# Patient Record
Sex: FEMALE | Race: WHITE | HISPANIC OR LATINO | Employment: FULL TIME | URBAN - METROPOLITAN AREA
[De-identification: names, ages, dates, MRNs, and addresses within clinical notes are randomized per-mention and may not be internally consistent; named-entity substitution may affect disease eponyms.]

---

## 2017-03-03 DIAGNOSIS — N60.09 SOLITARY CYST OF BREAST: ICD-10-CM

## 2017-03-03 DIAGNOSIS — Z12.31 ENCOUNTER FOR SCREENING MAMMOGRAM FOR MALIGNANT NEOPLASM OF BREAST: ICD-10-CM

## 2017-03-06 ENCOUNTER — HOSPITAL ENCOUNTER (OUTPATIENT)
Dept: MAMMOGRAPHY | Facility: CLINIC | Age: 52
Discharge: HOME | End: 2017-03-06
Payer: COMMERCIAL

## 2017-03-06 DIAGNOSIS — Z12.31 ENCOUNTER FOR SCREENING MAMMOGRAM FOR MALIGNANT NEOPLASM OF BREAST: ICD-10-CM

## 2017-03-06 PROCEDURE — G0202 SCR MAMMO BI INCL CAD: HCPCS

## 2017-03-22 ENCOUNTER — HOSPITAL ENCOUNTER (OUTPATIENT)
Dept: MAMMOGRAPHY | Facility: CLINIC | Age: 52
Discharge: HOME | End: 2017-03-22
Payer: COMMERCIAL

## 2017-03-22 ENCOUNTER — HOSPITAL ENCOUNTER (OUTPATIENT)
Dept: ULTRASOUND IMAGING | Facility: CLINIC | Age: 52
Discharge: HOME | End: 2017-03-22
Payer: COMMERCIAL

## 2017-03-22 DIAGNOSIS — N60.09 SOLITARY CYST OF BREAST: ICD-10-CM

## 2017-03-22 PROCEDURE — 76642 ULTRASOUND BREAST LIMITED: CPT

## 2017-03-22 PROCEDURE — G0206 DX MAMMO INCL CAD UNI: HCPCS

## 2017-04-17 ENCOUNTER — ALLSCRIPTS OFFICE VISIT (OUTPATIENT)
Dept: OTHER | Facility: OTHER | Age: 52
End: 2017-04-17

## 2017-04-17 ENCOUNTER — TRANSCRIBE ORDERS (OUTPATIENT)
Dept: LAB | Facility: CLINIC | Age: 52
End: 2017-04-17

## 2017-04-17 ENCOUNTER — LAB (OUTPATIENT)
Dept: LAB | Facility: CLINIC | Age: 52
End: 2017-04-17
Payer: COMMERCIAL

## 2017-04-17 DIAGNOSIS — R10.9 ABDOMINAL PAIN: ICD-10-CM

## 2017-04-17 DIAGNOSIS — E07.9 DISORDER OF THYROID: ICD-10-CM

## 2017-04-17 DIAGNOSIS — Z86.39 PERSONAL HISTORY OF OTHER ENDOCRINE, NUTRITIONAL AND METABOLIC DISEASE: ICD-10-CM

## 2017-04-17 LAB
ALBUMIN SERPL BCP-MCNC: 3.6 G/DL (ref 3.5–5)
ALP SERPL-CCNC: 110 U/L (ref 46–116)
ALT SERPL W P-5'-P-CCNC: 20 U/L (ref 12–78)
AMYLASE SERPL-CCNC: 56 IU/L (ref 25–115)
ANION GAP SERPL CALCULATED.3IONS-SCNC: 7 MMOL/L (ref 4–13)
AST SERPL W P-5'-P-CCNC: 20 U/L (ref 5–45)
BASOPHILS # BLD AUTO: 0.01 THOUSANDS/ΜL (ref 0–0.1)
BASOPHILS NFR BLD AUTO: 0 % (ref 0–1)
BILIRUB SERPL-MCNC: 0.42 MG/DL (ref 0.2–1)
BUN SERPL-MCNC: 14 MG/DL (ref 5–25)
CALCIUM SERPL-MCNC: 8.8 MG/DL (ref 8.3–10.1)
CHLORIDE SERPL-SCNC: 107 MMOL/L (ref 100–108)
CHOLEST SERPL-MCNC: 158 MG/DL (ref 50–200)
CO2 SERPL-SCNC: 27 MMOL/L (ref 21–32)
CREAT SERPL-MCNC: 0.64 MG/DL (ref 0.6–1.3)
EOSINOPHIL # BLD AUTO: 0.19 THOUSAND/ΜL (ref 0–0.61)
EOSINOPHIL NFR BLD AUTO: 3 % (ref 0–6)
ERYTHROCYTE [DISTWIDTH] IN BLOOD BY AUTOMATED COUNT: 13.8 % (ref 11.6–15.1)
GFR SERPL CREATININE-BSD FRML MDRD: >60 ML/MIN/1.73SQ M
GLUCOSE P FAST SERPL-MCNC: 88 MG/DL (ref 65–99)
HCT VFR BLD AUTO: 37.7 % (ref 34.8–46.1)
HDLC SERPL-MCNC: 76 MG/DL (ref 40–60)
HGB BLD-MCNC: 12.2 G/DL (ref 11.5–15.4)
LDLC SERPL CALC-MCNC: 53 MG/DL (ref 0–100)
LIPASE SERPL-CCNC: 253 U/L (ref 73–393)
LYMPHOCYTES # BLD AUTO: 2.56 THOUSANDS/ΜL (ref 0.6–4.47)
LYMPHOCYTES NFR BLD AUTO: 46 % (ref 14–44)
MCH RBC QN AUTO: 30 PG (ref 26.8–34.3)
MCHC RBC AUTO-ENTMCNC: 32.4 G/DL (ref 31.4–37.4)
MCV RBC AUTO: 93 FL (ref 82–98)
MONOCYTES # BLD AUTO: 0.53 THOUSAND/ΜL (ref 0.17–1.22)
MONOCYTES NFR BLD AUTO: 9 % (ref 4–12)
NEUTROPHILS # BLD AUTO: 2.37 THOUSANDS/ΜL (ref 1.85–7.62)
NEUTS SEG NFR BLD AUTO: 42 % (ref 43–75)
NRBC BLD AUTO-RTO: 0 /100 WBCS
PLATELET # BLD AUTO: 257 THOUSANDS/UL (ref 149–390)
PMV BLD AUTO: 10.6 FL (ref 8.9–12.7)
POTASSIUM SERPL-SCNC: 3.8 MMOL/L (ref 3.5–5.3)
PROT SERPL-MCNC: 7 G/DL (ref 6.4–8.2)
RBC # BLD AUTO: 4.07 MILLION/UL (ref 3.81–5.12)
SODIUM SERPL-SCNC: 141 MMOL/L (ref 136–145)
T4 FREE SERPL-MCNC: 1.11 NG/DL (ref 0.76–1.46)
TRIGL SERPL-MCNC: 146 MG/DL
TSH SERPL DL<=0.05 MIU/L-ACNC: 1.17 UIU/ML (ref 0.36–3.74)
WBC # BLD AUTO: 5.66 THOUSAND/UL (ref 4.31–10.16)

## 2017-04-17 PROCEDURE — 36415 COLL VENOUS BLD VENIPUNCTURE: CPT

## 2017-04-17 PROCEDURE — 84439 ASSAY OF FREE THYROXINE: CPT

## 2017-04-17 PROCEDURE — 86803 HEPATITIS C AB TEST: CPT

## 2017-04-17 PROCEDURE — 80053 COMPREHEN METABOLIC PANEL: CPT

## 2017-04-17 PROCEDURE — 84443 ASSAY THYROID STIM HORMONE: CPT

## 2017-04-17 PROCEDURE — 80061 LIPID PANEL: CPT

## 2017-04-17 PROCEDURE — 85025 COMPLETE CBC W/AUTO DIFF WBC: CPT

## 2017-04-17 PROCEDURE — 82150 ASSAY OF AMYLASE: CPT

## 2017-04-17 PROCEDURE — 83690 ASSAY OF LIPASE: CPT

## 2017-04-18 LAB — HCV AB SER QL: NORMAL

## 2017-04-22 ENCOUNTER — HOSPITAL ENCOUNTER (OUTPATIENT)
Dept: ULTRASOUND IMAGING | Facility: HOSPITAL | Age: 52
Discharge: HOME | End: 2017-04-22
Payer: COMMERCIAL

## 2017-04-22 DIAGNOSIS — R10.9 ABDOMINAL PAIN: ICD-10-CM

## 2017-04-22 PROCEDURE — 76705 ECHO EXAM OF ABDOMEN: CPT

## 2017-06-14 ENCOUNTER — HOSPITAL ENCOUNTER (EMERGENCY)
Facility: HOSPITAL | Age: 52
Discharge: HOME | End: 2017-06-14
Attending: EMERGENCY MEDICINE
Payer: COMMERCIAL

## 2017-06-14 ENCOUNTER — APPOINTMENT (EMERGENCY)
Dept: RADIOLOGY | Facility: HOSPITAL | Age: 52
End: 2017-06-14
Payer: COMMERCIAL

## 2017-06-14 VITALS
SYSTOLIC BLOOD PRESSURE: 186 MMHG | BODY MASS INDEX: 21.35 KG/M2 | TEMPERATURE: 98.1 F | RESPIRATION RATE: 16 BRPM | DIASTOLIC BLOOD PRESSURE: 86 MMHG | WEIGHT: 136 LBS | OXYGEN SATURATION: 100 % | HEIGHT: 67 IN | HEART RATE: 74 BPM

## 2017-06-14 DIAGNOSIS — M25.519 SHOULDER PAIN: Primary | ICD-10-CM

## 2017-06-14 PROCEDURE — 96372 THER/PROPH/DIAG INJ SC/IM: CPT

## 2017-06-14 PROCEDURE — 73030 X-RAY EXAM OF SHOULDER: CPT

## 2017-06-14 PROCEDURE — 99283 EMERGENCY DEPT VISIT LOW MDM: CPT

## 2017-06-14 RX ORDER — NAPROXEN 500 MG/1
500 TABLET ORAL 2 TIMES DAILY WITH MEALS
Qty: 30 TABLET | Refills: 0 | Status: SHIPPED | OUTPATIENT
Start: 2017-06-14 | End: 2018-04-27 | Stop reason: ALTCHOICE

## 2017-06-14 RX ORDER — KETOROLAC TROMETHAMINE 30 MG/ML
30 INJECTION, SOLUTION INTRAMUSCULAR; INTRAVENOUS ONCE
Status: COMPLETED | OUTPATIENT
Start: 2017-06-14 | End: 2017-06-14

## 2017-06-14 RX ADMIN — KETOROLAC TROMETHAMINE 30 MG: 30 INJECTION, SOLUTION INTRAMUSCULAR at 17:01

## 2017-06-21 ENCOUNTER — APPOINTMENT (OUTPATIENT)
Dept: LAB | Facility: CLINIC | Age: 52
End: 2017-06-21
Payer: COMMERCIAL

## 2017-06-21 ENCOUNTER — TRANSCRIBE ORDERS (OUTPATIENT)
Dept: LAB | Facility: CLINIC | Age: 52
End: 2017-06-21

## 2017-06-21 DIAGNOSIS — Z00.8 HEALTH EXAMINATION IN POPULATION SURVEY: ICD-10-CM

## 2017-06-21 DIAGNOSIS — Z00.8 HEALTH EXAMINATION IN POPULATION SURVEY: Primary | ICD-10-CM

## 2017-06-21 LAB
CHOLEST SERPL-MCNC: 215 MG/DL (ref 50–200)
EST. AVERAGE GLUCOSE BLD GHB EST-MCNC: 123 MG/DL
HBA1C MFR BLD: 5.9 % (ref 4.2–6.3)
HDLC SERPL-MCNC: 98 MG/DL (ref 40–60)
LDLC SERPL CALC-MCNC: 97 MG/DL (ref 0–100)
TRIGL SERPL-MCNC: 102 MG/DL

## 2017-06-21 PROCEDURE — 36415 COLL VENOUS BLD VENIPUNCTURE: CPT

## 2017-06-21 PROCEDURE — 80061 LIPID PANEL: CPT

## 2017-06-21 PROCEDURE — 83036 HEMOGLOBIN GLYCOSYLATED A1C: CPT

## 2017-06-23 ENCOUNTER — HOSPITAL ENCOUNTER (OUTPATIENT)
Dept: NON INVASIVE DIAGNOSTICS | Facility: CLINIC | Age: 52
Discharge: HOME | End: 2017-06-23
Payer: COMMERCIAL

## 2017-06-23 DIAGNOSIS — M79.609 PAIN IN EXTREMITY: ICD-10-CM

## 2017-06-23 PROCEDURE — 93971 EXTREMITY STUDY: CPT

## 2017-08-14 DIAGNOSIS — M25.552 PAIN IN LEFT HIP: ICD-10-CM

## 2017-08-14 DIAGNOSIS — M85.00 FIBROUS DYSPLASIA, MONOSTOTIC: ICD-10-CM

## 2017-08-29 ENCOUNTER — APPOINTMENT (OUTPATIENT)
Dept: RADIOLOGY | Facility: CLINIC | Age: 52
End: 2017-08-29
Payer: COMMERCIAL

## 2017-08-29 DIAGNOSIS — M25.552 PAIN IN LEFT HIP: ICD-10-CM

## 2017-08-29 PROCEDURE — 73502 X-RAY EXAM HIP UNI 2-3 VIEWS: CPT

## 2017-08-31 ENCOUNTER — TRANSCRIBE ORDERS (OUTPATIENT)
Dept: ADMINISTRATIVE | Facility: HOSPITAL | Age: 52
End: 2017-08-31

## 2017-08-31 DIAGNOSIS — M25.552 LEFT HIP PAIN: ICD-10-CM

## 2017-08-31 DIAGNOSIS — M85.00 FIBROUS DYSPLASIA OF BONE: Primary | ICD-10-CM

## 2017-08-31 DIAGNOSIS — M25.522 LEFT ELBOW PAIN: ICD-10-CM

## 2017-09-07 ENCOUNTER — HOSPITAL ENCOUNTER (OUTPATIENT)
Dept: MRI IMAGING | Facility: CLINIC | Age: 52
Discharge: HOME | End: 2017-09-07
Payer: COMMERCIAL

## 2017-09-07 DIAGNOSIS — M85.00 FIBROUS DYSPLASIA, MONOSTOTIC: ICD-10-CM

## 2017-09-07 DIAGNOSIS — M25.552 PAIN IN LEFT HIP: ICD-10-CM

## 2017-09-07 PROCEDURE — 73721 MRI JNT OF LWR EXTRE W/O DYE: CPT

## 2017-09-25 DIAGNOSIS — N60.09 SOLITARY CYST OF BREAST: ICD-10-CM

## 2017-10-28 ENCOUNTER — ALLSCRIPTS OFFICE VISIT (OUTPATIENT)
Dept: OTHER | Facility: OTHER | Age: 52
End: 2017-10-28

## 2017-10-29 NOTE — PROGRESS NOTES
Assessment  1  Acute suppurative otitis media (382 00) (H66 009)    Plan  Acute suppurative otitis media    · Amoxicillin-Pot Clavulanate 875-125 MG Oral Tablet; TAKE 1 TABLET EVERY 12 HOURS DAILY    Discussion/Summary  Discussion Summary:   She has bilateral otitis media  She was given a prescription for Augmentin  Side effects explained  will also take an antihistamine decongestant  Recheck next week if not improved  Chief Complaint  Chief Complaint Free Text Note Form: Acute sore throat and bilateral ear pain      History of Present Illness  HPI: She's been ill for 48 hours  She has bilateral ear pain and some pharyngitis  No rhinitis  Slight cough  No chest pain  No shortness of breath  Review of Systems  Complete-Female:   Constitutional: feeling poorly,-- chills-- and-- feeling tired, but-- as noted in HPI  Eyes: No complaints of eye pain, no red eyes, no eyesight problems, no discharge, no dry eyes, no itching of eyes  ENT: earache,-- sore throat-- and-- hoarseness, but-- no nasal discharge  Cardiovascular: No complaints of slow heart rate, no fast heart rate, no chest pain, no palpitations, no leg claudication, no lower extremity edema  Respiratory: as noted in HPI  Gastrointestinal: No complaints of abdominal pain, no constipation, no nausea or vomiting, no diarrhea, no bloody stools  Genitourinary: No complaints of dysuria, no incontinence, no pelvic pain, no dysmenorrhea, no vaginal discharge or bleeding  Active Problems  1  Abdominal pain (789 00) (R10 9)   2  Acute maxillary sinusitis (461 0) (J01 00)   3  Breast cyst (610 0) (N60 09)   4  Cervicalgia (723 1) (M54 2)   5  Cough (786 2) (R05)   6  Eustachian tube disorder (381 9) (H69 90)   7  Headache (784 0) (R51)   8  Hiatal hernia (553 3) (K44 9)   9  Left hip pain (719 45) (M25 552)   10  Limb pain (729 5) (M79 609)   11  Migraines (346 90) (G43 909)   12  Need for hepatitis C screening test (V73 89) (Z11 59)   13   Other screening mammogram (V76 12) (Z12 31)   14  Positional vertigo, right (386 19) (H81 11)   15  Rotator cuff injury (959 2) (S46 009A)   16  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)   17  Thyroid disease (246 9) (E07 9)    Past Medical History  1  History of Graves' disease (V12 29) (Z86 39)   2  History of Left hip pain (719 45) (M25 552)    Surgical History  1  History of Appendectomy   2  History of Excision Of Neuroma   3  History of Surgery Excision Lipoma    Family History  Mother    1  Family history of borderline diabetes mellitus (V18 0) (Z84 89)   2  Family history of hypercholesterolemia (V18 19) (Z83 42)  Father    3  Family history of epilepsy (V17 2) (Z82 0)  Sister    4  Family history of malignant neoplasm of brain (V16 8) (Z80 8)  Brother    5  Family history of    6  Family history of epilepsy (V17 2) (Z82 0)   7  Family history of Headache    Social History   · Current every day smoker (305 1) (F17 200)   · Employed   · No alcohol use   · Single  Social History Reviewed: The social history was reviewed and updated today  The social history was reviewed and is unchanged  Current Meds   1  Excedrin Extra Strength CAPS; TAKE 2 CAPSULE 4 times daily PRN; Therapy: (Recorded:73Vlt1328) to Recorded   2  Meclizine HCl - 25 MG Oral Tablet; TAKE 1 TABLET 3 TIMES DAILY AS NEEDED; Therapy: 97HBP1674 to (Evaluate:73Fim6591)  Requested for: 30PHF6195; Last Rx:03Vsp1304   Ordered   3  Multivitamins TABS; TAKE 1 TABLET DAILY; Therapy: (Recorded:14Zxp1481) to Recorded   4  PredniSONE 10 MG Oral Tablet; TAKE 4 TABLETS DAILY FOR 2 DAYS,3 TABLETS DAILY FOR 2   DAYS, 2 TABLETS DAILY FOR 2 DAYS AND 1 TABLET DAILY FOR 2 DAYS, THEN STOP; Therapy: 04XHX1946 to (Last Rx:75Hcd3078)  Requested for: 95LPV8773 Ordered   5  SUMAtriptan Succinate 100 MG Oral Tablet; take 1 tablet by mouth at onset of migraine headache,   may repeat onceafter 2 hours  maximum 2 tablets per 24 hours;    Therapy: 00AEX5496 to (Evaluate:09Apr2017)  Requested for: 61WIM9087; Last Rx:10Mar2017   Ordered   6  TraMADol HCl - 50 MG Oral Tablet; take one tablet by mouth every six hours as needed; Therapy: 01Sep2017 to (Last Rx:01Sep2017) Ordered  Medication List Reviewed: The medication list was reviewed and updated today  Allergies  1  Compazine TABS    Vitals  Vital Signs    Recorded: 79QBR5560 11:16AM   Temperature 97 8 F   Heart Rate 75   Systolic 482   Diastolic 88   O2 Saturation 100     Physical Exam    Constitutional   General appearance: Abnormal  -- She is in moderate distress  Eyes   Conjunctiva and lids: No swelling, erythema or discharge  Pupils and irises: Equal, round and reactive to light  Ears, Nose, Mouth, and Throat   Otoscopic examination: Abnormal  -- Tympanic membranes are dull and bulging bilaterally  No erythema  No perforation  Oropharynx: Abnormal  -- Posterior pharynx is markedly hyperemic  Pulmonary   Respiratory effort: No increased work of breathing or signs of respiratory distress  Auscultation of lungs: Clear to auscultation  Cardiovascular   Auscultation of heart: Normal rate and rhythm, normal S1 and S2, without murmurs  Abdomen   Abdomen: Non-tender, no masses  Future Appointments    Date/Time Provider Specialty Site   04/25/2018 09:00 AM SUSU Mckenzie   Orthopedic Surgery [address]     Signatures   Electronically signed by : Gustavo Levy DO; Oct 28 2017 12:48PM EST                       (Author)

## 2017-11-14 ENCOUNTER — GENERIC CONVERSION - ENCOUNTER (OUTPATIENT)
Dept: OTHER | Facility: OTHER | Age: 52
End: 2017-11-14

## 2017-11-15 NOTE — PROGRESS NOTES
Chief Complaint  Chief Complaint Free Text Note Form: Vaginal itching      History of Present Illness  HPI: The patient came to me in the office because I had given her an antibiotic for otitis media  The otitis media has resolved however she has developed some vaginal itching consistent with a yeast infection  She was given 2 doses of Diflucan to take  She is advised to see her gynecologist in follow-up  This is a especially since she has not had a recent pelvic examination  The importance of this was stressed to the patient  Active Problems  1  Abdominal pain (789 00) (R10 9)   2  Acute maxillary sinusitis (461 0) (J01 00)   3  Acute suppurative otitis media (382 00) (H66 009)   4  Breast cyst (610 0) (N60 09)   5  Cervicalgia (723 1) (M54 2)   6  Cough (786 2) (R05)   7  Eustachian tube disorder (381 9) (H69 90)   8  Headache (784 0) (R51)   9  Hiatal hernia (553 3) (K44 9)   10  Left hip pain (719 45) (M25 552)   11  Limb pain (729 5) (M79 609)   12  Migraines (346 90) (G43 909)   13  Need for hepatitis C screening test (V73 89) (Z11 59)   14  Other screening mammogram (V76 12) (Z12 31)   15  Positional vertigo, right (386 19) (H81 11)   16  Rotator cuff injury (959 2) (S46 009A)   17  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)   18  Thyroid disease (246 9) (E07 9)   19  Yeast infection (112 9) (B37 9)    Past Medical History  1  History of Graves' disease (V12 29) (Z86 39)   2  History of Left hip pain (719 45) (M25 552)    Surgical History  1  History of Appendectomy   2  History of Excision Of Neuroma   3  History of Surgery Excision Lipoma    Family History  Mother    1  Family history of borderline diabetes mellitus (V18 0) (Z84 89)   2  Family history of hypercholesterolemia (V18 19) (Z83 42)  Father    3  Family history of epilepsy (V17 2) (Z82 0)  Sister    4  Family history of malignant neoplasm of brain (V16 8) (Z80 8)  Brother    5  Family history of    6   Family history of epilepsy (V17 2) (Z82 0)   7  Family history of Headache    Social History     · Current every day smoker (305 1) (F17 200)   · Employed   · No alcohol use   · Single    Current Meds   1  Amoxicillin-Pot Clavulanate 875-125 MG Oral Tablet; TAKE 1 TABLET EVERY 12 HOURS DAILY; Therapy: 41SMK1863 to (Evaluate:11Nov2017)  Requested for: 28Oct2017; Last Rx:28Oct2017 Ordered   2  Excedrin Extra Strength CAPS; TAKE 2 CAPSULE 4 times daily PRN; Therapy: (Recorded:22Rat6820) to Recorded   3  Fluconazole 100 MG Oral Tablet (Diflucan); TAKE 1 TABLET ONCE  MAY REPEAT IN 2 DAYS ; Therapy: 85QMZ2072 to (Last Rx:14Nov2017); Status: ACTIVE - Retrospective By Protocol Authorization Ordered   4  Meclizine HCl - 25 MG Oral Tablet; TAKE 1 TABLET 3 TIMES DAILY AS NEEDED; Therapy: 01RFO9066 to (Evaluate:13Sep2016)  Requested for: 12KIC3705; Last Rx:85Glo2917 Ordered   5  Multivitamins TABS; TAKE 1 TABLET DAILY; Therapy: (Recorded:26Fzy7328) to Recorded   6  PredniSONE 10 MG Oral Tablet; TAKE 4 TABLETS DAILY FOR 2 DAYS,3 TABLETS DAILY FOR 2 DAYS, 2 TABLETS DAILY FOR 2 DAYS AND 1 TABLET DAILY FOR 2 DAYS, THEN STOP; Therapy: 16YJL4219 to (Last Rx:15Jun2017)  Requested for: 29EOY5133 Ordered   7  SUMAtriptan Succinate 100 MG Oral Tablet (Imitrex); take 1 tablet by mouth at onset of migraine headache, may repeat onceafter 2 hours  maximum 2 tablets per 24 hours; Therapy: 62ZAJ7514 to (Evaluate:09Apr2017)  Requested for: 48KOT3080; Last Rx:10Mar2017 Ordered   8  TraMADol HCl - 50 MG Oral Tablet; take one tablet by mouth every six hours as needed; Therapy: 08Ggv4102 to (Last Rx:63Lzx9441) Ordered    Allergies  1  Compazine TABS    Future Appointments    Date/Time Provider Specialty Site   04/25/2018 09:00 AM SUSU Mckenzie   Orthopedic Surgery [address]       Signatures   Electronically signed by : uGstavo Levy DO; Nov 14 2017  2:07PM EST                       (Author)

## 2018-01-12 VITALS
TEMPERATURE: 97.8 F | SYSTOLIC BLOOD PRESSURE: 130 MMHG | HEART RATE: 75 BPM | DIASTOLIC BLOOD PRESSURE: 88 MMHG | OXYGEN SATURATION: 100 %

## 2018-01-14 VITALS
RESPIRATION RATE: 12 BRPM | HEIGHT: 66 IN | WEIGHT: 135.25 LBS | HEART RATE: 60 BPM | SYSTOLIC BLOOD PRESSURE: 122 MMHG | DIASTOLIC BLOOD PRESSURE: 84 MMHG | BODY MASS INDEX: 21.74 KG/M2

## 2018-02-09 DIAGNOSIS — Z12.39 SCREENING FOR BREAST CANCER: Primary | ICD-10-CM

## 2018-03-06 DIAGNOSIS — F41.9 ANXIETY: Primary | ICD-10-CM

## 2018-03-06 RX ORDER — ALPRAZOLAM 0.25 MG/1
0.25 TABLET ORAL 2 TIMES DAILY PRN
Qty: 30 TABLET | Refills: 0 | Status: SHIPPED | OUTPATIENT
Start: 2018-03-06 | End: 2018-04-25

## 2018-04-16 ENCOUNTER — HOSPITAL ENCOUNTER (OUTPATIENT)
Dept: MAMMOGRAPHY | Facility: CLINIC | Age: 53
Discharge: HOME | End: 2018-04-16
Payer: COMMERCIAL

## 2018-04-16 DIAGNOSIS — Z12.39 SCREENING FOR BREAST CANCER: ICD-10-CM

## 2018-04-16 PROCEDURE — 77067 SCR MAMMO BI INCL CAD: CPT

## 2018-04-16 PROCEDURE — 77063 BREAST TOMOSYNTHESIS BI: CPT

## 2018-04-25 ENCOUNTER — TELEPHONE (OUTPATIENT)
Dept: OBGYN CLINIC | Facility: HOSPITAL | Age: 53
End: 2018-04-25

## 2018-04-25 ENCOUNTER — APPOINTMENT (OUTPATIENT)
Dept: RADIOLOGY | Facility: MEDICAL CENTER | Age: 53
End: 2018-04-25
Payer: COMMERCIAL

## 2018-04-25 ENCOUNTER — OFFICE VISIT (OUTPATIENT)
Dept: OBGYN CLINIC | Facility: MEDICAL CENTER | Age: 53
End: 2018-04-25
Payer: COMMERCIAL

## 2018-04-25 VITALS
HEART RATE: 73 BPM | RESPIRATION RATE: 18 BRPM | DIASTOLIC BLOOD PRESSURE: 76 MMHG | WEIGHT: 133.4 LBS | BODY MASS INDEX: 20.89 KG/M2 | SYSTOLIC BLOOD PRESSURE: 113 MMHG

## 2018-04-25 DIAGNOSIS — M25.561 ACUTE PAIN OF RIGHT KNEE: ICD-10-CM

## 2018-04-25 DIAGNOSIS — M16.7 OTHER SECONDARY OSTEOARTHRITIS OF LEFT HIP: Primary | ICD-10-CM

## 2018-04-25 DIAGNOSIS — M76.31 ILIOTIBIAL BAND SYNDROME OF RIGHT SIDE: ICD-10-CM

## 2018-04-25 DIAGNOSIS — M70.62 TROCHANTERIC BURSITIS OF LEFT HIP: ICD-10-CM

## 2018-04-25 PROCEDURE — 99204 OFFICE O/P NEW MOD 45 MIN: CPT | Performed by: ORTHOPAEDIC SURGERY

## 2018-04-25 PROCEDURE — 20610 DRAIN/INJ JOINT/BURSA W/O US: CPT | Performed by: ORTHOPAEDIC SURGERY

## 2018-04-25 PROCEDURE — 73562 X-RAY EXAM OF KNEE 3: CPT

## 2018-04-25 RX ORDER — DIPHENOXYLATE HYDROCHLORIDE AND ATROPINE SULFATE 2.5; .025 MG/1; MG/1
1 TABLET ORAL DAILY
COMMUNITY
End: 2022-06-29 | Stop reason: ALTCHOICE

## 2018-04-25 RX ORDER — LIDOCAINE HYDROCHLORIDE 10 MG/ML
2 INJECTION, SOLUTION INFILTRATION; PERINEURAL
Status: COMPLETED | OUTPATIENT
Start: 2018-04-25 | End: 2018-04-25

## 2018-04-25 RX ORDER — SUMATRIPTAN 100 MG/1
TABLET, FILM COATED ORAL ONCE AS NEEDED
COMMUNITY
Start: 2016-07-07 | End: 2022-06-29

## 2018-04-25 RX ORDER — LORAZEPAM 1 MG/1
1 TABLET ORAL
Qty: 1 TABLET | Refills: 0 | Status: SHIPPED | OUTPATIENT
Start: 2018-04-25 | End: 2018-06-19 | Stop reason: CLARIF

## 2018-04-25 RX ORDER — TITANIUM DIOXIDE, OCTINOXATE, ZINC OXIDE 4.61; 1.6; .78 G/40ML; G/40ML; G/40ML
400 CREAM TOPICAL AS NEEDED
COMMUNITY
End: 2020-07-22 | Stop reason: ALTCHOICE

## 2018-04-25 RX ORDER — IBUPROFEN 200 MG
1 CAPSULE ORAL DAILY
COMMUNITY
End: 2018-07-22 | Stop reason: ALTCHOICE

## 2018-04-25 RX ORDER — PNV NO.121/IRON/FOLIC ACID 28MG-0.8MG
1 TABLET ORAL DAILY
COMMUNITY
End: 2018-04-25

## 2018-04-25 RX ORDER — MECLIZINE HYDROCHLORIDE 25 MG/1
1 TABLET ORAL 3 TIMES DAILY PRN
COMMUNITY
Start: 2016-07-07 | End: 2018-10-19

## 2018-04-25 RX ORDER — LANOLIN ALCOHOL/MO/W.PET/CERES
1 CREAM (GRAM) TOPICAL AS NEEDED
COMMUNITY
End: 2022-06-29 | Stop reason: ALTCHOICE

## 2018-04-25 RX ORDER — TRAMADOL HYDROCHLORIDE 50 MG/1
1 TABLET ORAL EVERY 6 HOURS PRN
COMMUNITY
Start: 2017-09-01 | End: 2018-04-25

## 2018-04-25 RX ORDER — ASCORBIC ACID 500 MG
500 TABLET ORAL DAILY
COMMUNITY
End: 2018-10-19

## 2018-04-25 RX ORDER — METHYLPREDNISOLONE ACETATE 40 MG/ML
2 INJECTION, SUSPENSION INTRA-ARTICULAR; INTRALESIONAL; INTRAMUSCULAR; SOFT TISSUE
Status: COMPLETED | OUTPATIENT
Start: 2018-04-25 | End: 2018-04-25

## 2018-04-25 RX ORDER — BUPIVACAINE HYDROCHLORIDE 2.5 MG/ML
2 INJECTION, SOLUTION INFILTRATION; PERINEURAL
Status: DISCONTINUED | OUTPATIENT
Start: 2018-04-25 | End: 2018-05-08

## 2018-04-25 RX ORDER — PREDNISONE 10 MG/1
TABLET ORAL
COMMUNITY
Start: 2017-06-15 | End: 2018-04-25

## 2018-04-25 RX ORDER — METHYLPREDNISOLONE ACETATE 40 MG/ML
1 INJECTION, SUSPENSION INTRA-ARTICULAR; INTRALESIONAL; INTRAMUSCULAR; SOFT TISSUE
Status: COMPLETED | OUTPATIENT
Start: 2018-04-25 | End: 2018-04-25

## 2018-04-25 RX ORDER — AMOXICILLIN AND CLAVULANATE POTASSIUM 875; 125 MG/1; MG/1
1 TABLET, FILM COATED ORAL EVERY 12 HOURS
COMMUNITY
Start: 2017-10-28 | End: 2018-04-25

## 2018-04-25 RX ORDER — MELOXICAM 15 MG/1
15 TABLET ORAL DAILY
Qty: 30 TABLET | Refills: 0 | Status: SHIPPED | OUTPATIENT
Start: 2018-04-25 | End: 2018-06-03 | Stop reason: SDUPTHER

## 2018-04-25 RX ORDER — FLUCONAZOLE 100 MG/1
1 TABLET ORAL
COMMUNITY
Start: 2017-11-14 | End: 2018-04-25

## 2018-04-25 RX ADMIN — METHYLPREDNISOLONE ACETATE 1 ML: 40 INJECTION, SUSPENSION INTRA-ARTICULAR; INTRALESIONAL; INTRAMUSCULAR; SOFT TISSUE at 10:43

## 2018-04-25 RX ADMIN — LIDOCAINE HYDROCHLORIDE 2 ML: 10 INJECTION, SOLUTION INFILTRATION; PERINEURAL at 10:43

## 2018-04-25 RX ADMIN — METHYLPREDNISOLONE ACETATE 2 ML: 40 INJECTION, SUSPENSION INTRA-ARTICULAR; INTRALESIONAL; INTRAMUSCULAR; SOFT TISSUE at 10:44

## 2018-04-25 RX ADMIN — LIDOCAINE HYDROCHLORIDE 2 ML: 10 INJECTION, SOLUTION INFILTRATION; PERINEURAL at 10:44

## 2018-04-25 NOTE — PROGRESS NOTES
47 y o female Who presents for evaluation of both her left hip as well as her right knee  Patient describes pain as laterally based as well as groin pain which is been present now for the past 5 years  Patient denies history of trauma but was evaluated at that time and was instructed that she had arthritis  Patient states that at the time she was given very limited options and has since then been dealing with the pain  She denies any numbness or tingling  She is not had any formal intervention  The patient is also states since this time she's had increasing right knee pain described as anteriorly based worse as she kneels on it and feels that at time her knee Does pop  She denies any locking or catching  The patient states that she does like to be active and continues to workout at the gym     Review of Systems  Review of systems negative unless otherwise specified in HPI    Past Medical History  History reviewed  No pertinent past medical history  Past Surgical History  History reviewed  No pertinent surgical history  Current Medications  Current Outpatient Prescriptions on File Prior to Visit   Medication Sig Dispense Refill    naproxen (NAPROSYN) 500 mg tablet Take 1 tablet by mouth 2 (two) times a day with meals 30 tablet 0    [DISCONTINUED] ALPRAZolam (XANAX) 0 25 mg tablet Take 1 tablet (0 25 mg total) by mouth 2 (two) times a day as needed for anxiety 30 tablet 0     No current facility-administered medications on file prior to visit  Recent Labs (HCT,HGB,PT,INR,ESR,CRP,GLU,HgA1C)    0  Lab Value Date/Time   HCT 37 7 04/17/2017 1648   HGB 12 2 04/17/2017 1648   WBC 5 66 04/17/2017 1648   HGBA1C 5 9 06/21/2017 0847         Physical exam  · General: Awake, Alert, Oriented  · Eyes: Pupils equal, round and reactive to light  · Heart: regular rate and rhythm  · Lungs: No audible wheezing  · Abdomen: soft    Right knee has no obvious effusion or increase in loss    Patient does have tenderness to palpation over the tibial tubercle  Patient does have pain with patellar grind  No apprehension  Patient does have full extension with flexion up to 130  No varus and valgus instability  Patient has no medial or lateral joint line tenderness  With excursion patient does have snapping with the knee anteriorly  Patient has normal stress the quadriceps is Devoid of atrophy  Patient has no pain with logroll of the hip  Pain over Briana's    Left hip has tenderness over the IT band as well as the trochanteric flare  Patient does have pain with logroll  Patient has a positive Stinchfield pain with flexion adduction and internal rotation  Negative GALO   sTRENGTH IS APPROPRIATE HAVE A LIMITED SECONDARY TO PAIN   pATIENT HAS NO PAIN AT THE KNEE NO PAIN AT HER ANKLE   pALPABLE PULSES NORMAL SENSATION negative straight leg  Imaging  Imaging August 2017 left hip demonstrates patient to have joint space narrowing with evidence of calcinosis with the Hip joint in for aspect of the femoral acetabular joint  Symmetric wear pattern with mild protrusio    MRI left hip and traced patient to have evidence of degenerative changes within the femoral acetabular joint     Subchondral cystic changes in the left femoral head    Large joint arthrocentesis  Date/Time: 4/25/2018 10:43 AM  Consent given by: patient  Supporting Documentation  Indications: pain   Procedure Details  Location: hip - L greater trochanteric bursa  Approach: lateral  Medications administered: 2 mL lidocaine 1 %; 1 mL methylPREDNISolone acetate 40 mg/mL    Patient tolerance: patient tolerated the procedure well with no immediate complications      Large joint arthrocentesis  Date/Time: 4/25/2018 10:44 AM  Consent given by: patient  Supporting Documentation  Indications: pain   Procedure Details  Location: knee - R knee  Approach: anterolateral  Medications administered: 2 mL lidocaine 1 %; 2 mL methylPREDNISolone acetate 40 mg/mL    Patient tolerance: patient tolerated the procedure well with no immediate complications            Assessment/Plan:   47 y  o female With left hip degenerative arthritis with symmetrical wear pattern and evidence of calcinosis  Greater chandni bursitis left side     IT band syndrome With pain over gerdys  tubercle right knee with patellofemoral syndrome    Today we provided the patient with 2 injections  Left greater trochanter bursitis and right knee Gerdy's tubercle       Recommended oral anti-inflammatories meloxicam    Fluoroscopic left hip injection and will follow-up with results    Evaluation by rheumatology as patient does report a history concerning for right knots and with x-ray demonstrating a more symmetric pattern this may be a more inflammatory pathology    We also provide the patient with a list of exercises to work on VMO strengthening

## 2018-04-25 NOTE — TELEPHONE ENCOUNTER
Kiara- dr Marcia Velazco patient  - [phone]  Patient was seen today during an appointment and had an injection in her hip and knee  She is requesting to speak to you

## 2018-04-25 NOTE — LETTER
April 25, 2018     Patient: Thang Ferrell   YOB: 1965   Date of Visit: 4/25/2018       To Whom it May Concern:    Kyra Xavier is under my professional care  She was seen in my office on 4/25/2018  She may return to work on 4/25/2018  If you have any questions or concerns, please don't hesitate to call           Sincerely,          Brendan Erickson MD        CC: No Recipients

## 2018-04-27 ENCOUNTER — OFFICE VISIT (OUTPATIENT)
Dept: OBGYN CLINIC | Age: 53
End: 2018-04-27
Payer: COMMERCIAL

## 2018-04-27 VITALS
WEIGHT: 132 LBS | SYSTOLIC BLOOD PRESSURE: 106 MMHG | DIASTOLIC BLOOD PRESSURE: 70 MMHG | HEIGHT: 67 IN | BODY MASS INDEX: 20.72 KG/M2

## 2018-04-27 DIAGNOSIS — Z12.31 ENCOUNTER FOR SCREENING MAMMOGRAM FOR MALIGNANT NEOPLASM OF BREAST: ICD-10-CM

## 2018-04-27 DIAGNOSIS — Z78.0 ASYMPTOMATIC POSTMENOPAUSAL STATUS: ICD-10-CM

## 2018-04-27 DIAGNOSIS — Z01.419 ENCOUNTER FOR ANNUAL ROUTINE GYNECOLOGICAL EXAMINATION: Primary | ICD-10-CM

## 2018-04-27 PROBLEM — B37.9 YEAST INFECTION: Status: ACTIVE | Noted: 2017-11-14

## 2018-04-27 PROBLEM — N60.09 BREAST CYST: Status: ACTIVE | Noted: 2017-03-17

## 2018-04-27 PROCEDURE — 87624 HPV HI-RISK TYP POOLED RSLT: CPT | Performed by: NURSE PRACTITIONER

## 2018-04-27 PROCEDURE — 99386 PREV VISIT NEW AGE 40-64: CPT | Performed by: NURSE PRACTITIONER

## 2018-04-27 PROCEDURE — G0145 SCR C/V CYTO,THINLAYER,RESCR: HCPCS | Performed by: NURSE PRACTITIONER

## 2018-04-27 NOTE — PROGRESS NOTES
Assessment/Plan:    No problem-specific Assessment & Plan notes found for this encounter  Diagnoses and all orders for this visit:    Encounter for annual routine gynecological examination  -     Liquid-based pap, screening    Asymptomatic postmenopausal status    Encounter for screening mammogram for malignant neoplasm of breast  -     Mammo screening bilateral w 3d & cad; Future    Other orders  -     Aspirin-Acetaminophen-Caffeine (EXCEDRIN PO); Take by mouth        Call as needed, encouraged calcium/vit D in her diet, all questions answered    Subjective:      Patient ID: Harjinder Barber is a 47 y o  female  Pleasant 47 y o  postmenopausal female here for annual exam  She denies postmenopausal bleeding  Denies history of abnormal pap smears  Denies vaginal issues  Denies pelvic pain  Denies menopausal/postmenopausal issues  The following portions of the patient's history were reviewed and updated as appropriate:   She  has a past medical history of DVT (deep venous thrombosis) ([facility] [address] )  She   Patient Active Problem List    Diagnosis Date Noted    Encounter for annual routine gynecological examination 04/27/2018    Asymptomatic postmenopausal status 04/27/2018    Yeast infection 11/14/2017    Breast cyst 03/17/2017    Thyroid disease 07/07/2016    Nephrolithiasis 01/20/2016     She  has a past surgical history that includes Appendectomy  Her family history includes Asthma in her mother; Hypertension in her mother; Migraines in her brother; Seizures in her father  She  reports that she has quit smoking  She has never used smokeless tobacco  She reports that she drinks alcohol  She reports that she does not use drugs    Current Outpatient Prescriptions   Medication Sig Dispense Refill    ascorbic acid (VITAMIN C) 500 mg tablet Take 500 mg by mouth daily      Aspirin-Acetaminophen-Caffeine (EXCEDRIN PO) Take by mouth      calcium carbonate (OS-AL) 1250 (500 Ca) MG tablet Take 1 tablet by mouth daily      Cranberry 400 MG CAPS Take by mouth      glucosamine-chondroitin 500-400 MG tablet Take 1 tablet by mouth 3 (three) times a day      LORazepam (ATIVAN) 1 mg tablet Take 1 tablet (1 mg total) by mouth 30 min pre-procedure 1 tablet 0    meclizine (ANTIVERT) 25 mg tablet Take 1 tablet by mouth 3 (three) times a day as needed      multivitamin (THERAGRAN) TABS Take 1 tablet by mouth      SUMAtriptan (IMITREX) 100 mg tablet Take by mouth      meloxicam (MOBIC) 15 mg tablet Take 1 tablet (15 mg total) by mouth daily 30 tablet 0     No current facility-administered medications for this visit  She is allergic to pollen extract and prochlorperazine  OB History      Para Term  AB Living    2 2 2     2    SAB TAB Ectopic Multiple Live Births            2        Has a 26 yo daughter and 16yo son, no grands yet  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 No   BMI 20 67 kg/m²          Physical Exam   Constitutional: She appears well-developed and well-nourished  No distress  HENT:   Head: Normocephalic  Neck: Normal range of motion  Neck supple  Pulmonary/Chest: Effort normal  Right breast exhibits no inverted nipple, no mass, no nipple discharge, no skin change and no tenderness  Left breast exhibits no inverted nipple, no mass, no nipple discharge, no skin change and no tenderness  Breasts are symmetrical    Abdominal: Soft  Genitourinary: No breast swelling, tenderness, discharge or bleeding   Pelvic exam was performed with patient supine  No labial fusion  There is no rash, tenderness, lesion or injury on the right labia  There is no rash, tenderness, lesion or injury on the left labia  Uterus is not deviated, not enlarged, not fixed and not tender  Cervix exhibits no motion tenderness, no discharge and no friability  Right adnexum displays no mass, no tenderness and no fullness  Left adnexum displays no mass, no tenderness and no fullness  No erythema or tenderness in the vagina  No foreign body in the vagina  No signs of injury around the vagina  No vaginal discharge found  Genitourinary Comments: STENOTIC OS   Lymphadenopathy:        Right: No inguinal adenopathy present  Left: No inguinal adenopathy present

## 2018-04-27 NOTE — PATIENT INSTRUCTIONS
Menopause   WHAT YOU NEED TO KNOW:   What is menopause? Menopause is a normal stage in a woman's life when her monthly periods stop  Menopause starts when the ovaries slowly stop making the female hormones estrogen and progesterone  After menopause, a woman is no longer able to become pregnant  A woman who has not had a period for a full year after the age of 39 is considered to be in menopause  Perimenopause is a stage before menopause that may cause signs and symptoms similar to menopause  Perimenopause can last an average of 4 to 5 years  What are the signs and symptoms of menopause? The signs and symptoms of menopause can be different from woman to woman:  · Menstrual period changes such as skipped periods or periods that are closer together, or lighter or heavier than usual    · Hot flashes (feeling warm, flushed, and sweaty)     · Mood changes such as irritability or decreased desire to have sex    · Breast changes such as tenderness or pain    · Hair changes such as thinning hair or increased hair on your face    · Vaginal changes such as increased dryness     · Urinary changes such as increased urinary tract infections (UTIs) or urgency (feeling that you need to urinate right away)    · Other symptoms such as headaches, trouble sleeping, fatigue, or heart palpitations (strong, fast heartbeats)  What do I need to know about menopause? · You can still get pregnant while you have periods  Continue to use birth control if you do not want to get pregnant  You may need to use birth control until it has been 1 year since your periods stopped  Ask your healthcare provider when you can stop using birth control to prevent pregnancy  · Hormone replacement therapy can be used to treat symptoms of menopause  Hormone replacement therapy (HRT) is medicine that replaces your low hormone levels  HRT contains estrogen and sometimes progestin  HRT has benefits and risks   HRT decreases your risk for bone fractures by helping to prevent osteoporosis  HRT also protects you from colon cancer  HRT may increase your risk for breast cancer, blood clots, heart disease, and stroke  Ask your healthcare provider if HRT is right for you  How can I live a healthy lifestyle during and after menopause? After menopause, your risk for heart disease and bone loss increases  Ask about these and other ways to stay healthy:  · Exercise regularly  Exercise helps you maintain a healthy weight  Exercise can also help to control your blood pressure and cholesterol levels  Include weight-bearing exercise for strong bones  Ask your healthcare provider about the best exercise plan for you  · Eat a variety of healthy foods  Include fruits, vegetables, whole grains (whole-wheat bread, pasta, and cereals), low-fat dairy, and lean protein foods (beans, poultry, and fish)  Limit foods high in sodium (salt)  Ask your healthcare provider for more information about a meal plan that is right for you  · Maintain a healthy weight  Check with your healthcare provider before you start any weight loss program      · Take supplements as directed  You may need extra calcium and vitamin D to help prevent osteoporosis  · Limit alcohol and caffeine  Alcohol and caffeine may worsen your symptoms  · Do not smoke  If you smoke, it is never too late to quit  You are more likely to have a heart attack, lung disease, blood clots, and cancer if you smoke  Ask your healthcare provider for information if you need help quitting  When should I contact my healthcare provider? · You have vaginal bleeding after menopause  · You have questions or concerns about your condition or care  CARE AGREEMENT:   You have the right to help plan your care  Learn about your health condition and how it may be treated  Discuss treatment options with your caregivers to decide what care you want to receive  You always have the right to refuse treatment   The above information is an  only  It is not intended as medical advice for individual conditions or treatments  Talk to your doctor, nurse or pharmacist before following any medical regimen to see if it is safe and effective for you  © 2017 [employer] Information is for End User's use only and may not be sold, redistributed or otherwise used for commercial purposes  All illustrations and images included in CareNotes® are the copyrighted property of [employer]  or [employer]

## 2018-04-30 DIAGNOSIS — R92.2 DENSE BREAST: Primary | ICD-10-CM

## 2018-05-01 LAB — HPV RRNA GENITAL QL NAA+PROBE: NORMAL

## 2018-05-02 LAB
LAB AP GYN PRIMARY INTERPRETATION: NORMAL
Lab: NORMAL

## 2018-05-07 ENCOUNTER — HOSPITAL ENCOUNTER (OUTPATIENT)
Dept: RADIOLOGY | Facility: HOSPITAL | Age: 53
Discharge: HOME | End: 2018-05-07
Payer: COMMERCIAL

## 2018-05-07 DIAGNOSIS — M16.7 OTHER SECONDARY OSTEOARTHRITIS OF LEFT HIP: ICD-10-CM

## 2018-05-07 PROCEDURE — 20610 DRAIN/INJ JOINT/BURSA W/O US: CPT

## 2018-05-07 PROCEDURE — 77002 NEEDLE LOCALIZATION BY XRAY: CPT

## 2018-05-07 RX ORDER — LIDOCAINE HYDROCHLORIDE 10 MG/ML
15 INJECTION, SOLUTION INFILTRATION; PERINEURAL
Status: COMPLETED | OUTPATIENT
Start: 2018-05-07 | End: 2018-05-07

## 2018-05-07 RX ORDER — METHYLPREDNISOLONE ACETATE 80 MG/ML
80 INJECTION, SUSPENSION INTRA-ARTICULAR; INTRALESIONAL; INTRAMUSCULAR; SOFT TISSUE
Status: COMPLETED | OUTPATIENT
Start: 2018-05-07 | End: 2018-05-07

## 2018-05-07 RX ORDER — BUPIVACAINE HYDROCHLORIDE 2.5 MG/ML
10 INJECTION, SOLUTION EPIDURAL; INFILTRATION; INTRACAUDAL
Status: COMPLETED | OUTPATIENT
Start: 2018-05-07 | End: 2018-05-07

## 2018-05-07 RX ADMIN — BUPIVACAINE HYDROCHLORIDE 2 ML: 2.5 INJECTION, SOLUTION EPIDURAL; INFILTRATION; INTRACAUDAL; PERINEURAL at 12:25

## 2018-05-07 RX ADMIN — METHYLPREDNISOLONE ACETATE 80 MG: 80 INJECTION, SUSPENSION INTRA-ARTICULAR; INTRALESIONAL; INTRAMUSCULAR; SOFT TISSUE at 12:25

## 2018-05-07 RX ADMIN — LIDOCAINE HYDROCHLORIDE 10 ML: 10 INJECTION, SOLUTION INFILTRATION; PERINEURAL at 12:25

## 2018-05-09 NOTE — PROGRESS NOTES
Ms Luciano Andrews is a pleasant 46 y/o female who presented to [facility] on 5/7/18 to undergo a left hip steroid injection  Her procedure was completed without complications or complaints  Due to her documented iodine contrast allergy, confirmation of the location was achieved by injecting 2 ml air  The patient called today stated she worked yesterday and, " may have done too much" because she had a significant amount of left hip pain last evening  She states she felt her hip was, "swollen " She denies any skin erythema, drainage, fevers, or chills  She states she has been applying, "icing and using heat off and on  which has help alleviate some of the left hip discomfort  She does not feel that her prescribed meloxicam has help alleviate her pain  She does note that her left hip pain has slightly improved today  I offered for the patient to be evaluated today by Radiology  The patient stated she once to see how she feels today, before scheduling an evaluation  I asked the patient to call us in the interim with any questions or concerns  I provided a contact number for her   Brianda [facility]

## 2018-05-10 ENCOUNTER — TELEPHONE (OUTPATIENT)
Dept: OBGYN CLINIC | Facility: HOSPITAL | Age: 53
End: 2018-05-10

## 2018-05-10 NOTE — TELEPHONE ENCOUNTER
Dr Dariusz Wilde patient - L Hip Fluoro injection 5/7    Patient is calling that she is having increased pain post L Hip Fluoro injection  Feels pain when bearing weight  I advised this is normal post injection as we irritate the tissues should be better in a week  Advised tylenol 1000mg TID, and Ice 20 min on 20 min off   , She is taking Ibuprofen every  6 hrs but it is not helping and it is making her stomach irritated  Please advise

## 2018-05-10 NOTE — PROGRESS NOTES
I called and left a message with the patient this morning to see how she has been feeling since yesterday  I spoke to her a moment ago  She states she is continuing to have "severe" left hip pain to the point, " she can barely lift up her leg " She continues to deny any fevers, chills, or drainage from her injection site  She notes continued mild swelling at the site of the injection  She denies any skin erythema  Due to the persistent and severe left hip pain she has been experiencing since her hip injection, I recommended that the patient be evaluated by her PCP, orthopedics, or the emergency room  The patient stated, she was thinking about being evaluated by the ED tonight anyway, since her pain has persisted  I have asked the patient to call us with any questions or concerns  She verbalized her understanding of the above and concurred  Will discuss with attending   Misbah Guzman PA-C

## 2018-05-17 DIAGNOSIS — R51.9 NONINTRACTABLE HEADACHE, UNSPECIFIED CHRONICITY PATTERN, UNSPECIFIED HEADACHE TYPE: ICD-10-CM

## 2018-05-17 DIAGNOSIS — R53.83 OTHER FATIGUE: ICD-10-CM

## 2018-05-17 DIAGNOSIS — R30.0 DYSURIA: Primary | ICD-10-CM

## 2018-05-18 ENCOUNTER — APPOINTMENT (OUTPATIENT)
Dept: LAB | Facility: CLINIC | Age: 53
End: 2018-05-18
Payer: COMMERCIAL

## 2018-05-18 DIAGNOSIS — R30.0 DYSURIA: ICD-10-CM

## 2018-05-18 LAB
BACTERIA UR QL AUTO: ABNORMAL /HPF
BILIRUB UR QL STRIP: NEGATIVE
CAOX CRY URNS QL MICRO: ABNORMAL /HPF
CLARITY UR: ABNORMAL
COLOR UR: ABNORMAL
GLUCOSE UR STRIP-MCNC: NEGATIVE MG/DL
HGB UR QL STRIP.AUTO: ABNORMAL
KETONES UR STRIP-MCNC: NEGATIVE MG/DL
LEUKOCYTE ESTERASE UR QL STRIP: NEGATIVE
NITRITE UR QL STRIP: NEGATIVE
NON-SQ EPI CELLS URNS QL MICRO: ABNORMAL /HPF
PH UR STRIP.AUTO: 5.5 [PH] (ref 4.5–8)
PROT UR STRIP-MCNC: NEGATIVE MG/DL
RBC #/AREA URNS AUTO: ABNORMAL /HPF
SP GR UR STRIP.AUTO: 1.02 (ref 1–1.03)
UROBILINOGEN UR QL STRIP.AUTO: 0.2 E.U./DL
WBC #/AREA URNS AUTO: ABNORMAL /HPF

## 2018-05-18 PROCEDURE — 81001 URINALYSIS AUTO W/SCOPE: CPT | Performed by: NURSE PRACTITIONER

## 2018-05-18 PROCEDURE — 87086 URINE CULTURE/COLONY COUNT: CPT

## 2018-05-19 LAB — BACTERIA UR CULT: NORMAL

## 2018-05-21 ENCOUNTER — APPOINTMENT (OUTPATIENT)
Dept: LAB | Facility: CLINIC | Age: 53
End: 2018-05-21
Payer: COMMERCIAL

## 2018-05-21 ENCOUNTER — TELEPHONE (OUTPATIENT)
Dept: NEUROLOGY | Facility: CLINIC | Age: 53
End: 2018-05-21

## 2018-05-21 DIAGNOSIS — R51.9 NONINTRACTABLE HEADACHE, UNSPECIFIED CHRONICITY PATTERN, UNSPECIFIED HEADACHE TYPE: ICD-10-CM

## 2018-05-21 DIAGNOSIS — R53.83 OTHER FATIGUE: ICD-10-CM

## 2018-05-21 LAB
ALBUMIN SERPL BCP-MCNC: 4 G/DL (ref 3.5–5)
ALP SERPL-CCNC: 137 U/L (ref 46–116)
ALT SERPL W P-5'-P-CCNC: 30 U/L (ref 12–78)
ANION GAP SERPL CALCULATED.3IONS-SCNC: 6 MMOL/L (ref 4–13)
AST SERPL W P-5'-P-CCNC: 21 U/L (ref 5–45)
BASOPHILS # BLD AUTO: 0.02 THOUSANDS/ΜL (ref 0–0.1)
BASOPHILS NFR BLD AUTO: 0 % (ref 0–1)
BILIRUB DIRECT SERPL-MCNC: 0.14 MG/DL (ref 0–0.2)
BILIRUB SERPL-MCNC: 0.49 MG/DL (ref 0.2–1)
BUN SERPL-MCNC: 12 MG/DL (ref 5–25)
CALCIUM SERPL-MCNC: 9.5 MG/DL (ref 8.3–10.1)
CHLORIDE SERPL-SCNC: 105 MMOL/L (ref 100–108)
CO2 SERPL-SCNC: 29 MMOL/L (ref 21–32)
CREAT SERPL-MCNC: 0.78 MG/DL (ref 0.6–1.3)
EOSINOPHIL # BLD AUTO: 0.14 THOUSAND/ΜL (ref 0–0.61)
EOSINOPHIL NFR BLD AUTO: 2 % (ref 0–6)
ERYTHROCYTE [DISTWIDTH] IN BLOOD BY AUTOMATED COUNT: 13.7 % (ref 11.6–15.1)
ERYTHROCYTE [SEDIMENTATION RATE] IN BLOOD: 23 MM/HOUR (ref 0–20)
GFR SERPL CREATININE-BSD FRML MDRD: 87 ML/MIN/1.73SQ M
GLUCOSE SERPL-MCNC: 111 MG/DL (ref 65–140)
HCT VFR BLD AUTO: 43 % (ref 34.8–46.1)
HGB BLD-MCNC: 14.1 G/DL (ref 11.5–15.4)
LYMPHOCYTES # BLD AUTO: 1.96 THOUSANDS/ΜL (ref 0.6–4.47)
LYMPHOCYTES NFR BLD AUTO: 25 % (ref 14–44)
MAGNESIUM SERPL-MCNC: 2.2 MG/DL (ref 1.6–2.6)
MCH RBC QN AUTO: 30.7 PG (ref 26.8–34.3)
MCHC RBC AUTO-ENTMCNC: 32.8 G/DL (ref 31.4–37.4)
MCV RBC AUTO: 94 FL (ref 82–98)
MONOCYTES # BLD AUTO: 0.58 THOUSAND/ΜL (ref 0.17–1.22)
MONOCYTES NFR BLD AUTO: 7 % (ref 4–12)
NEUTROPHILS # BLD AUTO: 5.17 THOUSANDS/ΜL (ref 1.85–7.62)
NEUTS SEG NFR BLD AUTO: 65 % (ref 43–75)
NRBC BLD AUTO-RTO: 0 /100 WBCS
PHOSPHATE SERPL-MCNC: 2.9 MG/DL (ref 2.7–4.5)
PLATELET # BLD AUTO: 264 THOUSANDS/UL (ref 149–390)
PMV BLD AUTO: 9.8 FL (ref 8.9–12.7)
POTASSIUM SERPL-SCNC: 3.6 MMOL/L (ref 3.5–5.3)
PROT SERPL-MCNC: 7.7 G/DL (ref 6.4–8.2)
RBC # BLD AUTO: 4.6 MILLION/UL (ref 3.81–5.12)
SODIUM SERPL-SCNC: 140 MMOL/L (ref 136–145)
TSH SERPL DL<=0.05 MIU/L-ACNC: 1.09 UIU/ML (ref 0.36–3.74)
WBC # BLD AUTO: 7.9 THOUSAND/UL (ref 4.31–10.16)

## 2018-05-21 PROCEDURE — 84443 ASSAY THYROID STIM HORMONE: CPT

## 2018-05-21 PROCEDURE — 85652 RBC SED RATE AUTOMATED: CPT

## 2018-05-21 PROCEDURE — 83735 ASSAY OF MAGNESIUM: CPT

## 2018-05-21 PROCEDURE — 85025 COMPLETE CBC W/AUTO DIFF WBC: CPT

## 2018-05-21 PROCEDURE — 86618 LYME DISEASE ANTIBODY: CPT

## 2018-05-21 PROCEDURE — 86038 ANTINUCLEAR ANTIBODIES: CPT

## 2018-05-21 PROCEDURE — 36415 COLL VENOUS BLD VENIPUNCTURE: CPT

## 2018-05-21 PROCEDURE — 84100 ASSAY OF PHOSPHORUS: CPT

## 2018-05-21 PROCEDURE — 80048 BASIC METABOLIC PNL TOTAL CA: CPT

## 2018-05-21 PROCEDURE — 80076 HEPATIC FUNCTION PANEL: CPT

## 2018-05-21 NOTE — TELEPHONE ENCOUNTER
Pt last saw you 2016, she has an appt with you 7/18/18- she doesn't think she can wait that long, states that she has had a migraine since last Thursday, with nausea and vomiting  States that she works for [employer] and one of the [employer]  [address] at her practice assessed her and noticed the right side of her face and the back of her neck was swollen  Her right eye is twitching  Pt states that the right side of her face is numb, and whenever she puts her head down she becomes dizzy/nauseaed to the point where she has to run to the bathroom to vomit  Pt not sure why her face and back of her neck would be swollen  States that she has tried imatrex and Excedrin and it does not help  MARIANN ran some labs that are pending    Pt # [phone]

## 2018-05-21 NOTE — TELEPHONE ENCOUNTER
Patient can follow up with the primary care physician asap  and if he feels necessary for patient to be seen by us this week then please bring the patient in and if not let Dr Luisa Arshad know next week

## 2018-05-22 LAB
B BURGDOR IGG SER IA-ACNC: 0.06
B BURGDOR IGM SER IA-ACNC: 0.26

## 2018-05-23 LAB — RYE IGE QN: NEGATIVE

## 2018-05-24 ENCOUNTER — HOSPITAL ENCOUNTER (OUTPATIENT)
Dept: ULTRASOUND IMAGING | Facility: CLINIC | Age: 53
Discharge: HOME | End: 2018-05-24
Payer: COMMERCIAL

## 2018-05-24 DIAGNOSIS — R30.0 DYSURIA: ICD-10-CM

## 2018-05-24 PROCEDURE — 76770 US EXAM ABDO BACK WALL COMP: CPT

## 2018-05-25 ENCOUNTER — APPOINTMENT (OUTPATIENT)
Dept: LAB | Facility: CLINIC | Age: 53
End: 2018-05-25
Payer: COMMERCIAL

## 2018-05-25 DIAGNOSIS — R74.8 ELEVATED ALKALINE PHOSPHATASE LEVEL: ICD-10-CM

## 2018-05-25 DIAGNOSIS — R74.8 ELEVATED ALKALINE PHOSPHATASE LEVEL: Primary | ICD-10-CM

## 2018-05-25 PROCEDURE — 84080 ASSAY ALKALINE PHOSPHATASES: CPT

## 2018-05-25 PROCEDURE — 84075 ASSAY ALKALINE PHOSPHATASE: CPT

## 2018-05-25 PROCEDURE — 36415 COLL VENOUS BLD VENIPUNCTURE: CPT

## 2018-05-28 LAB
ALP BONE CFR SERPL: 47 % (ref 14–68)
ALP INTEST CFR SERPL: 7 % (ref 0–18)
ALP LIVER CFR SERPL: 46 % (ref 18–85)
ALP SERPL-CCNC: 137 IU/L (ref 39–117)

## 2018-06-03 DIAGNOSIS — M16.7 OTHER SECONDARY OSTEOARTHRITIS OF LEFT HIP: ICD-10-CM

## 2018-06-04 RX ORDER — MELOXICAM 15 MG/1
15 TABLET ORAL DAILY
Qty: 30 TABLET | Refills: 0 | Status: SHIPPED | OUTPATIENT
Start: 2018-06-04 | End: 2018-06-19 | Stop reason: CLARIF

## 2018-06-07 ENCOUNTER — TRANSCRIBE ORDERS (OUTPATIENT)
Dept: LAB | Facility: CLINIC | Age: 53
End: 2018-06-07

## 2018-06-07 ENCOUNTER — TELEPHONE (OUTPATIENT)
Dept: NEUROLOGY | Facility: CLINIC | Age: 53
End: 2018-06-07

## 2018-06-07 ENCOUNTER — APPOINTMENT (OUTPATIENT)
Dept: LAB | Facility: CLINIC | Age: 53
End: 2018-06-07
Payer: COMMERCIAL

## 2018-06-07 DIAGNOSIS — Z00.8 HEALTH EXAMINATION IN POPULATION SURVEY: ICD-10-CM

## 2018-06-07 DIAGNOSIS — Z00.8 HEALTH EXAMINATION IN POPULATION SURVEY: Primary | ICD-10-CM

## 2018-06-07 LAB
CHOLEST SERPL-MCNC: 189 MG/DL (ref 50–200)
EST. AVERAGE GLUCOSE BLD GHB EST-MCNC: 137 MG/DL
HBA1C MFR BLD: 6.4 % (ref 4.2–6.3)
HDLC SERPL-MCNC: 68 MG/DL (ref 40–60)
LDLC SERPL CALC-MCNC: 92 MG/DL (ref 0–100)
NONHDLC SERPL-MCNC: 121 MG/DL
TRIGL SERPL-MCNC: 146 MG/DL

## 2018-06-07 PROCEDURE — 80061 LIPID PANEL: CPT

## 2018-06-07 PROCEDURE — 83036 HEMOGLOBIN GLYCOSYLATED A1C: CPT

## 2018-06-07 PROCEDURE — 36415 COLL VENOUS BLD VENIPUNCTURE: CPT

## 2018-06-07 NOTE — TELEPHONE ENCOUNTER
LMOM for patient to call back to schedule an appointment of Dr Nida Holland wait list that we had an opening tomorrow

## 2018-06-12 ENCOUNTER — TELEPHONE (OUTPATIENT)
Dept: NEUROLOGY | Facility: CLINIC | Age: 53
End: 2018-06-12

## 2018-06-19 ENCOUNTER — OFFICE VISIT (OUTPATIENT)
Dept: NEUROLOGY | Facility: CLINIC | Age: 53
End: 2018-06-19
Payer: COMMERCIAL

## 2018-06-19 VITALS
HEIGHT: 66 IN | DIASTOLIC BLOOD PRESSURE: 72 MMHG | HEART RATE: 64 BPM | WEIGHT: 134.4 LBS | BODY MASS INDEX: 21.6 KG/M2 | SYSTOLIC BLOOD PRESSURE: 118 MMHG

## 2018-06-19 DIAGNOSIS — G43.009 MIGRAINE WITHOUT AURA AND WITHOUT STATUS MIGRAINOSUS, NOT INTRACTABLE: Primary | ICD-10-CM

## 2018-06-19 DIAGNOSIS — M54.2 CERVICALGIA: ICD-10-CM

## 2018-06-19 PROCEDURE — 99215 OFFICE O/P EST HI 40 MIN: CPT | Performed by: PSYCHIATRY & NEUROLOGY

## 2018-06-19 RX ORDER — TOPIRAMATE 25 MG/1
TABLET ORAL
Qty: 42 TABLET | Refills: 0 | Status: SHIPPED | OUTPATIENT
Start: 2018-06-19 | End: 2018-07-22 | Stop reason: SDUPTHER

## 2018-06-19 RX ORDER — PROMETHAZINE HYDROCHLORIDE 12.5 MG/1
12.5 SUPPOSITORY RECTAL EVERY 6 HOURS PRN
Qty: 2 SUPPOSITORY | Refills: 3 | Status: SHIPPED | OUTPATIENT
Start: 2018-06-19 | End: 2018-10-08 | Stop reason: CLARIF

## 2018-06-19 RX ORDER — TOPIRAMATE 100 MG/1
TABLET, FILM COATED ORAL
Qty: 30 TABLET | Refills: 5 | Status: SHIPPED | OUTPATIENT
Start: 2018-06-19 | End: 2018-10-08 | Stop reason: SDUPTHER

## 2018-06-19 RX ORDER — CEPHALEXIN 250 MG/1
1 CAPSULE ORAL 4 TIMES DAILY
COMMUNITY
Start: 2018-06-18 | End: 2018-07-22 | Stop reason: ALTCHOICE

## 2018-06-19 RX ORDER — RIZATRIPTAN BENZOATE 10 MG/1
10 TABLET ORAL ONCE AS NEEDED
Qty: 9 TABLET | Refills: 5 | Status: SHIPPED | OUTPATIENT
Start: 2018-06-19 | End: 2018-10-19

## 2018-06-19 NOTE — PROGRESS NOTES
Blanco Renteria is a 47 y o  female who returns in follow-up today with headaches and neck pain    Assessment:  1  Migraine without aura and without status migrainosus, not intractable    2  Cervicalgia        Plan:  Initiate Topamax 25 mg at bedtime gradually increasing up to 100 mg  Trial of Maxalt as needed for headache  Phenergan as needed for rescue  Keep headache calendar  Follow-up 6 weeks    Discussion:  Ifnargis has migraine headaches with recent increased frequency, possibly related to medication overuse  Have recommended avoiding Excedrin and other caffeinated products to treat her headaches, we will initiate Topamax for migraine prophylaxis and a trial of Maxalt for breakthrough headaches  She will try physical therapy for her neck pain and I will see her back in follow-up in 6 weeks      Subjective:    HPI  Ifnargis returns back to see me for headaches  She was last seen in July of 2016 with headaches that were occurring daily for 3-4 weeks  She states that she was doing reasonably well over the last couple of years until about a month ago when she reports her headache frequency again increased from 2 or 3 times a week to every day  She states she tends to get a headache toward the middle of the day  It begins as a pressure type pain sometimes in the neck other times in the head associated with photophobia, sonophobia and nausea  She finds that bright lights aggravate her headaches and the heat will sometimes precipitate headaches  She states that she avoids taking Imitrex during the day as it makes her feel little bit tired so she uses Excedrin  She states she has been taking a lot of Excedrin  She states that sometimes she uses energy drinks to control her headaches  She notes no warning prior to the headache  In addition she reports pain in her neck  She states that sometimes she gets a severe pain in her neck like she cannot move it and this typically lateralizes to the left side    Other times she notes more muscle spasm on the right  She denies any radicular pain symptoms  She had blood work done a month ago that demonstrated no significant abnormalities including sedimentation rate and Lyme titer      Past Medical History:   Diagnosis Date    Benign positional vertigo, right     Cervicalgia     [facility] venous thrombosis) ([facility] [address] )     Headache     Thyroid disease        Family History:  Family History   Problem Relation Age of Onset    Hypertension Mother     Asthma Mother     Seizures Father     Migraines Brother     Breast cancer Neg Hx     Colon cancer Neg Hx     Ovarian cancer Neg Hx     Uterine cancer Neg Hx     Cervical cancer Neg Hx        Past Surgical History:  Past Surgical History:   Procedure Laterality Date    APPENDECTOMY      LIPOMA RESECTION      NEUROMA EXCISION      STEROID INJECTION HIP Left 05/2018       Social History:   reports that she has quit smoking  She has never used smokeless tobacco  She reports that she drinks alcohol  She reports that she does not use drugs  Allergies:  Contrast [iodinated diagnostic agents];  Pollen extract; and Prochlorperazine      Current Outpatient Prescriptions:     ascorbic acid (VITAMIN C) 500 mg tablet, Take 500 mg by mouth daily, Disp: , Rfl:     Aspirin-Acetaminophen-Caffeine (EXCEDRIN PO), Take by mouth, Disp: , Rfl:     calcium carbonate (OS-AL) 1250 (500 Ca) MG tablet, Take 1 tablet by mouth daily, Disp: , Rfl:     cephalexin (KEFLEX) 250 mg capsule, Take 1 capsule by mouth 4 (four) times a day, Disp: , Rfl:     Cranberry 400 MG CAPS, Take by mouth, Disp: , Rfl:     glucosamine-chondroitin 500-400 MG tablet, Take 1 tablet by mouth 3 (three) times a day, Disp: , Rfl:     meclizine (ANTIVERT) 25 mg tablet, Take 1 tablet by mouth 3 (three) times a day as needed, Disp: , Rfl:     multivitamin (THERAGRAN) TABS, Take 1 tablet by mouth, Disp: , Rfl:     SUMAtriptan (IMITREX) 100 mg tablet, Take by mouth, Disp: , Rfl:     topiramate (TOPAMAX) 100 mg tablet, One p o  q h s , Disp: 30 tablet, Rfl: 5    topiramate (TOPAMAX) 25 mg tablet, One p o  q h s  for 1 week then 2 p o  q h s  for 1 week then 3 p o  q h s  for 1 week then increase to the 100 mg dose, Disp: 42 tablet, Rfl: 0    I have reviewed the past medical, social and family history, current medications, allergies, vitals, review of systems and updated this information as appropriate today     Objective:    Vitals:  Blood pressure 118/72, pulse 64, height 5' 6" (1 676 m), weight 61 kg (134 lb 6 4 oz), not currently breastfeeding  Physical Exam    Neurological Exam    GENERAL:  Cooperative in no acute distress  Well-developed and well-nourished    HEAD and NECK   Head is atraumatic normocephalic with no lesions or masses  Neck is supple with full range of motion    CARDIOVASCULAR  Carotid Arteries-no carotid bruits  NEUROLOGIC:  Mental Status-the patient is awake alert and oriented without aphasia or apraxia  Cranial Nerves: Visual fields are full to confrontation  Discs are flat  Extraocular movements are full without nystagmus  Pupils are 2-1/2 mm and reactive  Face is symmetrical to light touch  Movements of facial expression move symmetrically  Hearing is normal to finger rub bilaterally  Soft palate lifts symmetrically  Shoulder shrug is symmetrical  Tongue is midline without atrophy  Motor: No drift is noted on arm extension  Strength is full in the upper and lower extremities with normal bulk and tone  Sensory:  Cortical function is intact  Coordination: Finger to nose testing is performed accurately  Romberg is negative  Gait reveals a normal base with symmetrical arm swing  Tandem walk is normal   Reflexes:  2/4 and symmetrical in the biceps, triceps, brachioradialis, knee jerk and ankle jerk regions    Toes are downgoing  Hallpike maneuver failed to elicit symptoms of vertigo were signs of nystagmus          ROS:    Review of Systems Constitutional: Negative for chills, fatigue and fever  HENT: Negative for congestion, sinus pain, sinus pressure, sore throat, tinnitus and trouble swallowing  Eyes: Negative for pain, discharge and visual disturbance  Respiratory: Negative for cough, shortness of breath and wheezing  Cardiovascular: Negative  Gastrointestinal: Positive for nausea and vomiting  Negative for abdominal pain  Endocrine: Positive for heat intolerance  Negative for cold intolerance  Genitourinary: Negative for difficulty urinating, frequency, hematuria and urgency  Musculoskeletal: Positive for arthralgias, gait problem, neck pain and neck stiffness  Negative for back pain  Skin: Positive for rash  Negative for wound  Allergic/Immunologic: Negative for environmental allergies and food allergies  Neurological: Positive for facial asymmetry (right sided numbness), light-headedness and headaches  Negative for dizziness, tremors, seizures, syncope, speech difficulty, weakness and numbness  Hematological: Negative  Psychiatric/Behavioral: Negative for confusion, decreased concentration and sleep disturbance

## 2018-07-13 DIAGNOSIS — M54.2 CERVICALGIA: ICD-10-CM

## 2018-07-13 RX ORDER — TOPIRAMATE 25 MG/1
TABLET ORAL
Qty: 42 TABLET | Refills: 0 | OUTPATIENT
Start: 2018-07-13

## 2018-07-18 ENCOUNTER — APPOINTMENT (OUTPATIENT)
Dept: LAB | Facility: CLINIC | Age: 53
End: 2018-07-18
Payer: COMMERCIAL

## 2018-07-18 DIAGNOSIS — R07.81 PLEURITIC CHEST PAIN: Primary | ICD-10-CM

## 2018-07-18 DIAGNOSIS — R07.81 PLEURITIC CHEST PAIN: ICD-10-CM

## 2018-07-18 LAB
BASOPHILS # BLD AUTO: 0.02 THOUSANDS/ΜL (ref 0–0.1)
BASOPHILS NFR BLD AUTO: 0 % (ref 0–1)
DEPRECATED D DIMER PPP: 349 NG/ML (FEU) (ref 0–424)
EOSINOPHIL # BLD AUTO: 0.19 THOUSAND/ΜL (ref 0–0.61)
EOSINOPHIL NFR BLD AUTO: 3 % (ref 0–6)
ERYTHROCYTE [DISTWIDTH] IN BLOOD BY AUTOMATED COUNT: 12.7 % (ref 11.6–15.1)
ERYTHROCYTE [SEDIMENTATION RATE] IN BLOOD: 16 MM/HOUR (ref 0–20)
HCT VFR BLD AUTO: 43.6 % (ref 34.8–46.1)
HGB BLD-MCNC: 13.6 G/DL (ref 11.5–15.4)
IMM GRANULOCYTES # BLD AUTO: 0.01 THOUSAND/UL (ref 0–0.2)
IMM GRANULOCYTES NFR BLD AUTO: 0 % (ref 0–2)
LYMPHOCYTES # BLD AUTO: 1.95 THOUSANDS/ΜL (ref 0.6–4.47)
LYMPHOCYTES NFR BLD AUTO: 32 % (ref 14–44)
MCH RBC QN AUTO: 29.7 PG (ref 26.8–34.3)
MCHC RBC AUTO-ENTMCNC: 31.2 G/DL (ref 31.4–37.4)
MCV RBC AUTO: 95 FL (ref 82–98)
MONOCYTES # BLD AUTO: 0.58 THOUSAND/ΜL (ref 0.17–1.22)
MONOCYTES NFR BLD AUTO: 10 % (ref 4–12)
NEUTROPHILS # BLD AUTO: 3.38 THOUSANDS/ΜL (ref 1.85–7.62)
NEUTS SEG NFR BLD AUTO: 55 % (ref 43–75)
NRBC BLD AUTO-RTO: 0 /100 WBCS
PLATELET # BLD AUTO: 281 THOUSANDS/UL (ref 149–390)
PMV BLD AUTO: 9.9 FL (ref 8.9–12.7)
RBC # BLD AUTO: 4.58 MILLION/UL (ref 3.81–5.12)
WBC # BLD AUTO: 6.13 THOUSAND/UL (ref 4.31–10.16)

## 2018-07-18 PROCEDURE — 36415 COLL VENOUS BLD VENIPUNCTURE: CPT

## 2018-07-18 PROCEDURE — 85652 RBC SED RATE AUTOMATED: CPT

## 2018-07-18 PROCEDURE — 85025 COMPLETE CBC W/AUTO DIFF WBC: CPT

## 2018-07-18 PROCEDURE — 85379 FIBRIN DEGRADATION QUANT: CPT

## 2018-07-22 ENCOUNTER — APPOINTMENT (EMERGENCY)
Dept: RADIOLOGY | Facility: HOSPITAL | Age: 53
End: 2018-07-22
Payer: COMMERCIAL

## 2018-07-22 ENCOUNTER — HOSPITAL ENCOUNTER (EMERGENCY)
Facility: HOSPITAL | Age: 53
Discharge: HOME | End: 2018-07-22
Payer: COMMERCIAL

## 2018-07-22 VITALS
DIASTOLIC BLOOD PRESSURE: 75 MMHG | WEIGHT: 134.48 LBS | HEIGHT: 66 IN | TEMPERATURE: 97.9 F | HEART RATE: 58 BPM | BODY MASS INDEX: 21.61 KG/M2 | SYSTOLIC BLOOD PRESSURE: 142 MMHG | RESPIRATION RATE: 18 BRPM | OXYGEN SATURATION: 100 %

## 2018-07-22 DIAGNOSIS — M70.22 OLECRANON BURSITIS OF LEFT ELBOW: Primary | ICD-10-CM

## 2018-07-22 PROCEDURE — 99283 EMERGENCY DEPT VISIT LOW MDM: CPT

## 2018-07-22 PROCEDURE — 73080 X-RAY EXAM OF ELBOW: CPT

## 2018-07-22 RX ORDER — NAPROXEN 500 MG/1
500 TABLET ORAL 2 TIMES DAILY WITH MEALS
Qty: 10 TABLET | Refills: 0 | Status: SHIPPED | OUTPATIENT
Start: 2018-07-22 | End: 2018-10-08 | Stop reason: CLARIF

## 2018-07-22 NOTE — DISCHARGE INSTRUCTIONS
Elbow Bursitis   WHAT YOU NEED TO KNOW:   Elbow bursitis is inflammation of the bursa in your elbow  The bursa is a fluid-filled sac that acts as a cushion between a bone and a tendon  A tendon is a cord of strong tissue that connects muscles to bones  The bursa is located right under the point of your elbow  DISCHARGE INSTRUCTIONS:   Medicines:   · NSAIDs:  These medicines decrease swelling, pain, and fever  NSAIDs are available without a doctor's order  Ask your healthcare provider which medicine is right for you  Ask how much to take and when to take it  Take as directed  NSAIDs can cause stomach bleeding and kidney problems if not taken correctly  · Antibiotics: These help fight an infection caused by bacteria  You may need antibiotics if your bursitis is caused by infection  · Take your medicine as directed  Contact your healthcare provider if you think your medicine is not helping or if you have side effects  Tell him of her if you are allergic to any medicine  Keep a list of the medicines, vitamins, and herbs you take  Include the amounts, and when and why you take them  Bring the list or the pill bottles to follow-up visits  Carry your medicine list with you in case of an emergency  Manage your symptoms:   · Rest:  Rest your elbow as much as possible to decrease pain and swelling  Slowly start to do more each day  Return to your daily activities as directed  · Ice:  Ice helps decrease swelling and pain  Ice may also help prevent tissue damage  Use an ice pack, or put crushed ice in a plastic bag  Cover it with a towel and place it on your elbow for 15 to 20 minutes, 3 to 4 times each day, as directed  · Compress:  Healthcare providers may wrap your arm with tape or an elastic bandage to decrease swelling  Loosen the elastic bandage if you start to lose feeling in your fingers  · Elevate:  Raise your elbow above the level of your heart as often as you can   This will help decrease swelling and pain  Prop your elbow on pillows or blankets to keep it elevated comfortably  Physical therapy:  A physical therapist teaches you exercises to help improve movement and strength, and to decrease pain  Prevent another elbow injury:   · Avoid injury and pressure to your elbows: Wear elbow pads or protectors when you play sports  Do not lean on your elbows or clench your fists  Do not tightly  small items, such as tools or pens  · Stretch, warm up, and cool down:  Always stretch and do warmup and cool-down exercises before and after you exercise  This will help loosen your muscles and decrease stress on your elbow  Rest between workouts  Follow up with your healthcare provider as directed:  Write down your questions so you remember to ask them during your visits  Contact your healthcare provider if:   · Your pain and swelling increase  · Your symptoms do not improve after 10 days of treatment  · You have a fever  · You have questions or concerns about your condition or care  © 2017 [facility] Information is for End User's use only and may not be sold, redistributed or otherwise used for commercial purposes  All illustrations and images included in CareNotes® are the copyrighted property of [employer]  or Carlos Maxwell  The above information is an  only  It is not intended as medical advice for individual conditions or treatments  Talk to your doctor, nurse or pharmacist before following any medical regimen to see if it is safe and effective for you

## 2018-07-22 NOTE — ED PROVIDER NOTES
History  Chief Complaint   Patient presents with    Elbow Injury     Pt states she fell yesterday and landed on her left elbow, today c/o swelling and burning  Took Aleve without relief  Pleasant 50-year-old female presenting here with left elbow injury  Reportedly she had fallen yesterday landing on her left elbow  Today she has point tenderness over the left olecranon  She does have full range of motion full extension and flexion full pronation supination  She is point tender over that area  Likely the beginning of an olecranon bursitis  Prior to Admission Medications   Prescriptions Last Dose Informant Patient Reported? Taking? Aspirin-Acetaminophen-Caffeine (EXCEDRIN PO)   Yes Yes   Sig: Take by mouth as needed     Cranberry 400 MG CAPS   Yes Yes   Sig: Take by mouth   SUMAtriptan (IMITREX) 100 mg tablet   Yes Yes   Sig: Take by mouth once as needed     ascorbic acid (VITAMIN C) 500 mg tablet   Yes Yes   Sig: Take 500 mg by mouth daily   glucosamine-chondroitin 500-400 MG tablet   Yes Yes   Sig: Take 1 tablet by mouth 3 (three) times a day   meclizine (ANTIVERT) 25 mg tablet   Yes Yes   Sig: Take 1 tablet by mouth 3 (three) times a day as needed   multivitamin (THERAGRAN) TABS   Yes Yes   Sig: Take 1 tablet by mouth   promethazine (PHENERGAN) 12 5 mg suppository   No Yes   Sig: Insert 1 suppository (12 5 mg total) into the rectum every 6 (six) hours as needed for nausea or vomiting   rizatriptan (MAXALT) 10 MG tablet   No Yes   Sig: Take 1 tablet (10 mg total) by mouth once as needed for migraine for up to 1 dose May repeat in 2 hours if needed   topiramate (TOPAMAX) 100 mg tablet   No Yes   Sig: One p o  q h s  Patient taking differently: 10 mg as needed One p o  q h s         Facility-Administered Medications: None       Past Medical History:   Diagnosis Date    Benign positional vertigo, right     Cervicalgia     DVT (deep venous thrombosis) ([facility])     Headache     Thyroid disease        Past Surgical History:   Procedure Laterality Date    APPENDECTOMY      LIPOMA RESECTION      NEUROMA EXCISION      STEROID INJECTION HIP Left 05/2018       Family History   Problem Relation Age of Onset    Hypertension Mother     Asthma Mother     Seizures Father     Migraines Brother     Breast cancer Neg Hx     Colon cancer Neg Hx     Ovarian cancer Neg Hx     Uterine cancer Neg Hx     Cervical cancer Neg Hx      I have reviewed and agree with the history as documented  Social History   Substance Use Topics    Smoking status: Former Smoker    Smokeless tobacco: Never Used    Alcohol use Yes      Comment: occasional        Review of Systems   Constitutional: Negative for activity change, fatigue and fever  HENT: Negative for congestion, ear pain, rhinorrhea and sore throat  Eyes: Negative  Respiratory: Negative for cough, shortness of breath and wheezing  Gastrointestinal: Negative for abdominal pain, diarrhea, nausea and vomiting  Endocrine: Negative  Genitourinary: Negative for difficulty urinating, dyspareunia, dysuria, flank pain, frequency, menstrual problem, pelvic pain, urgency, vaginal bleeding, vaginal discharge and vaginal pain  Musculoskeletal: Negative for arthralgias and myalgias  Skin: Negative for color change and pallor  Neurological: Negative for dizziness, speech difficulty, weakness and headaches  Hematological: Negative for adenopathy  Psychiatric/Behavioral: Negative for confusion  Physical Exam  Physical Exam   Constitutional: She is oriented to person, place, and time  She appears well-developed and well-nourished  She is cooperative  Non-toxic appearance  She does not have a sickly appearance  She does not appear ill  No distress  HENT:   Head: Normocephalic and atraumatic     Right Ear: Tympanic membrane and external ear normal    Left Ear: Tympanic membrane and external ear normal    Nose: No rhinorrhea, sinus tenderness or nasal deformity  No epistaxis  Right sinus exhibits no maxillary sinus tenderness and no frontal sinus tenderness  Left sinus exhibits no maxillary sinus tenderness and no frontal sinus tenderness  Mouth/Throat: Oropharynx is clear and moist and mucous membranes are normal  Normal dentition  Eyes: EOM are normal  Pupils are equal, round, and reactive to light  Neck: Normal range of motion  Neck supple  Cardiovascular: Normal rate, regular rhythm and normal heart sounds  No murmur heard  Pulmonary/Chest: Effort normal and breath sounds normal  No accessory muscle usage  No respiratory distress  She has no wheezes  She has no rales  She exhibits no tenderness  Abdominal: Soft  She exhibits no distension  There is no guarding  Musculoskeletal: Normal range of motion  She exhibits no edema  Left elbow: Tenderness found  Olecranon process tenderness noted  Lymphadenopathy:     She has no cervical adenopathy  Neurological: She is alert and oriented to person, place, and time  She exhibits normal muscle tone  Skin: Skin is warm and dry  No rash noted  No erythema  Psychiatric: She has a normal mood and affect  Nursing note and vitals reviewed  Vital Signs  ED Triage Vitals [07/22/18 1423]   Temperature Pulse Respirations Blood Pressure SpO2   97 9 °F (36 6 °C) 58 18 142/75 100 %      Temp Source Heart Rate Source Patient Position - Orthostatic VS BP Location FiO2 (%)   Oral Monitor Sitting Left arm --      Pain Score       4           Vitals:    07/22/18 1423   BP: 142/75   Pulse: 58   Patient Position - Orthostatic VS: Sitting       Visual Acuity      ED Medications  Medications - No data to display    Diagnostic Studies  Results Reviewed     None                 XR elbow 3+ vw LEFT   Final Result by Yaya Rush MD (07/22 1602)      No acute osseous abnormality              Workstation performed: [id number]                    Procedures  Procedures       Phone Contacts  ED Phone Contact    ED Course                               MDM  Number of Diagnoses or Management Options  Olecranon bursitis of left elbow: new and requires workup     Amount and/or Complexity of Data Reviewed  Tests in the radiology section of CPT®: reviewed and ordered    Patient Progress  Patient progress: stable    CritCare Time    Disposition  Final diagnoses:   Olecranon bursitis of left elbow     Time reflects when diagnosis was documented in both MDM as applicable and the Disposition within this note     Time User Action Codes Description Comment    7/22/2018  3:36 PM Thomas Guzman Add [M70 22] Olecranon bursitis of left elbow       ED Disposition     ED Disposition Condition Comment    Discharge  Tomy Daley discharge to home/self care      Condition at discharge: Good        Follow-up Information     Follow up With Specialties Details Why Contact Info    [facility], DO Sports Medicine Schedule an appointment as soon as possible for a visit For Continued Evaluation [address]  [place] [address]  [phone]            Discharge Medication List as of 7/22/2018  3:37 PM      START taking these medications    Details   naproxen (NAPROSYN) 500 mg tablet Take 1 tablet (500 mg total) by mouth 2 (two) times a day with meals for 5 days, Starting Sun 7/22/2018, Until Fri 7/27/2018, Print         CONTINUE these medications which have NOT CHANGED    Details   ascorbic acid (VITAMIN C) 500 mg tablet Take 500 mg by mouth daily, Historical Med      Aspirin-Acetaminophen-Caffeine (EXCEDRIN PO) Take by mouth as needed  , Historical Med      Cranberry 400 MG CAPS Take by mouth, Historical Med      glucosamine-chondroitin 500-400 MG tablet Take 1 tablet by mouth 3 (three) times a day, Historical Med      meclizine (ANTIVERT) 25 mg tablet Take 1 tablet by mouth 3 (three) times a day as needed, Starting Thu 7/7/2016, Historical Med      multivitamin (THERAGRAN) TABS Take 1 tablet by mouth, Historical Med      promethazine (PHENERGAN) 12 5 mg suppository Insert 1 suppository (12 5 mg total) into the rectum every 6 (six) hours as needed for nausea or vomiting, Starting Tue 6/19/2018, Normal      rizatriptan (MAXALT) 10 MG tablet Take 1 tablet (10 mg total) by mouth once as needed for migraine for up to 1 dose May repeat in 2 hours if needed, Starting Tue 6/19/2018, Normal      SUMAtriptan (IMITREX) 100 mg tablet Take by mouth once as needed  , Starting Thu 7/7/2016, Historical Med      topiramate (TOPAMAX) 100 mg tablet One p o  q h s , Normal           No discharge procedures on file      ED Provider  Electronically Signed by           MARIANN Willett  07/27/18 6076

## 2018-08-01 ENCOUNTER — OFFICE VISIT (OUTPATIENT)
Dept: INTERNAL MEDICINE CLINIC | Facility: CLINIC | Age: 53
End: 2018-08-01
Payer: COMMERCIAL

## 2018-08-01 ENCOUNTER — HOSPITAL ENCOUNTER (OUTPATIENT)
Dept: NON INVASIVE DIAGNOSTICS | Facility: CLINIC | Age: 53
Discharge: HOME | End: 2018-08-01
Payer: COMMERCIAL

## 2018-08-01 VITALS
DIASTOLIC BLOOD PRESSURE: 76 MMHG | BODY MASS INDEX: 21.53 KG/M2 | WEIGHT: 134 LBS | RESPIRATION RATE: 12 BRPM | SYSTOLIC BLOOD PRESSURE: 118 MMHG | HEIGHT: 66 IN | HEART RATE: 68 BPM

## 2018-08-01 DIAGNOSIS — R10.13 EPIGASTRIC PAIN: Primary | ICD-10-CM

## 2018-08-01 DIAGNOSIS — M79.605 LEG PAIN, POSTERIOR, LEFT: ICD-10-CM

## 2018-08-01 PROCEDURE — 3008F BODY MASS INDEX DOCD: CPT | Performed by: PHYSICIAN ASSISTANT

## 2018-08-01 PROCEDURE — 93971 EXTREMITY STUDY: CPT

## 2018-08-01 PROCEDURE — 93971 EXTREMITY STUDY: CPT | Performed by: SURGERY

## 2018-08-01 PROCEDURE — 99214 OFFICE O/P EST MOD 30 MIN: CPT | Performed by: PHYSICIAN ASSISTANT

## 2018-08-01 RX ORDER — PANTOPRAZOLE SODIUM 40 MG/1
40 TABLET, DELAYED RELEASE ORAL DAILY
Qty: 30 TABLET | Refills: 5 | Status: SHIPPED | OUTPATIENT
Start: 2018-08-01 | End: 2018-10-19

## 2018-08-01 NOTE — PROGRESS NOTES
Assessment/Plan:    Left leg pain/HX of DVT-  STAT Doppler of left leg    Epigastric pain-  CBC, CMP, amylase and lipase  Complete US abd  Start Protonix for acid reflux    No problem-specific Assessment & Plan notes found for this encounter  Diagnoses and all orders for this visit:    Epigastric pain  -     CBC and differential; Future  -     Comprehensive metabolic panel; Future  -     Amylase; Future  -     Lipase; Future  -     US abdomen complete; Future  -     pantoprazole (PROTONIX) 40 mg tablet; Take 1 tablet (40 mg total) by mouth daily    Leg pain, posterior, left  -     VAS lower limb venous duplex study, unilateral/limited; Future          Subjective:      Patient ID: Nomran Maldonado is a 47 y o  female  Patient comes in with 2 complaints  She is having pain in her left calf posteriorly just below the popliteal fossa  She says it has been going on for 3 days  She is concerned because she has a history of a DVT  She has not had any long travel in a car or airplane recently  She has started lifting weights recently and wonders if it is just muscular  There has not been any significant or noticeable swelling  No chest pain, shortness of breath or palpitations  Her other symptom is epigastric abdominal pain  For the last 5 days she has been experiencing discomfort in "the pit of her stomach"  She says it is related to both eating, as well as having an empty stomach  She says even when she drinks water sometimes she feels it  She did have 3 days with loose bowels but today she had a formed stool  No nausea or vomiting, no fever, chills or sweats  No blood in the stool or black stools  The patient does use Excedrin on a fairly regular basis for headaches  She follows with Neurology for these and does take Topamax as well          The following portions of the patient's history were reviewed and updated as appropriate: allergies, current medications, past family history, past medical history, past social history, past surgical history and problem list     Review of Systems   Constitutional: Negative for chills, fatigue, fever and unexpected weight change  Respiratory: Negative for cough, chest tightness and shortness of breath  Cardiovascular: Negative for chest pain, palpitations and leg swelling  Gastrointestinal: Positive for abdominal pain and diarrhea  Negative for blood in stool, constipation, nausea and vomiting  Musculoskeletal:        Left calf pain   Neurological: Positive for headaches  Objective:      /76 (BP Location: Left arm, Patient Position: Sitting)   Pulse 68   Resp 12   Ht 5' 6" (1 676 m)   Wt 60 8 kg (134 lb)   BMI 21 63 kg/m²          Physical Exam   Constitutional: She appears well-developed and well-nourished  HENT:   Head: Normocephalic and atraumatic  Mouth/Throat: Oropharynx is clear and moist  No oropharyngeal exudate  Neck: Normal range of motion  Cardiovascular: Normal rate, regular rhythm and normal heart sounds  Pulmonary/Chest: Effort normal and breath sounds normal  No respiratory distress  She has no wheezes  Abdominal: Soft  Bowel sounds are normal  There is tenderness in the epigastric area  There is no rebound and no guarding

## 2018-09-07 ENCOUNTER — APPOINTMENT (OUTPATIENT)
Dept: LAB | Facility: CLINIC | Age: 53
End: 2018-09-07
Payer: COMMERCIAL

## 2018-09-07 DIAGNOSIS — R30.0 DYSURIA: ICD-10-CM

## 2018-09-07 DIAGNOSIS — R30.0 DYSURIA: Primary | ICD-10-CM

## 2018-09-07 LAB
BACTERIA UR QL AUTO: ABNORMAL /HPF
BILIRUB UR QL STRIP: NEGATIVE
CAOX CRY URNS QL MICRO: ABNORMAL /HPF
CLARITY UR: ABNORMAL
COLOR UR: ABNORMAL
GLUCOSE UR STRIP-MCNC: NEGATIVE MG/DL
HGB UR QL STRIP.AUTO: ABNORMAL
KETONES UR STRIP-MCNC: NEGATIVE MG/DL
LEUKOCYTE ESTERASE UR QL STRIP: NEGATIVE
NITRITE UR QL STRIP: NEGATIVE
NON-SQ EPI CELLS URNS QL MICRO: ABNORMAL /HPF
PH UR STRIP.AUTO: 6 [PH] (ref 4.5–8)
PROT UR STRIP-MCNC: ABNORMAL MG/DL
RBC #/AREA URNS AUTO: ABNORMAL /HPF
SP GR UR STRIP.AUTO: 1.02 (ref 1–1.03)
UROBILINOGEN UR QL STRIP.AUTO: 0.2 E.U./DL
WBC #/AREA URNS AUTO: ABNORMAL /HPF

## 2018-09-07 PROCEDURE — 87086 URINE CULTURE/COLONY COUNT: CPT

## 2018-09-07 PROCEDURE — 81001 URINALYSIS AUTO W/SCOPE: CPT | Performed by: INTERNAL MEDICINE

## 2018-09-08 DIAGNOSIS — R30.0 DYSURIA: Primary | ICD-10-CM

## 2018-09-08 DIAGNOSIS — R10.9 ABDOMINAL PAIN, UNSPECIFIED ABDOMINAL LOCATION: ICD-10-CM

## 2018-09-08 LAB — BACTERIA UR CULT: NORMAL

## 2018-09-08 RX ORDER — CIPROFLOXACIN 500 MG/1
500 TABLET, FILM COATED ORAL EVERY 12 HOURS SCHEDULED
Qty: 6 TABLET | Refills: 0 | Status: SHIPPED | OUTPATIENT
Start: 2018-09-08 | End: 2018-09-11

## 2018-09-21 ENCOUNTER — OFFICE VISIT (OUTPATIENT)
Dept: DERMATOLOGY | Facility: CLINIC | Age: 53
End: 2018-09-21
Payer: COMMERCIAL

## 2018-09-21 DIAGNOSIS — M79.5 FOREIGN BODY (FB) IN SOFT TISSUE: Primary | ICD-10-CM

## 2018-09-21 PROCEDURE — 10120 INC&RMVL FB SUBQ TISS SMPL: CPT | Performed by: DERMATOLOGY

## 2018-09-21 NOTE — PROGRESS NOTES
[facility]ERMATOLOG[facility]  [address]  [facility] [address]  [phone]  [phone]     MRN: [id number] : 1965  Encounter: [id number]  Patient Information: Greg Aj    Subjective:     47  female presents for concerns regarding painful area on her left foot patient was concerned regarding this was a wart     Objective: There were no vitals taken for this visit  Physical Exam:    General Appearance:    Alert, cooperative, no distress   Skin:    Keratotic area noted on the heel of the left foot pared down with a magnifying light we were able to see that there was a small foreign body  which was pared out with a 15 blade     Assessment:     1  Foreign body (FB) in soft tissue           Plan:    no further treatment needed this should hopefully resolve without  sequela    Prior to Admission medications    Medication Sig Start Date End Date Taking?  Authorizing Provider   ascorbic acid (VITAMIN C) 500 mg tablet Take 500 mg by mouth daily   Yes Historical Provider, MD   Aspirin-Acetaminophen-Caffeine (EXCEDRIN PO) Take by mouth as needed     Yes Historical Provider, MD   Cranberry 400 MG CAPS Take by mouth   Yes Historical Provider, MD   glucosamine-chondroitin 500-400 MG tablet Take 1 tablet by mouth 3 (three) times a day   Yes Historical Provider, MD   meclizine (ANTIVERT) 25 mg tablet Take 1 tablet by mouth 3 (three) times a day as needed 16  Yes Historical Provider, MD   multivitamin (THERAGRAN) TABS Take 1 tablet by mouth   Yes Historical Provider, MD   pantoprazole (PROTONIX) 40 mg tablet Take 1 tablet (40 mg total) by mouth daily 18  Yes Mi Montemayor PA-C   rizatriptan (MAXALT) 10 MG tablet Take 1 tablet (10 mg total) by mouth once as needed for migraine for up to 1 dose May repeat in 2 hours if needed 18  Yes Trina Alcaraz MD   SUMAtriptan (IMITREX) 100 mg tablet Take by mouth once as needed   16  Yes Historical Provider, MD   topiramate (TOPAMAX) 100 mg tablet One p o  q h s  Patient taking differently: 10 mg as needed One p o  q h s   6/19/18  Yes Juliane Tan MD   naproxen (NAPROSYN) 500 mg tablet Take 1 tablet (500 mg total) by mouth 2 (two) times a day with meals for 5 days 7/22/18 7/27/18  MARIANN Wild   promethazine (PHENERGAN) 12 5 mg suppository Insert 1 suppository (12 5 mg total) into the rectum every 6 (six) hours as needed for nausea or vomiting  Patient not taking: Reported on 9/21/2018 6/19/18   Juliane Tan MD     Allergies   Allergen Reactions    Contrast [Iodinated Diagnostic Agents]      Pt had continuous coughing after last ct scan  Pt was given benadryl and the coughing stopped  No other symptoms noted   Pollen Extract Other (See Comments)     Throat clearing, difficulty swallowing, ears itchy    Prochlorperazine Other (See Comments)      Aka (compazine)  Delirious        Xin Crum MD  9/21/2018,11:33 AM    Portions of the record may have been created with voice recognition software   Occasional wrong word or "sound a like" substitutions may have occurred due to the inherent limitations of voice recognition software   Read the chart carefully and recognize, using context, where substitutions have occurred

## 2018-10-08 ENCOUNTER — APPOINTMENT (OUTPATIENT)
Dept: LAB | Facility: CLINIC | Age: 53
End: 2018-10-08
Payer: COMMERCIAL

## 2018-10-08 DIAGNOSIS — E07.9 THYROID DISEASE: ICD-10-CM

## 2018-10-08 DIAGNOSIS — R53.81 MALAISE AND FATIGUE: ICD-10-CM

## 2018-10-08 DIAGNOSIS — R73.03 PREDIABETES: ICD-10-CM

## 2018-10-08 DIAGNOSIS — E53.1 VITAMIN B6 DEFICIENCY: ICD-10-CM

## 2018-10-08 DIAGNOSIS — E55.9 VITAMIN D DEFICIENCY: ICD-10-CM

## 2018-10-08 DIAGNOSIS — R77.0 ABNORMAL ALBUMIN: ICD-10-CM

## 2018-10-08 DIAGNOSIS — R74.8 ELEVATED ALKALINE PHOSPHATASE LEVEL: Primary | ICD-10-CM

## 2018-10-08 DIAGNOSIS — G43.809 OTHER MIGRAINE WITHOUT STATUS MIGRAINOSUS, NOT INTRACTABLE: Primary | ICD-10-CM

## 2018-10-08 DIAGNOSIS — R53.83 MALAISE AND FATIGUE: ICD-10-CM

## 2018-10-08 DIAGNOSIS — R74.8 ELEVATED ALKALINE PHOSPHATASE LEVEL: ICD-10-CM

## 2018-10-08 DIAGNOSIS — E53.8 VITAMIN B12 DEFICIENCY: ICD-10-CM

## 2018-10-08 DIAGNOSIS — E07.9 THYROID DISEASE: Primary | ICD-10-CM

## 2018-10-08 LAB
25(OH)D3 SERPL-MCNC: 20.3 NG/ML (ref 30–100)
ALBUMIN SERPL BCP-MCNC: 4 G/DL (ref 3.5–5)
BASOPHILS # BLD AUTO: 0.02 THOUSANDS/ΜL (ref 0–0.1)
BASOPHILS NFR BLD AUTO: 0 % (ref 0–1)
BILIRUB UR QL STRIP: NEGATIVE
CLARITY UR: ABNORMAL
COLOR UR: YELLOW
EOSINOPHIL # BLD AUTO: 0.17 THOUSAND/ΜL (ref 0–0.61)
EOSINOPHIL NFR BLD AUTO: 3 % (ref 0–6)
ERYTHROCYTE [DISTWIDTH] IN BLOOD BY AUTOMATED COUNT: 12.7 % (ref 11.6–15.1)
EST. AVERAGE GLUCOSE BLD GHB EST-MCNC: 123 MG/DL
GGT SERPL-CCNC: 29 U/L (ref 5–85)
GLUCOSE UR STRIP-MCNC: NEGATIVE MG/DL
HBA1C MFR BLD: 5.9 % (ref 4.2–6.3)
HCT VFR BLD AUTO: 42.6 % (ref 34.8–46.1)
HGB BLD-MCNC: 13.6 G/DL (ref 11.5–15.4)
HGB UR QL STRIP.AUTO: ABNORMAL
IMM GRANULOCYTES # BLD AUTO: 0.01 THOUSAND/UL (ref 0–0.2)
IMM GRANULOCYTES NFR BLD AUTO: 0 % (ref 0–2)
KETONES UR STRIP-MCNC: NEGATIVE MG/DL
LEUKOCYTE ESTERASE UR QL STRIP: NEGATIVE
LYMPHOCYTES # BLD AUTO: 1.92 THOUSANDS/ΜL (ref 0.6–4.47)
LYMPHOCYTES NFR BLD AUTO: 33 % (ref 14–44)
MCH RBC QN AUTO: 30.4 PG (ref 26.8–34.3)
MCHC RBC AUTO-ENTMCNC: 31.9 G/DL (ref 31.4–37.4)
MCV RBC AUTO: 95 FL (ref 82–98)
MONOCYTES # BLD AUTO: 0.54 THOUSAND/ΜL (ref 0.17–1.22)
MONOCYTES NFR BLD AUTO: 9 % (ref 4–12)
NEUTROPHILS # BLD AUTO: 3.2 THOUSANDS/ΜL (ref 1.85–7.62)
NEUTS SEG NFR BLD AUTO: 55 % (ref 43–75)
NITRITE UR QL STRIP: NEGATIVE
NRBC BLD AUTO-RTO: 0 /100 WBCS
PH UR STRIP.AUTO: 5.5 [PH] (ref 4.5–8)
PLATELET # BLD AUTO: 268 THOUSANDS/UL (ref 149–390)
PMV BLD AUTO: 10.4 FL (ref 8.9–12.7)
PROT UR STRIP-MCNC: NEGATIVE MG/DL
RBC # BLD AUTO: 4.48 MILLION/UL (ref 3.81–5.12)
SP GR UR STRIP.AUTO: 1.02 (ref 1–1.03)
TSH SERPL DL<=0.05 MIU/L-ACNC: 1.36 UIU/ML (ref 0.36–3.74)
UROBILINOGEN UR QL STRIP.AUTO: 0.2 E.U./DL
VIT B12 SERPL-MCNC: 313 PG/ML (ref 100–900)
WBC # BLD AUTO: 5.86 THOUSAND/UL (ref 4.31–10.16)

## 2018-10-08 PROCEDURE — 82306 VITAMIN D 25 HYDROXY: CPT

## 2018-10-08 PROCEDURE — 82040 ASSAY OF SERUM ALBUMIN: CPT

## 2018-10-08 PROCEDURE — 80053 COMPREHEN METABOLIC PANEL: CPT

## 2018-10-08 PROCEDURE — 82977 ASSAY OF GGT: CPT

## 2018-10-08 PROCEDURE — 84443 ASSAY THYROID STIM HORMONE: CPT

## 2018-10-08 PROCEDURE — 82607 VITAMIN B-12: CPT

## 2018-10-08 PROCEDURE — 81001 URINALYSIS AUTO W/SCOPE: CPT

## 2018-10-08 PROCEDURE — 84207 ASSAY OF VITAMIN B-6: CPT

## 2018-10-08 PROCEDURE — 85025 COMPLETE CBC W/AUTO DIFF WBC: CPT

## 2018-10-08 PROCEDURE — 36415 COLL VENOUS BLD VENIPUNCTURE: CPT

## 2018-10-08 PROCEDURE — 83036 HEMOGLOBIN GLYCOSYLATED A1C: CPT

## 2018-10-08 RX ORDER — TOPIRAMATE 25 MG/1
25 TABLET ORAL
Qty: 30 TABLET | Refills: 5 | Status: SHIPPED | OUTPATIENT
Start: 2018-10-08 | End: 2018-10-19

## 2018-10-09 ENCOUNTER — APPOINTMENT (OUTPATIENT)
Dept: LAB | Facility: HOSPITAL | Age: 53
End: 2018-10-09
Payer: COMMERCIAL

## 2018-10-09 ENCOUNTER — TELEPHONE (OUTPATIENT)
Dept: INTERNAL MEDICINE CLINIC | Facility: CLINIC | Age: 53
End: 2018-10-09

## 2018-10-09 DIAGNOSIS — R31.29 MICROSCOPIC HEMATURIA: Primary | ICD-10-CM

## 2018-10-09 DIAGNOSIS — R82.998 CALCIUM OXALATE CRYSTALS IN URINE: ICD-10-CM

## 2018-10-09 DIAGNOSIS — N20.0 NEPHROLITHIASIS: ICD-10-CM

## 2018-10-09 LAB
ALBUMIN SERPL BCP-MCNC: 4 G/DL (ref 3.5–5)
ALP SERPL-CCNC: 129 U/L (ref 46–116)
ALT SERPL W P-5'-P-CCNC: 18 U/L (ref 12–78)
ANION GAP SERPL CALCULATED.3IONS-SCNC: 5 MMOL/L (ref 4–13)
AST SERPL W P-5'-P-CCNC: 19 U/L (ref 5–45)
BACTERIA UR QL AUTO: ABNORMAL /HPF
BILIRUB SERPL-MCNC: 0.47 MG/DL (ref 0.2–1)
BUN SERPL-MCNC: 13 MG/DL (ref 5–25)
CALCIUM SERPL-MCNC: 9.1 MG/DL (ref 8.3–10.1)
CAOX CRY URNS QL MICRO: ABNORMAL /HPF
CHLORIDE SERPL-SCNC: 105 MMOL/L (ref 100–108)
CO2 SERPL-SCNC: 29 MMOL/L (ref 21–32)
CREAT SERPL-MCNC: 0.84 MG/DL (ref 0.6–1.3)
ERYTHROCYTE [SEDIMENTATION RATE] IN BLOOD: 16 MM/HOUR (ref 0–20)
GFR SERPL CREATININE-BSD FRML MDRD: 80 ML/MIN/1.73SQ M
GLUCOSE SERPL-MCNC: 78 MG/DL (ref 65–140)
NON-SQ EPI CELLS URNS QL MICRO: ABNORMAL /HPF
POTASSIUM SERPL-SCNC: 3.7 MMOL/L (ref 3.5–5.3)
PROT SERPL-MCNC: 7.7 G/DL (ref 6.4–8.2)
RBC #/AREA URNS AUTO: ABNORMAL /HPF
SODIUM SERPL-SCNC: 139 MMOL/L (ref 136–145)
WBC #/AREA URNS AUTO: ABNORMAL /HPF

## 2018-10-09 PROCEDURE — 85652 RBC SED RATE AUTOMATED: CPT

## 2018-10-09 PROCEDURE — 36415 COLL VENOUS BLD VENIPUNCTURE: CPT

## 2018-10-09 NOTE — TELEPHONE ENCOUNTER
Ceasar Nance called about patient's albumin results  She said that there was a sampling issue and she said she will correct it and then send the result   Call her if there are any questions at [phone]

## 2018-10-09 NOTE — PROGRESS NOTES
Reviewed lab results with patient  Discussed need for routine follow-up with PCP  She wishes to stay with Alyx Vu as her PCP  Albumin level as expected came back normal on repeat  She continues to have microscopic hematuria and calcium oxalate crystals in urine  Discussed her use of OTC vitamin C intake and energy drink consumption  She should get further testing for her stone production  Referral to nephrology will be provided

## 2018-10-10 ENCOUNTER — OFFICE VISIT (OUTPATIENT)
Dept: NEPHROLOGY | Facility: CLINIC | Age: 53
End: 2018-10-10
Payer: COMMERCIAL

## 2018-10-10 VITALS
BODY MASS INDEX: 20.96 KG/M2 | DIASTOLIC BLOOD PRESSURE: 80 MMHG | WEIGHT: 130.4 LBS | HEIGHT: 66 IN | SYSTOLIC BLOOD PRESSURE: 122 MMHG | TEMPERATURE: 98 F | HEART RATE: 64 BPM

## 2018-10-10 DIAGNOSIS — N20.0 NEPHROLITHIASIS: ICD-10-CM

## 2018-10-10 DIAGNOSIS — R82.998 CALCIUM OXALATE CRYSTALS IN URINE: ICD-10-CM

## 2018-10-10 DIAGNOSIS — R31.29 MICROSCOPIC HEMATURIA: ICD-10-CM

## 2018-10-10 PROCEDURE — 99244 OFF/OP CNSLTJ NEW/EST MOD 40: CPT | Performed by: INTERNAL MEDICINE

## 2018-10-10 NOTE — LETTER
October 10, 2018     Nayan Murillo MD  [address], [address] [place] [address]    Patient: Roselia Sheppard   YOB: 1965   Date of Visit: 10/10/2018       Dear Dr Leta Jones: Thank you for referring Lionel Alvarado to me for evaluation  Below are my notes for this consultation  If you have questions, please do not hesitate to call me  I look forward to following your patient along with you  Sincerely,        Mikki Youssef MD        CC: DO Mikki Bonilla MD  10/10/2018 11:24 AM  Incomplete  NEPHROLOGY OFFICE CONSULT  Tomy Owen 47 y o  female MRN: [id number]    Encounter: [id number] DATE: 10/10/2018    REASON FOR VISIT: Roselia Sheppard is a 47 y  o female who was referred by Nayan Murillo MD for evaluation  Bhargav Manifold HPI:    This is a 47 y o  F with PMH of Nephrolithiasis first diagnosed at the age of 4 s/p lithotripsy, UTI, migraine headaches, UTI, smoker who is referred to Renal clinic by PCP due to having h/o forming renal calculi  Patient's first episode of kidney stone with flank pain was at the age of 4  She had undergone lithotripsy once in her life time  More recently, patient had c/o left flank pain associated with polyuria and nausea  A Renal US in May, 2018 had revealed a 6 mm renal calculi in the mid-pole of left kidney without any hydronephrosis  Patient, however, denies any burning in urine, foul odor or discharge  Reports to me that very recently while at an outdoor pool in [facility] became dizzy, nauseous and defecated  States that she has lost 3 lbs as well in the past 2 weeks  Denies any vomiting, diarrhea, chest pain, shortness of breath, leg swelling, gross blood in urine or foamy urine  States that she has a long h/o microscopic hematuria with cause likely being kidney stones  States that she takes Vitamin C daily and has been on Topamax for the past few months  Denies any use of NSAIDs   States that her diet consists of mostly vegetables but admits to consuming spinach, nuts and chocolates  PAST MEDICAL HISTORY:  Past Medical History:   Diagnosis Date    Benign positional vertigo, right     Cervicalgia     DVT (deep venous thrombosis) (HCC)     Headache     Thyroid disease        PAST SURGICAL HISTORY:  Past Surgical History:   Procedure Laterality Date    APPENDECTOMY      LIPOMA RESECTION      NEUROMA EXCISION      STEROID INJECTION HIP Left 05/2018       SOCIAL HISTORY:  History   Alcohol Use    Yes     Comment: occasional     History   Drug Use No     History   Smoking Status    Light Tobacco Smoker   Smokeless Tobacco    Never Used     Comment: 3 a week        FAMILY HISTORY:  Family History   Problem Relation Age of Onset    Hypertension Mother     Asthma Mother     Seizures Father     Migraines Brother     Breast cancer Neg Hx     Colon cancer Neg Hx     Ovarian cancer Neg Hx     Uterine cancer Neg Hx     Cervical cancer Neg Hx        ALLERGY:  Allergies   Allergen Reactions    Contrast [Iodinated Diagnostic Agents]      Pt had continuous coughing after last ct scan  Pt was given benadryl and the coughing stopped  No other symptoms noted       Pollen Extract Other (See Comments)     Throat clearing, difficulty swallowing, ears itchy    Prochlorperazine Other (See Comments)      Aka (compazine)  Delirious        MEDICATIONS:    Current Outpatient Prescriptions:     ascorbic acid (VITAMIN C) 500 mg tablet, Take 500 mg by mouth daily, Disp: , Rfl:     Aspirin-Acetaminophen-Caffeine (EXCEDRIN PO), Take by mouth as needed  , Disp: , Rfl:     Cranberry 400 MG CAPS, Take by mouth, Disp: , Rfl:     glucosamine-chondroitin 500-400 MG tablet, Take 1 tablet by mouth 3 (three) times a day, Disp: , Rfl:     meclizine (ANTIVERT) 25 mg tablet, Take 1 tablet by mouth 3 (three) times a day as needed, Disp: , Rfl:     multivitamin (THERAGRAN) TABS, Take 1 tablet by mouth, Disp: , Rfl:    pantoprazole (PROTONIX) 40 mg tablet, Take 1 tablet (40 mg total) by mouth daily, Disp: 30 tablet, Rfl: 5    rizatriptan (MAXALT) 10 MG tablet, Take 1 tablet (10 mg total) by mouth once as needed for migraine for up to 1 dose May repeat in 2 hours if needed, Disp: 9 tablet, Rfl: 5    SUMAtriptan (IMITREX) 100 mg tablet, Take by mouth once as needed  , Disp: , Rfl:     topiramate (TOPAMAX) 25 mg tablet, Take 1 tablet (25 mg total) by mouth daily at bedtime One p o  q h s , Disp: 30 tablet, Rfl: 5    REVIEW OF SYSTEMS:    Review of Systems   Constitutional: Positive for unexpected weight change  Gastrointestinal:        Flank pain   Genitourinary: Positive for frequency  All other systems reviewed and are negative  PHYSICAL EXAM:  Vitals:    10/10/18 0902   BP: 122/80   BP Location: Left arm   Patient Position: Sitting   Cuff Size: Adult   Pulse: 64   Temp: 98 °F (36 7 °C)   TempSrc: Oral   Weight: 59 1 kg (130 lb 6 4 oz)   Height: 5' 6" (1 676 m)     Body mass index is 21 05 kg/m²  Physical Exam   Constitutional: She is oriented to person, place, and time  She appears well-developed and well-nourished  HENT:   Head: Normocephalic and atraumatic  Eyes: Pupils are equal, round, and reactive to light  Neck: Neck supple  Cardiovascular: Normal rate, regular rhythm and normal heart sounds  Pulmonary/Chest: Effort normal    Abdominal: Soft  Bowel sounds are normal    Left CVA tenderness upon palpation   Musculoskeletal: Normal range of motion  Neurological: She is alert and oriented to person, place, and time  Skin: Skin is warm  Psychiatric: She has a normal mood and affect         LAB RESULTS:  Results for orders placed or performed in visit on 10/08/18   CBC and differential   Result Value Ref Range    WBC 5 86 4 31 - 10 16 Thousand/uL    RBC 4 48 3 81 - 5 12 Million/uL    Hemoglobin 13 6 11 5 - 15 4 g/dL    Hematocrit 42 6 34 8 - 46 1 %    MCV 95 82 - 98 fL    MCH 30 4 26 8 - 34 3 pg MCHC 31 9 31 4 - 37 4 g/dL    RDW 12 7 11 6 - 15 1 %    MPV 10 4 8 9 - 12 7 fL    Platelets 131 748 - 440 Thousands/uL    nRBC 0 /100 WBCs    Neutrophils Relative 55 43 - 75 %    Immat GRANS % 0 0 - 2 %    Lymphocytes Relative 33 14 - 44 %    Monocytes Relative 9 4 - 12 %    Eosinophils Relative 3 0 - 6 %    Basophils Relative 0 0 - 1 %    Neutrophils Absolute 3 20 1 85 - 7 62 Thousands/µL    Immature Grans Absolute 0 01 0 00 - 0 20 Thousand/uL    Lymphocytes Absolute 1 92 0 60 - 4 47 Thousands/µL    Monocytes Absolute 0 54 0 17 - 1 22 Thousand/µL    Eosinophils Absolute 0 17 0 00 - 0 61 Thousand/µL    Basophils Absolute 0 02 0 00 - 0 10 Thousands/µL   Comprehensive metabolic panel   Result Value Ref Range    Sodium 139 136 - 145 mmol/L    Potassium 3 7 3 5 - 5 3 mmol/L    Chloride 105 100 - 108 mmol/L    CO2 29 21 - 32 mmol/L    ANION GAP 5 4 - 13 mmol/L    BUN 13 5 - 25 mg/dL    Creatinine 0 84 0 60 - 1 30 mg/dL    Glucose 78 65 - 140 mg/dL    Calcium 9 1 8 3 - 10 1 mg/dL    AST 19 5 - 45 U/L    ALT 18 12 - 78 U/L    Alkaline Phosphatase 129 (H) 46 - 116 U/L    Total Protein 7 7 6 4 - 8 2 g/dL    Albumin 4 0 3 5 - 5 0 g/dL    Total Bilirubin 0 47 0 20 - 1 00 mg/dL    eGFR 80 ml/min/1 73sq m   TSH, 3rd generation with Free T4 reflex   Result Value Ref Range    TSH 3RD GENERATON 1 360 0 358 - 3 740 uIU/mL   Vitamin D 25 hydroxy   Result Value Ref Range    Vit D, 25-Hydroxy 20 3 (L) 30 0 - 100 0 ng/mL   Vitamin B12   Result Value Ref Range    Vitamin B-12 313 100 - 900 pg/mL   Albumin   Result Value Ref Range    Albumin 4 0 3 5 - 5 0 g/dL   Gamma GT   Result Value Ref Range    GGT 29 5 - 85 U/L   Urinalysis with reflex to microscopic   Result Value Ref Range    Color, UA Yellow     Clarity, UA Turbid     Specific [place], UA 1 024 1 003 - 1 030    pH, UA 5 5 4 5 - 8 0    Leukocytes, UA Negative Negative    Nitrite, UA Negative Negative    Protein, UA Negative Negative mg/dl    Glucose, UA Negative Negative mg/dl Ketones, UA Negative Negative mg/dl    Urobilinogen, UA 0 2 0 2, 1 0 E U /dl E U /dl    Bilirubin, UA Negative Negative    Blood, UA Large (A) Negative   Sedimentation rate, automated   Result Value Ref Range    Sed Rate 16 0 - 20 mm/hour   HEMOGLOBIN A1C W/ EAG ESTIMATION   Result Value Ref Range    Hemoglobin A1C 5 9 4 2 - 6 3 %     mg/dl   Urine Microscopic   Result Value Ref Range    RBC, UA 20-30 (A) None Seen, 0-5 /hpf    WBC, UA None Seen None Seen, 0-5, 5-55, 5-65 /hpf    Epithelial Cells Occasional None Seen, Occasional /hpf    Bacteria, UA Occasional None Seen, Occasional /hpf    Ca Oxalate Rama, UA Occasional (A) None Seen /hpf       ASSESSMENT and PLAN:  Tomy was seen today for consult  Diagnoses and all orders for this visit:    Microscopic hematuria  -     Ambulatory referral to Nephrology  -     Urinalysis with microscopic  -     Urine culture; Future  -     Urinalysis with microscopic  -     Protein / creatinine ratio, urine    Calcium oxalate crystals in urine  -     Ambulatory referral to Nephrology  -     PTH, intact; Future  -     Urine culture; Future    Nephrolithiasis  -     Ambulatory referral to Nephrology  -     PTH, intact; Future  -     Phosphorus; Future      47 F with PMH of microscopic hematuria, Nephrolithiasis, Migraine headaches, smoker, UTI who is referred to Renal clinic for management of microscopic hematuria and long standing h/o Nephrolithiasis  1) Nephrolithiasis: Long standing h/o producing renal stones with urine sediment examination recently showing presence of calcium oxalate crystals   - Recent imaging in May, 2018 had shown 6 mm stone in the left kidney   Given the slightly worsening symptoms at present time of obstructed renal calculi such as flank pain, polyuria, recommended patient to undergo a repeat Renal US for evaluation of any hydronephrosis/hydroureter    - Patient was educated on the etiology of stone formation which may include lack of adequate water intake (up to ~ 2 5 L/day), increased salt intake (which can mimic calcium excretion), low citrate intake (natural binder of calcium in the urine), excess oxalate production (both from eating nuts, rhubarb, spinach and helene doses of Vit C daily), idiopathic calcium excretion  Patient has been consuming excessive nuts, spinach and so was told to discontinue the same  Instructed to d/c vitamin C at this time as metabolism of it can result in production of oxalate and may contribute toward calcium oxalate stone formation if she is producing them  Specific gravity of urine as been ~ 1 024 which is suggestive of  A Litholink study with two 24 hour urine collection will be performed to find out the etiology of stone formation  2) Microscopic hematuria: Noted isolated microscopic hematuria without proteinuria  With lack of elevated BP, edema or renal insufficiency unlikely to suspect underlying glomerulonephritis and rather suspect Nephrolithiasis as the etiology of hematuria  Repeat Renal US to assess for the 6 mm stone will be undertaken  Even though patient is a smoker, highly unlikely to suspect underlying bladder or kidney mass as the cause of hematuria especially in the setting of benign Renal US  Du[place]

## 2018-10-10 NOTE — PATIENT INSTRUCTIONS
Discontinue Vitamin C tabs  Drink up to 2 5 L of fluids  Discontinue nuts, ice tea, spinach, rhubarb, chocolates

## 2018-10-10 NOTE — PROGRESS NOTES
NEPHROLOGY OFFICE CONSULT  Tomy Rodrigues 47 y o  female MRN: [id number]    Encounter: [id number] DATE: 10/10/2018    REASON FOR VISIT: Bunny De La Rosa is a 47 y  o female who was referred by Rae Ford MD for evaluation  Negar Matt HPI:    This is a 47 y o  F with PMH of Nephrolithiasis first diagnosed at the age of 4 s/p lithotripsy, UTI, migraine headaches, UTI, smoker who is referred to Renal clinic by PCP due to having h/o forming renal calculi  Patient's first episode of kidney stone with flank pain was at the age of 4  She had undergone lithotripsy once in her life time  More recently, patient had c/o left flank pain associated with polyuria and nausea  A Renal US in May, 2018 had revealed a 6 mm renal calculi in the mid-pole of left kidney without any hydronephrosis  Patient, however, denies any burning in urine, foul odor or discharge  Reports to me that very recently while at an outdoor pool in [facility] became dizzy, nauseous and defecated  States that she has lost 3 lbs as well in the past 2 weeks  Denies any vomiting, diarrhea, chest pain, shortness of breath, leg swelling, gross blood in urine or foamy urine  States that she has a long h/o microscopic hematuria with cause likely being kidney stones  States that she takes Vitamin C daily and has been on Topamax for the past few months  Denies any use of NSAIDs  States that her diet consists of mostly vegetables but admits to consuming spinach, nuts and chocolates               PAST MEDICAL HISTORY:  Past Medical History:   Diagnosis Date    Benign positional vertigo, right     Cervicalgia     DVT (deep venous thrombosis) (HCC)     Headache     Thyroid disease        PAST SURGICAL HISTORY:  Past Surgical History:   Procedure Laterality Date    APPENDECTOMY      LIPOMA RESECTION      NEUROMA EXCISION      STEROID INJECTION HIP Left 05/2018       SOCIAL HISTORY:  History   Alcohol Use    Yes     Comment: occasional     History   Drug Use No     History   Smoking Status    Light Tobacco Smoker   Smokeless Tobacco    Never Used     Comment: 3 a week        FAMILY HISTORY:  Family History   Problem Relation Age of Onset    Hypertension Mother     Asthma Mother     Seizures Father     Migraines Brother     Breast cancer Neg Hx     Colon cancer Neg Hx     Ovarian cancer Neg Hx     Uterine cancer Neg Hx     Cervical cancer Neg Hx        ALLERGY:  Allergies   Allergen Reactions    Contrast [Iodinated Diagnostic Agents]      Pt had continuous coughing after last ct scan  Pt was given benadryl and the coughing stopped  No other symptoms noted       Pollen Extract Other (See Comments)     Throat clearing, difficulty swallowing, ears itchy    Prochlorperazine Other (See Comments)      Aka (compazine)  Delirious        MEDICATIONS:    Current Outpatient Prescriptions:     ascorbic acid (VITAMIN C) 500 mg tablet, Take 500 mg by mouth daily, Disp: , Rfl:     Aspirin-Acetaminophen-Caffeine (EXCEDRIN PO), Take by mouth as needed  , Disp: , Rfl:     Cranberry 400 MG CAPS, Take by mouth, Disp: , Rfl:     glucosamine-chondroitin 500-400 MG tablet, Take 1 tablet by mouth 3 (three) times a day, Disp: , Rfl:     meclizine (ANTIVERT) 25 mg tablet, Take 1 tablet by mouth 3 (three) times a day as needed, Disp: , Rfl:     multivitamin (THERAGRAN) TABS, Take 1 tablet by mouth, Disp: , Rfl:     pantoprazole (PROTONIX) 40 mg tablet, Take 1 tablet (40 mg total) by mouth daily, Disp: 30 tablet, Rfl: 5    rizatriptan (MAXALT) 10 MG tablet, Take 1 tablet (10 mg total) by mouth once as needed for migraine for up to 1 dose May repeat in 2 hours if needed, Disp: 9 tablet, Rfl: 5    SUMAtriptan (IMITREX) 100 mg tablet, Take by mouth once as needed  , Disp: , Rfl:     topiramate (TOPAMAX) 25 mg tablet, Take 1 tablet (25 mg total) by mouth daily at bedtime One p o  q h s , Disp: 30 tablet, Rfl: 5    REVIEW OF SYSTEMS:    Review of Systems   Constitutional: Positive for unexpected weight change  Gastrointestinal:        Flank pain   Genitourinary: Positive for frequency  All other systems reviewed and are negative  PHYSICAL EXAM:  Vitals:    10/10/18 0902   BP: 122/80   BP Location: Left arm   Patient Position: Sitting   Cuff Size: Adult   Pulse: 64   Temp: 98 °F (36 7 °C)   TempSrc: Oral   Weight: 59 1 kg (130 lb 6 4 oz)   Height: 5' 6" (1 676 m)     Body mass index is 21 05 kg/m²  Physical Exam   Constitutional: She is oriented to person, place, and time  She appears well-developed and well-nourished  HENT:   Head: Normocephalic and atraumatic  Eyes: Pupils are equal, round, and reactive to light  Neck: Neck supple  Cardiovascular: Normal rate, regular rhythm and normal heart sounds  Pulmonary/Chest: Effort normal    Abdominal: Soft  Bowel sounds are normal    Left CVA tenderness upon palpation   Musculoskeletal: Normal range of motion  Neurological: She is alert and oriented to person, place, and time  Skin: Skin is warm  Psychiatric: She has a normal mood and affect         LAB RESULTS:  Results for orders placed or performed in visit on 10/08/18   CBC and differential   Result Value Ref Range    WBC 5 86 4 31 - 10 16 Thousand/uL    RBC 4 48 3 81 - 5 12 Million/uL    Hemoglobin 13 6 11 5 - 15 4 g/dL    Hematocrit 42 6 34 8 - 46 1 %    MCV 95 82 - 98 fL    MCH 30 4 26 8 - 34 3 pg    MCHC 31 9 31 4 - 37 4 g/dL    RDW 12 7 11 6 - 15 1 %    MPV 10 4 8 9 - 12 7 fL    Platelets 918 450 - 616 Thousands/uL    nRBC 0 /100 WBCs    Neutrophils Relative 55 43 - 75 %    Immat GRANS % 0 0 - 2 %    Lymphocytes Relative 33 14 - 44 %    Monocytes Relative 9 4 - 12 %    Eosinophils Relative 3 0 - 6 %    Basophils Relative 0 0 - 1 %    Neutrophils Absolute 3 20 1 85 - 7 62 Thousands/µL    Immature Grans Absolute 0 01 0 00 - 0 20 Thousand/uL    Lymphocytes Absolute 1 92 0 60 - 4 47 Thousands/µL    Monocytes Absolute 0 54 0 17 - 1 22 Thousand/µL    Eosinophils Absolute 0 17 0 00 - 0 61 Thousand/µL    Basophils Absolute 0 02 0 00 - 0 10 Thousands/µL   Comprehensive metabolic panel   Result Value Ref Range    Sodium 139 136 - 145 mmol/L    Potassium 3 7 3 5 - 5 3 mmol/L    Chloride 105 100 - 108 mmol/L    CO2 29 21 - 32 mmol/L    ANION GAP 5 4 - 13 mmol/L    BUN 13 5 - 25 mg/dL    Creatinine 0 84 0 60 - 1 30 mg/dL    Glucose 78 65 - 140 mg/dL    Calcium 9 1 8 3 - 10 1 mg/dL    AST 19 5 - 45 U/L    ALT 18 12 - 78 U/L    Alkaline Phosphatase 129 (H) 46 - 116 U/L    Total Protein 7 7 6 4 - 8 2 g/dL    Albumin 4 0 3 5 - 5 0 g/dL    Total Bilirubin 0 47 0 20 - 1 00 mg/dL    eGFR 80 ml/min/1 73sq m   TSH, 3rd generation with Free T4 reflex   Result Value Ref Range    TSH 3RD GENERATON 1 360 0 358 - 3 740 uIU/mL   Vitamin D 25 hydroxy   Result Value Ref Range    Vit D, 25-Hydroxy 20 3 (L) 30 0 - 100 0 ng/mL   Vitamin B12   Result Value Ref Range    Vitamin B-12 313 100 - 900 pg/mL   Albumin   Result Value Ref Range    Albumin 4 0 3 5 - 5 0 g/dL   Gamma GT   Result Value Ref Range    GGT 29 5 - 85 U/L   Urinalysis with reflex to microscopic   Result Value Ref Range    Color, UA Yellow     Clarity, UA Turbid     Specific [place], UA 1 024 1 003 - 1 030    pH, UA 5 5 4 5 - 8 0    Leukocytes, UA Negative Negative    Nitrite, UA Negative Negative    Protein, UA Negative Negative mg/dl    Glucose, UA Negative Negative mg/dl    Ketones, UA Negative Negative mg/dl    Urobilinogen, UA 0 2 0 2, 1 0 E U /dl E U /dl    Bilirubin, UA Negative Negative    Blood, UA Large (A) Negative   Sedimentation rate, automated   Result Value Ref Range    Sed Rate 16 0 - 20 mm/hour   HEMOGLOBIN A1C W/ EAG ESTIMATION   Result Value Ref Range    Hemoglobin A1C 5 9 4 2 - 6 3 %     mg/dl   Urine Microscopic   Result Value Ref Range    RBC, UA 20-30 (A) None Seen, 0-5 /hpf    WBC, UA None Seen None Seen, 0-5, 5-55, 5-65 /hpf    Epithelial Cells Occasional None Seen, Occasional /hpf    Bacteria, UA Occasional None Seen, Occasional /hpf    Ca Oxalate Rama, UA Occasional (A) None Seen /hpf       ASSESSMENT and PLAN:  Tomy was seen today for consult  Diagnoses and all orders for this visit:    Microscopic hematuria  -     Ambulatory referral to Nephrology  -     Urinalysis with microscopic  -     Urine culture; Future  -     Urinalysis with microscopic  -     Protein / creatinine ratio, urine    Calcium oxalate crystals in urine  -     Ambulatory referral to Nephrology  -     PTH, intact; Future  -     Urine culture; Future    Nephrolithiasis  -     Ambulatory referral to Nephrology  -     PTH, intact; Future  -     Phosphorus; Future      47 F with PMH of microscopic hematuria, Nephrolithiasis, Migraine headaches, smoker, UTI who is referred to Renal clinic for management of microscopic hematuria and long standing h/o Nephrolithiasis  1) Nephrolithiasis: Long standing h/o producing renal stones with urine sediment examination recently showing presence of calcium oxalate crystals   - Recent imaging in May, 2018 had shown 6 mm stone in the left kidney  Given the slightly worsening symptoms at present time of obstructed renal calculi such as flank pain, polyuria, recommended patient to undergo a repeat Renal US for evaluation of any hydronephrosis/hydroureter    - Patient was educated on the etiology of stone formation which may include lack of adequate water intake (< 2 5 L/day), increased salt intake (which can mimic calcium excretion), low citrate intake (natural binder of calcium in the urine), excess oxalate production (both from eating nuts, rhubarb, spinach and helene doses of Vit C daily), and idiopathic calcium excretion  Patient has been consuming excessive nuts, spinach and so was told to discontinue the same  Instructed to d/c vitamin C at this time as metabolism of it can result in production of oxalate and may contribute toward calcium oxalate stone formation if she is producing them   Specific gravity of urine has been ~ 1 024 which is suggestive of producing concentrated urine  Therefore, recommended patient to drink at least 2 5-3 L of fluids daily  Topamax has been known to result in Calcium phosphate stones  Since patient is taking it sparingly, instructed to hold off on the drug at this time  A Litholink study with two 24 hour urine collection will be performed to find out the etiology of stone formation  PTHi will be ordered to evaluate for any primary HPTH  2) Microscopic hematuria: Noted isolated microscopic hematuria without proteinuria  With lack of elevated BP, edema or renal insufficiency unlikely to suspect underlying glomerulonephritis and rather suspect Nephrolithiasis as the etiology of hematuria  Repeat Renal US to assess for the 6 mm stone will be undertaken  Even though patient is a smoker, highly unlikely to suspect underlying bladder or kidney mass as the cause of hematuria especially in the setting of benign Renal US  3) Polyuria: Will get repeat UA with Urine C+S and repeat imaging as suggested above to evaluate for obstructed renal stone

## 2018-10-10 NOTE — LETTER
October 10, 2018     Sergio Bettnecourt MD  [address], [facility] [place] [address]    Patient: Donald Kebede   YOB: 1965   Date of Visit: 10/10/2018       Dear Dr Giovanny Boykin: Thank you for referring Carmella Millard to me for evaluation  Below are my notes for this consultation  If you have questions, please do not hesitate to call me  I look forward to following your patient along with you  Sincerely,        Rick Marlow MD        CC: DO Rick Hussein MD  10/10/2018 11:24 AM  Incomplete  NEPHROLOGY OFFICE CONSULT  Tomy Bobo 47 y o  female MRN: [id number]    Encounter: [id number] DATE: 10/10/2018    REASON FOR VISIT: Donald Kebede is a 47 y  o female who was referred by Sergio Bettencourt MD for evaluation  Leonie Dickinson HPI:    This is a 47 y o  F with PMH of Nephrolithiasis first diagnosed at the age of 4 s/p lithotripsy, UTI, migraine headaches, UTI, smoker who is referred to Renal clinic by PCP due to having h/o forming renal calculi  Patient's first episode of kidney stone with flank pain was at the age of 4  She had undergone lithotripsy once in her life time  More recently, patient had c/o left flank pain associated with polyuria and nausea  A Renal US in May, 2018 had revealed a 6 mm renal calculi in the mid-pole of left kidney without any hydronephrosis  Patient, however, denies any burning in urine, foul odor or discharge  Reports to me that very recently while at an outdoor pool in [facility] became dizzy, nauseous and defecated  States that she has lost 3 lbs as well in the past 2 weeks  Denies any vomiting, diarrhea, chest pain, shortness of breath, leg swelling, gross blood in urine or foamy urine  States that she has a long h/o microscopic hematuria with cause likely being kidney stones  States that she takes Vitamin C daily and has been on Topamax for the past few months  Denies any use of NSAIDs   States that her diet consists of mostly vegetables but admits to consuming spinach, nuts and chocolates  PAST MEDICAL HISTORY:  Past Medical History:   Diagnosis Date    Benign positional vertigo, right     Cervicalgia     DVT (deep venous thrombosis) (HCC)     Headache     Thyroid disease        PAST SURGICAL HISTORY:  Past Surgical History:   Procedure Laterality Date    APPENDECTOMY      LIPOMA RESECTION      NEUROMA EXCISION      STEROID INJECTION HIP Left 05/2018       SOCIAL HISTORY:  History   Alcohol Use    Yes     Comment: occasional     History   Drug Use No     History   Smoking Status    Light Tobacco Smoker   Smokeless Tobacco    Never Used     Comment: 3 a week        FAMILY HISTORY:  Family History   Problem Relation Age of Onset    Hypertension Mother     Asthma Mother     Seizures Father     Migraines Brother     Breast cancer Neg Hx     Colon cancer Neg Hx     Ovarian cancer Neg Hx     Uterine cancer Neg Hx     Cervical cancer Neg Hx        ALLERGY:  Allergies   Allergen Reactions    Contrast [Iodinated Diagnostic Agents]      Pt had continuous coughing after last ct scan  Pt was given benadryl and the coughing stopped  No other symptoms noted       Pollen Extract Other (See Comments)     Throat clearing, difficulty swallowing, ears itchy    Prochlorperazine Other (See Comments)      Aka (compazine)  Delirious        MEDICATIONS:    Current Outpatient Prescriptions:     ascorbic acid (VITAMIN C) 500 mg tablet, Take 500 mg by mouth daily, Disp: , Rfl:     Aspirin-Acetaminophen-Caffeine (EXCEDRIN PO), Take by mouth as needed  , Disp: , Rfl:     Cranberry 400 MG CAPS, Take by mouth, Disp: , Rfl:     glucosamine-chondroitin 500-400 MG tablet, Take 1 tablet by mouth 3 (three) times a day, Disp: , Rfl:     meclizine (ANTIVERT) 25 mg tablet, Take 1 tablet by mouth 3 (three) times a day as needed, Disp: , Rfl:     multivitamin (THERAGRAN) TABS, Take 1 tablet by mouth, Disp: , Rfl:    pantoprazole (PROTONIX) 40 mg tablet, Take 1 tablet (40 mg total) by mouth daily, Disp: 30 tablet, Rfl: 5    rizatriptan (MAXALT) 10 MG tablet, Take 1 tablet (10 mg total) by mouth once as needed for migraine for up to 1 dose May repeat in 2 hours if needed, Disp: 9 tablet, Rfl: 5    SUMAtriptan (IMITREX) 100 mg tablet, Take by mouth once as needed  , Disp: , Rfl:     topiramate (TOPAMAX) 25 mg tablet, Take 1 tablet (25 mg total) by mouth daily at bedtime One p o  q h s , Disp: 30 tablet, Rfl: 5    REVIEW OF SYSTEMS:    Review of Systems   Constitutional: Positive for unexpected weight change  Gastrointestinal:        Flank pain   Genitourinary: Positive for frequency  All other systems reviewed and are negative  PHYSICAL EXAM:  Vitals:    10/10/18 0902   BP: 122/80   BP Location: Left arm   Patient Position: Sitting   Cuff Size: Adult   Pulse: 64   Temp: 98 °F (36 7 °C)   TempSrc: Oral   Weight: 59 1 kg (130 lb 6 4 oz)   Height: 5' 6" (1 676 m)     Body mass index is 21 05 kg/m²  Physical Exam   Constitutional: She is oriented to person, place, and time  She appears well-developed and well-nourished  HENT:   Head: Normocephalic and atraumatic  Eyes: Pupils are equal, round, and reactive to light  Neck: Neck supple  Cardiovascular: Normal rate, regular rhythm and normal heart sounds  Pulmonary/Chest: Effort normal    Abdominal: Soft  Bowel sounds are normal    Left CVA tenderness upon palpation   Musculoskeletal: Normal range of motion  Neurological: She is alert and oriented to person, place, and time  Skin: Skin is warm  Psychiatric: She has a normal mood and affect         LAB RESULTS:  Results for orders placed or performed in visit on 10/08/18   CBC and differential   Result Value Ref Range    WBC 5 86 4 31 - 10 16 Thousand/uL    RBC 4 48 3 81 - 5 12 Million/uL    Hemoglobin 13 6 11 5 - 15 4 g/dL    Hematocrit 42 6 34 8 - 46 1 %    MCV 95 82 - 98 fL    MCH 30 4 26 8 - 34 3 pg MCHC 31 9 31 4 - 37 4 g/dL    RDW 12 7 11 6 - 15 1 %    MPV 10 4 8 9 - 12 7 fL    Platelets 248 578 - 854 Thousands/uL    nRBC 0 /100 WBCs    Neutrophils Relative 55 43 - 75 %    Immat GRANS % 0 0 - 2 %    Lymphocytes Relative 33 14 - 44 %    Monocytes Relative 9 4 - 12 %    Eosinophils Relative 3 0 - 6 %    Basophils Relative 0 0 - 1 %    Neutrophils Absolute 3 20 1 85 - 7 62 Thousands/µL    Immature Grans Absolute 0 01 0 00 - 0 20 Thousand/uL    Lymphocytes Absolute 1 92 0 60 - 4 47 Thousands/µL    Monocytes Absolute 0 54 0 17 - 1 22 Thousand/µL    Eosinophils Absolute 0 17 0 00 - 0 61 Thousand/µL    Basophils Absolute 0 02 0 00 - 0 10 Thousands/µL   Comprehensive metabolic panel   Result Value Ref Range    Sodium 139 136 - 145 mmol/L    Potassium 3 7 3 5 - 5 3 mmol/L    Chloride 105 100 - 108 mmol/L    CO2 29 21 - 32 mmol/L    ANION GAP 5 4 - 13 mmol/L    BUN 13 5 - 25 mg/dL    Creatinine 0 84 0 60 - 1 30 mg/dL    Glucose 78 65 - 140 mg/dL    Calcium 9 1 8 3 - 10 1 mg/dL    AST 19 5 - 45 U/L    ALT 18 12 - 78 U/L    Alkaline Phosphatase 129 (H) 46 - 116 U/L    Total Protein 7 7 6 4 - 8 2 g/dL    Albumin 4 0 3 5 - 5 0 g/dL    Total Bilirubin 0 47 0 20 - 1 00 mg/dL    eGFR 80 ml/min/1 73sq m   TSH, 3rd generation with Free T4 reflex   Result Value Ref Range    TSH 3RD GENERATON 1 360 0 358 - 3 740 uIU/mL   Vitamin D 25 hydroxy   Result Value Ref Range    Vit D, 25-Hydroxy 20 3 (L) 30 0 - 100 0 ng/mL   Vitamin B12   Result Value Ref Range    Vitamin B-12 313 100 - 900 pg/mL   Albumin   Result Value Ref Range    Albumin 4 0 3 5 - 5 0 g/dL   Gamma GT   Result Value Ref Range    GGT 29 5 - 85 U/L   Urinalysis with reflex to microscopic   Result Value Ref Range    Color, UA Yellow     Clarity, UA Turbid     Specific [place], UA 1 024 1 003 - 1 030    pH, UA 5 5 4 5 - 8 0    Leukocytes, UA Negative Negative    Nitrite, UA Negative Negative    Protein, UA Negative Negative mg/dl    Glucose, UA Negative Negative mg/dl Ketones, UA Negative Negative mg/dl    Urobilinogen, UA 0 2 0 2, 1 0 E U /dl E U /dl    Bilirubin, UA Negative Negative    Blood, UA Large (A) Negative   Sedimentation rate, automated   Result Value Ref Range    Sed Rate 16 0 - 20 mm/hour   HEMOGLOBIN A1C W/ EAG ESTIMATION   Result Value Ref Range    Hemoglobin A1C 5 9 4 2 - 6 3 %     mg/dl   Urine Microscopic   Result Value Ref Range    RBC, UA 20-30 (A) None Seen, 0-5 /hpf    WBC, UA None Seen None Seen, 0-5, 5-55, 5-65 /hpf    Epithelial Cells Occasional None Seen, Occasional /hpf    Bacteria, UA Occasional None Seen, Occasional /hpf    Ca Oxalate Rama, UA Occasional (A) None Seen /hpf       ASSESSMENT and PLAN:  Tomy was seen today for consult  Diagnoses and all orders for this visit:    Microscopic hematuria  -     Ambulatory referral to Nephrology  -     Urinalysis with microscopic  -     Urine culture; Future  -     Urinalysis with microscopic  -     Protein / creatinine ratio, urine    Calcium oxalate crystals in urine  -     Ambulatory referral to Nephrology  -     PTH, intact; Future  -     Urine culture; Future    Nephrolithiasis  -     Ambulatory referral to Nephrology  -     PTH, intact; Future  -     Phosphorus; Future      47 F with PMH of microscopic hematuria, Nephrolithiasis, Migraine headaches, smoker, UTI who is referred to Renal clinic for management of microscopic hematuria and long standing h/o Nephrolithiasis  1) Nephrolithiasis: Long standing h/o producing renal stones with urine sediment examination recently showing presence of calcium oxalate crystals   - Recent imaging in May, 2018 had shown 6 mm stone in the left kidney   Given the slightly worsening symptoms at present time of obstructed renal calculi such as flank pain, polyuria, recommended patient to undergo a repeat Renal US for evaluation of any hydronephrosis/hydroureter    - Patient was educated on the etiology of stone formation which may include lack of adequate water intake (up to ~ 2 5 L/day), increased salt intake (which can mimic calcium excretion), low citrate intake (natural binder of calcium in the urine), excess oxalate production (both from eating nuts, rhubarb, spinach and helene doses of Vit C daily), idiopathic calcium excretion  Patient has been consuming excessive nuts, spinach and so was told to discontinue the same  Instructed to d/c vitamin C at this time as metabolism of it can result in production of oxalate and may contribute toward calcium oxalate stone formation if she is producing them  Specific gravity of urine as been ~ 1 024 which is suggestive of  A Litholink study with two 24 hour urine collection will be performed to find out the etiology of stone formation  2) Microscopic hematuria: Noted isolated microscopic hematuria without proteinuria  With lack of elevated BP, edema or renal insufficiency unlikely to suspect underlying glomerulonephritis and rather suspect Nephrolithiasis as the etiology of hematuria  Repeat Renal US to assess for the 6 mm stone will be undertaken  Even though patient is a smoker, highly unlikely to suspect underlying bladder or kidney mass as the cause of hematuria especially in the setting of benign Renal US  Henry Elias

## 2018-10-11 ENCOUNTER — APPOINTMENT (OUTPATIENT)
Dept: LAB | Facility: CLINIC | Age: 53
End: 2018-10-11
Payer: COMMERCIAL

## 2018-10-11 DIAGNOSIS — R31.29 MICROSCOPIC HEMATURIA: ICD-10-CM

## 2018-10-11 DIAGNOSIS — N20.0 NEPHROLITHIASIS: ICD-10-CM

## 2018-10-11 DIAGNOSIS — R82.998 CALCIUM OXALATE CRYSTALS IN URINE: ICD-10-CM

## 2018-10-11 LAB
BACTERIA UR QL AUTO: ABNORMAL /HPF
BILIRUB UR QL STRIP: NEGATIVE
CLARITY UR: ABNORMAL
COLOR UR: YELLOW
CREAT UR-MCNC: 166 MG/DL
GLUCOSE UR STRIP-MCNC: NEGATIVE MG/DL
HGB UR QL STRIP.AUTO: ABNORMAL
HYALINE CASTS #/AREA URNS LPF: ABNORMAL /LPF
KETONES UR STRIP-MCNC: NEGATIVE MG/DL
LEUKOCYTE ESTERASE UR QL STRIP: NEGATIVE
NITRITE UR QL STRIP: NEGATIVE
NON-SQ EPI CELLS URNS QL MICRO: ABNORMAL /HPF
PH UR STRIP.AUTO: 6 [PH] (ref 4.5–8)
PHOSPHATE SERPL-MCNC: 3.3 MG/DL (ref 2.7–4.5)
PROT UR STRIP-MCNC: NEGATIVE MG/DL
PROT UR-MCNC: 6 MG/DL
PROT/CREAT UR: 0.04 MG/G{CREAT} (ref 0–0.1)
PTH-INTACT SERPL-MCNC: 63.4 PG/ML (ref 18.4–80.1)
RBC #/AREA URNS AUTO: ABNORMAL /HPF
SP GR UR STRIP.AUTO: 1.02 (ref 1–1.03)
UROBILINOGEN UR QL STRIP.AUTO: 0.2 E.U./DL
VIT B6 SERPL-MCNC: 16.1 UG/L (ref 2–32.8)
WBC #/AREA URNS AUTO: ABNORMAL /HPF

## 2018-10-11 PROCEDURE — 81001 URINALYSIS AUTO W/SCOPE: CPT | Performed by: INTERNAL MEDICINE

## 2018-10-11 PROCEDURE — 36415 COLL VENOUS BLD VENIPUNCTURE: CPT

## 2018-10-11 PROCEDURE — 84100 ASSAY OF PHOSPHORUS: CPT

## 2018-10-11 PROCEDURE — 82570 ASSAY OF URINE CREATININE: CPT | Performed by: INTERNAL MEDICINE

## 2018-10-11 PROCEDURE — 87086 URINE CULTURE/COLONY COUNT: CPT

## 2018-10-11 PROCEDURE — 84156 ASSAY OF PROTEIN URINE: CPT | Performed by: INTERNAL MEDICINE

## 2018-10-11 PROCEDURE — 83970 ASSAY OF PARATHORMONE: CPT

## 2018-10-12 ENCOUNTER — HOSPITAL ENCOUNTER (OUTPATIENT)
Dept: ULTRASOUND IMAGING | Facility: HOSPITAL | Age: 53
Discharge: HOME | End: 2018-10-12
Attending: INTERNAL MEDICINE
Payer: COMMERCIAL

## 2018-10-12 DIAGNOSIS — R10.9 ABDOMINAL PAIN, UNSPECIFIED ABDOMINAL LOCATION: ICD-10-CM

## 2018-10-12 LAB — BACTERIA UR CULT: NORMAL

## 2018-10-12 PROCEDURE — 76700 US EXAM ABDOM COMPLETE: CPT

## 2018-10-14 ENCOUNTER — APPOINTMENT (EMERGENCY)
Dept: RADIOLOGY | Facility: HOSPITAL | Age: 53
End: 2018-10-14
Payer: COMMERCIAL

## 2018-10-14 ENCOUNTER — HOSPITAL ENCOUNTER (EMERGENCY)
Facility: HOSPITAL | Age: 53
Discharge: HOME | End: 2018-10-14
Attending: EMERGENCY MEDICINE | Admitting: EMERGENCY MEDICINE
Payer: COMMERCIAL

## 2018-10-14 VITALS
HEART RATE: 63 BPM | DIASTOLIC BLOOD PRESSURE: 68 MMHG | OXYGEN SATURATION: 100 % | SYSTOLIC BLOOD PRESSURE: 154 MMHG | WEIGHT: 135.14 LBS | TEMPERATURE: 97.6 F | BODY MASS INDEX: 21.81 KG/M2 | RESPIRATION RATE: 18 BRPM

## 2018-10-14 DIAGNOSIS — M10.9 GOUT: Primary | ICD-10-CM

## 2018-10-14 DIAGNOSIS — L03.90 CELLULITIS: ICD-10-CM

## 2018-10-14 LAB
BACTERIA UR QL AUTO: ABNORMAL /HPF
BILIRUB UR QL STRIP: NEGATIVE
CLARITY UR: CLEAR
COLOR UR: YELLOW
GLUCOSE UR STRIP-MCNC: NEGATIVE MG/DL
HGB UR QL STRIP.AUTO: ABNORMAL
KETONES UR STRIP-MCNC: NEGATIVE MG/DL
LEUKOCYTE ESTERASE UR QL STRIP: NEGATIVE
NITRITE UR QL STRIP: NEGATIVE
NON-SQ EPI CELLS URNS QL MICRO: ABNORMAL /HPF
PH UR STRIP.AUTO: 5.5 [PH] (ref 4.5–8)
PROT UR STRIP-MCNC: NEGATIVE MG/DL
RBC #/AREA URNS AUTO: ABNORMAL /HPF
SP GR UR STRIP.AUTO: <=1.005 (ref 1–1.03)
UROBILINOGEN UR QL STRIP.AUTO: 0.2 E.U./DL
WBC #/AREA URNS AUTO: ABNORMAL /HPF

## 2018-10-14 PROCEDURE — 81001 URINALYSIS AUTO W/SCOPE: CPT | Performed by: EMERGENCY MEDICINE

## 2018-10-14 PROCEDURE — 99283 EMERGENCY DEPT VISIT LOW MDM: CPT

## 2018-10-14 PROCEDURE — 73630 X-RAY EXAM OF FOOT: CPT

## 2018-10-14 RX ORDER — COLCHICINE 0.6 MG/1
1.2 TABLET ORAL ONCE
Status: COMPLETED | OUTPATIENT
Start: 2018-10-14 | End: 2018-10-14

## 2018-10-14 RX ORDER — IBUPROFEN 600 MG/1
600 TABLET ORAL ONCE
Status: COMPLETED | OUTPATIENT
Start: 2018-10-14 | End: 2018-10-14

## 2018-10-14 RX ORDER — CEPHALEXIN 500 MG/1
500 CAPSULE ORAL EVERY 6 HOURS SCHEDULED
Qty: 28 CAPSULE | Refills: 0 | Status: SHIPPED | OUTPATIENT
Start: 2018-10-14 | End: 2018-10-19

## 2018-10-14 RX ORDER — COLCHICINE 0.6 MG/1
0.6 TABLET ORAL DAILY
Qty: 30 TABLET | Refills: 0 | Status: SHIPPED | OUTPATIENT
Start: 2018-10-14 | End: 2018-10-19

## 2018-10-14 RX ORDER — INDOMETHACIN 50 MG/1
50 CAPSULE ORAL 2 TIMES DAILY WITH MEALS
Qty: 20 CAPSULE | Refills: 0 | Status: SHIPPED | OUTPATIENT
Start: 2018-10-14 | End: 2018-10-19

## 2018-10-14 RX ADMIN — IBUPROFEN 600 MG: 600 TABLET ORAL at 11:57

## 2018-10-14 RX ADMIN — COLCHICINE 1.2 MG: 0.6 TABLET, FILM COATED ORAL at 11:32

## 2018-10-14 NOTE — DISCHARGE INSTRUCTIONS
Gout   WHAT YOU NEED TO KNOW:   Gout is a form of arthritis that causes severe joint pain, redness, swelling, and stiffness  Acute gout pain starts suddenly, gets worse quickly, and stops on its own  Acute gout can become chronic and cause permanent damage to the joints  DISCHARGE INSTRUCTIONS:   Return to the emergency department if:   · You have severe pain in one or more of your joints that you cannot tolerate  · You have a fever or redness that spreads beyond the joint area  Contact your healthcare provider if:   · You have new symptoms, such as a rash, after you start gout treatment  · Your joint pain and swelling do not go away, even after treatment  · You are not urinating as much or as often as you usually do  · You have trouble taking your gout medicines  · You have questions or concerns about your condition or care  Medicines: You may need any of the following:  · Prescription pain medicine  may be given  Ask your healthcare provider how to take this medicine safely  Some prescription pain medicines contain acetaminophen  Do not take other medicines that contain acetaminophen without talking to your healthcare provider  Too much acetaminophen may cause liver damage  Prescription pain medicine may cause constipation  Ask your healthcare provider how to prevent or treat constipation  · NSAIDs , such as ibuprofen, help decrease swelling, pain, and fever  This medicine is available with or without a doctor's order  NSAIDs can cause stomach bleeding or kidney problems in certain people  If you take blood thinner medicine, always ask your healthcare provider if NSAIDs are safe for you  Always read the medicine label and follow directions  · Gout medicine  decreases joint pain and swelling  It may also be given to prevent new gout attacks  · Steroids  reduce inflammation and can help your joint stiffness and pain during gout attacks      · Uric acid medicine  may be given to reduce the amount of uric acid your body makes  Some medicines may help you pass more uric acid when you urinate  · Take your medicine as directed  Contact your healthcare provider if you think your medicine is not helping or if you have side effects  Tell him or her if you are allergic to any medicine  Keep a list of the medicines, vitamins, and herbs you take  Include the amounts, and when and why you take them  Bring the list or the pill bottles to follow-up visits  Carry your medicine list with you in case of an emergency  Follow up with your healthcare provider as directed:  Write down your questions so you remember to ask them during your visits  Manage gout:   · Rest your painful joint so it can heal   Your healthcare provider may recommend crutches or a walker if the affected joint is in a leg  · Apply ice to your joint  Ice decreases pain and swelling  Use an ice pack, or put crushed ice in a plastic bag  Cover the ice pack or bag with a towel before you apply it to your painful joint  Apply ice for 15 to 20 minutes every hour, or as directed  · Elevate your joint  Elevation helps reduce swelling and pain  Raise your joint above the level of your heart as often as you can  Prop your painful joint on pillows to keep it above your heart comfortably  · Go to physical therapy if directed  A physical therapist can teach you exercises to improve flexibility and range of motion  Prevent gout attacks:   · Do not eat high-purine foods  These foods include meats, seafood, asparagus, spinach, cauliflower, and some types of beans  Healthcare providers may tell you to eat more low-fat milk products, such as yogurt  Milk products may decrease your risk for gout attacks  Vitamin C and coffee may also help  Your healthcare provider or dietitian can help you create a meal plan  · Drink more liquids as directed  Liquids such as water help remove uric acid from your body   Ask how much liquid to drink each day and which liquids are best for you  · Manage your weight  Weight loss may decrease the amount of uric acid in your body  Exercise can help you lose weight  Talk to your healthcare provider about the best exercises for you  · Control your blood sugar level if you have diabetes  Keep your blood sugar level in a normal range  This can help prevent gout attacks  · Limit or do not drink alcohol as directed  Alcohol can trigger a gout attack  Alcohol also increases your risk for dehydration  Ask your healthcare provider if alcohol is safe for you  © 2017 [address] [facility] Information is for End User's use only and may not be sold, redistributed or otherwise used for commercial purposes  All illustrations and images included in CareNotes® are the copyrighted property of [employer] D [employer] M [employer]  or Carlos Maxwell  The above information is an  only  It is not intended as medical advice for individual conditions or treatments  Talk to your doctor, nurse or pharmacist before following any medical regimen to see if it is safe and effective for you

## 2018-10-14 NOTE — ED PROVIDER NOTES
History  Chief Complaint   Patient presents with    Toe Pain     Pt states she has pain in her right pinky toe since yesterday  [de-identified] old female patient presents emergency department for evaluation of digit of the pain  The patient states this started spontaneously  The patient has no history of gout but does describe a get a like pain  Only for x-ray, treated with colchicine, a prescription for outpatient antibiotics if culture seen does not have take care of the patient's symptoms  History provided by:  Patient   used: No    Foot Injury - Major   Location:  Foot  Injury: no    Foot location:  R foot  Pain details:     Quality:  Aching    Radiates to:  Does not radiate    Severity:  Moderate    Onset quality:  Sudden    Timing:  Constant    Progression:  Worsening  Chronicity:  New  Foreign body present:  No foreign bodies  Relieved by:  Nothing  Worsened by:  Nothing  Ineffective treatments:  None tried  Associated symptoms: no decreased ROM, no itching, no muscle weakness and no stiffness    Risk factors: no frequent fractures        Prior to Admission Medications   Prescriptions Last Dose Informant Patient Reported? Taking?    Aspirin-Acetaminophen-Caffeine (EXCEDRIN PO)  Self Yes No   Sig: Take by mouth as needed     Cranberry 400 MG CAPS  Self Yes No   Sig: Take by mouth   SUMAtriptan (IMITREX) 100 mg tablet  Self Yes No   Sig: Take by mouth once as needed     ascorbic acid (VITAMIN C) 500 mg tablet  Self Yes No   Sig: Take 500 mg by mouth daily   glucosamine-chondroitin 500-400 MG tablet  Self Yes No   Sig: Take 1 tablet by mouth 3 (three) times a day   meclizine (ANTIVERT) 25 mg tablet  Self Yes No   Sig: Take 1 tablet by mouth 3 (three) times a day as needed   multivitamin (THERAGRAN) TABS  Self Yes No   Sig: Take 1 tablet by mouth   pantoprazole (PROTONIX) 40 mg tablet   No No   Sig: Take 1 tablet (40 mg total) by mouth daily   rizatriptan (MAXALT) 10 MG tablet  Self No No   Sig: Take 1 tablet (10 mg total) by mouth once as needed for migraine for up to 1 dose May repeat in 2 hours if needed   topiramate (TOPAMAX) 25 mg tablet   No No   Sig: Take 1 tablet (25 mg total) by mouth daily at bedtime One p o  q h s  Facility-Administered Medications: None       Past Medical History:   Diagnosis Date    Benign positional vertigo, right     Cervicalgia     DVT (deep venous thrombosis) (HCC)     Headache     Thyroid disease        Past Surgical History:   Procedure Laterality Date    APPENDECTOMY      LIPOMA RESECTION      NEUROMA EXCISION      STEROID INJECTION HIP Left 05/2018       Family History   Problem Relation Age of Onset    Hypertension Mother     Asthma Mother     Seizures Father     Migraines Brother     Breast cancer Neg Hx     Colon cancer Neg Hx     Ovarian cancer Neg Hx     Uterine cancer Neg Hx     Cervical cancer Neg Hx      I have reviewed and agree with the history as documented  Social History   Substance Use Topics    Smoking status: Light Tobacco Smoker    Smokeless tobacco: Never Used      Comment: 3 a week     Alcohol use Yes      Comment: occasional        Review of Systems   Musculoskeletal: Negative for stiffness  Skin: Negative for itching  All other systems reviewed and are negative  Physical Exam  Physical Exam   Constitutional: She is oriented to person, place, and time  She appears well-developed and well-nourished  HENT:   Head: Normocephalic and atraumatic  Right Ear: External ear normal    Left Ear: External ear normal    Eyes: Conjunctivae and EOM are normal    Neck: No JVD present  No tracheal deviation present  No thyromegaly present  Cardiovascular: Normal rate  Pulmonary/Chest: Effort normal and breath sounds normal  No stridor  Abdominal: Soft  She exhibits no distension and no mass  There is no tenderness  There is no guarding  No hernia  Musculoskeletal: Normal range of motion   She exhibits no edema, tenderness or deformity  Lymphadenopathy:     She has no cervical adenopathy  Neurological: She is alert and oriented to person, place, and time  Skin: Skin is warm  No rash noted  No erythema  No pallor  Psychiatric: She has a normal mood and affect  Her behavior is normal    Nursing note and vitals reviewed        Vital Signs  ED Triage Vitals [10/14/18 1055]   Temperature Pulse Respirations Blood Pressure SpO2   97 6 °F (36 4 °C) 87 18 (!) 177/84 100 %      Temp Source Heart Rate Source Patient Position - Orthostatic VS BP Location FiO2 (%)   Oral Monitor Sitting Right arm --      Pain Score       --           Vitals:    10/14/18 1055 10/14/18 1241   BP: (!) 177/84 154/68   Pulse: 87 63   Patient Position - Orthostatic VS: Sitting Sitting       Visual Acuity      ED Medications  Medications   colchicine (COLCRYS) tablet 1 2 mg (1 2 mg Oral Given 10/14/18 1132)   ibuprofen (MOTRIN) tablet 600 mg (600 mg Oral Given 10/14/18 1157)       Diagnostic Studies  Results Reviewed     Procedure Component Value Units Date/Time    Urine Microscopic [[id number]]  (Abnormal) Collected:  10/14/18 1151    Lab Status:  Final result Specimen:  Urine from Urine, Clean Catch Updated:  10/14/18 1205     RBC, UA 4-10 (A) /hpf      WBC, UA 0-1 (A) /hpf      Epithelial Cells Occasional /hpf      Bacteria, UA None Seen /hpf     UA w Reflex to Microscopic w Reflex to Culture [[id number]]  (Abnormal) Collected:  10/14/18 1151    Lab Status:  Final result Specimen:  Urine from Urine, Clean Catch Updated:  10/14/18 1156     Color, UA Yellow     Clarity, UA Clear     Specific Gravity, UA <=1 005     pH, UA 5 5     Leukocytes, UA Negative     Nitrite, UA Negative     Protein, UA Negative mg/dl      Glucose, UA Negative mg/dl      Ketones, UA Negative mg/dl      Urobilinogen, UA 0 2 E U /dl      Bilirubin, UA Negative     Blood, UA Large (A)                 XR foot 3+ views RIGHT   ED Interpretation by April DO Minerva (10/14 1154)   No fracture                 Procedures  Procedures       Phone Contacts  ED Phone Contact    ED Course                               MDM  Number of Diagnoses or Management Options  Cellulitis: new and requires workup  Gout: new and requires workup     Amount and/or Complexity of Data Reviewed  Tests in the radiology section of CPT®: ordered and reviewed  Decide to obtain previous medical records or to obtain history from someone other than the patient: yes  Review and summarize past medical records: yes    Patient Progress  Patient progress: stable    CritCare Time    Disposition  Final diagnoses:   Gout   Cellulitis     Time reflects when diagnosis was documented in both MDM as applicable and the Disposition within this note     Time User Action Codes Description Comment    10/14/2018 12:34 PM Yancy Chapae Add [M10 9] Gout     10/14/2018 12:34 PM Yancy Chapae Add [L03 90] Cellulitis       ED Disposition     ED Disposition Condition Comment    Discharge  Tomy Rodrigues discharge to home/self care      Condition at discharge: Good        Follow-up Information     Follow up With Specialties Details Why Jacobo Salmeron MD Internal Medicine, [address] [place] [address]  [phone]            Discharge Medication List as of 10/14/2018 12:36 PM      START taking these medications    Details   cephalexin (KEFLEX) 500 mg capsule Take 1 capsule (500 mg total) by mouth every 6 (six) hours for 28 days, Starting Sun 10/14/2018, Until Sun 11/11/2018, Normal      colchicine (COLCRYS) 0 6 mg tablet Take 1 tablet (0 6 mg total) by mouth daily, Starting Sun 10/14/2018, Normal      indomethacin (INDOCIN) 50 mg capsule Take 1 capsule (50 mg total) by mouth 2 (two) times a day with meals, Starting Sun 10/14/2018, Normal         CONTINUE these medications which have NOT CHANGED    Details   ascorbic acid (VITAMIN C) 500 mg tablet Take 500 mg by mouth daily, Historical Med      Aspirin-Acetaminophen-Caffeine (EXCEDRIN PO) Take by mouth as needed  , Historical Med      Cranberry 400 MG CAPS Take by mouth, Historical Med      glucosamine-chondroitin 500-400 MG tablet Take 1 tablet by mouth 3 (three) times a day, Historical Med      meclizine (ANTIVERT) 25 mg tablet Take 1 tablet by mouth 3 (three) times a day as needed, Starting Thu 7/7/2016, Historical Med      multivitamin (THERAGRAN) TABS Take 1 tablet by mouth, Historical Med      pantoprazole (PROTONIX) 40 mg tablet Take 1 tablet (40 mg total) by mouth daily, Starting Wed 8/1/2018, Normal      rizatriptan (MAXALT) 10 MG tablet Take 1 tablet (10 mg total) by mouth once as needed for migraine for up to 1 dose May repeat in 2 hours if needed, Starting Tue 6/19/2018, Normal      SUMAtriptan (IMITREX) 100 mg tablet Take by mouth once as needed  , Starting Thu 7/7/2016, Historical Med      topiramate (TOPAMAX) 25 mg tablet Take 1 tablet (25 mg total) by mouth daily at bedtime One p o  q h s , Starting Mon 10/8/2018, Normal           No discharge procedures on file      ED Provider  Electronically Signed by           Torsten Carlos DO  10/14/18 3897

## 2018-10-19 ENCOUNTER — OFFICE VISIT (OUTPATIENT)
Dept: INTERNAL MEDICINE CLINIC | Facility: CLINIC | Age: 53
End: 2018-10-19
Payer: COMMERCIAL

## 2018-10-19 VITALS
SYSTOLIC BLOOD PRESSURE: 142 MMHG | BODY MASS INDEX: 20.93 KG/M2 | WEIGHT: 130.2 LBS | HEIGHT: 66 IN | OXYGEN SATURATION: 98 % | DIASTOLIC BLOOD PRESSURE: 88 MMHG | HEART RATE: 76 BPM

## 2018-10-19 DIAGNOSIS — M79.676 PAIN OF TOE, UNSPECIFIED LATERALITY: ICD-10-CM

## 2018-10-19 DIAGNOSIS — N20.0 NEPHROLITHIASIS: ICD-10-CM

## 2018-10-19 DIAGNOSIS — R10.2 PELVIC PAIN: ICD-10-CM

## 2018-10-19 DIAGNOSIS — I10 ESSENTIAL HYPERTENSION: ICD-10-CM

## 2018-10-19 DIAGNOSIS — N20.0 KIDNEY STONE: ICD-10-CM

## 2018-10-19 DIAGNOSIS — R31.9 HEMATURIA, UNSPECIFIED TYPE: Primary | ICD-10-CM

## 2018-10-19 PROCEDURE — 99215 OFFICE O/P EST HI 40 MIN: CPT | Performed by: NURSE PRACTITIONER

## 2018-10-19 PROCEDURE — 3008F BODY MASS INDEX DOCD: CPT | Performed by: NURSE PRACTITIONER

## 2018-10-19 NOTE — PATIENT INSTRUCTIONS
Hypertension borderline recommend she follow on a weekly basis  Decrease caffeine sodium continue exercise    History of kidney stone she has a 6 mm stone left inner pole in the setting of microscopic hematuria will refer to Urology    Acute onset of right 5th toe pain ER diagnosed as gout question traumatic since she had been at the gym the day before  Continue to monitor I discussed if it reoccurs then it was more likely gout we could check uric acid etc we will consider doing so with next set of labs    Elevated alk-phos her isoenzymes were negative she does have O-positive blood this is likely the cause    Microscopic hematuria as above refer to Urology    Left flank pain on likely related to kidney stone likely musculoskeletal ibuprofen as needed    Right groin pain differential diagnosis is ovarian pathology versus hernia we will start with pelvic and transvaginal ultrasound of this is negative would proceed with CT      History of migraines- off topamax secondary to stones

## 2018-10-19 NOTE — PROGRESS NOTES
Assessment/Plan:    Patient Instructions   Hypertension borderline recommend she follow on a weekly basis  Decrease caffeine sodium continue exercise    History of kidney stone she has a 6 mm stone left inner pole in the setting of microscopic hematuria will refer to Urology    Acute onset of right 5th toe pain ER diagnosed as gout question traumatic since she had been at the gym the day before  Continue to monitor I discussed if it reoccurs then it was more likely gout we could check uric acid etc we will consider doing so with next set of labs    Elevated alk-phos her isoenzymes were negative she does have O-positive blood this is likely the cause    Microscopic hematuria as above refer to Urology    Left flank pain on likely related to kidney stone likely musculoskeletal ibuprofen as needed    Right groin pain differential diagnosis is ovarian pathology versus hernia we will start with pelvic and transvaginal ultrasound of this is negative would proceed with CT  History of migraines- off topamax secondary to stones         Diagnoses and all orders for this visit:    Hematuria, unspecified type  -     Ambulatory referral to Urology; Future    Kidney stone  -     Ambulatory referral to Urology;  Future    Pelvic pain  -     US pelvis complete w transvaginal; Future    Nephrolithiasis    Essential hypertension    Pain of toe, unspecified laterality         Subjective:      Patient ID: Tomy Fleming Neighbor is a 47 y o  female    Pt was found to have microscopic hematuria sent to nephrology- she has known stone left side- hx of same  Left sided flank pain x 6 months worse w/ movement and deep palp  Right groin pain, hurts worse w/ touch no bulge does not hurt worse w/ exercise  Last period at 45 no vaginally bleeding  Pt admits she was drinking redbull daily she has since stopped  Was taking topamax but stopped b/c of kidney stones            Current Outpatient Prescriptions:     Cranberry 400 MG CAPS, Take by mouth, Disp: , Rfl:     glucosamine-chondroitin 500-400 MG tablet, Take 1 tablet by mouth 3 (three) times a day, Disp: , Rfl:     multivitamin (THERAGRAN) TABS, Take 1 tablet by mouth, Disp: , Rfl:     SUMAtriptan (IMITREX) 100 mg tablet, Take by mouth once as needed  , Disp: , Rfl:     Recent Results (from the past 1008 hour(s))   UA w Reflex to Microscopic w Reflex to Culture - Clinic Collect    Collection Time: 09/07/18  2:16 PM   Result Value Ref Range    Color, UA Dk Yellow     Clarity, UA Cloudy     Specific [place], UA 1 023 1 003 - 1 030    pH, UA 6 0 4 5 - 8 0    Leukocytes, UA Negative Negative    Nitrite, UA Negative Negative    Protein, UA Trace (A) Negative mg/dl    Glucose, UA Negative Negative mg/dl    Ketones, UA Negative Negative mg/dl    Urobilinogen, UA 0 2 0 2, 1 0 E U /dl E U /dl    Bilirubin, UA Negative Negative    Blood, UA Moderate (A) Negative   Urine culture    Collection Time: 09/07/18  2:16 PM   Result Value Ref Range    Urine Culture No Growth <1000 cfu/mL    Urine Microscopic    Collection Time: 09/07/18  2:16 PM   Result Value Ref Range    RBC, UA None Seen None Seen, 0-5 /hpf    WBC, UA 2-4 (A) None Seen, 0-5, 5-55, 5-65 /hpf    Epithelial Cells Occasional None Seen, Occasional /hpf    Bacteria, UA Occasional None Seen, Occasional /hpf    Ca Oxalate Rama, UA Moderate (A) None Seen /hpf   CBC and differential    Collection Time: 10/08/18 12:55 PM   Result Value Ref Range    WBC 5 86 4 31 - 10 16 Thousand/uL    RBC 4 48 3 81 - 5 12 Million/uL    Hemoglobin 13 6 11 5 - 15 4 g/dL    Hematocrit 42 6 34 8 - 46 1 %    MCV 95 82 - 98 fL    MCH 30 4 26 8 - 34 3 pg    MCHC 31 9 31 4 - 37 4 g/dL    RDW 12 7 11 6 - 15 1 %    MPV 10 4 8 9 - 12 7 fL    Platelets 978 068 - 339 Thousands/uL    nRBC 0 /100 WBCs    Neutrophils Relative 55 43 - 75 %    Immat GRANS % 0 0 - 2 %    Lymphocytes Relative 33 14 - 44 %    Monocytes Relative 9 4 - 12 %    Eosinophils Relative 3 0 - 6 %    Basophils Relative 0 0 - 1 %    Neutrophils Absolute 3 20 1 85 - 7 62 Thousands/µL    Immature Grans Absolute 0 01 0 00 - 0 20 Thousand/uL    Lymphocytes Absolute 1 92 0 60 - 4 47 Thousands/µL    Monocytes Absolute 0 54 0 17 - 1 22 Thousand/µL    Eosinophils Absolute 0 17 0 00 - 0 61 Thousand/µL    Basophils Absolute 0 02 0 00 - 0 10 Thousands/µL   Comprehensive metabolic panel    Collection Time: 10/08/18 12:55 PM   Result Value Ref Range    Sodium 139 136 - 145 mmol/L    Potassium 3 7 3 5 - 5 3 mmol/L    Chloride 105 100 - 108 mmol/L    CO2 29 21 - 32 mmol/L    ANION GAP 5 4 - 13 mmol/L    BUN 13 5 - 25 mg/dL    Creatinine 0 84 0 60 - 1 30 mg/dL    Glucose 78 65 - 140 mg/dL    Calcium 9 1 8 3 - 10 1 mg/dL    AST 19 5 - 45 U/L    ALT 18 12 - 78 U/L    Alkaline Phosphatase 129 (H) 46 - 116 U/L    Total Protein 7 7 6 4 - 8 2 g/dL    Albumin 4 0 3 5 - 5 0 g/dL    Total Bilirubin 0 47 0 20 - 1 00 mg/dL    eGFR 80 ml/min/1 73sq m   TSH, 3rd generation with Free T4 reflex    Collection Time: 10/08/18 12:55 PM   Result Value Ref Range    TSH 3RD GENERATON 1 360 0 358 - 3 740 uIU/mL   Vitamin D 25 hydroxy    Collection Time: 10/08/18 12:55 PM   Result Value Ref Range    Vit D, 25-Hydroxy 20 3 (L) 30 0 - 100 0 ng/mL   Vitamin B6    Collection Time: 10/08/18 12:55 PM   Result Value Ref Range    Vitamin B6 16 1 2 0 - 32 8 ug/L   Vitamin B12    Collection Time: 10/08/18 12:55 PM   Result Value Ref Range    Vitamin B-12 313 100 - 900 pg/mL   Albumin    Collection Time: 10/08/18  5:46 PM   Result Value Ref Range    Albumin 4 0 3 5 - 5 0 g/dL   Gamma GT    Collection Time: 10/08/18  5:46 PM   Result Value Ref Range    GGT 29 5 - 85 U/L   Urinalysis with reflex to microscopic    Collection Time: 10/08/18  5:46 PM   Result Value Ref Range    Color, UA Yellow     Clarity, UA Turbid     Specific [place], UA 1 024 1 003 - 1 030    pH, UA 5 5 4 5 - 8 0    Leukocytes, UA Negative Negative    Nitrite, UA Negative Negative    Protein, UA Negative Negative mg/dl    Glucose, UA Negative Negative mg/dl    Ketones, UA Negative Negative mg/dl    Urobilinogen, UA 0 2 0 2, 1 0 E U /dl E U /dl    Bilirubin, UA Negative Negative    Blood, UA Large (A) Negative   HEMOGLOBIN A1C W/ EAG ESTIMATION    Collection Time: 10/08/18  5:46 PM   Result Value Ref Range    Hemoglobin A1C 5 9 4 2 - 6 3 %     mg/dl   Urine Microscopic    Collection Time: 10/08/18  5:46 PM   Result Value Ref Range    RBC, UA 20-30 (A) None Seen, 0-5 /hpf    WBC, UA None Seen None Seen, 0-5, 5-55, 5-65 /hpf    Epithelial Cells Occasional None Seen, Occasional /hpf    Bacteria, UA Occasional None Seen, Occasional /hpf    Ca Oxalate Rama, UA Occasional (A) None Seen /hpf   Sedimentation rate, automated    Collection Time: 10/09/18 11:37 AM   Result Value Ref Range    Sed Rate 16 0 - 20 mm/hour   Urinalysis with microscopic    Collection Time: 10/11/18  7:51 AM   Result Value Ref Range    Clarity, UA Cloudy     Color, UA Yellow     Specific [place], UA 1 016 1 003 - 1 030    pH, UA 6 0 4 5 - 8 0    Glucose, UA Negative Negative mg/dl    Ketones, UA Negative Negative mg/dl    Blood, UA Moderate (A) Negative    Protein, UA Negative Negative mg/dl    Nitrite, UA Negative Negative    Bilirubin, UA Negative Negative    Urobilinogen, UA 0 2 0 2, 1 0 E U /dl E U /dl    Leukocytes, UA Negative Negative    WBC, UA None Seen None Seen, 0-5, 5-55, 5-65 /hpf    RBC, UA 4-10 (A) None Seen, 0-5 /hpf    Hyaline Casts, UA None Seen None Seen /lpf    Bacteria, UA None Seen None Seen, Occasional /hpf    Epithelial Cells None Seen None Seen, Occasional /hpf   Protein / creatinine ratio, urine    Collection Time: 10/11/18  7:51 AM   Result Value Ref Range    Creatinine, Ur 166 0 mg/dL    Protein Urine Random 6 mg/dL    Prot/Creat Ratio, Ur 0 04 0 00 - 0 10   Urine culture    Collection Time: 10/11/18  7:51 AM   Result Value Ref Range    Urine Culture No Growth <1000 cfu/mL    PTH, intact    Collection Time: 10/11/18  7:51 AM Result Value Ref Range    PTH 63 4 18 4 - 80 1 pg/mL   Phosphorus    Collection Time: 10/11/18  7:51 AM   Result Value Ref Range    Phosphorus 3 3 2 7 - 4 5 mg/dL   UA w Reflex to Microscopic w Reflex to Culture    Collection Time: 10/14/18 11:51 AM   Result Value Ref Range    Color, UA Yellow     Clarity, UA Clear     Specific Gravity, UA <=1 005 1 003 - 1 030    pH, UA 5 5 4 5 - 8 0    Leukocytes, UA Negative Negative    Nitrite, UA Negative Negative    Protein, UA Negative Negative mg/dl    Glucose, UA Negative Negative mg/dl    Ketones, UA Negative Negative mg/dl    Urobilinogen, UA 0 2 0 2, 1 0 E U /dl E U /dl    Bilirubin, UA Negative Negative    Blood, UA Large (A) Negative   Urine Microscopic    Collection Time: 10/14/18 11:51 AM   Result Value Ref Range    RBC, UA 4-10 (A) None Seen, 0-5 /hpf    WBC, UA 0-1 (A) None Seen, 0-5, 5-55, 5-65 /hpf    Epithelial Cells Occasional None Seen, Occasional /hpf    Bacteria, UA None Seen None Seen, Occasional /hpf       The following portions of the patient's history were reviewed and updated as appropriate: allergies, current medications, past family history, past medical history, past social history, past surgical history and problem list      Review of Systems   Constitutional: Negative for appetite change, chills, diaphoresis, fatigue, fever and unexpected weight change  HENT: Negative for postnasal drip and sneezing  Eyes: Negative for visual disturbance  Respiratory: Negative for chest tightness and shortness of breath  Cardiovascular: Negative for chest pain, palpitations and leg swelling  Gastrointestinal: Negative for abdominal pain and blood in stool  Pelvic pain   Endocrine: Negative for cold intolerance, heat intolerance, polydipsia, polyphagia and polyuria  Genitourinary: Negative for difficulty urinating, dysuria, frequency and urgency  Musculoskeletal: Positive for back pain  Negative for arthralgias and myalgias     Skin: Negative for rash and wound  Neurological: Negative for dizziness, weakness, light-headedness and headaches  Hematological: Negative for adenopathy  Psychiatric/Behavioral: Negative for confusion, dysphoric mood and sleep disturbance  The patient is not nervous/anxious  Objective:      /88 (BP Location: Left arm, Patient Position: Sitting)   Pulse 76   Ht 5' 6" (1 676 m)   Wt 59 1 kg (130 lb 3 2 oz)   SpO2 98%   BMI 21 01 kg/m²        Physical Exam   Constitutional: She is oriented to person, place, and time  She appears well-developed  No distress  HENT:   Head: Normocephalic and atraumatic  Nose: Nose normal    Mouth/Throat: Oropharynx is clear and moist    Eyes: Pupils are equal, round, and reactive to light  Conjunctivae and EOM are normal    Neck: Normal range of motion  Neck supple  No JVD present  No tracheal deviation present  No thyromegaly present  Cardiovascular: Normal rate, regular rhythm, normal heart sounds and intact distal pulses  Exam reveals no gallop and no friction rub  No murmur heard  Pulmonary/Chest: Effort normal and breath sounds normal  No respiratory distress  She has no wheezes  She has no rales  Abdominal: Soft  Bowel sounds are normal  She exhibits mass  She exhibits no distension  There is tenderness  Musculoskeletal: Normal range of motion  She exhibits no edema  Lymphadenopathy:     She has no cervical adenopathy  Neurological: She is alert and oriented to person, place, and time  No cranial nerve deficit  Skin: Skin is warm and dry  No rash noted  She is not diaphoretic  Psychiatric: She has a normal mood and affect   Her behavior is normal  Judgment and thought content normal

## 2018-11-14 DIAGNOSIS — H10.32 ACUTE CONJUNCTIVITIS OF LEFT EYE, UNSPECIFIED ACUTE CONJUNCTIVITIS TYPE: Primary | ICD-10-CM

## 2018-12-03 ENCOUNTER — TELEPHONE (OUTPATIENT)
Dept: INTERNAL MEDICINE CLINIC | Facility: CLINIC | Age: 53
End: 2018-12-03

## 2018-12-03 NOTE — TELEPHONE ENCOUNTER
----- Message from Andrea Christian DO sent at 12/3/2018 10:05 AM EST -----  Regarding: RE: Colonoscopy [facility] internal med   Contact: [phone]    We made 3 requests to provider's office  They did not provide a response to any of our requests  Drake Marie ----- Message -----  From: Meme Walsh  Sent: 8/1/2018   2:40 PM  To: Huong  Subject: Colonoscopy [facility] internal med                    I have reviewed both [employer] and Epic and need your assistance in locating Colorectal Cancer (CRC) Screening  Per Patient, testing was done by Dr Alanna Morelos on maybe 3-4 years ago  Thank you

## 2018-12-13 DIAGNOSIS — N30.01 ACUTE CYSTITIS WITH HEMATURIA: Primary | ICD-10-CM

## 2018-12-13 RX ORDER — CIPROFLOXACIN 250 MG/1
TABLET, FILM COATED ORAL
Qty: 6 TABLET | Refills: 0 | Status: SHIPPED | OUTPATIENT
Start: 2018-12-13 | End: 2018-12-16

## 2018-12-18 ENCOUNTER — TELEPHONE (OUTPATIENT)
Dept: OBGYN CLINIC | Age: 53
End: 2018-12-18

## 2018-12-18 DIAGNOSIS — N30.00 ACUTE CYSTITIS WITHOUT HEMATURIA: Primary | ICD-10-CM

## 2018-12-18 DIAGNOSIS — R30.0 DYSURIA: Primary | ICD-10-CM

## 2018-12-18 PROCEDURE — 87077 CULTURE AEROBIC IDENTIFY: CPT | Performed by: NURSE PRACTITIONER

## 2018-12-18 PROCEDURE — 87186 SC STD MICRODIL/AGAR DIL: CPT | Performed by: NURSE PRACTITIONER

## 2018-12-18 PROCEDURE — 87086 URINE CULTURE/COLONY COUNT: CPT | Performed by: NURSE PRACTITIONER

## 2018-12-18 RX ORDER — NITROFURANTOIN 25; 75 MG/1; MG/1
100 CAPSULE ORAL 2 TIMES DAILY
Qty: 10 CAPSULE | Refills: 0 | Status: SHIPPED | OUTPATIENT
Start: 2018-12-18 | End: 2018-12-23

## 2018-12-18 NOTE — PROGRESS NOTES
Patient has complaint of dysuria  In office urine dip showed positive Leuks, Nitrites and Blood  Will send culture

## 2018-12-18 NOTE — TELEPHONE ENCOUNTER
Patient states that she has symptoms of UTI   Sent culture for patient  In office urine dip showed nitrites,leuks and blood

## 2018-12-20 LAB — BACTERIA UR CULT: ABNORMAL

## 2019-01-17 ENCOUNTER — APPOINTMENT (EMERGENCY)
Dept: RADIOLOGY | Facility: HOSPITAL | Age: 54
End: 2019-01-17
Payer: COMMERCIAL

## 2019-01-17 ENCOUNTER — HOSPITAL ENCOUNTER (EMERGENCY)
Facility: HOSPITAL | Age: 54
Discharge: HOME | End: 2019-01-18
Attending: EMERGENCY MEDICINE | Admitting: EMERGENCY MEDICINE
Payer: COMMERCIAL

## 2019-01-17 ENCOUNTER — HOSPITAL ENCOUNTER (OUTPATIENT)
Dept: RADIOLOGY | Facility: HOSPITAL | Age: 54
Discharge: HOME | End: 2019-01-17
Attending: ORTHOPAEDIC SURGERY
Payer: COMMERCIAL

## 2019-01-17 ENCOUNTER — OFFICE VISIT (OUTPATIENT)
Dept: OBGYN CLINIC | Facility: HOSPITAL | Age: 54
End: 2019-01-17
Payer: COMMERCIAL

## 2019-01-17 ENCOUNTER — APPOINTMENT (OUTPATIENT)
Dept: LAB | Facility: HOSPITAL | Age: 54
End: 2019-01-17
Attending: ORTHOPAEDIC SURGERY
Payer: COMMERCIAL

## 2019-01-17 VITALS
HEART RATE: 71 BPM | OXYGEN SATURATION: 99 % | DIASTOLIC BLOOD PRESSURE: 71 MMHG | SYSTOLIC BLOOD PRESSURE: 136 MMHG | BODY MASS INDEX: 21.25 KG/M2 | TEMPERATURE: 98.3 F | RESPIRATION RATE: 20 BRPM | WEIGHT: 135.36 LBS | HEIGHT: 67 IN

## 2019-01-17 VITALS
BODY MASS INDEX: 19.93 KG/M2 | HEART RATE: 79 BPM | SYSTOLIC BLOOD PRESSURE: 171 MMHG | WEIGHT: 127 LBS | DIASTOLIC BLOOD PRESSURE: 114 MMHG | HEIGHT: 67 IN

## 2019-01-17 DIAGNOSIS — M25.552 PAIN IN LEFT HIP: Primary | ICD-10-CM

## 2019-01-17 DIAGNOSIS — G89.29 CHRONIC GROIN PAIN, LEFT: ICD-10-CM

## 2019-01-17 DIAGNOSIS — R10.32 CHRONIC GROIN PAIN, LEFT: ICD-10-CM

## 2019-01-17 DIAGNOSIS — R07.9 CHEST PAIN: Primary | ICD-10-CM

## 2019-01-17 DIAGNOSIS — M16.7 OTHER SECONDARY OSTEOARTHRITIS OF LEFT HIP: Primary | ICD-10-CM

## 2019-01-17 DIAGNOSIS — M16.7 OTHER SECONDARY OSTEOARTHRITIS OF LEFT HIP: ICD-10-CM

## 2019-01-17 LAB
ALBUMIN SERPL BCP-MCNC: 4.1 G/DL (ref 3.5–5)
ALP SERPL-CCNC: 170 U/L (ref 46–116)
ALT SERPL W P-5'-P-CCNC: 20 U/L (ref 12–78)
ANION GAP SERPL CALCULATED.3IONS-SCNC: 10 MMOL/L (ref 4–13)
APTT PPP: 28 SECONDS (ref 26–38)
AST SERPL W P-5'-P-CCNC: 27 U/L (ref 5–45)
BASOPHILS # BLD AUTO: 0.03 THOUSANDS/ΜL (ref 0–0.1)
BASOPHILS # BLD AUTO: 0.03 THOUSANDS/ΜL (ref 0–0.1)
BASOPHILS NFR BLD AUTO: 0 % (ref 0–1)
BASOPHILS NFR BLD AUTO: 1 % (ref 0–1)
BILIRUB DIRECT SERPL-MCNC: 0.08 MG/DL (ref 0–0.2)
BILIRUB SERPL-MCNC: 0.2 MG/DL (ref 0.2–1)
BUN SERPL-MCNC: 17 MG/DL (ref 5–25)
CALCIUM SERPL-MCNC: 9.3 MG/DL (ref 8.3–10.1)
CHLORIDE SERPL-SCNC: 104 MMOL/L (ref 100–108)
CO2 SERPL-SCNC: 30 MMOL/L (ref 21–32)
CREAT SERPL-MCNC: 0.85 MG/DL (ref 0.6–1.3)
CRP SERPL QL: <3 MG/L
DEPRECATED D DIMER PPP: <270 NG/ML (FEU)
EOSINOPHIL # BLD AUTO: 0.21 THOUSAND/ΜL (ref 0–0.61)
EOSINOPHIL # BLD AUTO: 0.29 THOUSAND/ΜL (ref 0–0.61)
EOSINOPHIL NFR BLD AUTO: 3 % (ref 0–6)
EOSINOPHIL NFR BLD AUTO: 4 % (ref 0–6)
ERYTHROCYTE [DISTWIDTH] IN BLOOD BY AUTOMATED COUNT: 13 % (ref 11.6–15.1)
ERYTHROCYTE [DISTWIDTH] IN BLOOD BY AUTOMATED COUNT: 13.1 % (ref 11.6–15.1)
ERYTHROCYTE [SEDIMENTATION RATE] IN BLOOD: 12 MM/HOUR (ref 0–20)
GFR SERPL CREATININE-BSD FRML MDRD: 78 ML/MIN/1.73SQ M
GLUCOSE SERPL-MCNC: 102 MG/DL (ref 65–140)
HCT VFR BLD AUTO: 40.8 % (ref 34.8–46.1)
HCT VFR BLD AUTO: 42.3 % (ref 34.8–46.1)
HGB BLD-MCNC: 13.2 G/DL (ref 11.5–15.4)
HGB BLD-MCNC: 13.3 G/DL (ref 11.5–15.4)
IMM GRANULOCYTES # BLD AUTO: 0.01 THOUSAND/UL (ref 0–0.2)
IMM GRANULOCYTES # BLD AUTO: 0.02 THOUSAND/UL (ref 0–0.2)
IMM GRANULOCYTES NFR BLD AUTO: 0 % (ref 0–2)
IMM GRANULOCYTES NFR BLD AUTO: 0 % (ref 0–2)
INR PPP: 0.93 (ref 0.86–1.17)
LIPASE SERPL-CCNC: 263 U/L (ref 73–393)
LYMPHOCYTES # BLD AUTO: 2.36 THOUSANDS/ΜL (ref 0.6–4.47)
LYMPHOCYTES # BLD AUTO: 2.98 THOUSANDS/ΜL (ref 0.6–4.47)
LYMPHOCYTES NFR BLD AUTO: 36 % (ref 14–44)
LYMPHOCYTES NFR BLD AUTO: 41 % (ref 14–44)
MCH RBC QN AUTO: 29.6 PG (ref 26.8–34.3)
MCH RBC QN AUTO: 30.1 PG (ref 26.8–34.3)
MCHC RBC AUTO-ENTMCNC: 31.4 G/DL (ref 31.4–37.4)
MCHC RBC AUTO-ENTMCNC: 32.4 G/DL (ref 31.4–37.4)
MCV RBC AUTO: 93 FL (ref 82–98)
MCV RBC AUTO: 94 FL (ref 82–98)
MONOCYTES # BLD AUTO: 0.6 THOUSAND/ΜL (ref 0.17–1.22)
MONOCYTES # BLD AUTO: 0.7 THOUSAND/ΜL (ref 0.17–1.22)
MONOCYTES NFR BLD AUTO: 10 % (ref 4–12)
MONOCYTES NFR BLD AUTO: 9 % (ref 4–12)
NEUTROPHILS # BLD AUTO: 3.25 THOUSANDS/ΜL (ref 1.85–7.62)
NEUTROPHILS # BLD AUTO: 3.29 THOUSANDS/ΜL (ref 1.85–7.62)
NEUTS SEG NFR BLD AUTO: 45 % (ref 43–75)
NEUTS SEG NFR BLD AUTO: 51 % (ref 43–75)
NRBC BLD AUTO-RTO: 0 /100 WBCS
NRBC BLD AUTO-RTO: 0 /100 WBCS
PLATELET # BLD AUTO: 265 THOUSANDS/UL (ref 149–390)
PLATELET # BLD AUTO: 272 THOUSANDS/UL (ref 149–390)
PMV BLD AUTO: 9.6 FL (ref 8.9–12.7)
PMV BLD AUTO: 9.6 FL (ref 8.9–12.7)
POTASSIUM SERPL-SCNC: 3.8 MMOL/L (ref 3.5–5.3)
PROT SERPL-MCNC: 7.8 G/DL (ref 6.4–8.2)
PROTHROMBIN TIME: 12.4 SECONDS (ref 11.8–14.2)
RBC # BLD AUTO: 4.38 MILLION/UL (ref 3.81–5.12)
RBC # BLD AUTO: 4.49 MILLION/UL (ref 3.81–5.12)
SODIUM SERPL-SCNC: 144 MMOL/L (ref 136–145)
TROPONIN I SERPL-MCNC: 0.02 NG/ML
TROPONIN I SERPL-MCNC: 0.03 NG/ML
WBC # BLD AUTO: 6.5 THOUSAND/UL (ref 4.31–10.16)
WBC # BLD AUTO: 7.27 THOUSAND/UL (ref 4.31–10.16)

## 2019-01-17 PROCEDURE — 73502 X-RAY EXAM HIP UNI 2-3 VIEWS: CPT

## 2019-01-17 PROCEDURE — 36415 COLL VENOUS BLD VENIPUNCTURE: CPT

## 2019-01-17 PROCEDURE — 96361 HYDRATE IV INFUSION ADD-ON: CPT

## 2019-01-17 PROCEDURE — 86038 ANTINUCLEAR ANTIBODIES: CPT

## 2019-01-17 PROCEDURE — 85025 COMPLETE CBC W/AUTO DIFF WBC: CPT

## 2019-01-17 PROCEDURE — 71046 X-RAY EXAM CHEST 2 VIEWS: CPT

## 2019-01-17 PROCEDURE — 84484 ASSAY OF TROPONIN QUANT: CPT | Performed by: PHYSICIAN ASSISTANT

## 2019-01-17 PROCEDURE — 86140 C-REACTIVE PROTEIN: CPT

## 2019-01-17 PROCEDURE — 83690 ASSAY OF LIPASE: CPT | Performed by: INTERNAL MEDICINE

## 2019-01-17 PROCEDURE — 96375 TX/PRO/DX INJ NEW DRUG ADDON: CPT

## 2019-01-17 PROCEDURE — 96374 THER/PROPH/DIAG INJ IV PUSH: CPT

## 2019-01-17 PROCEDURE — 99285 EMERGENCY DEPT VISIT HI MDM: CPT

## 2019-01-17 PROCEDURE — 93005 ELECTROCARDIOGRAM TRACING: CPT

## 2019-01-17 PROCEDURE — 85730 THROMBOPLASTIN TIME PARTIAL: CPT | Performed by: PHYSICIAN ASSISTANT

## 2019-01-17 PROCEDURE — 80076 HEPATIC FUNCTION PANEL: CPT | Performed by: PHYSICIAN ASSISTANT

## 2019-01-17 PROCEDURE — 85610 PROTHROMBIN TIME: CPT | Performed by: PHYSICIAN ASSISTANT

## 2019-01-17 PROCEDURE — 85379 FIBRIN DEGRADATION QUANT: CPT | Performed by: PHYSICIAN ASSISTANT

## 2019-01-17 PROCEDURE — 85025 COMPLETE CBC W/AUTO DIFF WBC: CPT | Performed by: PHYSICIAN ASSISTANT

## 2019-01-17 PROCEDURE — 99213 OFFICE O/P EST LOW 20 MIN: CPT | Performed by: ORTHOPAEDIC SURGERY

## 2019-01-17 PROCEDURE — 80048 BASIC METABOLIC PNL TOTAL CA: CPT | Performed by: PHYSICIAN ASSISTANT

## 2019-01-17 PROCEDURE — 86003 ALLG SPEC IGE CRUDE XTRC EA: CPT

## 2019-01-17 PROCEDURE — 85652 RBC SED RATE AUTOMATED: CPT

## 2019-01-17 RX ORDER — KETOROLAC TROMETHAMINE 30 MG/ML
15 INJECTION, SOLUTION INTRAMUSCULAR; INTRAVENOUS ONCE
Status: COMPLETED | OUTPATIENT
Start: 2019-01-17 | End: 2019-01-17

## 2019-01-17 RX ORDER — NITROGLYCERIN 0.4 MG/1
0.4 TABLET SUBLINGUAL ONCE
Status: COMPLETED | OUTPATIENT
Start: 2019-01-17 | End: 2019-01-17

## 2019-01-17 RX ORDER — DIPHENHYDRAMINE HYDROCHLORIDE 50 MG/ML
25 INJECTION INTRAMUSCULAR; INTRAVENOUS ONCE
Status: COMPLETED | OUTPATIENT
Start: 2019-01-17 | End: 2019-01-17

## 2019-01-17 RX ADMIN — NITROGLYCERIN 0.4 MG: 0.4 TABLET SUBLINGUAL at 17:45

## 2019-01-17 RX ADMIN — SODIUM CHLORIDE 1000 ML: 0.9 INJECTION, SOLUTION INTRAVENOUS at 17:48

## 2019-01-17 RX ADMIN — KETOROLAC TROMETHAMINE 15 MG: 30 INJECTION, SOLUTION INTRAMUSCULAR at 19:40

## 2019-01-17 RX ADMIN — DIPHENHYDRAMINE HYDROCHLORIDE 25 MG: 50 INJECTION INTRAMUSCULAR; INTRAVENOUS at 18:50

## 2019-01-17 NOTE — ED PROVIDER NOTES
History  Chief Complaint   Patient presents with    Chest Pain     Pt c/o left sided back pain radiating under left axilary, radiating to left side of chest x1 day  Pt reports dizziness ongoing starting 40 minutes prior   Back Pain     45 y/o Female patient presents to the ER for evaluation of left shoulder bleed, left chest pain  Began yesterday  She was driving home from work when it began to develop  Since then getting worse  Worse with deep inspiration  At 1 point yesterday she felt like she could not lift her arm up because of pain  She has mild shortness of breath  She does have history of DVT, not currently on any anticoagulants or antiplatelets  Denies cough, fevers, chills  No hemoptysis  She went to orthopedic today for routine follow-up and in office she had elevated blood pressure which concerned her  She has never had elevated blood pressure in the past  Denies any trauma or injuries           History provided by:  Patient   used: No    Chest Pain   Pain location:  L lateral chest  Pain quality: aching    Pain radiates to:  Upper back and L shoulder  Pain radiates to the back: yes    Pain severity:  Severe  Onset quality:  Gradual  Duration:  1 day  Timing:  Constant  Progression:  Unchanged  Chronicity:  New  Context: not breathing, no drug use, not eating, no intercourse, not lifting, no movement, not raising an arm, not at rest, no stress and no trauma    Relieved by:  Nothing  Worsened by:  Deep breathing  Ineffective treatments:  None tried  Associated symptoms: back pain    Associated symptoms: no abdominal pain, no AICD problem, no altered mental status, no anorexia, no anxiety, no claudication, no cough, no diaphoresis, no dizziness, no dysphagia, no fatigue, no fever, no headache, no heartburn, no lower extremity edema, no nausea, no near-syncope, no numbness, no orthopnea, no palpitations, no PND, no shortness of breath, no syncope, not vomiting and no weakness Risk factors: no aortic disease, no coronary artery disease, no diabetes mellitus, no Jocelyn-Danlos syndrome, no high cholesterol, no hypertension, no immobilization, not male, no Marfan's syndrome, not obese, not pregnant, no prior DVT/PE, no smoking and no surgery    Back Pain   Associated symptoms: chest pain    Associated symptoms: no abdominal pain, no dysuria, no fever, no headaches, no numbness and no weakness        Prior to Admission Medications   Prescriptions Last Dose Informant Patient Reported? Taking? Cranberry 400 MG CAPS Past Month at Unknown time Self Yes Yes   Sig: Take 400 mg by mouth daily prn    SUMAtriptan (IMITREX) 100 mg tablet More than a month at Unknown time Self Yes No   Sig: Take by mouth once as needed     glucosamine-chondroitin 500-400 MG tablet 1/16/2019 at Unknown time Self Yes Yes   Sig: Take 1 tablet by mouth 3 (three) times a day   multivitamin (THERAGRAN) TABS Past Month at Unknown time Self Yes Yes   Sig: Take 1 tablet by mouth daily        Facility-Administered Medications: None       Past Medical History:   Diagnosis Date    Benign positional vertigo, right     Cervicalgia     [facility] thrombosis) ([facility] [address] )     Graves disease     Headache     Thyroid disease        Past Surgical History:   Procedure Laterality Date    APPENDECTOMY      LIPOMA RESECTION      NEUROMA EXCISION      STEROID INJECTION HIP Left 05/2018       Family History   Problem Relation Age of Onset    Hypertension Mother     Asthma Mother     Diabetes Mother     Hyperlipidemia Mother     Seizures Father    [place] Migraines Brother     Seizures Brother     Cancer Sister         brain    Breast cancer Neg Hx     Colon cancer Neg Hx     Ovarian cancer Neg Hx     Uterine cancer Neg Hx     Cervical cancer Neg Hx      I have reviewed and agree with the history as documented      Social History   Substance Use Topics    Smoking status: Light Tobacco Smoker    Smokeless tobacco: Never Used Comment: 3 a week     Alcohol use Yes      Comment: occasional        Review of Systems   Constitutional: Negative for activity change, appetite change, chills, diaphoresis, fatigue, fever and unexpected weight change  HENT: Negative for congestion, rhinorrhea, sinus pressure, sore throat and trouble swallowing  Eyes: Negative for photophobia and visual disturbance  Respiratory: Negative for apnea, cough, choking, chest tightness, shortness of breath, wheezing and stridor  Cardiovascular: Positive for chest pain  Negative for palpitations, orthopnea, claudication, leg swelling, syncope, PND and near-syncope  Gastrointestinal: Negative for abdominal distention, abdominal pain, anorexia, blood in stool, constipation, diarrhea, heartburn, nausea and vomiting  Genitourinary: Negative for decreased urine volume, difficulty urinating, dysuria, enuresis, flank pain, frequency, hematuria and urgency  Musculoskeletal: Positive for back pain  Negative for arthralgias, myalgias, neck pain and neck stiffness  Skin: Negative for color change, pallor, rash and wound  Allergic/Immunologic: Negative  Neurological: Negative for dizziness, tremors, syncope, weakness, light-headedness, numbness and headaches  Hematological: Negative  Psychiatric/Behavioral: Negative  All other systems reviewed and are negative  Physical Exam  Physical Exam   Constitutional: She is oriented to person, place, and time  She appears well-developed and well-nourished  Non-toxic appearance  She does not have a sickly appearance  She does not appear ill  No distress  HENT:   Head: Normocephalic and atraumatic  Eyes: Pupils are equal, round, and reactive to light  EOM and lids are normal    Neck: Normal range of motion  Neck supple  Cardiovascular: Normal rate, regular rhythm, S1 normal, S2 normal, normal heart sounds, intact distal pulses and normal pulses    Exam reveals no gallop, no distant heart sounds, no friction rub and no decreased pulses  No murmur heard  Pulses:       Radial pulses are 2+ on the right side, and 2+ on the left side  Pulmonary/Chest: Effort normal and breath sounds normal  No accessory muscle usage  No apnea, no tachypnea and no bradypnea  No respiratory distress  She has no decreased breath sounds  She has no wheezes  She has no rhonchi  She has no rales  Abdominal: Soft  Normal appearance  She exhibits no distension  There is no tenderness  There is no rigidity, no rebound and no guarding  Musculoskeletal: Normal range of motion  She exhibits no edema, tenderness or deformity  Arms:  Tenderness in left thoracic paraspinal region  No crepitus  No rash or redness  Neurological: She is alert and oriented to person, place, and time  No cranial nerve deficit  GCS eye subscore is 4  GCS verbal subscore is 5  GCS motor subscore is 6  GCS 15  AAOx3  Ambulating in department without difficulty  CN II-XII grossly intact  No focal neuro deficits  Skin: Skin is warm, dry and intact  No rash noted  She is not diaphoretic  No erythema  No pallor  Psychiatric: Her speech is normal    Nursing note and vitals reviewed        Vital Signs  ED Triage Vitals   Temperature Pulse Respirations Blood Pressure SpO2   01/17/19 1737 01/17/19 1729 01/17/19 1729 01/17/19 1729 01/17/19 1729   98 3 °F (36 8 °C) 81 22 (!) 196/101 100 %      Temp Source Heart Rate Source Patient Position - Orthostatic VS BP Location FiO2 (%)   01/17/19 1737 01/17/19 1729 01/17/19 1729 01/17/19 1729 --   Oral Monitor Lying Right arm       Pain Score       01/17/19 1729       7           Vitals:    01/17/19 2030 01/17/19 2040 01/17/19 2215 01/17/19 2245   BP: 147/71 137/83  136/71   Pulse:  68 75 71   Patient Position - Orthostatic VS:  Lying         Visual Acuity      ED Medications  Medications   nitroglycerin (NITROSTAT) SL tablet 0 4 mg (0 4 mg Sublingual Given 1/17/19 1745)   sodium chloride 0 9 % bolus 1,000 mL (0 mL Intravenous Stopped 1/17/19 1848)   diphenhydrAMINE (BENADRYL) injection 25 mg (25 mg Intravenous Given 1/17/19 1850)   ketorolac (TORADOL) injection 15 mg (15 mg Intravenous Given 1/17/19 1940)       Diagnostic Studies  Results Reviewed     Procedure Component Value Units Date/Time    Troponin I [[id number]]  (Normal) Collected:  01/17/19 2358    Lab Status:  Final result Specimen:  Blood from Arm, Right Updated:  01/18/19 0022     Troponin I 0 03 ng/mL     Lipase [[id number]]  (Normal) Collected:  01/17/19 1746    Lab Status:  Final result Specimen:  Blood from Arm, Right Updated:  01/17/19 2315     Lipase 263 u/L     Troponin I [[id number]]  (Normal) Collected:  01/17/19 2050    Lab Status:  Final result Specimen:  Blood from Arm, Right Updated:  01/17/19 2113     Troponin I 0 03 ng/mL     D-Dimer [[id number]]  (Normal) Collected:  01/17/19 1746    Lab Status:  Final result Specimen:  Blood from Arm, Right Updated:  01/17/19 1817     D-Dimer, Quant <270 ng/ml (FEU)     Troponin I [[id number]]  (Normal) Collected:  01/17/19 1746    Lab Status:  Final result Specimen:  Blood from Arm, Right Updated:  01/17/19 1814     Troponin I 0 02 ng/mL     Basic metabolic panel [[id number]] Collected:  01/17/19 1746    Lab Status:  Final result Specimen:  Blood from Arm, Right Updated:  01/17/19 1810     Sodium 144 mmol/L      Potassium 3 8 mmol/L      Chloride 104 mmol/L      CO2 30 mmol/L      ANION GAP 10 mmol/L      BUN 17 mg/dL      Creatinine 0 85 mg/dL      Glucose 102 mg/dL      Calcium 9 3 mg/dL      eGFR 78 ml/min/1 73sq m     Narrative:         National Kidney Disease Education Program recommendations are as follows:  GFR calculation is accurate only with a steady state creatinine  Chronic Kidney disease less than 60 ml/min/1 73 sq  meters  Kidney failure less than 15 ml/min/1 73 sq  meters      Hepatic function panel [[id number]]  (Abnormal) Collected:  01/17/19 1746    Lab Status:  Final result Specimen:  Blood from Arm, Right Updated:  01/17/19 1810     Total Bilirubin 0 20 mg/dL      Bilirubin, Direct 0 08 mg/dL      Alkaline Phosphatase 170 (H) U/L      AST 27 U/L      ALT 20 U/L      Total Protein 7 8 g/dL      Albumin 4 1 g/dL     Protime-INR [[id number]]  (Normal) Collected:  01/17/19 1746    Lab Status:  Final result Specimen:  Blood from Arm, Right Updated:  01/17/19 1802     Protime 12 4 seconds      INR 0 93    APTT [[id number]]  (Normal) Collected:  01/17/19 1746    Lab Status:  Final result Specimen:  Blood from Arm, Right Updated:  01/17/19 1802     PTT 28 seconds     CBC and differential [[id number]] Collected:  01/17/19 1746    Lab Status:  Final result Specimen:  Blood from Arm, Right Updated:  01/17/19 1752     WBC 7 27 Thousand/uL      RBC 4 38 Million/uL      Hemoglobin 13 2 g/dL      Hematocrit 40 8 %      MCV 93 fL      MCH 30 1 pg      MCHC 32 4 g/dL      RDW 13 0 %      MPV 9 6 fL      Platelets 422 Thousands/uL      nRBC 0 /100 WBCs      Neutrophils Relative 45 %      Immat GRANS % 0 %      Lymphocytes Relative 41 %      Monocytes Relative 10 %      Eosinophils Relative 4 %      Basophils Relative 0 %      Neutrophils Absolute 3 25 Thousands/µL      Immature Grans Absolute 0 02 Thousand/uL      Lymphocytes Absolute 2 98 Thousands/µL      Monocytes Absolute 0 70 Thousand/µL      Eosinophils Absolute 0 29 Thousand/µL      Basophils Absolute 0 03 Thousands/µL                  XR chest 2 views   ED Interpretation by Yaya Barajas PA-C (01/17 1833)   No acute cardiopulmonary abnormalities      Final Result by John Fu MD (01/18 0085)      No acute cardiopulmonary disease  Workstation performed: [id number]                    Procedures  Procedures       Phone Contacts  ED Phone Contact    ED Course  ED Course as of Jan 21 1508   Thu Jan 17, 2019   2154 Paged slim to admit pt  SLIM would like to see patient prior to admission to determine need for admission       2308 Pt would prefer to repeat a third troponin and if it continues to trend up she will be admitted, otherwise pt would then like to go home if it remains negative  HEART Risk Score      Most Recent Value   History  1 Filed at: 01/17/2019 2138   ECG  1 Filed at: 01/17/2019 2138   Age  1 Filed at: 01/17/2019 2138   Risk Factors  0 Filed at: 01/17/2019 2138   Troponin  0 Filed at: 01/17/2019 2138   Heart Score Risk Calculator   History  1 Filed at: 01/17/2019 2138   ECG  1 Filed at: 01/17/2019 2138   Age  1 Filed at: 01/17/2019 2138   Risk Factors  0 Filed at: 01/17/2019 2138   Troponin  0 Filed at: 01/17/2019 2138   HEART Score  3 Filed at: 01/17/2019 2138   HEART Score  3 Filed at: 01/17/2019 2138                            MDM  Number of Diagnoses or Management Options  Chest pain: new and requires workup  Diagnosis management comments: DDX including but not limited to: ACS, MI, PE, PTX, pneumonia, dissection, pleurisy, pericarditis, myocarditis, rhabdomyolysis, GI etiology  Plan: EKG and trop  Labs  CXR  SL nitro  Dispo pending  Amount and/or Complexity of Data Reviewed  Clinical lab tests: ordered and reviewed  Tests in the radiology section of CPT®: ordered and reviewed  Independent visualization of images, tracings, or specimens: yes    Risk of Complications, Morbidity, and/or Mortality  Presenting problems: moderate  Management options: low  General comments: 45 y/o female with chest pain  Trop negative  EKG nonspecific  BP improved while resting  Delta troponin slightly increased, still negative  Discussed with patient and medicine  Offered admission  After being seen by SLIM, pt preferred to have one additional repeat troponin  Ultimately this repeat troponin was negative and pt wanted to go home   She was instructed to f/u with PCP for her pain and elevated blood pressure readings which could be situational      Patient Progress  Patient progress: stable    CritCare Time    Disposition  Final diagnoses:   Chest pain     Time reflects when diagnosis was documented in both MDM as applicable and the Disposition within this note     Time User Action Codes Description Comment    1/17/2019 11:52 PM Devyn Rodriguez Add [R07 9] Chest pain       ED Disposition     ED Disposition Condition Comment    Discharge  Tomy Bhat [facility] discharge to home/self care  Condition at discharge: Good        Follow-up Information     Follow up With Specialties Details Why Contact Info    Bennye Halsted, MD Internal Medicine, Palliative Care Call for routine follow up after ED visit [address] [place] [address]            Discharge Medication List as of 1/17/2019 11:53 PM      CONTINUE these medications which have NOT CHANGED    Details   Cranberry 400 MG CAPS Take 400 mg by mouth daily prn , Historical Med      glucosamine-chondroitin 500-400 MG tablet Take 1 tablet by mouth 3 (three) times a day, Historical Med      multivitamin (THERAGRAN) TABS Take 1 tablet by mouth daily  , Historical Med      SUMAtriptan (IMITREX) 100 mg tablet Take by mouth once as needed  , Starting Thu 7/7/2016, Historical Med           No discharge procedures on file      ED Provider  Electronically Signed by           Yanci Wei PA-C  01/21/19 0019

## 2019-01-17 NOTE — PROGRESS NOTES
Assessment:  1  Other secondary osteoarthritis of left hip  XR hip/pelv 2-3 vws left if performed   2  Chronic groin pain, left         Plan:    Reviewed options with patient conservative care vs total hip replacement  She has significant arthritis of hip with sclerosis  She feels her quality of life impaired due to hip pain and would like to have hip replacement either in summer or fall  Surgery was reviewed at this appointment as well as risks, complications, benefits of surgery  Risk infection, fracture,nerve damage, DVT, dislocation, stroke, mechanical complication of prosthesis, unequal leg length  She may need lovenox post op  She doesn't tolearte narcotics well  Continue with HEP, daily stretching to maintain hip motion  OTC NSAIDs as needed  Would recommend 6-8 weeks of lead time to hold surgical date  Set up for another intra articular hip injection  Was also having right knee pain, can inject with cortisone at next appointment  Mary, CR latex, cbc, sedrate, crp will be ordered r/o inflammatory disease     To do next visit:  No Follow-up on file  The above stated was discussed in layman's terms and the patient expressed understanding  All questions were answered to the patient's satisfaction  Scribe Attestation    I,:   aMy Trevizo am acting as a scribe while in the presence of the attending physician :        I,:   Dustin Sahu MD personally performed the services described in this documentation    as scribed in my presence :              Subjective:   Suni Lowe is a 46 y o  female who presents for evaluation of left hip pain  She has had progressive left hip pain for the past 4 years  No associated trauma  She use to dance ballet  She noted daily groin pain worse at the end of the day  She was told she has significant arthritis of left hip and will need ROSIBEL in near future  Pain with internal rotation, loss of ROM secondary to pain   Any pivoting or twisting recreate sharp acute pain in groin at times almost causing her to fall  Pain will refer to lateral hip and down anterior thigh  She has had 2 intra articular hip injections which have offered relief for around 5 months, last injection was in May  Using OTC NSAIDs for pain control  Pain will wake her from sleep  Difficult time sleeping on left hip due to pain  She has had to cut back going to the gym due to pain in hip  Her job requires a lot of standing and walking  Denies numbness or tingling in leg        Review of systems negative unless otherwise specified in HPI    Past Medical History:   Diagnosis Date    Benign positional vertigo, right     Cervicalgia     DVT (deep venous thrombosis) ([facility])     Graves disease     Headache     Thyroid disease        Past Surgical History:   Procedure Laterality Date    APPENDECTOMY      LIPOMA RESECTION      NEUROMA EXCISION      STEROID INJECTION HIP Left 05/2018       Family History   Problem Relation Age of Onset    Hypertension Mother     Asthma Mother     Diabetes Mother     Hyperlipidemia Mother     Seizures Father    [place] Migraines Brother     Seizures Brother    [place] Cancer Sister         brain    Breast cancer Neg Hx     Colon cancer Neg Hx     Ovarian cancer Neg Hx     Uterine cancer Neg Hx     Cervical cancer Neg Hx        Social History     Occupational History          Social History Main Topics    Smoking status: Light Tobacco Smoker    Smokeless tobacco: Never Used      Comment: 3 a week     Alcohol use Yes      Comment: occasional    Drug use: No    Sexual activity: Not Currently     Birth control/ protection: Post-menopausal         Current Outpatient Prescriptions:     Cranberry 400 MG CAPS, Take by mouth, Disp: , Rfl:     glucosamine-chondroitin 500-400 MG tablet, Take 1 tablet by mouth 3 (three) times a day, Disp: , Rfl:     multivitamin (THERAGRAN) TABS, Take 1 tablet by mouth, Disp: , Rfl:     SUMAtriptan (IMITREX) 100 mg tablet, Take by mouth once as needed  , Disp: , Rfl:     tobramycin in sterile water-balanced salts ophthalmic solution, Apply 1-2 drops to eye every 4 (four) hours (Patient not taking: Reported on 1/17/2019 ), Disp: 7 mL, Rfl: 0    Allergies   Allergen Reactions    Contrast [Iodinated Diagnostic Agents]      Pt had continuous coughing after last ct scan  Pt was given benadryl and the coughing stopped  No other symptoms noted   Pollen Extract Other (See Comments)     Throat clearing, difficulty swallowing, ears itchy    Prochlorperazine Other (See Comments)      Aka (compazine)  Delirious             Vitals:    01/17/19 0923   BP: (!) 171/114   Pulse: 79       Objective:                    Left Hip Exam     Tenderness   The patient is experiencing tenderness in the anterior and greater trochanter (groin pain)  Range of Motion   Flexion: 100   Internal Rotation: 10   External Rotation: 20     Muscle Strength   Flexion: 4/5     Other   Erythema: absent  Scars: absent  Sensation: normal  Pulse: present    Comments:  Hip flexion 4/5 against resistance  Internal rotation of hip recreates sharp groin pain  Calf is soft to palpation            Diagnostics, reviewed and taken today if performed as documented: The attending physician has personally reviewed the pertinent films in PACS and interpretation is as follows: significant arthritic changes left hip, inferior medial wall with inferior osteophyte with sclerosis  Procedures, if performed today:    Procedures    None performed      Portions of the record may have been created with voice recognition software   Occasional wrong word or "sound a like" substitutions may have occurred due to the inherent limitations of voice recognition software   Read the chart carefully and recognize, using context, where substitutions have occurred

## 2019-01-17 NOTE — ED NOTES
Pt c/o throat feeling itchy  VSS  ALDO Hunt updated, no orders received at this time  Updated pt on plan of care, continued support provided        Mariano Peterson RN  01/17/19 6547

## 2019-01-18 LAB
ATRIAL RATE: 69 BPM
ATRIAL RATE: 79 BPM
P AXIS: 59 DEGREES
P AXIS: 61 DEGREES
PR INTERVAL: 120 MS
PR INTERVAL: 130 MS
QRS AXIS: 87 DEGREES
QRS AXIS: 89 DEGREES
QRSD INTERVAL: 90 MS
QRSD INTERVAL: 94 MS
QT INTERVAL: 380 MS
QT INTERVAL: 414 MS
QTC INTERVAL: 435 MS
QTC INTERVAL: 443 MS
RYE IGE QN: NEGATIVE
T WAVE AXIS: 66 DEGREES
T WAVE AXIS: 68 DEGREES
TROPONIN I SERPL-MCNC: 0.03 NG/ML
VENTRICULAR RATE: 69 BPM
VENTRICULAR RATE: 79 BPM

## 2019-01-18 PROCEDURE — 93010 ELECTROCARDIOGRAM REPORT: CPT | Performed by: INTERNAL MEDICINE

## 2019-01-18 NOTE — DISCHARGE INSTRUCTIONS
Return to the Emergency Department sooner if increased pain, fever, vomiting, difficulty breathing, rash  Chest Pain   WHAT YOU NEED TO KNOW:   What causes chest pain? Chest pain can be caused by a range of conditions, from not serious to life-threatening  Chest pain can be a symptom of a digestive problem, such as acid reflux or a stomach ulcer  An anxiety attack or a strong emotion, such as anger, can also cause chest pain  Infection, inflammation, or a fracture in the bones or cartilage in your chest can cause pain or discomfort  Sometimes chest pain or pressure is caused by poor blood flow to your heart (angina)  Chest pain may also be caused by life-threatening conditions such as a heart attack or blood clot in your lungs  What other symptoms might I have with chest pain? · A burning feeling behind your breastbone    · A racing or slow heartbeat     · Fever or sweating     · Nausea or vomiting     · Shortness of breath     · Discomfort or pressure that spreads from your chest to your back, jaw, or arm     · Feeling weak, tired, or faint  How is the cause of chest pain diagnosed? Your healthcare provider will examine you  Describe your chest pain in as much detail as possible  Tell him or her where your pain is and when it began  Tell the provider if you notice anything that makes the pain worse or better  Tell him or her if it is constant or comes and goes  Your healthcare provider will ask about any medicines you use and medical conditions you have  He or she will also examine you  You may also need any of the following tests:  · An EKG  is a test that records your heart's electrical activity  · Blood tests  check for heart damage and signs of a heart attack  · An echocardiogram  uses sound waves to see if blood is flowing normally through your heart  · An ultrasound, x-ray, CT, or MRI scan  may show the cause of your chest pain   You may be given contrast liquid to help your heart show up better in the pictures  Tell the healthcare provider if you have ever had an allergic reaction to contrast liquid  Do not enter the MRI room with anything metal  Metal can cause serious injury  Tell the healthcare provider if you have any metal in or on your body  · An endoscopy  may be done to check for ulcers or problem with your esophagus  How is chest pain treated? Medicines may be given to treat the cause of your chest pain  Examples include pain medicine, anxiety medicine, or medicines to increase blood flow to your heart  Do not  take certain medicines without asking your healthcare provider first  These include NSAIDs, herbal or vitamin supplements, or hormones (estrogen or progestin)  What are some healthy living tips? The following are general healthy guidelines  If your chest pain is caused by a heart problem, your healthcare provider will give you specific guidelines to follow  · Do not smoke  Nicotine and other chemicals in cigarettes and cigars can cause lung and heart damage  Ask your healthcare provider for information if you currently smoke and need help to quit  E-cigarettes or smokeless tobacco still contain nicotine  Talk to your healthcare provider before you use these products  · Eat a variety of healthy, low-fat foods  Healthy foods include fruits, vegetables, whole-grain breads, low-fat dairy products, beans, lean meats, and fish  Ask for more information about a heart healthy diet  · Ask about activity  Your healthcare provider will tell you which activities to limit or avoid  Ask when you can drive, return to work, and have sex  Ask about the best exercise plan for you  · Maintain a healthy weight  Ask your healthcare provider how much you should weigh  Ask him or her to help you create a weight loss plan if you are overweight    Call 911 if:   · You have any of the following signs of a heart attack:      ¨ Squeezing, pressure, or pain in your chest that lasts longer than 5 minutes or returns    ¨ Discomfort or pain in your back, neck, jaw, stomach, or arm     ¨ Trouble breathing    ¨ Nausea or vomiting    ¨ Lightheadedness or a sudden cold sweat, especially with chest pain or trouble breathing    When should I seek immediate care? · You have chest discomfort that gets worse, even with medicine  · You cough or vomit blood  · Your bowel movements are black or bloody  · You cannot stop vomiting, or it hurts to swallow  When should I contact my healthcare provider? · You have questions or concerns about your condition or care  CARE AGREEMENT:   You have the right to help plan your care  Learn about your health condition and how it may be treated  Discuss treatment options with your caregivers to decide what care you want to receive  You always have the right to refuse treatment  The above information is an  only  It is not intended as medical advice for individual conditions or treatments  Talk to your doctor, nurse or pharmacist before following any medical regimen to see if it is safe and effective for you  © 2017 [employer]  Information is for End User's use only and may not be sold, redistributed or otherwise used for commercial purposes  All illustrations and images included in CareNotes® are the copyrighted property of [employer]  or [employer]

## 2019-01-18 NOTE — CONSULTS
Reason for consult - scapular pain radiating to chest    Assesment-  50-year-old female with scapular pain radiating to the chest for the last 24 hours prior to admission  EKG- LVH  Serial troponins negative x3  D-dimer is negative  Pain is exacerbated by inspiration  Chest x-ray showing no acute abnormalities  Mediastinum is not widened  Heart score of 2  Pain does not sound cardiac  Suspect this is musculoskeletal versus pleuritic in nature  Recommendations-  I recommended to patient that she takes over-the-counter NSAIDs for pain control  She knows to return to the emergency department if pain is worse or she has any other concerning symptoms such SOB, worsening chest pain, lightheadedness, syncope  LVH seen on EKG  Patient's blood pressure is normotensive in the emergency department she states that her blood pressure is normal as an outpatient  I have recommended to keep daily log of her blood pressure at home and discuss with her PCP if her blood pressure is elevated to consider antihypertensive medications  Assessment and plan was discussed with emergency physician on call and patient who are all in agreement with the plan  We would like to thank primary team for allowing us to participate in the care of this patient  HPI    Patient is a 50-year-old female with remote history of DVT previously when she was smoking and on birth control  She denies any other cardiopulmonary disease  She takes no medications  She presents with pain which started on the evening of Tuesday  Pain has been persistent and stabbing in nature and radiating to the front of her chest   It is worse with inspiration  It is not positional   She denies trauma or heavy lifting  There is no diaphoresis  No nausea or vomiting  No abdominal pain  No fevers, no chills  No coughs  No leg swelling  No calf tenderness  No recent surgeries or immobilization  Denies any early family history of cardiac disease  Denies any personal history of hypertension or any cardiac issues  Denies any chest pain with exertion or shortness of breath, she is active and goes the gym to do cardiac workout with no chest pain with exertion    In the ED, EKG showed left ventricular hypertrophy with repolarization abnormalities  Serial troponins were negative  Medicine was consulted for chest pain  PMH AND PSH  Past Medical History:   Diagnosis Date    Benign positional vertigo, right     Cervicalgia     DVT (deep venous thrombosis) (HCC)     Graves disease     Headache     Thyroid disease     Past Surgical History:   Procedure Laterality Date    APPENDECTOMY      LIPOMA RESECTION      NEUROMA EXCISION      STEROID INJECTION HIP Left 05/2018       [address]  None    ROS  14 point ROS is otherwise non contributory except for mentioned in HPI    MEDS  Scheduled Meds:  Continuous Infusions:  No current facility-administered medications for this encounter  PRN Meds:  ALLERGIES  Iodine contrast causing throat swelling      PHYSICAL EXAMINATION  VITALS  Vitals:    01/17/19 2245   BP: 136/71   Pulse: 71   Resp: 20   Temp:    SpO2: 99%       GENERAL: AAO x 3, NAD  HEENT: atraumatic, normocephalic  NECK- supple, no JVD, no lymphadenopathy, no thryomegaly  CHEST:  clear to auscultation bilaterally  CVS: S1, S2   Regular rate and rhythm, no murmurs, rubs or gallops  ABDOMEN:  soft, nontender, nondistended, normoactive bowel sounds  NEUROLOGICAL: CN II -XI grossly intact  No focal motor or sensory deficits    EXTREMITIES: wwp, no edema    LABS  Results Reviewed     Procedure Component Value Units Date/Time    Troponin I [[id number]]  (Normal) Collected:  01/17/19 2358    Lab Status:  Final result Specimen:  Blood from Arm, Right Updated:  01/18/19 0022     Troponin I 0 03 ng/mL     Lipase [[id number]]  (Normal) Collected:  01/17/19 1746    Lab Status:  Final result Specimen:  Blood from Arm, Right Updated:  01/17/19 2315     Lipase 263 u/L     Troponin I [[id number]]  (Normal) Collected:  01/17/19 2050    Lab Status:  Final result Specimen:  Blood from Arm, Right Updated:  01/17/19 2113     Troponin I 0 03 ng/mL     D-Dimer [[id number]]  (Normal) Collected:  01/17/19 1746    Lab Status:  Final result Specimen:  Blood from Arm, Right Updated:  01/17/19 1817     D-Dimer, Quant <270 ng/ml (FEU)     Troponin I [[id number]]  (Normal) Collected:  01/17/19 1746    Lab Status:  Final result Specimen:  Blood from Arm, Right Updated:  01/17/19 1814     Troponin I 0 02 ng/mL     Basic metabolic panel [[id number]] Collected:  01/17/19 1746    Lab Status:  Final result Specimen:  Blood from Arm, Right Updated:  01/17/19 1810     Sodium 144 mmol/L      Potassium 3 8 mmol/L      Chloride 104 mmol/L      CO2 30 mmol/L      ANION GAP 10 mmol/L      BUN 17 mg/dL      Creatinine 0 85 mg/dL      Glucose 102 mg/dL      Calcium 9 3 mg/dL      eGFR 78 ml/min/1 73sq m     Narrative:         National Kidney Disease Education Program recommendations are as follows:  GFR calculation is accurate only with a steady state creatinine  Chronic Kidney disease less than 60 ml/min/1 73 sq  meters  Kidney failure less than 15 ml/min/1 73 sq  meters      Hepatic function panel [[id number]]  (Abnormal) Collected:  01/17/19 1746    Lab Status:  Final result Specimen:  Blood from Arm, Right Updated:  01/17/19 1810     Total Bilirubin 0 20 mg/dL      Bilirubin, Direct 0 08 mg/dL      Alkaline Phosphatase 170 (H) U/L      AST 27 U/L      ALT 20 U/L      Total Protein 7 8 g/dL      Albumin 4 1 g/dL     Protime-INR [[id number]]  (Normal) Collected:  01/17/19 1746    Lab Status:  Final result Specimen:  Blood from Arm, Right Updated:  01/17/19 1802     Protime 12 4 seconds      INR 0 93    APTT [[id number]]  (Normal) Collected:  01/17/19 1746    Lab Status:  Final result Specimen:  Blood from Arm, Right Updated:  01/17/19 1802     PTT 28 seconds     CBC and differential [[id number]] Collected:  01/17/19 1746    Lab Status:  Final result Specimen:  Blood from Arm, Right Updated:  01/17/19 1752     WBC 7 27 Thousand/uL      RBC 4 38 Million/uL      Hemoglobin 13 2 g/dL      Hematocrit 40 8 %      MCV 93 fL      MCH 30 1 pg      MCHC 32 4 g/dL      RDW 13 0 %      MPV 9 6 fL      Platelets 251 Thousands/uL      nRBC 0 /100 WBCs      Neutrophils Relative 45 %      Immat GRANS % 0 %      Lymphocytes Relative 41 %      Monocytes Relative 10 %      Eosinophils Relative 4 %      Basophils Relative 0 %      Neutrophils Absolute 3 25 Thousands/µL      Immature Grans Absolute 0 02 Thousand/uL      Lymphocytes Absolute 2 98 Thousands/µL      Monocytes Absolute 0 70 Thousand/µL      Eosinophils Absolute 0 29 Thousand/µL      Basophils Absolute 0 03 Thousands/µL           EKG-normal sinus rhythm, left ventricular hypertrophy  Time Spent for Care:  minutes   More than 50% of total time spent on counseling and coordination of care as described above  Janeane Phoenix, MD  HOSPITALIST SERVICES  1/18/2019    PLEASE NOTE:  This encounter was completed utilizing the [employer] Direct Speech Voice Recognition Software  Grammatical errors, random word insertions, pronoun errors and incomplete sentences are occasional consequences of the system due to software limitations, ambient noise and hardware issues  These may be missed by proof reading prior to affixing electronic signature  Any questions or concerns about the content, text or information contained within the body of this dictation should be directly addressed to the physician for clarification  Please do not hesitate to call me directly if you have any any questions or concerns

## 2019-01-20 LAB — LTX IGE QN: <0.1 KU/L

## 2019-01-24 ENCOUNTER — TELEPHONE (OUTPATIENT)
Dept: INTERNAL MEDICINE CLINIC | Facility: CLINIC | Age: 54
End: 2019-01-24

## 2019-01-24 ENCOUNTER — HOSPITAL ENCOUNTER (OUTPATIENT)
Dept: MRI IMAGING | Facility: HOSPITAL | Age: 54
Discharge: HOME | End: 2019-01-24
Payer: COMMERCIAL

## 2019-01-24 ENCOUNTER — OFFICE VISIT (OUTPATIENT)
Dept: INTERNAL MEDICINE CLINIC | Facility: CLINIC | Age: 54
End: 2019-01-24
Payer: COMMERCIAL

## 2019-01-24 VITALS
RESPIRATION RATE: 16 BRPM | SYSTOLIC BLOOD PRESSURE: 150 MMHG | HEART RATE: 80 BPM | HEIGHT: 67 IN | DIASTOLIC BLOOD PRESSURE: 90 MMHG | BODY MASS INDEX: 20.53 KG/M2 | WEIGHT: 130.8 LBS

## 2019-01-24 DIAGNOSIS — M54.6 THORACIC BACK PAIN, UNSPECIFIED BACK PAIN LATERALITY, UNSPECIFIED CHRONICITY: ICD-10-CM

## 2019-01-24 DIAGNOSIS — R51.9 ACUTE NONINTRACTABLE HEADACHE, UNSPECIFIED HEADACHE TYPE: ICD-10-CM

## 2019-01-24 DIAGNOSIS — H53.9 VISUAL CHANGES: ICD-10-CM

## 2019-01-24 DIAGNOSIS — R51.9 ACUTE NONINTRACTABLE HEADACHE, UNSPECIFIED HEADACHE TYPE: Primary | ICD-10-CM

## 2019-01-24 DIAGNOSIS — I51.7 LVH (LEFT VENTRICULAR HYPERTROPHY): ICD-10-CM

## 2019-01-24 DIAGNOSIS — F40.240 CLAUSTROPHOBIA: ICD-10-CM

## 2019-01-24 PROBLEM — I15.9 SECONDARY HYPERTENSION: Status: ACTIVE | Noted: 2019-01-24

## 2019-01-24 PROCEDURE — 99214 OFFICE O/P EST MOD 30 MIN: CPT | Performed by: NURSE PRACTITIONER

## 2019-01-24 PROCEDURE — 3008F BODY MASS INDEX DOCD: CPT | Performed by: NURSE PRACTITIONER

## 2019-01-24 PROCEDURE — 70551 MRI BRAIN STEM W/O DYE: CPT

## 2019-01-24 RX ORDER — NAPROXEN 500 MG/1
500 TABLET ORAL 2 TIMES DAILY WITH MEALS
Qty: 60 TABLET | Refills: 1 | Status: SHIPPED | OUTPATIENT
Start: 2019-01-24 | End: 2019-08-06

## 2019-01-24 RX ORDER — LORAZEPAM 0.5 MG/1
TABLET ORAL
Qty: 2 TABLET | Refills: 0 | Status: SHIPPED | OUTPATIENT
Start: 2019-01-24 | End: 2019-01-30 | Stop reason: SDUPTHER

## 2019-01-24 NOTE — TELEPHONE ENCOUNTER
CALLED AND SPOKE TO JAIDA AND WAS NOTIFIED SHE CAN LEAVE, STATED SHE TOOK SON TO ED AND WILL CALL US WHEN RESULTS ARE IN

## 2019-01-24 NOTE — PATIENT INSTRUCTIONS
Borderline hypertension in the office 140/90 continue to monitor for the next 10 days communicate results to me remain elevated discuss starting hydrochlorothiazide  Discussed she would increase potassium in diet    Headache with numbness to the left side of her face after a hypertensive episode she does have history of migraine her current headaches or atypical of those migraines  She is also complaining of visual changes  Therefore we will check stat MRI of the brain  Premedicate as documented  Musculoskeletal strain, left scapular region this may be also causing cervicogenic type headaches  Discussed this with the patient  Will treat with naproxen twice a day with food discussed this could temporarily increased blood pressures so we will have to take that in mind    Warm compresses if not improving consider physical therapy    Abnormal EKG showing left ventricular hypertrophy follow up with echocardiogram

## 2019-01-24 NOTE — TELEPHONE ENCOUNTER
Nicolas Melendez from MRI Dept with [facility] called to see if patient can leave; since her MRI was done- it was a STAT    OR    Does she have to wait until it's read  I tried to get [facility]; no answer  Called Ar next; Silvia answered- went to ask [facility] said she can leave; the results of the MRI after it's read has to be relayed to our office  Renetta Buchanan from [facility] hung up by the time I got an answer

## 2019-01-24 NOTE — PROGRESS NOTES
Assessment/Plan:    Patient Instructions   Borderline hypertension in the office 140/90 continue to monitor for the next 10 days communicate results to me remain elevated discuss starting hydrochlorothiazide  Discussed she would increase potassium in diet    Headache with numbness to the left side of her face after a hypertensive episode she does have history of migraine her current headaches or atypical of those migraines  She is also complaining of visual changes  Therefore we will check stat MRI of the brain  Premedicate as documented  Musculoskeletal strain, left scapular region this may be also causing cervicogenic type headaches  Discussed this with the patient  Will treat with naproxen twice a day with food discussed this could temporarily increased blood pressures so we will have to take that in mind  Warm compresses if not improving consider physical therapy    Abnormal EKG showing left ventricular hypertrophy follow up with echocardiogram         Diagnoses and all orders for this visit:    Acute nonintractable headache, unspecified headache type  -     MRI brain wo contrast; Future    Visual changes  -     MRI brain wo contrast; Future    Claustrophobia  -     LORazepam (ATIVAN) 0 5 mg tablet; Take 1 tab 1 hour prior to MRI and can repeat 30 mins prior    LVH (left ventricular hypertrophy)  -     Echo complete with contrast if indicated; Future    Thoracic back pain, unspecified back pain laterality, unspecified chronicity  -     naproxen (NAPROSYN) 500 mg tablet; Take 1 tablet (500 mg total) by mouth 2 (two) times a day with meals         Subjective:      Patient ID: Suni Lowe is a 46 y o  female    Patient noticed that last Wednesday she started to notice some pain to the left upper scapular region she noted that the pain was in the back and radiated along the side and under the breast   She thought nothing of it    Then on Thursday she was evaluated by Orthopedic for her hip pain and she noted in her my chart that her blood pressure was documented as 171/114 but was not mentioned at the visit  She got nervous based on her symptoms so went to the emergency room  She mentioned chest pain while in the emergency room she was given nitroglycerin Toradol IV fluids she had negative troponins she had an abnormal EKG showing left ventricular hypertrophy  After they gave her nitroglycerin she had headache  She has had headache ever since she rates it a 5 out of 10 pain but at worst it is an 8/10  It is not her typical headaches  She feels that behind her eyes and in the back of her head  She has also noticed blurred vision  And she has had several days of numbness to the left side of her face  Her blood pressure was not treated  She takes anti inflammatory only as needed  Not taking decongestants  No increased salt in her diet            Current Outpatient Prescriptions:     Cranberry 400 MG CAPS, Take 400 mg by mouth as needed prn , Disp: , Rfl:     glucosamine-chondroitin 500-400 MG tablet, Take 1 tablet by mouth 3 (three) times a day, Disp: , Rfl:     multivitamin (THERAGRAN) TABS, Take 1 tablet by mouth daily  , Disp: , Rfl:     SUMAtriptan (IMITREX) 100 mg tablet, Take by mouth once as needed  , Disp: , Rfl:     LORazepam (ATIVAN) 0 5 mg tablet, Take 1 tab 1 hour prior to MRI and can repeat 30 mins prior, Disp: 2 tablet, Rfl: 0    naproxen (NAPROSYN) 500 mg tablet, Take 1 tablet (500 mg total) by mouth 2 (two) times a day with meals, Disp: 60 tablet, Rfl: 1    Recent Results (from the past 1008 hour(s))   Urine culture    Collection Time: 12/18/18 12:54 PM   Result Value Ref Range    Urine Culture >100,000 cfu/ml Escherichia coli (A)        Susceptibility    Escherichia coli - OPAL     ZID Performed Yes       Amoxicillin + Clavulanate <4/2 Susceptible ug/ml     Ampicillin ($$) >16 00 Resistant ug/ml     Ampicillin + Sulbactam ($) 16/8 Intermediate ug/ml     Aztreonam ($$$)  <4 Susceptible ug/ml     Cefazolin ($) <2 00 Susceptible ug/ml     Ciprofloxacin ($)  <1 00 Susceptible ug/ml     Ertapenem ($$$) <0 5 Susceptible ug/ml     Gentamicin ($$) <1 Susceptible ug/ml     Levofloxacin ($) 0 50 Susceptible ug/ml     Nitrofurantoin <32 Susceptible ug/ml     Tetracycline >8 Resistant ug/ml     Tobramycin ($) 2 Susceptible ug/ml     Trimethoprim + Sulfamethoxazole ($$$) >2/38 Resistant ug/ml   CBC and differential    Collection Time: 01/17/19 10:52 AM   Result Value Ref Range    WBC 6 50 4 31 - 10 16 Thousand/uL    RBC 4 49 3 81 - 5 12 Million/uL    Hemoglobin 13 3 11 5 - 15 4 g/dL    Hematocrit 42 3 34 8 - 46 1 %    MCV 94 82 - 98 fL    MCH 29 6 26 8 - 34 3 pg    MCHC 31 4 31 4 - 37 4 g/dL    RDW 13 1 11 6 - 15 1 %    MPV 9 6 8 9 - 12 7 fL    Platelets 648 221 - 228 Thousands/uL    nRBC 0 /100 WBCs    Neutrophils Relative 51 43 - 75 %    Immat GRANS % 0 0 - 2 %    Lymphocytes Relative 36 14 - 44 %    Monocytes Relative 9 4 - 12 %    Eosinophils Relative 3 0 - 6 %    Basophils Relative 1 0 - 1 %    Neutrophils Absolute 3 29 1 85 - 7 62 Thousands/µL    Immature Grans Absolute 0 01 0 00 - 0 20 Thousand/uL    Lymphocytes Absolute 2 36 0 60 - 4 47 Thousands/µL    Monocytes Absolute 0 60 0 17 - 1 22 Thousand/µL    Eosinophils Absolute 0 21 0 00 - 0 61 Thousand/µL    Basophils Absolute 0 03 0 00 - 0 10 Thousands/µL   Sedimentation rate, automated    Collection Time: 01/17/19 10:52 AM   Result Value Ref Range    Sed Rate 12 0 - 20 mm/hour   C-reactive protein    Collection Time: 01/17/19 10:52 AM   Result Value Ref Range    CRP <3 0 <3 0 mg/L   Latex, IgE    Collection Time: 01/17/19 10:52 AM   Result Value Ref Range    Latex Specific IgE <0 10 Class 0 kU/L   ALLAN Screen w/ Reflex to Titer/Pattern    Collection Time: 01/17/19 10:52 AM   Result Value Ref Range    ALLAN Negative Negative   ECG 12 lead    Collection Time: 01/17/19  5:34 PM   Result Value Ref Range    Ventricular Rate 79 BPM    Atrial Rate 79 BPM    MI Interval 120 ms    QRSD Interval 90 ms    QT Interval 380 ms    QTC Interval 435 ms    P Axis 61 degrees    QRS Axis 89 degrees    T Wave Axis 66 degrees   CBC and differential    Collection Time: 01/17/19  5:46 PM   Result Value Ref Range    WBC 7 27 4 31 - 10 16 Thousand/uL    RBC 4 38 3 81 - 5 12 Million/uL    Hemoglobin 13 2 11 5 - 15 4 g/dL    Hematocrit 40 8 34 8 - 46 1 %    MCV 93 82 - 98 fL    MCH 30 1 26 8 - 34 3 pg    MCHC 32 4 31 4 - 37 4 g/dL    RDW 13 0 11 6 - 15 1 %    MPV 9 6 8 9 - 12 7 fL    Platelets 780 319 - 216 Thousands/uL    nRBC 0 /100 WBCs    Neutrophils Relative 45 43 - 75 %    Immat GRANS % 0 0 - 2 %    Lymphocytes Relative 41 14 - 44 %    Monocytes Relative 10 4 - 12 %    Eosinophils Relative 4 0 - 6 %    Basophils Relative 0 0 - 1 %    Neutrophils Absolute 3 25 1 85 - 7 62 Thousands/µL    Immature Grans Absolute 0 02 0 00 - 0 20 Thousand/uL    Lymphocytes Absolute 2 98 0 60 - 4 47 Thousands/µL    Monocytes Absolute 0 70 0 17 - 1 22 Thousand/µL    Eosinophils Absolute 0 29 0 00 - 0 61 Thousand/µL    Basophils Absolute 0 03 0 00 - 0 10 Thousands/µL   Basic metabolic panel    Collection Time: 01/17/19  5:46 PM   Result Value Ref Range    Sodium 144 136 - 145 mmol/L    Potassium 3 8 3 5 - 5 3 mmol/L    Chloride 104 100 - 108 mmol/L    CO2 30 21 - 32 mmol/L    ANION GAP 10 4 - 13 mmol/L    BUN 17 5 - 25 mg/dL    Creatinine 0 85 0 60 - 1 30 mg/dL    Glucose 102 65 - 140 mg/dL    Calcium 9 3 8 3 - 10 1 mg/dL    eGFR 78 ml/min/1 73sq m   Hepatic function panel    Collection Time: 01/17/19  5:46 PM   Result Value Ref Range    Total Bilirubin 0 20 0 20 - 1 00 mg/dL    Bilirubin, Direct 0 08 0 00 - 0 20 mg/dL    Alkaline Phosphatase 170 (H) 46 - 116 U/L    AST 27 5 - 45 U/L    ALT 20 12 - 78 U/L    Total Protein 7 8 6 4 - 8 2 g/dL    Albumin 4 1 3 5 - 5 0 g/dL   D-Dimer    Collection Time: 01/17/19  5:46 PM   Result Value Ref Range    D-Dimer, Quant <270 <500 ng/ml (FEU)   Troponin I Collection Time: 01/17/19  5:46 PM   Result Value Ref Range    Troponin I 0 02 <=0 04 ng/mL   Protime-INR    Collection Time: 01/17/19  5:46 PM   Result Value Ref Range    Protime 12 4 11 8 - 14 2 seconds    INR 0 93 0 86 - 1 17   APTT    Collection Time: 01/17/19  5:46 PM   Result Value Ref Range    PTT 28 26 - 38 seconds   Lipase    Collection Time: 01/17/19  5:46 PM   Result Value Ref Range    Lipase 263 73 - 393 u/L   ECG 12 lead    Collection Time: 01/17/19  8:38 PM   Result Value Ref Range    Ventricular Rate 69 BPM    Atrial Rate 69 BPM    OK Interval 130 ms    QRSD Interval 94 ms    QT Interval 414 ms    QTC Interval 443 ms    P [place] 59 degrees    QRS Axis 87 degrees    T Wave Axis 68 degrees   Troponin I    Collection Time: 01/17/19  8:50 PM   Result Value Ref Range    Troponin I 0 03 <=0 04 ng/mL   Troponin I    Collection Time: 01/17/19 11:58 PM   Result Value Ref Range    Troponin I 0 03 <=0 04 ng/mL       The following portions of the patient's history were reviewed and updated as appropriate: allergies, current medications, past family history, past medical history, past social history, past surgical history and problem list      Review of Systems   Constitutional: Negative for appetite change, chills, diaphoresis, fatigue, fever and unexpected weight change  HENT: Negative for postnasal drip and sneezing  Eyes: Negative for visual disturbance  Respiratory: Negative for chest tightness and shortness of breath  Cardiovascular: Negative for chest pain, palpitations and leg swelling  Gastrointestinal: Negative for abdominal pain and blood in stool  Endocrine: Negative for cold intolerance, heat intolerance, polydipsia, polyphagia and polyuria  Genitourinary: Negative for difficulty urinating, dysuria, frequency and urgency  Musculoskeletal: Positive for back pain  Negative for arthralgias and myalgias  Skin: Negative for rash and wound     Neurological: Positive for numbness and headaches  Negative for dizziness, weakness and light-headedness  Hematological: Negative for adenopathy  Psychiatric/Behavioral: Negative for confusion, dysphoric mood and sleep disturbance  The patient is not nervous/anxious  Objective:      /90 (BP Location: Left arm, Patient Position: Sitting)   Pulse 80   Resp 16   Ht 5' 7" (1 702 m)   Wt 59 3 kg (130 lb 12 8 oz)   BMI 20 49 kg/m²        Physical Exam   Constitutional: She is oriented to person, place, and time  She appears well-developed  No distress  HENT:   Head: Normocephalic and atraumatic  Nose: Nose normal    Mouth/Throat: Oropharynx is clear and moist    Eyes: Pupils are equal, round, and reactive to light  Conjunctivae and EOM are normal    Neck: Normal range of motion  Neck supple  No JVD present  No tracheal deviation present  No thyromegaly present  Cardiovascular: Normal rate, regular rhythm and intact distal pulses  Exam reveals no gallop and no friction rub  Murmur heard  Pulmonary/Chest: Effort normal and breath sounds normal  No respiratory distress  She has no wheezes  She has no rales  Abdominal: Soft  Bowel sounds are normal  She exhibits no distension  There is no tenderness  Musculoskeletal: Normal range of motion  She exhibits no edema  Lymphadenopathy:     She has no cervical adenopathy  Neurological: She is alert and oriented to person, place, and time  No cranial nerve deficit  Skin: Skin is warm and dry  No rash noted  She is not diaphoretic  Psychiatric: She has a normal mood and affect   Her behavior is normal  Judgment and thought content normal

## 2019-01-28 ENCOUNTER — HOSPITAL ENCOUNTER (OUTPATIENT)
Dept: NON INVASIVE DIAGNOSTICS | Facility: CLINIC | Age: 54
Discharge: HOME | End: 2019-01-28
Payer: COMMERCIAL

## 2019-01-28 DIAGNOSIS — I51.7 LVH (LEFT VENTRICULAR HYPERTROPHY): ICD-10-CM

## 2019-01-28 PROCEDURE — 93306 TTE W/DOPPLER COMPLETE: CPT | Performed by: INTERNAL MEDICINE

## 2019-01-28 PROCEDURE — 93306 TTE W/DOPPLER COMPLETE: CPT

## 2019-01-29 DIAGNOSIS — I10 ESSENTIAL HYPERTENSION: Primary | ICD-10-CM

## 2019-01-29 RX ORDER — CHLORTHALIDONE 25 MG/1
TABLET ORAL
Qty: 30 TABLET | Refills: 5 | Status: SHIPPED | OUTPATIENT
Start: 2019-01-29 | End: 2019-02-28 | Stop reason: SDUPTHER

## 2019-01-30 DIAGNOSIS — F40.240 CLAUSTROPHOBIA: ICD-10-CM

## 2019-01-30 RX ORDER — LORAZEPAM 0.5 MG/1
TABLET ORAL
Qty: 2 TABLET | Refills: 0 | OUTPATIENT
Start: 2019-01-30

## 2019-01-30 RX ORDER — LORAZEPAM 0.5 MG/1
TABLET ORAL
Qty: 2 TABLET | Refills: 0 | Status: SHIPPED | OUTPATIENT
Start: 2019-01-30 | End: 2019-08-06

## 2019-01-30 RX ORDER — LORAZEPAM 0.5 MG/1
TABLET ORAL
Qty: 2 TABLET | Refills: 0 | Status: CANCELLED | OUTPATIENT
Start: 2019-01-30

## 2019-01-30 NOTE — TELEPHONE ENCOUNTER
From: Leidy Serrato  Sent: 6/88/2695 3:25 PM EST  Subject: Medication Renewal Request    Tomy Newton would like a refill of the following medications:     LORazepam (ATIVAN) 0 5 mg tablet MARIANN Magaña]    Preferred pharmacy: [id number] : [id number]    Comment:

## 2019-01-31 ENCOUNTER — TRANSCRIBE ORDERS (OUTPATIENT)
Dept: RADIOLOGY | Facility: HOSPITAL | Age: 54
End: 2019-01-31

## 2019-01-31 ENCOUNTER — HOSPITAL ENCOUNTER (OUTPATIENT)
Dept: RADIOLOGY | Facility: HOSPITAL | Age: 54
Discharge: HOME | End: 2019-01-31
Attending: ORTHOPAEDIC SURGERY
Payer: COMMERCIAL

## 2019-01-31 DIAGNOSIS — M25.552 PAIN IN LEFT HIP: ICD-10-CM

## 2019-01-31 PROCEDURE — 20610 DRAIN/INJ JOINT/BURSA W/O US: CPT

## 2019-01-31 PROCEDURE — 77002 NEEDLE LOCALIZATION BY XRAY: CPT

## 2019-01-31 RX ORDER — BUPIVACAINE HYDROCHLORIDE 2.5 MG/ML
10 INJECTION, SOLUTION EPIDURAL; INFILTRATION; INTRACAUDAL
Status: DISCONTINUED | OUTPATIENT
Start: 2019-01-31 | End: 2019-02-01 | Stop reason: HOSPADM

## 2019-01-31 RX ORDER — METHYLPREDNISOLONE ACETATE 80 MG/ML
80 INJECTION, SUSPENSION INTRA-ARTICULAR; INTRALESIONAL; INTRAMUSCULAR; SOFT TISSUE
Status: DISCONTINUED | OUTPATIENT
Start: 2019-01-31 | End: 2019-02-01 | Stop reason: HOSPADM

## 2019-01-31 RX ORDER — LIDOCAINE HYDROCHLORIDE 10 MG/ML
10 INJECTION, SOLUTION INFILTRATION; PERINEURAL
Status: DISCONTINUED | OUTPATIENT
Start: 2019-01-31 | End: 2019-02-01 | Stop reason: HOSPADM

## 2019-02-21 ENCOUNTER — HOSPITAL ENCOUNTER (OUTPATIENT)
Dept: ULTRASOUND IMAGING | Facility: HOSPITAL | Age: 54
Discharge: HOME | End: 2019-02-21
Payer: COMMERCIAL

## 2019-02-21 ENCOUNTER — TRANSCRIBE ORDERS (OUTPATIENT)
Dept: ADMINISTRATIVE | Facility: HOSPITAL | Age: 54
End: 2019-02-21

## 2019-02-21 DIAGNOSIS — D17.23 LIPOMA OF RIGHT LOWER EXTREMITY: ICD-10-CM

## 2019-02-21 DIAGNOSIS — D17.23 LIPOMA OF RIGHT LOWER EXTREMITY: Primary | ICD-10-CM

## 2019-02-21 PROCEDURE — 76705 ECHO EXAM OF ABDOMEN: CPT

## 2019-02-28 DIAGNOSIS — I10 ESSENTIAL HYPERTENSION: ICD-10-CM

## 2019-02-28 RX ORDER — CHLORTHALIDONE 25 MG/1
TABLET ORAL
Qty: 30 TABLET | Refills: 5 | Status: SHIPPED | OUTPATIENT
Start: 2019-02-28 | End: 2019-03-04 | Stop reason: SDUPTHER

## 2019-03-04 DIAGNOSIS — Z72.0 TOBACCO ABUSE: Primary | ICD-10-CM

## 2019-03-04 DIAGNOSIS — I10 ESSENTIAL HYPERTENSION: ICD-10-CM

## 2019-03-04 RX ORDER — VARENICLINE TARTRATE 0.5 MG/1
0.5 TABLET, FILM COATED ORAL 2 TIMES DAILY
Qty: 60 TABLET | Refills: 0 | Status: SHIPPED | OUTPATIENT
Start: 2019-03-04 | End: 2019-08-06

## 2019-03-04 RX ORDER — CHLORTHALIDONE 25 MG/1
TABLET ORAL
Qty: 30 TABLET | Refills: 5 | Status: SHIPPED | OUTPATIENT
Start: 2019-03-04 | End: 2020-11-04 | Stop reason: SDUPTHER

## 2019-07-12 DIAGNOSIS — E55.9 VITAMIN D DEFICIENCY: ICD-10-CM

## 2019-07-12 DIAGNOSIS — Z13.6 SCREENING FOR CARDIOVASCULAR CONDITION: ICD-10-CM

## 2019-07-12 DIAGNOSIS — Z12.39 SCREENING FOR BREAST CANCER: ICD-10-CM

## 2019-07-12 DIAGNOSIS — R73.03 PREDIABETES: ICD-10-CM

## 2019-07-12 DIAGNOSIS — R53.83 FATIGUE, UNSPECIFIED TYPE: Primary | ICD-10-CM

## 2019-07-12 DIAGNOSIS — R53.83 MALAISE AND FATIGUE: ICD-10-CM

## 2019-07-12 DIAGNOSIS — I10 ESSENTIAL HYPERTENSION: Primary | ICD-10-CM

## 2019-07-12 DIAGNOSIS — E53.8 VITAMIN B 12 DEFICIENCY: ICD-10-CM

## 2019-07-12 DIAGNOSIS — R53.81 MALAISE AND FATIGUE: ICD-10-CM

## 2019-07-12 DIAGNOSIS — E07.9 THYROID DISEASE: ICD-10-CM

## 2019-08-02 DIAGNOSIS — A09 TRAVELER'S DIARRHEA: Primary | ICD-10-CM

## 2019-08-02 RX ORDER — CIPROFLOXACIN 500 MG/1
500 TABLET, FILM COATED ORAL EVERY 12 HOURS SCHEDULED
Qty: 6 TABLET | Refills: 0 | Status: SHIPPED | OUTPATIENT
Start: 2019-08-02 | End: 2019-08-05

## 2019-08-02 NOTE — PROGRESS NOTES
Persistent diarrhea and cramping after coming back from recent vacation [place]  Other travel members have similar symptoms  No blood, pus, fever, chills

## 2019-08-05 DIAGNOSIS — Z12.11 SCREEN FOR COLON CANCER: Primary | ICD-10-CM

## 2019-08-05 RX ORDER — PHENTERMINE HYDROCHLORIDE 37.5 MG/1
37.5 CAPSULE ORAL DAILY
Refills: 1 | COMMUNITY
Start: 2019-07-08 | End: 2019-08-17 | Stop reason: ALTCHOICE

## 2019-08-05 RX ORDER — ACETAMINOPHEN, ASPIRIN AND CAFFEINE 250; 250; 65 MG/1; MG/1; MG/1
1 TABLET, FILM COATED ORAL EVERY 6 HOURS PRN
COMMUNITY

## 2019-08-05 RX ORDER — ELETRIPTAN HYDROBROMIDE 40 MG/1
40 TABLET, FILM COATED ORAL DAILY PRN
COMMUNITY
End: 2020-07-22 | Stop reason: ALTCHOICE

## 2019-08-06 ENCOUNTER — OFFICE VISIT (OUTPATIENT)
Dept: GASTROENTEROLOGY | Facility: CLINIC | Age: 54
End: 2019-08-06
Payer: COMMERCIAL

## 2019-08-06 VITALS
BODY MASS INDEX: 20.47 KG/M2 | SYSTOLIC BLOOD PRESSURE: 128 MMHG | DIASTOLIC BLOOD PRESSURE: 84 MMHG | HEIGHT: 67 IN | WEIGHT: 130.4 LBS | HEART RATE: 74 BPM

## 2019-08-06 DIAGNOSIS — R11.0 NAUSEA: ICD-10-CM

## 2019-08-06 DIAGNOSIS — K59.1 FUNCTIONAL DIARRHEA: Primary | ICD-10-CM

## 2019-08-06 DIAGNOSIS — R10.13 EPIGASTRIC PAIN: ICD-10-CM

## 2019-08-06 PROCEDURE — 99204 OFFICE O/P NEW MOD 45 MIN: CPT | Performed by: PHYSICIAN ASSISTANT

## 2019-08-06 RX ORDER — DICYCLOMINE HCL 20 MG
20 TABLET ORAL EVERY 6 HOURS
Qty: 30 TABLET | Refills: 2 | Status: SHIPPED | OUTPATIENT
Start: 2019-08-06 | End: 2020-06-11 | Stop reason: SDUPTHER

## 2019-08-06 RX ORDER — ONDANSETRON 4 MG/1
4 TABLET, FILM COATED ORAL EVERY 8 HOURS PRN
Qty: 40 TABLET | Refills: 0 | Status: SHIPPED | OUTPATIENT
Start: 2019-08-06 | End: 2020-07-22 | Stop reason: ALTCHOICE

## 2019-08-06 NOTE — PROGRESS NOTES
[facility] - Outpatient Consultation  Bucky Rowland 46 y o  female MRN: [id number]  Encounter: [id number]          ASSESSMENT AND PLAN:      1  Functional diarrhea  2  Nausea  3  Epigastric pain    She has a history of recurring issues with abdominal pain, nausea and diarrhea  Recently the symptoms seem to be consistent  Suspect these are functional  She started taking pantoprazole without improvement  She reports a history of H pylori and was treated approximately 16 years ago but does not believe she had a test of cure  Will plan EGD  Her last colonoscopy was more than 10 years ago will plan colonoscopy  Recommended fiber supplement and probiotic  Dicyclomine p r n  Zantac 150 b i d     ______________________________________________________________________    HPI:  50-year-old female presents for evaluation of diarrhea, nausea and abdominal pain  She reports recurring episodes of these symptoms over the past several years however over the past few weeks have been consistent which is different  She states that she has several loose stools daily, daily nausea and daily abdominal pain  She admits the symptoms are made worse by eating  She denies any vomiting, hematemesis, dysphagia, rectal bleeding  There are no episodes of constipation  She admits that over the past several days her stools seem to be firming up  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  She denies any new medications or antibiotics  She did recently travel to the [facility] but states that her symptoms were present prior to this  She has lost several lb since symptom onset  REVIEW OF SYSTEMS:    CONSTITUTIONAL: Denies any fever, chills, rigors, and weight loss  HEENT: No earache or tinnitus  Denies hearing loss or visual disturbances  CARDIOVASCULAR: No chest pain or palpitations     RESPIRATORY: Denies any cough, hemoptysis, shortness of breath or dyspnea on exertion  GASTROINTESTINAL: As noted in the History of Present Illness  GENITOURINARY: No problems with urination  Denies any hematuria or dysuria  NEUROLOGIC: No dizziness or vertigo, denies headaches  MUSCULOSKELETAL: Denies any muscle or joint pain  SKIN: Denies skin rashes or itching  ENDOCRINE: Denies excessive thirst  Denies intolerance to heat or cold  PSYCHOSOCIAL: Denies depression or anxiety  Denies any recent memory loss         Historical Information   Past Medical History:   Diagnosis Date    Benign positional vertigo, right     Cervicalgia     DVT (deep venous thrombosis) (HCC)     Graves disease     Headache     Thyroid disease      Past Surgical History:   Procedure Laterality Date    APPENDECTOMY      FL INJECTION LEFT HIP (NON ARTHROGRAM)  1/31/2019    LIPOMA RESECTION      NEUROMA EXCISION      STEROID INJECTION HIP Left 05/2018     Social History   Social History     Substance and Sexual Activity   Alcohol Use Yes    Comment: occasional     Social History     Substance and Sexual Activity   Drug Use No     Social History     Tobacco Use   Smoking Status Light Tobacco Smoker    Types: Cigarettes   Smokeless Tobacco Never Used   Tobacco Comment    3 a week      Family History   Problem Relation Age of Onset    Hypertension Mother     Asthma Mother     Diabetes Mother     Hyperlipidemia Mother     Seizures Father    Tran Scales Migraines Brother     Seizures Brother     Cancer Sister         brain    Breast cancer Neg Hx     Colon cancer Neg Hx     Ovarian cancer Neg Hx     Uterine cancer Neg Hx     Cervical cancer Neg Hx        Meds/Allergies       Current Outpatient Medications:     chlorthalidone 25 mg tablet    Cranberry 400 MG CAPS    glucosamine-chondroitin 500-400 MG tablet    multivitamin (THERAGRAN) TABS    SUMAtriptan (IMITREX) 100 mg tablet    aspirin-acetaminophen-caffeine (EXCEDRIN MIGRAINE) 250-250-65 MG per tablet    dicyclomine (BENTYL) 20 mg tablet   eletriptan (RELPAX) 40 MG tablet    ondansetron (ZOFRAN) 4 mg tablet    phentermine 37 5 MG capsule    Allergies   Allergen Reactions    Bee Venom      Bee sting    Contrast [Iodinated Diagnostic Agents] Anaphylaxis     Pt states her throat closes- kk rn     Pollen Extract Other (See Comments)     Throat clearing, difficulty swallowing, ears itchy    Prochlorperazine Other (See Comments)      Aka (compazine)  Delirious            Objective     Blood pressure 128/84, pulse 74, height 5' 7" (1 702 m), weight 59 1 kg (130 lb 6 4 oz), not currently breastfeeding  Body mass index is 20 42 kg/m²  PHYSICAL EXAM:      General Appearance:   Alert, cooperative, no distress   HEENT:   Normocephalic, atraumatic, anicteric      Neck:  Supple, symmetrical, trachea midline   Lungs:   Clear to auscultation bilaterally; no rales, rhonchi or wheezing; respirations unlabored    Heart[de-identified]   Regular rate and rhythm; no murmur, rub, or gallop  Abdomen:   Soft, non-tender, non-distended; normal bowel sounds; no masses, no organomegaly    Genitalia:   Deferred    Rectal:   Deferred    Extremities:  No cyanosis, clubbing or edema    Pulses:  2+ and symmetric    Skin:  No jaundice, rashes, or lesions    Lymph nodes:  No palpable cervical lymphadenopathy        Lab Results:   No visits with results within 1 Day(s) from this visit     Latest known visit with results is:   Admission on 01/17/2019, Discharged on 01/18/2019   Component Date Value    WBC 01/17/2019 7 27     RBC 01/17/2019 4 38     Hemoglobin 01/17/2019 13 2     Hematocrit 01/17/2019 40 8     MCV 01/17/2019 93     MCH 01/17/2019 30 1     MCHC 01/17/2019 32 4     RDW 01/17/2019 13 0     MPV 01/17/2019 9 6     Platelets 25/51/5193 265     nRBC 01/17/2019 0     Neutrophils Relative 01/17/2019 45     Immat GRANS % 01/17/2019 0     Lymphocytes Relative 01/17/2019 41     Monocytes Relative 01/17/2019 10     Eosinophils Relative 01/17/2019 4     Basophils Relative 01/17/2019 0     Neutrophils Absolute 01/17/2019 3 25     Immature Grans Absolute 01/17/2019 0 02     Lymphocytes Absolute 01/17/2019 2 98     Monocytes Absolute 01/17/2019 0 70     Eosinophils Absolute 01/17/2019 0 29     Basophils Absolute 01/17/2019 0 03     Sodium 01/17/2019 144     Potassium 01/17/2019 3 8     Chloride 01/17/2019 104     CO2 01/17/2019 30     ANION GAP 01/17/2019 10     BUN 01/17/2019 17     Creatinine 01/17/2019 0 85     Glucose 01/17/2019 102     Calcium 01/17/2019 9 3     eGFR 01/17/2019 78     Total Bilirubin 01/17/2019 0 20     Bilirubin, Direct 01/17/2019 0 08     Alkaline Phosphatase 01/17/2019 170*    AST 01/17/2019 27     ALT 01/17/2019 20     Total Protein 01/17/2019 7 8     Albumin 01/17/2019 4 1     D-Dimer, Quant 01/17/2019 <270     Troponin I 01/17/2019 0 02     Protime 01/17/2019 12 4     INR 01/17/2019 0 93     PTT 01/17/2019 28     Troponin I 01/17/2019 0 03     Lipase 01/17/2019 263     Troponin I 01/17/2019 0 03     Ventricular Rate 01/17/2019 79     Atrial Rate 01/17/2019 79     CA Interval 01/17/2019 120     QRSD Interval 01/17/2019 90     QT Interval 01/17/2019 380     QTC Interval 01/17/2019 435     P Axis 01/17/2019 61     QRS Axis 01/17/2019 89     T Wave Axis 01/17/2019 66     Ventricular Rate 01/17/2019 69     Atrial Rate 01/17/2019 69     CA Interval 01/17/2019 130     QRSD Interval 01/17/2019 94     QT Interval 01/17/2019 414     QTC Interval 01/17/2019 443     P Axis 01/17/2019 59     QRS Axis 01/17/2019 87     T Wave Axis 01/17/2019 68          Radiology Results:   No results found

## 2019-08-06 NOTE — H&P (VIEW-ONLY)
[facility] - Outpatient Consultation  Zakia Swanson 46 y o  female MRN: [id number]  Encounter: [id number]          ASSESSMENT AND PLAN:      1  Functional diarrhea  2  Nausea  3  Epigastric pain    She has a history of recurring issues with abdominal pain, nausea and diarrhea  Recently the symptoms seem to be consistent  Suspect these are functional  She started taking pantoprazole without improvement  She reports a history of H pylori and was treated approximately 16 years ago but does not believe she had a test of cure  Will plan EGD  Her last colonoscopy was more than 10 years ago will plan colonoscopy  Recommended fiber supplement and probiotic  Dicyclomine p r n  Zantac 150 b i d     ______________________________________________________________________    HPI:  54-year-old female presents for evaluation of diarrhea, nausea and abdominal pain  She reports recurring episodes of these symptoms over the past several years however over the past few weeks have been consistent which is different  She states that she has several loose stools daily, daily nausea and daily abdominal pain  She admits the symptoms are made worse by eating  She denies any vomiting, hematemesis, dysphagia, rectal bleeding  There are no episodes of constipation  She admits that over the past several days her stools seem to be firming up  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  She denies any new medications or antibiotics  She did recently travel to the [facility] but states that her symptoms were present prior to this  She has lost several lb since symptom onset  REVIEW OF SYSTEMS:    CONSTITUTIONAL: Denies any fever, chills, rigors, and weight loss  HEENT: No earache or tinnitus  Denies hearing loss or visual disturbances  CARDIOVASCULAR: No chest pain or palpitations     RESPIRATORY: Denies any cough, hemoptysis, shortness of breath or dyspnea on exertion  GASTROINTESTINAL: As noted in the History of Present Illness  GENITOURINARY: No problems with urination  Denies any hematuria or dysuria  NEUROLOGIC: No dizziness or vertigo, denies headaches  MUSCULOSKELETAL: Denies any muscle or joint pain  SKIN: Denies skin rashes or itching  ENDOCRINE: Denies excessive thirst  Denies intolerance to heat or cold  PSYCHOSOCIAL: Denies depression or anxiety  Denies any recent memory loss         Historical Information   Past Medical History:   Diagnosis Date    Benign positional vertigo, right     Cervicalgia     DVT (deep venous thrombosis) (HCC)     Graves disease     Headache     Thyroid disease      Past Surgical History:   Procedure Laterality Date    APPENDECTOMY      FL INJECTION LEFT HIP (NON ARTHROGRAM)  1/31/2019    LIPOMA RESECTION      NEUROMA EXCISION      STEROID INJECTION HIP Left 05/2018     Social History   Social History     Substance and Sexual Activity   Alcohol Use Yes    Comment: occasional     Social History     Substance and Sexual Activity   Drug Use No     Social History     Tobacco Use   Smoking Status Light Tobacco Smoker    Types: Cigarettes   Smokeless Tobacco Never Used   Tobacco Comment    3 a week      Family History   Problem Relation Age of Onset    Hypertension Mother     Asthma Mother     Diabetes Mother     Hyperlipidemia Mother     Seizures Father    Richi Hero Migraines Brother     Seizures Brother     Cancer Sister         brain    Breast cancer Neg Hx     Colon cancer Neg Hx     Ovarian cancer Neg Hx     Uterine cancer Neg Hx     Cervical cancer Neg Hx        Meds/Allergies       Current Outpatient Medications:     chlorthalidone 25 mg tablet    Cranberry 400 MG CAPS    glucosamine-chondroitin 500-400 MG tablet    multivitamin (THERAGRAN) TABS    SUMAtriptan (IMITREX) 100 mg tablet    aspirin-acetaminophen-caffeine (EXCEDRIN MIGRAINE) 250-250-65 MG per tablet    dicyclomine (BENTYL) 20 mg tablet   eletriptan (RELPAX) 40 MG tablet    ondansetron (ZOFRAN) 4 mg tablet    phentermine 37 5 MG capsule    Allergies   Allergen Reactions    Bee Venom      Bee sting    Contrast [Iodinated Diagnostic Agents] Anaphylaxis     Pt states her throat closes- kk rn     Pollen Extract Other (See Comments)     Throat clearing, difficulty swallowing, ears itchy    Prochlorperazine Other (See Comments)      Aka (compazine)  Delirious            Objective     Blood pressure 128/84, pulse 74, height 5' 7" (1 702 m), weight 59 1 kg (130 lb 6 4 oz), not currently breastfeeding  Body mass index is 20 42 kg/m²  PHYSICAL EXAM:      General Appearance:   Alert, cooperative, no distress   HEENT:   Normocephalic, atraumatic, anicteric      Neck:  Supple, symmetrical, trachea midline   Lungs:   Clear to auscultation bilaterally; no rales, rhonchi or wheezing; respirations unlabored    Heart[de-identified]   Regular rate and rhythm; no murmur, rub, or gallop  Abdomen:   Soft, non-tender, non-distended; normal bowel sounds; no masses, no organomegaly    Genitalia:   Deferred    Rectal:   Deferred    Extremities:  No cyanosis, clubbing or edema    Pulses:  2+ and symmetric    Skin:  No jaundice, rashes, or lesions    Lymph nodes:  No palpable cervical lymphadenopathy        Lab Results:   No visits with results within 1 Day(s) from this visit     Latest known visit with results is:   Admission on 01/17/2019, Discharged on 01/18/2019   Component Date Value    WBC 01/17/2019 7 27     RBC 01/17/2019 4 38     Hemoglobin 01/17/2019 13 2     Hematocrit 01/17/2019 40 8     MCV 01/17/2019 93     MCH 01/17/2019 30 1     MCHC 01/17/2019 32 4     RDW 01/17/2019 13 0     MPV 01/17/2019 9 6     Platelets 60/42/1349 265     nRBC 01/17/2019 0     Neutrophils Relative 01/17/2019 45     Immat GRANS % 01/17/2019 0     Lymphocytes Relative 01/17/2019 41     Monocytes Relative 01/17/2019 10     Eosinophils Relative 01/17/2019 4     Basophils Relative 01/17/2019 0     Neutrophils Absolute 01/17/2019 3 25     Immature Grans Absolute 01/17/2019 0 02     Lymphocytes Absolute 01/17/2019 2 98     Monocytes Absolute 01/17/2019 0 70     Eosinophils Absolute 01/17/2019 0 29     Basophils Absolute 01/17/2019 0 03     Sodium 01/17/2019 144     Potassium 01/17/2019 3 8     Chloride 01/17/2019 104     CO2 01/17/2019 30     ANION GAP 01/17/2019 10     BUN 01/17/2019 17     Creatinine 01/17/2019 0 85     Glucose 01/17/2019 102     Calcium 01/17/2019 9 3     eGFR 01/17/2019 78     Total Bilirubin 01/17/2019 0 20     Bilirubin, Direct 01/17/2019 0 08     Alkaline Phosphatase 01/17/2019 170*    AST 01/17/2019 27     ALT 01/17/2019 20     Total Protein 01/17/2019 7 8     Albumin 01/17/2019 4 1     D-Dimer, Quant 01/17/2019 <270     Troponin I 01/17/2019 0 02     Protime 01/17/2019 12 4     INR 01/17/2019 0 93     PTT 01/17/2019 28     Troponin I 01/17/2019 0 03     Lipase 01/17/2019 263     Troponin I 01/17/2019 0 03     Ventricular Rate 01/17/2019 79     Atrial Rate 01/17/2019 79     NH Interval 01/17/2019 120     QRSD Interval 01/17/2019 90     QT Interval 01/17/2019 380     QTC Interval 01/17/2019 435     P Axis 01/17/2019 61     QRS Axis 01/17/2019 89     T Wave Axis 01/17/2019 66     Ventricular Rate 01/17/2019 69     Atrial Rate 01/17/2019 69     NH Interval 01/17/2019 130     QRSD Interval 01/17/2019 94     QT Interval 01/17/2019 414     QTC Interval 01/17/2019 443     P Axis 01/17/2019 59     QRS Axis 01/17/2019 87     T Wave Axis 01/17/2019 68          Radiology Results:   No results found

## 2019-08-07 ENCOUNTER — APPOINTMENT (OUTPATIENT)
Dept: LAB | Facility: CLINIC | Age: 54
End: 2019-08-07
Payer: COMMERCIAL

## 2019-08-07 DIAGNOSIS — E07.9 THYROID DISEASE: ICD-10-CM

## 2019-08-07 DIAGNOSIS — E55.9 VITAMIN D DEFICIENCY: ICD-10-CM

## 2019-08-07 DIAGNOSIS — R73.03 PREDIABETES: ICD-10-CM

## 2019-08-07 DIAGNOSIS — K59.1 FUNCTIONAL DIARRHEA: ICD-10-CM

## 2019-08-07 DIAGNOSIS — R53.83 FATIGUE, UNSPECIFIED TYPE: ICD-10-CM

## 2019-08-07 DIAGNOSIS — R53.83 MALAISE AND FATIGUE: ICD-10-CM

## 2019-08-07 DIAGNOSIS — E53.8 VITAMIN B 12 DEFICIENCY: ICD-10-CM

## 2019-08-07 DIAGNOSIS — Z13.6 SCREENING FOR CARDIOVASCULAR CONDITION: ICD-10-CM

## 2019-08-07 DIAGNOSIS — R53.81 MALAISE AND FATIGUE: ICD-10-CM

## 2019-08-07 LAB
25(OH)D3 SERPL-MCNC: 25.9 NG/ML (ref 30–100)
ALBUMIN SERPL BCP-MCNC: 3.9 G/DL (ref 3.5–5)
ALP SERPL-CCNC: 151 U/L (ref 46–116)
ALT SERPL W P-5'-P-CCNC: 24 U/L (ref 12–78)
ANION GAP SERPL CALCULATED.3IONS-SCNC: 6 MMOL/L (ref 4–13)
AST SERPL W P-5'-P-CCNC: 25 U/L (ref 5–45)
BASOPHILS # BLD AUTO: 0.02 THOUSANDS/ΜL (ref 0–0.1)
BASOPHILS NFR BLD AUTO: 0 % (ref 0–1)
BILIRUB SERPL-MCNC: 0.35 MG/DL (ref 0.2–1)
BUN SERPL-MCNC: 9 MG/DL (ref 5–25)
CALCIUM SERPL-MCNC: 9.2 MG/DL (ref 8.3–10.1)
CHLORIDE SERPL-SCNC: 104 MMOL/L (ref 100–108)
CHOLEST SERPL-MCNC: 189 MG/DL (ref 50–200)
CO2 SERPL-SCNC: 30 MMOL/L (ref 21–32)
CREAT SERPL-MCNC: 0.76 MG/DL (ref 0.6–1.3)
EOSINOPHIL # BLD AUTO: 0.22 THOUSAND/ΜL (ref 0–0.61)
EOSINOPHIL NFR BLD AUTO: 4 % (ref 0–6)
ERYTHROCYTE [DISTWIDTH] IN BLOOD BY AUTOMATED COUNT: 12.9 % (ref 11.6–15.1)
EST. AVERAGE GLUCOSE BLD GHB EST-MCNC: 120 MG/DL
FOLATE SERPL-MCNC: >20 NG/ML (ref 3.1–17.5)
GFR SERPL CREATININE-BSD FRML MDRD: 89 ML/MIN/1.73SQ M
GLUCOSE P FAST SERPL-MCNC: 77 MG/DL (ref 65–99)
HBA1C MFR BLD: 5.8 % (ref 4.2–6.3)
HCT VFR BLD AUTO: 43.1 % (ref 34.8–46.1)
HDLC SERPL-MCNC: 66 MG/DL (ref 40–60)
HGB BLD-MCNC: 13.4 G/DL (ref 11.5–15.4)
IMM GRANULOCYTES # BLD AUTO: 0.01 THOUSAND/UL (ref 0–0.2)
IMM GRANULOCYTES NFR BLD AUTO: 0 % (ref 0–2)
LDLC SERPL CALC-MCNC: 99 MG/DL (ref 0–100)
LYMPHOCYTES # BLD AUTO: 1.52 THOUSANDS/ΜL (ref 0.6–4.47)
LYMPHOCYTES NFR BLD AUTO: 31 % (ref 14–44)
MCH RBC QN AUTO: 29.6 PG (ref 26.8–34.3)
MCHC RBC AUTO-ENTMCNC: 31.1 G/DL (ref 31.4–37.4)
MCV RBC AUTO: 95 FL (ref 82–98)
MONOCYTES # BLD AUTO: 0.59 THOUSAND/ΜL (ref 0.17–1.22)
MONOCYTES NFR BLD AUTO: 12 % (ref 4–12)
NEUTROPHILS # BLD AUTO: 2.62 THOUSANDS/ΜL (ref 1.85–7.62)
NEUTS SEG NFR BLD AUTO: 53 % (ref 43–75)
NONHDLC SERPL-MCNC: 123 MG/DL
NRBC BLD AUTO-RTO: 0 /100 WBCS
PLATELET # BLD AUTO: 258 THOUSANDS/UL (ref 149–390)
PMV BLD AUTO: 9.9 FL (ref 8.9–12.7)
POTASSIUM SERPL-SCNC: 3.2 MMOL/L (ref 3.5–5.3)
PROT SERPL-MCNC: 7.6 G/DL (ref 6.4–8.2)
RBC # BLD AUTO: 4.53 MILLION/UL (ref 3.81–5.12)
SODIUM SERPL-SCNC: 140 MMOL/L (ref 136–145)
TRIGL SERPL-MCNC: 118 MG/DL
TSH SERPL DL<=0.05 MIU/L-ACNC: 2.56 UIU/ML (ref 0.36–3.74)
VIT B12 SERPL-MCNC: 264 PG/ML (ref 100–900)
WBC # BLD AUTO: 4.98 THOUSAND/UL (ref 4.31–10.16)

## 2019-08-07 PROCEDURE — 80061 LIPID PANEL: CPT

## 2019-08-07 PROCEDURE — 83036 HEMOGLOBIN GLYCOSYLATED A1C: CPT

## 2019-08-07 PROCEDURE — 82306 VITAMIN D 25 HYDROXY: CPT

## 2019-08-07 PROCEDURE — 82607 VITAMIN B-12: CPT

## 2019-08-07 PROCEDURE — 84443 ASSAY THYROID STIM HORMONE: CPT

## 2019-08-07 PROCEDURE — 80053 COMPREHEN METABOLIC PANEL: CPT

## 2019-08-07 PROCEDURE — 83516 IMMUNOASSAY NONANTIBODY: CPT

## 2019-08-07 PROCEDURE — 85025 COMPLETE CBC W/AUTO DIFF WBC: CPT

## 2019-08-07 PROCEDURE — 36415 COLL VENOUS BLD VENIPUNCTURE: CPT

## 2019-08-07 PROCEDURE — 82746 ASSAY OF FOLIC ACID SERUM: CPT

## 2019-08-08 ENCOUNTER — TELEPHONE (OUTPATIENT)
Dept: INTERNAL MEDICINE CLINIC | Facility: CLINIC | Age: 54
End: 2019-08-08

## 2019-08-08 LAB — TTG IGA SER-ACNC: <2 U/ML (ref 0–3)

## 2019-08-08 NOTE — TELEPHONE ENCOUNTER
----- Message from Yung Barber, [address] sent at 8/8/2019  8:07 AM EDT -----  She is supposed to making a follow up to review labs

## 2019-08-15 ENCOUNTER — TELEPHONE (OUTPATIENT)
Dept: GASTROENTEROLOGY | Facility: CLINIC | Age: 54
End: 2019-08-15

## 2019-08-17 ENCOUNTER — ANESTHESIA (OUTPATIENT)
Dept: GASTROENTEROLOGY | Facility: HOSPITAL | Age: 54
End: 2019-08-17

## 2019-08-17 ENCOUNTER — ANESTHESIA EVENT (OUTPATIENT)
Dept: GASTROENTEROLOGY | Facility: HOSPITAL | Age: 54
End: 2019-08-17

## 2019-08-17 ENCOUNTER — HOSPITAL ENCOUNTER (OUTPATIENT)
Dept: GASTROENTEROLOGY | Facility: HOSPITAL | Age: 54
Setting detail: OUTPATIENT SURGERY
Discharge: HOME | End: 2019-08-17
Attending: INTERNAL MEDICINE | Admitting: INTERNAL MEDICINE
Payer: COMMERCIAL

## 2019-08-17 VITALS
HEART RATE: 62 BPM | TEMPERATURE: 97.6 F | RESPIRATION RATE: 16 BRPM | BODY MASS INDEX: 19.69 KG/M2 | HEIGHT: 67 IN | DIASTOLIC BLOOD PRESSURE: 87 MMHG | WEIGHT: 125.44 LBS | SYSTOLIC BLOOD PRESSURE: 151 MMHG | OXYGEN SATURATION: 99 %

## 2019-08-17 DIAGNOSIS — K58.0 IRRITABLE BOWEL SYNDROME WITH DIARRHEA: Primary | ICD-10-CM

## 2019-08-17 DIAGNOSIS — R11.0 NAUSEA: ICD-10-CM

## 2019-08-17 DIAGNOSIS — K59.1 FUNCTIONAL DIARRHEA: ICD-10-CM

## 2019-08-17 PROCEDURE — 87329 GIARDIA AG IA: CPT | Performed by: INTERNAL MEDICINE

## 2019-08-17 PROCEDURE — 88305 TISSUE EXAM BY PATHOLOGIST: CPT | Performed by: PATHOLOGY

## 2019-08-17 PROCEDURE — 87177 OVA AND PARASITES SMEARS: CPT | Performed by: INTERNAL MEDICINE

## 2019-08-17 PROCEDURE — 43239 EGD BIOPSY SINGLE/MULTIPLE: CPT | Performed by: INTERNAL MEDICINE

## 2019-08-17 PROCEDURE — 45380 COLONOSCOPY AND BIOPSY: CPT | Performed by: INTERNAL MEDICINE

## 2019-08-17 PROCEDURE — 87209 SMEAR COMPLEX STAIN: CPT | Performed by: INTERNAL MEDICINE

## 2019-08-17 RX ORDER — PANTOPRAZOLE SODIUM 40 MG/1
40 TABLET, DELAYED RELEASE ORAL DAILY
Qty: 30 TABLET | Refills: 2 | Status: SHIPPED | OUTPATIENT
Start: 2019-08-17 | End: 2020-08-11

## 2019-08-17 RX ORDER — LIDOCAINE HYDROCHLORIDE 10 MG/ML
0.5 INJECTION, SOLUTION EPIDURAL; INFILTRATION; INTRACAUDAL; PERINEURAL ONCE AS NEEDED
Status: DISCONTINUED | OUTPATIENT
Start: 2019-08-17 | End: 2019-08-21 | Stop reason: HOSPADM

## 2019-08-17 RX ORDER — LIDOCAINE HYDROCHLORIDE 10 MG/ML
INJECTION, SOLUTION EPIDURAL; INFILTRATION; INTRACAUDAL; PERINEURAL AS NEEDED
Status: DISCONTINUED | OUTPATIENT
Start: 2019-08-17 | End: 2019-08-17 | Stop reason: SURG

## 2019-08-17 RX ORDER — ACETAMINOPHEN 325 MG/1
650 TABLET ORAL EVERY 6 HOURS PRN
COMMUNITY
End: 2020-07-22 | Stop reason: ALTCHOICE

## 2019-08-17 RX ORDER — SODIUM CHLORIDE, SODIUM LACTATE, POTASSIUM CHLORIDE, CALCIUM CHLORIDE 600; 310; 30; 20 MG/100ML; MG/100ML; MG/100ML; MG/100ML
125 INJECTION, SOLUTION INTRAVENOUS CONTINUOUS
Status: DISCONTINUED | OUTPATIENT
Start: 2019-08-17 | End: 2019-08-17

## 2019-08-17 RX ORDER — PROPOFOL 10 MG/ML
INJECTION, EMULSION INTRAVENOUS AS NEEDED
Status: DISCONTINUED | OUTPATIENT
Start: 2019-08-17 | End: 2019-08-17 | Stop reason: SURG

## 2019-08-17 RX ORDER — METOCLOPRAMIDE HYDROCHLORIDE 5 MG/ML
10 INJECTION INTRAMUSCULAR; INTRAVENOUS ONCE
Status: COMPLETED | OUTPATIENT
Start: 2019-08-17 | End: 2019-08-17

## 2019-08-17 RX ORDER — KETOROLAC TROMETHAMINE 30 MG/ML
30 INJECTION, SOLUTION INTRAMUSCULAR; INTRAVENOUS ONCE
Status: COMPLETED | OUTPATIENT
Start: 2019-08-17 | End: 2019-08-17

## 2019-08-17 RX ADMIN — LIDOCAINE HYDROCHLORIDE 50 MG: 10 INJECTION, SOLUTION EPIDURAL; INFILTRATION; INTRACAUDAL; PERINEURAL at 10:39

## 2019-08-17 RX ADMIN — KETOROLAC TROMETHAMINE 30 MG: 30 INJECTION, SOLUTION INTRAMUSCULAR at 11:24

## 2019-08-17 RX ADMIN — SODIUM CHLORIDE, SODIUM LACTATE, POTASSIUM CHLORIDE, AND CALCIUM CHLORIDE: .6; .31; .03; .02 INJECTION, SOLUTION INTRAVENOUS at 10:16

## 2019-08-17 RX ADMIN — PROPOFOL 50 MG: 10 INJECTION, EMULSION INTRAVENOUS at 10:44

## 2019-08-17 RX ADMIN — METOCLOPRAMIDE 10 MG: 5 INJECTION, SOLUTION INTRAMUSCULAR; INTRAVENOUS at 11:19

## 2019-08-17 RX ADMIN — PROPOFOL 100 MG: 10 INJECTION, EMULSION INTRAVENOUS at 10:39

## 2019-08-17 NOTE — INTERVAL H&P NOTE
H&P reviewed  After examining the patient I find no changes in the patients condition since the H&P had been written  There were no vitals filed for this visit

## 2019-08-17 NOTE — DISCHARGE INSTRUCTIONS
Colonoscopy   WHAT YOU NEED TO KNOW:   A colonoscopy is a procedure to examine the inside of your colon (intestine) with a scope  Polyps or tissue growths may have been removed during your colonoscopy  It is normal to feel bloated and to have some abdominal discomfort  You should be passing gas  If you have hemorrhoids or you had polyps removed, you may have a small amount of bleeding  DISCHARGE INSTRUCTIONS:   Seek care immediately if:   · You have a large amount of bright red blood in your bowel movements  · Your abdomen is hard and firm and you have severe pain  · You have sudden trouble breathing  Contact your healthcare provider if:   · You develop a rash or hives  · You have a fever within 24 hours of your procedure       · You have not had a bowel movement for 3 days after your procedure  · You have questions or concerns about your condition or care  Activity:   · Do not lift, strain, or run  for 3 days after your procedure  · Rest after your procedure  You have been given medicine to relax you  Do not  drive or make important decisions until the day after your procedure  Return to your normal activity as directed  · Relieve gas and discomfort from bloating  by lying on your right side with a heating pad on your abdomen  You may need to take short walks to help the gas move out  Eat small meals until bloating is relieved  If you had polyps removed: For 7 days after your procedure:  · Do not  take aspirin  · Do not  go on long car rides  Follow up with your healthcare provider as directed:  Write down your questions so you remember to ask them during your visits  © 2017 [employer] is for End User's use only and may not be sold, redistributed or otherwise used for commercial purposes  All illustrations and images included in CareNotes® are the copyrighted property of [employer]  or Carlos Maxwell    The above information is an  only  It is not intended as medical advice for individual conditions or treatments  Talk to your doctor, nurse or pharmacist before following any medical regimen to see if it is safe and effective for you  Upper Endoscopy   WHAT YOU NEED TO KNOW:   An upper endoscopy is also called an upper gastrointestinal (GI) endoscopy, or an esophagogastroduodenoscopy (EGD)  You may feel bloated, gassy, or have some abdominal discomfort after your procedure  Your throat may be sore for 24 to 36 hours  You may burp or pass gas from air that is still inside your body  DISCHARGE INSTRUCTIONS:   Call 911 for any of the following:   · You have sudden chest pain or trouble breathing  Seek care immediately if:   · You feel dizzy or faint  · You have trouble swallowing  · Your bowel movements are very dark or black  · Your abdomen is hard and firm and you have severe pain  · You vomit blood  Contact your healthcare provider if:   · You feel full or bloated and cannot burp or pass gas  · You have not had a bowel movement for 3 days after your procedure  · You have neck pain  · You have a fever or chills  · You have nausea or are vomiting  · You have a rash or hives  · You have questions or concerns about your endoscopy  Relieve a sore throat:  Suck on throat lozenges or crushed ice  Gargle with a small amount of warm salt water  Mix 1 teaspoon of salt and 1 cup of warm water to make salt water  Relieve gas and discomfort from bloating:  Lie on your right side with a heating pad on your abdomen  Take short walks to help pass gas  Eat small meals until bloating is relieved  Rest after your procedure: You have been given medicine to relax you  Do not  drive or make important decisions until the day after your procedure  Return to your normal activity as directed  You can usually return to work the day after your procedure    Follow up with your healthcare provider as directed: Write down your questions so you remember to ask them during your visits  © 2017 [employer] is for End User's use only and may not be sold, redistributed or otherwise used for commercial purposes  All illustrations and images included in CareNotes® are the copyrighted property of [employer]  or Carlos Maxwell  The above information is an  only  It is not intended as medical advice for individual conditions or treatments  Talk to your doctor, nurse or pharmacist before following any medical regimen to see if it is safe and effective for you

## 2019-08-17 NOTE — ANESTHESIA POSTPROCEDURE EVALUATION
Post-Op Assessment Note    CV Status:  Stable  Pain Score: 0    Pain management: adequate     Mental Status:  Sleepy   Hydration Status:  Stable   PONV Controlled:  None   Airway Patency:  Patent   Post Op Vitals Reviewed: Yes      Staff: AnesthesiologistSUNNY           /93 (08/17/19 1052)    Temp      Pulse 58 (08/17/19 1052)   Resp 16 (08/17/19 1052)    SpO2 100 % (08/17/19 1052)

## 2019-08-17 NOTE — ANESTHESIA PREPROCEDURE EVALUATION
Review of Systems/Medical History  Patient summary reviewed  Chart reviewed  History of anesthetic complications PONV    Cardiovascular  Exercise tolerance (METS): >4,  No pacemaker/AICD, Hypertension , No CAD , No cardiac stents     Pulmonary  Smoker cigarette smoker  , Tobacco cessation counseling given , No asthma , No recent URI (residual reactive cough after URI (2 weeks ago)) ,        GI/Hepatic       Kidney stones,        Endo/Other  History of thyroid disease , hypothyroidism,      GYN       Hematology   Musculoskeletal       Neurology    No TIA, No CVA , Headaches,    Psychology         Lab Results   Component Value Date    WBC 4 98 2019    HGB 13 4 2019     2019     Lab Results   Component Value Date    SODIUM 140 2019    K 3 2 (L) 2019    BUN 9 2019    CREATININE 0 76 2019     Lab Results   Component Value Date    PTT 28 2019      Lab Results   Component Value Date    INR 0 93 2019         Lab Results   Component Value Date    HGBA1C 5 8 2019     Transthoracic Echocardiogram  2D, M-mode, Doppler, and Color Doppler     Study date:  2019     Patient: Phill Hastings  MR number: [id number]  Account number: [de-identified]  : 1965  Age: 46 years  Gender: Female  Status: Outpatient  Location: [facility]  Height: 67 in  Weight: 130 lb  BP: 150/ 90 mmHg     Indications: Cardiomegaly      Diagnoses: I51 7 - Cardiomegaly     Sonographer:  SEKOU Culp  Interpreting Physician:  Gena Ashley MD  Primary Physician:  Frederic Flanagan MD  Referring Physician:  MARIANN Rodriguez  Group:  [facility]     SUMMARY     LEFT VENTRICLE:  Systolic function was normal  Ejection fraction was estimated in the range of 55 % to 65 %    There were no regional wall motion abnormalities      MITRAL VALVE:  There was mild regurgitation      TRICUSPID VALVE:  There was mild regurgitation  Estimated peak PA pressure was 20 mmHg      HISTORY: PRIOR HISTORY: Risk factors: hypertension  Physical Exam    Airway    Mallampati score: II  TM Distance: >3 FB       Dental   No notable dental hx     Cardiovascular      Pulmonary      Other Findings        Anesthesia Plan  ASA Score- 2     Anesthesia Type- IV sedation with anesthesia with ASA Monitors  Additional Monitors:   Airway Plan:     Comment: SUSU Hernández , have personally seen and evaluated the patient prior to anesthetic care  I have reviewed the pre-anesthetic record, and other medical records if appropriate to the anesthetic care  If a CRNA is involved in the case, I have reviewed the CRNA assessment, if present, and agree  Risks/benefits and alternatives discussed with patient including possible PONV, sore throat, and possibility of rare anesthetic and surgical emergencies        Plan Factors-    Induction-     Postoperative Plan-     Informed Consent- Anesthetic plan and risks discussed with patient  I personally reviewed this patient with the CRNA  Discussed and agreed on the Anesthesia Plan with the CRNA  Tobin Alvares

## 2019-08-17 NOTE — PROGRESS NOTES
Post op patient complaint of gas pain and migraine  Patient suffers from migraines at home and take Imitrex when needed  Per Dr Rosa Elena Cervantes ok to give Toradol here and Reglan to help patient with stomach and head pain  After giving patient medication she was able to rest comfortably for some time  After resting patient stated the pain in her stomach and head were both getting better and she requested to go home so she could sleep in her own bed  Discharge teaching done with both patient and her friend, patient discharged 
88

## 2019-08-21 LAB — G LAMBLIA AG STL QL IA: NEGATIVE

## 2019-08-22 ENCOUNTER — TELEPHONE (OUTPATIENT)
Dept: GASTROENTEROLOGY | Facility: CLINIC | Age: 54
End: 2019-08-22

## 2019-08-22 NOTE — TELEPHONE ENCOUNTER
Called and relayed to patient she stated she's still having the abdominal pain but worsens after eating or drinking  Also has a consistent dry cough      She has the dicyclomine but can't always take as it makes her drowsy she would like to know what she can do next

## 2019-08-22 NOTE — TELEPHONE ENCOUNTER
----- Message from Coleta Gaucher, MD sent at 8/22/2019  8:38 AM EDT -----  pls tell her path was normal

## 2019-08-24 LAB — O+P STL CONC: NORMAL

## 2019-09-12 DIAGNOSIS — R21 RASH: Primary | ICD-10-CM

## 2019-09-12 RX ORDER — TRIAMCINOLONE ACETONIDE 1 MG/G
CREAM TOPICAL 2 TIMES DAILY
Qty: 80 G | Refills: 0 | Status: SHIPPED | OUTPATIENT
Start: 2019-09-12 | End: 2020-07-22 | Stop reason: ALTCHOICE

## 2019-09-20 ENCOUNTER — TELEPHONE (OUTPATIENT)
Dept: GASTROENTEROLOGY | Facility: CLINIC | Age: 54
End: 2019-09-20

## 2019-09-20 NOTE — TELEPHONE ENCOUNTER
Received notification this morning pt needed a PA for Xifaxan  Contacted pt, advising that no one had notified us until this morning but that I will submit a PA and call her and the pharmacy once approved

## 2019-10-07 ENCOUNTER — OFFICE VISIT (OUTPATIENT)
Dept: OBGYN CLINIC | Age: 54
End: 2019-10-07

## 2019-10-07 ENCOUNTER — OFFICE VISIT (OUTPATIENT)
Dept: OBGYN CLINIC | Age: 54
End: 2019-10-07
Payer: COMMERCIAL

## 2019-10-07 VITALS
WEIGHT: 131 LBS | BODY MASS INDEX: 20.56 KG/M2 | HEIGHT: 67 IN | SYSTOLIC BLOOD PRESSURE: 120 MMHG | DIASTOLIC BLOOD PRESSURE: 80 MMHG

## 2019-10-07 DIAGNOSIS — Z23 NEED FOR SHINGLES VACCINE: Primary | ICD-10-CM

## 2019-10-07 PROCEDURE — 90471 IMMUNIZATION ADMIN: CPT

## 2019-10-07 PROCEDURE — 90750 HZV VACC RECOMBINANT IM: CPT

## 2019-10-07 NOTE — PROGRESS NOTES
Patient received her 1st dose of the Zoster vaccine (shingrix) on her right deltoid with no difficulties her next dose is due by Dec 07/2019

## 2019-11-26 ENCOUNTER — TELEPHONE (OUTPATIENT)
Dept: INTERNAL MEDICINE CLINIC | Facility: CLINIC | Age: 54
End: 2019-11-26

## 2019-11-26 NOTE — TELEPHONE ENCOUNTER
John Dean from [employer] called, they will be faxing over forms for short term disability for [facility] to fill out   [facility]     Phone [phone]  Ref # [id number]

## 2020-01-20 ENCOUNTER — CLINICAL SUPPORT (OUTPATIENT)
Dept: INTERNAL MEDICINE CLINIC | Facility: CLINIC | Age: 55
End: 2020-01-20
Payer: COMMERCIAL

## 2020-01-20 DIAGNOSIS — Z23 ENCOUNTER FOR IMMUNIZATION: Primary | ICD-10-CM

## 2020-01-20 PROCEDURE — 90471 IMMUNIZATION ADMIN: CPT

## 2020-01-20 PROCEDURE — 90682 RIV4 VACC RECOMBINANT DNA IM: CPT

## 2020-01-27 DIAGNOSIS — N95.0 PMB (POSTMENOPAUSAL BLEEDING): Primary | ICD-10-CM

## 2020-01-27 RX ORDER — MISOPROSTOL 200 UG/1
TABLET ORAL
Qty: 1 TABLET | Refills: 0 | Status: SHIPPED | OUTPATIENT
Start: 2020-01-27 | End: 2020-07-22 | Stop reason: ALTCHOICE

## 2020-02-03 ENCOUNTER — HOSPITAL ENCOUNTER (OUTPATIENT)
Dept: ULTRASOUND IMAGING | Facility: CLINIC | Age: 55
Discharge: HOME | End: 2020-02-03
Payer: COMMERCIAL

## 2020-02-03 DIAGNOSIS — N95.0 PMB (POSTMENOPAUSAL BLEEDING): ICD-10-CM

## 2020-02-03 PROCEDURE — 76856 US EXAM PELVIC COMPLETE: CPT

## 2020-02-03 PROCEDURE — 76830 TRANSVAGINAL US NON-OB: CPT

## 2020-02-20 DIAGNOSIS — Z20.828 EXPOSURE TO THE FLU: Primary | ICD-10-CM

## 2020-02-20 RX ORDER — OSELTAMIVIR PHOSPHATE 75 MG/1
75 CAPSULE ORAL DAILY
Qty: 7 CAPSULE | Refills: 0 | Status: SHIPPED | OUTPATIENT
Start: 2020-02-20 | End: 2020-02-27

## 2020-04-09 DIAGNOSIS — J01.90 ACUTE NON-RECURRENT SINUSITIS, UNSPECIFIED LOCATION: Primary | ICD-10-CM

## 2020-04-09 RX ORDER — AZITHROMYCIN 250 MG/1
TABLET, FILM COATED ORAL
Qty: 6 TABLET | Refills: 0 | Status: SHIPPED | OUTPATIENT
Start: 2020-04-09 | End: 2020-04-13

## 2020-06-08 DIAGNOSIS — R21 RASH: Primary | ICD-10-CM

## 2020-06-08 RX ORDER — SULFAMETHOXAZOLE AND TRIMETHOPRIM 800; 160 MG/1; MG/1
1 TABLET ORAL EVERY 12 HOURS SCHEDULED
Qty: 14 TABLET | Refills: 0 | Status: SHIPPED | OUTPATIENT
Start: 2020-06-08 | End: 2020-06-15

## 2020-06-09 ENCOUNTER — OFFICE VISIT (OUTPATIENT)
Dept: URGENT CARE | Facility: MEDICAL CENTER | Age: 55
End: 2020-06-09
Payer: COMMERCIAL

## 2020-06-09 VITALS
RESPIRATION RATE: 18 BRPM | SYSTOLIC BLOOD PRESSURE: 134 MMHG | HEIGHT: 66 IN | DIASTOLIC BLOOD PRESSURE: 78 MMHG | OXYGEN SATURATION: 99 % | HEART RATE: 66 BPM | WEIGHT: 132 LBS | BODY MASS INDEX: 21.21 KG/M2 | TEMPERATURE: 99.5 F

## 2020-06-09 DIAGNOSIS — S00.86XA INSECT BITE OF FACE, INITIAL ENCOUNTER: Primary | ICD-10-CM

## 2020-06-09 DIAGNOSIS — S20.469A INSECT BITE OF BACK, UNSPECIFIED LATERALITY, INITIAL ENCOUNTER: ICD-10-CM
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
## 2020-06-09 PROCEDURE — G0382 LEV 3 HOSP TYPE B ED VISIT: HCPCS | Performed by: PHYSICIAN ASSISTANT

## 2020-06-09 RX ORDER — PREDNISONE 20 MG/1
40 TABLET ORAL DAILY
Qty: 10 TABLET | Refills: 0 | Status: SHIPPED | OUTPATIENT
Start: 2020-06-09 | End: 2020-06-14

## 2020-06-11 DIAGNOSIS — Z12.39 BREAST CANCER SCREENING: Primary | ICD-10-CM

## 2020-06-11 DIAGNOSIS — T63.444D BEE STING-INDUCED ANAPHYLAXIS, UNDETERMINED INTENT, SUBSEQUENT ENCOUNTER: Primary | ICD-10-CM

## 2020-06-11 DIAGNOSIS — R11.0 NAUSEA: ICD-10-CM

## 2020-06-11 RX ORDER — EPINEPHRINE 0.3 MG/.3ML
0.3 INJECTION SUBCUTANEOUS ONCE
Qty: 0.6 ML | Refills: 1 | Status: SHIPPED | OUTPATIENT
Start: 2020-06-11 | End: 2022-07-20

## 2020-06-11 RX ORDER — DICYCLOMINE HCL 20 MG
20 TABLET ORAL EVERY 6 HOURS
Qty: 30 TABLET | Refills: 2 | Status: SHIPPED | OUTPATIENT
Start: 2020-06-11 | End: 2020-11-04 | Stop reason: ALTCHOICE

## 2020-06-19 ENCOUNTER — TELEPHONE (OUTPATIENT)
Dept: GASTROENTEROLOGY | Facility: CLINIC | Age: 55
End: 2020-06-19

## 2020-06-19 ENCOUNTER — OFFICE VISIT (OUTPATIENT)
Dept: GASTROENTEROLOGY | Facility: CLINIC | Age: 55
End: 2020-06-19
Payer: COMMERCIAL

## 2020-06-19 VITALS
TEMPERATURE: 99 F | HEIGHT: 67 IN | HEART RATE: 68 BPM | BODY MASS INDEX: 20.75 KG/M2 | DIASTOLIC BLOOD PRESSURE: 86 MMHG | SYSTOLIC BLOOD PRESSURE: 148 MMHG | WEIGHT: 132.2 LBS

## 2020-06-19 DIAGNOSIS — K58.0 IRRITABLE BOWEL SYNDROME WITH DIARRHEA: ICD-10-CM

## 2020-06-19 DIAGNOSIS — R11.0 NAUSEA: ICD-10-CM

## 2020-06-19 DIAGNOSIS — R10.84 GENERALIZED ABDOMINAL PAIN: ICD-10-CM

## 2020-06-19 DIAGNOSIS — K21.9 GASTROESOPHAGEAL REFLUX DISEASE, ESOPHAGITIS PRESENCE NOT SPECIFIED: ICD-10-CM

## 2020-06-19 DIAGNOSIS — K59.1 FUNCTIONAL DIARRHEA: Primary | ICD-10-CM

## 2020-06-19 PROCEDURE — 3008F BODY MASS INDEX DOCD: CPT | Performed by: PHYSICIAN ASSISTANT

## 2020-06-19 PROCEDURE — 3077F SYST BP >= 140 MM HG: CPT | Performed by: PHYSICIAN ASSISTANT

## 2020-06-19 PROCEDURE — 4004F PT TOBACCO SCREEN RCVD TLK: CPT | Performed by: PHYSICIAN ASSISTANT

## 2020-06-19 PROCEDURE — 99213 OFFICE O/P EST LOW 20 MIN: CPT | Performed by: PHYSICIAN ASSISTANT

## 2020-06-19 PROCEDURE — 3079F DIAST BP 80-89 MM HG: CPT | Performed by: PHYSICIAN ASSISTANT

## 2020-06-19 RX ORDER — ESCITALOPRAM OXALATE 20 MG/1
20 TABLET ORAL DAILY
Qty: 30 TABLET | Refills: 6 | Status: SHIPPED | OUTPATIENT
Start: 2020-06-19 | End: 2020-07-22

## 2020-06-19 RX ORDER — MONTELUKAST SODIUM 4 MG/1
1 TABLET, CHEWABLE ORAL 3 TIMES DAILY PRN
Qty: 60 TABLET | Refills: 2 | Status: SHIPPED | OUTPATIENT
Start: 2020-06-19 | End: 2020-07-22 | Stop reason: ALTCHOICE

## 2020-06-19 RX ORDER — DEXLANSOPRAZOLE 60 MG/1
60 CAPSULE, DELAYED RELEASE ORAL DAILY
Qty: 30 CAPSULE | Refills: 3 | Status: SHIPPED | OUTPATIENT
Start: 2020-06-19 | End: 2020-08-11 | Stop reason: SDUPTHER

## 2020-06-22 ENCOUNTER — TELEPHONE (OUTPATIENT)
Dept: GASTROENTEROLOGY | Facility: CLINIC | Age: 55
End: 2020-06-22

## 2020-07-14 ENCOUNTER — HOSPITAL ENCOUNTER (OUTPATIENT)
Dept: MAMMOGRAPHY | Facility: CLINIC | Age: 55
Discharge: HOME | End: 2020-07-14
Payer: COMMERCIAL

## 2020-07-14 DIAGNOSIS — Z12.39 BREAST CANCER SCREENING: ICD-10-CM

## 2020-07-14 PROCEDURE — 77067 SCR MAMMO BI INCL CAD: CPT

## 2020-07-14 PROCEDURE — 77063 BREAST TOMOSYNTHESIS BI: CPT

## 2020-07-22 ENCOUNTER — ANNUAL EXAM (OUTPATIENT)
Dept: OBGYN CLINIC | Age: 55
End: 2020-07-22
Payer: COMMERCIAL

## 2020-07-22 VITALS
WEIGHT: 133.4 LBS | SYSTOLIC BLOOD PRESSURE: 136 MMHG | BODY MASS INDEX: 21.21 KG/M2 | DIASTOLIC BLOOD PRESSURE: 86 MMHG | TEMPERATURE: 97.4 F | RESPIRATION RATE: 18 BRPM

## 2020-07-22 DIAGNOSIS — N95.2 POST-MENOPAUSAL ATROPHIC VAGINITIS: ICD-10-CM

## 2020-07-22 DIAGNOSIS — Z01.419 ENCOUNTER FOR ANNUAL ROUTINE GYNECOLOGICAL EXAMINATION: Primary | ICD-10-CM

## 2020-07-22 DIAGNOSIS — R10.2 PELVIC PAIN: ICD-10-CM

## 2020-07-22 PROCEDURE — 99396 PREV VISIT EST AGE 40-64: CPT | Performed by: NURSE PRACTITIONER

## 2020-07-22 RX ORDER — EPINEPHRINE 0.3 MG/.3ML
INJECTION SUBCUTANEOUS
COMMUNITY
Start: 2020-06-12 | End: 2020-11-04 | Stop reason: SDUPTHER

## 2020-07-22 NOTE — PROGRESS NOTES
Diagnoses and all orders for this visit:    1  Encounter for annual routine gynecological examination  -     Cancel: Liquid-based pap, screening  -     Liquid-based pap, screening  Pap collected today, discussed new guidelines  Ok to use lubrication during intercourse and/or OTC vaginal moisturizers twice a week  Healthy eating and nutrition, daily weight bearing exercise discussed and SBE reinforced  Discussed calcium and vitamin D daily intake  2  Pelvic pain  -     US pelvis complete w transvaginal; Future  Will treat further based on results  May consider EMB after reviewing US  3  Post-menopausal atrophic vaginitis    Other orders  -     EPINEPHrine (EPIPEN) 0 3 mg/0 3 mL SOAJ; INJECT 0 3 ML (0 3 MG TOTAL) INTO A MUSCLE ONCE FOR 1 DOSE            All questions and concerns answered  Call with any questions  Pleasant 53 y o  postmenopausal female here for annual exam  [id number], 1 living adult daughter lives in [place], teenage son recently passed away  She report having some discomfort during and after intercourse and small amount of tinge of bleeeing vs  A spot that occurred after intercourse  She stated that this was after having pain and difficulty with insertion and feeing "tight" and did report that it had been a while that she had intercourse  She also is currently being treated for IBS symptoms and occasional has some bloated discomfort and has a F/U appt scheduled with G [facility] in a week  Denies vaginal itching, discharge and odor  Recent mammogram showed "grouped microcalcifications" and she does feel small area of tenderness before this mammogram and still present  Denies stress urinary incontinence  She is up-to-date on mammogram, colonoscopy done in 2019 told to repeat in 10 years  Based on new pap guidelines, patient will have a pap today   Last pap in 2018 was negative       Vitals:    20 0824   BP: 136/86   BP Location: Left arm   Patient Position: Sitting   Cuff Size: Standard   Resp: 18   Temp: (!) 97 4 °F (36 3 °C)   TempSrc: Tympanic   Weight: 60 5 kg (133 lb 6 4 oz)     Body mass index is 21 21 kg/m²      Allergies   Allergen Reactions    Bee Venom      Bee sting    Contrast [Iodinated Diagnostic Agents] Anaphylaxis     Pt states her throat closes- kk rn     Pollen Extract Other (See Comments)     Throat clearing, difficulty swallowing, ears itchy    Prochlorperazine Other (See Comments)      Aka (compazine)  Delirious        Past Medical History:   Diagnosis Date    Benign positional vertigo, right     Cervicalgia     DVT (deep venous thrombosis) (HCC)     Graves disease     Headache     Thyroid disease      Past Surgical History:   Procedure Laterality Date    APPENDECTOMY      FL INJECTION LEFT HIP (NON ARTHROGRAM)  1/31/2019    LIPOMA RESECTION      NEUROMA EXCISION      STEROID INJECTION HIP Left 05/2018     Family History   Problem Relation Age of Onset    Hypertension Mother     Asthma Mother     Diabetes Mother     Hyperlipidemia Mother     Seizures Father    [place] Migraines Brother     Seizures Brother     Cancer Sister 21        brain    No Known Problems Daughter     No Known Problems Maternal Grandmother     No Known Problems Paternal Grandmother     No Known Problems Maternal Aunt     No Known Problems Maternal Aunt     No Known Problems Maternal Aunt     No Known Problems Paternal Aunt     No Known Problems Paternal Aunt     No Known Problems Paternal Aunt     Breast cancer Neg Hx     Colon cancer Neg Hx     Ovarian cancer Neg Hx     Uterine cancer Neg Hx     Cervical cancer Neg Hx      Social History     Tobacco Use    Smoking status: Light Tobacco Smoker     Types: Cigarettes    Smokeless tobacco: Never Used    Tobacco comment: last smoked 8/16/19   Substance Use Topics    Alcohol use: Not Currently     Comment: occasional    Drug use: No       Current Outpatient Medications:     aspirin-acetaminophen-caffeine (EXCEDRIN MIGRAINE) 250-250-65 MG per tablet, Take 1 tablet by mouth every 6 (six) hours as needed, Disp: , Rfl:     chlorthalidone 25 mg tablet, Take 1/2 tablet daily, Disp: 30 tablet, Rfl: 5    dexlansoprazole (Dexilant) 60 MG capsule, Take 1 capsule (60 mg total) by mouth daily, Disp: 30 capsule, Rfl: 3    dicyclomine (BENTYL) 20 mg tablet, Take 1 tablet (20 mg total) by mouth every 6 (six) hours, Disp: 30 tablet, Rfl: 2    glucosamine-chondroitin 500-400 MG tablet, Take 1 tablet by mouth 3 (three) times a day, Disp: , Rfl:     multivitamin (THERAGRAN) TABS, Take 1 tablet by mouth daily  , Disp: , Rfl:     pantoprazole (PROTONIX) 40 mg tablet, Take 1 tablet (40 mg total) by mouth daily, Disp: 30 tablet, Rfl: 2    SUMAtriptan (IMITREX) 100 mg tablet, Take by mouth once as needed  , Disp: , Rfl:     EPINEPHrine (EPIPEN) 0 3 mg/0 3 mL SOAJ, Inject 0 3 mL (0 3 mg total) into a muscle once for 1 dose, Disp: 0 6 mL, Rfl: 1    EPINEPHrine (EPIPEN) 0 3 mg/0 3 mL SOAJ, INJECT 0 3 ML (0 3 MG TOTAL) INTO A MUSCLE ONCE FOR 1 DOSE, Disp: , Rfl:     OB History    Para Term  AB Living   2 2 2     1   SAB TAB Ectopic Multiple Live Births           2      # Outcome Date GA Lbr Mirza/2nd Weight Sex Delivery Anes PTL Lv   2 Term            1 Term                      Review of Systems   Constitutional: Negative for chills, fatigue, fever and unexpected weight change  Respiratory: Negative for shortness of breath  Gastrointestinal: Negative for anal bleeding, blood in stool, constipation and diarrhea  Genitourinary: Negative for difficulty urinating, dysuria and hematuria  OBGyn Exam     Physical Exam   Constitutional: She appears well-developed and well-nourished  No distress  Head: Normocephalic  Neck: Normal range of motion  Neck supple  Pulmonary: Effort normal   Breasts: bilateral without masses, skin changes or nipple discharge  Bilaterally soft and warm to touch  Abdominal: Soft   ++tender, More on side right and left sides  Pelvic exam was performed with patient supine  No labial fusion, thinning or leukoplakia  There is no rash, tenderness, lesion or injury on the right labia  There is no rash, tenderness, lesion or injury on the left labia  Uterus is not deviated, not enlarged, not fixed and not tender  Ureathral meatus w/o pus or discharge during bladder palpation and urethral stripping  Cervix exhibits no motion tenderness, no discharge and no friability, stenotic os: YES  Right adnexum displays no mass, no tenderness and no fullness  Left adnexum displays no mass, no tenderness and no fullness  No erythema, ++ tenderness in the vagina, R/T vaginal dryness  Mild signs of atrophy, no erosion, no shortening or tightening of vaginal canal  No foreign body in the vagina  No signs of injury around the vagina  No vaginal discharge found  Prolapse: Pelvic floor weakness  Lymphadenopathy:          Right: No inguinal adenopathy present  Left: No inguinal adenopathy present  Perineum and anal area w/o lesions hemorrhoids or noticeable bleeding

## 2020-07-23 DIAGNOSIS — Z01.419 ENCOUNTER FOR ANNUAL ROUTINE GYNECOLOGICAL EXAMINATION: Primary | ICD-10-CM

## 2020-07-23 PROCEDURE — G0145 SCR C/V CYTO,THINLAYER,RESCR: HCPCS | Performed by: NURSE PRACTITIONER

## 2020-07-23 PROCEDURE — 87624 HPV HI-RISK TYP POOLED RSLT: CPT | Performed by: NURSE PRACTITIONER

## 2020-07-28 ENCOUNTER — HOSPITAL ENCOUNTER (OUTPATIENT)
Dept: ULTRASOUND IMAGING | Facility: CLINIC | Age: 55
Discharge: HOME | End: 2020-07-28
Payer: COMMERCIAL

## 2020-07-28 DIAGNOSIS — R10.2 PELVIC PAIN: ICD-10-CM

## 2020-07-28 PROCEDURE — 76830 TRANSVAGINAL US NON-OB: CPT

## 2020-07-28 PROCEDURE — 76856 US EXAM PELVIC COMPLETE: CPT

## 2020-07-30 DIAGNOSIS — Z01.419 ENCOUNTER FOR ANNUAL ROUTINE GYNECOLOGICAL EXAMINATION: Primary | ICD-10-CM

## 2020-07-30 LAB
LAB AP GYN PRIMARY INTERPRETATION: NORMAL
Lab: NORMAL

## 2020-08-01 LAB
HPV HR 12 DNA CVX QL NAA+PROBE: NEGATIVE
HPV16 DNA CVX QL NAA+PROBE: NEGATIVE
HPV18 DNA CVX QL NAA+PROBE: NEGATIVE

## 2020-08-11 ENCOUNTER — APPOINTMENT (OUTPATIENT)
Dept: LAB | Facility: HOSPITAL | Age: 55
End: 2020-08-11
Payer: COMMERCIAL

## 2020-08-11 ENCOUNTER — OFFICE VISIT (OUTPATIENT)
Dept: GASTROENTEROLOGY | Facility: CLINIC | Age: 55
End: 2020-08-11
Payer: COMMERCIAL

## 2020-08-11 ENCOUNTER — TELEPHONE (OUTPATIENT)
Dept: GASTROENTEROLOGY | Facility: CLINIC | Age: 55
End: 2020-08-11

## 2020-08-11 VITALS
WEIGHT: 128.1 LBS | TEMPERATURE: 97.6 F | BODY MASS INDEX: 20.11 KG/M2 | DIASTOLIC BLOOD PRESSURE: 98 MMHG | HEIGHT: 67 IN | SYSTOLIC BLOOD PRESSURE: 132 MMHG | HEART RATE: 72 BPM

## 2020-08-11 DIAGNOSIS — K58.0 IRRITABLE BOWEL SYNDROME WITH DIARRHEA: ICD-10-CM

## 2020-08-11 DIAGNOSIS — K59.1 FUNCTIONAL DIARRHEA: Primary | ICD-10-CM

## 2020-08-11 DIAGNOSIS — R79.89 LOW VITAMIN D LEVEL: Primary | ICD-10-CM

## 2020-08-11 DIAGNOSIS — K21.9 GASTROESOPHAGEAL REFLUX DISEASE, ESOPHAGITIS PRESENCE NOT SPECIFIED: ICD-10-CM

## 2020-08-11 LAB
25(OH)D3 SERPL-MCNC: 21 NG/ML (ref 30–100)
ALBUMIN SERPL BCP-MCNC: 4 G/DL (ref 3.5–5)
ALP SERPL-CCNC: 167 U/L (ref 46–116)
ALT SERPL W P-5'-P-CCNC: 20 U/L (ref 12–78)
ANION GAP SERPL CALCULATED.3IONS-SCNC: 9 MMOL/L (ref 4–13)
AST SERPL W P-5'-P-CCNC: 21 U/L (ref 5–45)
BASOPHILS # BLD AUTO: 0.02 THOUSANDS/ΜL (ref 0–0.1)
BASOPHILS NFR BLD AUTO: 0 % (ref 0–1)
BILIRUB SERPL-MCNC: 0.5 MG/DL (ref 0.2–1)
BUN SERPL-MCNC: 12 MG/DL (ref 5–25)
CALCIUM SERPL-MCNC: 9.4 MG/DL (ref 8.3–10.1)
CHLORIDE SERPL-SCNC: 105 MMOL/L (ref 100–108)
CHOLEST SERPL-MCNC: 201 MG/DL (ref 50–200)
CO2 SERPL-SCNC: 30 MMOL/L (ref 21–32)
CREAT SERPL-MCNC: 0.72 MG/DL (ref 0.6–1.3)
EOSINOPHIL # BLD AUTO: 0.14 THOUSAND/ΜL (ref 0–0.61)
EOSINOPHIL NFR BLD AUTO: 3 % (ref 0–6)
ERYTHROCYTE [DISTWIDTH] IN BLOOD BY AUTOMATED COUNT: 12.4 % (ref 11.6–15.1)
ERYTHROCYTE [SEDIMENTATION RATE] IN BLOOD: 25 MM/HOUR (ref 0–29)
GFR SERPL CREATININE-BSD FRML MDRD: 95 ML/MIN/1.73SQ M
GLUCOSE P FAST SERPL-MCNC: 103 MG/DL (ref 65–99)
HCT VFR BLD AUTO: 45.2 % (ref 34.8–46.1)
HDLC SERPL-MCNC: 77 MG/DL
HGB BLD-MCNC: 14.4 G/DL (ref 11.5–15.4)
IMM GRANULOCYTES # BLD AUTO: 0.01 THOUSAND/UL (ref 0–0.2)
IMM GRANULOCYTES NFR BLD AUTO: 0 % (ref 0–2)
LDLC SERPL CALC-MCNC: 105 MG/DL (ref 0–100)
LYMPHOCYTES # BLD AUTO: 1.87 THOUSANDS/ΜL (ref 0.6–4.47)
LYMPHOCYTES NFR BLD AUTO: 34 % (ref 14–44)
MCH RBC QN AUTO: 29.9 PG (ref 26.8–34.3)
MCHC RBC AUTO-ENTMCNC: 31.9 G/DL (ref 31.4–37.4)
MCV RBC AUTO: 94 FL (ref 82–98)
MONOCYTES # BLD AUTO: 0.51 THOUSAND/ΜL (ref 0.17–1.22)
MONOCYTES NFR BLD AUTO: 9 % (ref 4–12)
NEUTROPHILS # BLD AUTO: 3.04 THOUSANDS/ΜL (ref 1.85–7.62)
NEUTS SEG NFR BLD AUTO: 54 % (ref 43–75)
NONHDLC SERPL-MCNC: 124 MG/DL
NRBC BLD AUTO-RTO: 0 /100 WBCS
PLATELET # BLD AUTO: 263 THOUSANDS/UL (ref 149–390)
PMV BLD AUTO: 9.7 FL (ref 8.9–12.7)
POTASSIUM SERPL-SCNC: 3.8 MMOL/L (ref 3.5–5.3)
PROT SERPL-MCNC: 8.1 G/DL (ref 6.4–8.2)
RBC # BLD AUTO: 4.82 MILLION/UL (ref 3.81–5.12)
SODIUM SERPL-SCNC: 144 MMOL/L (ref 136–145)
T4 FREE SERPL-MCNC: 1.04 NG/DL (ref 0.76–1.46)
TRIGL SERPL-MCNC: 97 MG/DL
TSH SERPL DL<=0.05 MIU/L-ACNC: 1.93 UIU/ML (ref 0.36–3.74)
WBC # BLD AUTO: 5.59 THOUSAND/UL (ref 4.31–10.16)

## 2020-08-11 PROCEDURE — 36415 COLL VENOUS BLD VENIPUNCTURE: CPT

## 2020-08-11 PROCEDURE — 84443 ASSAY THYROID STIM HORMONE: CPT

## 2020-08-11 PROCEDURE — 82306 VITAMIN D 25 HYDROXY: CPT

## 2020-08-11 PROCEDURE — 3008F BODY MASS INDEX DOCD: CPT | Performed by: PHYSICIAN ASSISTANT

## 2020-08-11 PROCEDURE — 4004F PT TOBACCO SCREEN RCVD TLK: CPT | Performed by: PHYSICIAN ASSISTANT

## 2020-08-11 PROCEDURE — 85652 RBC SED RATE AUTOMATED: CPT

## 2020-08-11 PROCEDURE — 80061 LIPID PANEL: CPT

## 2020-08-11 PROCEDURE — 3080F DIAST BP >= 90 MM HG: CPT | Performed by: PHYSICIAN ASSISTANT

## 2020-08-11 PROCEDURE — 3075F SYST BP GE 130 - 139MM HG: CPT | Performed by: PHYSICIAN ASSISTANT

## 2020-08-11 PROCEDURE — 84439 ASSAY OF FREE THYROXINE: CPT

## 2020-08-11 PROCEDURE — 80053 COMPREHEN METABOLIC PANEL: CPT

## 2020-08-11 PROCEDURE — 99213 OFFICE O/P EST LOW 20 MIN: CPT | Performed by: PHYSICIAN ASSISTANT

## 2020-08-11 PROCEDURE — 85025 COMPLETE CBC W/AUTO DIFF WBC: CPT

## 2020-08-11 RX ORDER — ERGOCALCIFEROL 1.25 MG/1
50000 CAPSULE ORAL WEEKLY
Qty: 8 CAPSULE | Refills: 0 | Status: SHIPPED | OUTPATIENT
Start: 2020-08-11 | End: 2020-10-02

## 2020-08-11 RX ORDER — ALOSETRON HYDROCHLORIDE 0.5 MG/1
0.5 TABLET, FILM COATED ORAL DAILY
Qty: 30 TABLET | Refills: 3 | Status: SHIPPED | OUTPATIENT
Start: 2020-08-11 | End: 2021-03-17

## 2020-08-11 NOTE — PROGRESS NOTES
[facility] - Outpatient Follow-up Note  Tomy Johansen 53 y o  female MRN: [id number]  Encounter: [id number]          ASSESSMENT AND PLAN:      1  Gastroesophageal reflux disease, esophagitis presence not specified  Dexilant helped but she could not afford the copay for the refill  Copay assistance card provided    3  Irritable bowel syndrome with diarrhea  She responded very well to Xifaxan however symptoms returned almost immediately after finishing  She had a reaction to Colestid  Imodium causes drowsiness  She is on fiber and probiotic  She is using Dicyclomine prn with good relief in pain  She is following a Pescatarian diet  Will try low dose Lotronex - we discussed the risk of ischemic colitis    She suffered a reaction to Lexapro  She is seeing her therapist 2x per week and has started meditating  She wants to see a psychiatrist - I have asked her to contact her PCP for a referral    ______________________________________________________________________    SUBJECTIVE:  48-year-old female with diarrhea predominant irritable bowel syndrome and GERD presents for follow-up  After her last visit she started a 2 week Xifaxan course  She reports that she felt great Will taking the medication however immediately after finishing her symptoms returned  She is back to having abdominal pain, bloating and diarrhea  She tried Colestid but reports she had a reaction to it although she cannot remember exactly what happened  She has been taking Imodium as needed but reports that this seems to cause drowsiness and constipation  She is maintaining on a fiber supplement and probiotic  She has taken dicyclomine as needed with good relief in her pain  She started following a Pescatarian diet  Kendra Aguilar has improved her heartburn symptoms  Unfortunately, her copay is high and she could not get her refill  She continues to suffer with depression and anxiety    After taking 1 Lexapro pill she developed worsening depression  She stopped immediately and contacted her therapist who is now seeing her two times a week  Her therapist has requested that she see a psychiatrist however she has been unable to find one locally who is in network  Tello Mohr believes she needs a medication to help control these symptoms  She has started meditating and practicing yoga which helps  REVIEW OF SYSTEMS IS OTHERWISE NEGATIVE        Historical Information   Past Medical History:   Diagnosis Date    Benign positional vertigo, right     Cervicalgia     DVT (deep venous thrombosis) (HCC)     Graves disease     Headache     Thyroid disease      Past Surgical History:   Procedure Laterality Date    APPENDECTOMY      FL INJECTION LEFT HIP (NON ARTHROGRAM)  1/31/2019    LIPOMA RESECTION      NEUROMA EXCISION      STEROID INJECTION HIP Left 05/2018     Social History   Social History     Substance and Sexual Activity   Alcohol Use Not Currently    Comment: occasional     Social History     Substance and Sexual Activity   Drug Use No     Social History     Tobacco Use   Smoking Status Light Tobacco Smoker    Types: Cigarettes   Smokeless Tobacco Never Used   Tobacco Comment    last smoked 8/16/19     Family History   Problem Relation Age of Onset    Hypertension Mother     Asthma Mother     Diabetes Mother     Hyperlipidemia Mother     Seizures Father    [facility] Migraines Brother     Seizures Brother     Cancer Sister 21        brain    No Known Problems Daughter     No Known Problems Maternal Grandmother     No Known Problems Paternal Grandmother     No Known Problems Maternal Aunt     No Known Problems Maternal Aunt     No Known Problems Maternal Aunt     No Known Problems Paternal Aunt     No Known Problems Paternal Aunt     No Known Problems Paternal Aunt     Breast cancer Neg Hx     Colon cancer Neg Hx     Ovarian cancer Neg Hx     Uterine cancer Neg Hx     Cervical cancer Neg Hx        Meds/Allergies       Current Outpatient Medications:     chlorthalidone 25 mg tablet    dicyclomine (BENTYL) 20 mg tablet    glucosamine-chondroitin 500-400 MG tablet    multivitamin (THERAGRAN) TABS    SUMAtriptan (IMITREX) 100 mg tablet    alosetron (LOTRONEX) 0 5 MG tablet    aspirin-acetaminophen-caffeine (EXCEDRIN MIGRAINE) 250-250-65 MG per tablet    dexlansoprazole (Dexilant) 60 MG capsule    EPINEPHrine (EPIPEN) 0 3 mg/0 3 mL SOAJ    EPINEPHrine (EPIPEN) 0 3 mg/0 3 mL SOAJ    Allergies   Allergen Reactions    Bee Venom      Bee sting    Contrast [Iodinated Diagnostic Agents] Anaphylaxis     Pt states her throat closes- kk rn     Pollen Extract Other (See Comments)     Throat clearing, difficulty swallowing, ears itchy    Prochlorperazine Other (See Comments)      Aka (compazine)  Delirious            Objective     Blood pressure 132/98, pulse 72, temperature 97 6 °F (36 4 °C), temperature source Temporal, height 5' 6 5" (1 689 m), weight 58 1 kg (128 lb 1 6 oz), not currently breastfeeding  Body mass index is 20 37 kg/m²  PHYSICAL EXAM:      General Appearance:   Alert, cooperative, no distress   HEENT:   Normocephalic, atraumatic, anicteric      Neck:  Supple, symmetrical, trachea midline   Lungs:   Clear to auscultation bilaterally; no rales, rhonchi or wheezing; respirations unlabored    Heart[de-identified]   Regular rate and rhythm; no murmur, rub, or gallop  Abdomen:   Soft, non-tender, non-distended; normal bowel sounds; no masses, no organomegaly    Genitalia:   Deferred    Rectal:   Deferred    Extremities:  No cyanosis, clubbing or edema    Pulses:  2+ and symmetric    Skin:  No jaundice, rashes, or lesions    Lymph nodes:  No palpable cervical lymphadenopathy        Lab Results:   No visits with results within 1 Day(s) from this visit     Latest known visit with results is:   Orders Only on 07/23/2020   Component Date Value    Case Report 07/23/2020 Value:Gynecologic Cytology Report                       Case: [id number]                                  Authorizing Provider:  MARIANN Dudley       Collected:           07/23/2020 1611              Ordering Location:     [facility]      Received:            07/23/2020 1611                                     [facility]                                                                  First Screen:          Luis Cardona, CT                                                         Specimen:    LIQUID-BASED PAP, SCREENING, Cervix                                                        Primary Interpretation 07/23/2020 Negative for intraepithelial lesion or malignancy     Specimen Adequacy 07/23/2020 Satisfactory for evaluation  Endocervical/transformation zone component present   Additional Information 07/23/2020                      Value: This result contains rich text formatting which cannot be displayed here  Radiology Results:   Us Pelvis Complete W Transvaginal    Result Date: 7/30/2020  Narrative: PELVIC ULTRASOUND, COMPLETE INDICATION:  53years old  R10 2: Pelvic and perineal pain  COMPARISON: Pelvic ultrasound 2/3/2020 TECHNIQUE:   Transabdominal pelvic ultrasound was performed in sagittal and transverse planes with a curvilinear transducer  Additional transvaginal imaging was performed to better evaluate the endometrium and ovaries  Imaging included volumetric sweeps as well as traditional still imaging technique  FINDINGS: UTERUS: The uterus is anteverted in position, measuring 5 2 x 2 8 x 4 6 cm  Contour and echotexture appear normal  The cervix shows no suspicious abnormality  ENDOMETRIUM:  Normal caliber of 3 mm  Homogeneous and normal in appearance  OVARIES/ADNEXA: Right ovary:  1 5 x 0 8 x 1 4 cm  No suspicious right ovarian abnormality   Doppler flow within normal limits  Left ovary:  1 7 x 1 x 1 3 cm  No suspicious left ovarian abnormality  Doppler flow within normal limits  No suspicious adnexal mass or loculated collections  There is no free fluid  Impression:  Normal   Workstation performed: [id number]     Mammo Screening Bilateral W 3d & Cad    Result Date: 7/16/2020  Narrative: DIAGNOSIS: Breast cancer screening TECHNIQUE: Digital screening mammography was performed  Computer Aided Detection (CAD) analyzed all applicable images  COMPARISONS: Prior breast imaging dated: 04/16/2018, 03/22/2017, and 03/06/2017 and 11/18/2011 and 11/11/2010  RELEVANT HISTORY: Family Breast Cancer History: History of breast cancer in Neg Hx  Family Medical History: No known relevant family medical history  Personal History: Hormone history includes birth control  No known relevant surgical history  No known relevant medical history  The patient is scheduled in a reminder system for screening mammography  8-10% of cancers will be missed on mammography  Management of a palpable abnormality must be based on clinical grounds  Patients will be notified of their results via letter from our facility  Accredited by [employer] of [employer] and FDA  RISK ASSESSMENT: 5 Year Tyrer-Cuzick: 0 69 % 10 Year Tyrer-Cuzick: 1 53 % Lifetime Tyrer-Cuzick: 5 16 % TISSUE DENSITY: The breasts are heterogeneously dense, which may obscure small masses  INDICATION: Tomy Simpson is a 53 y o  female presenting for screening mammography  FINDINGS: There are no suspicious masses, grouped microcalcifications or areas of architectural distortion  The skin and nipple areolar complex are unremarkable  Impression: No mammographic evidence of malignancy  ASSESSMENT/BI-RADS CATEGORY: Left: 1 - Negative Right: 1 - Negative Overall: 1 - Negative RECOMMENDATION:      - Routine screening mammogram in 1 year for both breasts   Workstation ID: [id number]

## 2020-08-11 NOTE — TELEPHONE ENCOUNTER
----- Message from Olga Go PA-C sent at 8/11/2020 12:56 PM EDT -----  Pls let patient know her labs were all ok except for a low vitamin D - freeman din a supplement for her to take once weekly for 8 weeks    Her cholesterol and bs were slightly elevated as I do not believe she was fasting

## 2020-08-11 NOTE — TELEPHONE ENCOUNTER
Called gave pt lab results  Pt wanted to know when to repeat Labs  Spoke to  Adalberto Brady she advised to repeat labs in 6 months but if pt wants to get them done sooner, it is ok  Make sure pt knows to fasts prior to lab test  She should maintain a high protein and low carb diet  Ordered repeat labs: CMP, Lipid panel, hemogloblin A1C  Called pt and advised all the above

## 2020-08-19 ENCOUNTER — HOSPITAL ENCOUNTER (OUTPATIENT)
Dept: ULTRASOUND IMAGING | Facility: CLINIC | Age: 55
Discharge: HOME | End: 2020-08-19
Payer: COMMERCIAL

## 2020-08-19 DIAGNOSIS — N64.4 MASTALGIA: ICD-10-CM

## 2020-08-19 DIAGNOSIS — N64.4 MASTALGIA: Primary | ICD-10-CM

## 2020-08-19 PROCEDURE — 76642 ULTRASOUND BREAST LIMITED: CPT

## 2020-09-10 ENCOUNTER — OFFICE VISIT (OUTPATIENT)
Dept: OBGYN CLINIC | Facility: CLINIC | Age: 55
End: 2020-09-10
Payer: COMMERCIAL

## 2020-09-10 VITALS
BODY MASS INDEX: 20.88 KG/M2 | TEMPERATURE: 97.3 F | SYSTOLIC BLOOD PRESSURE: 142 MMHG | WEIGHT: 133 LBS | HEIGHT: 67 IN | DIASTOLIC BLOOD PRESSURE: 80 MMHG

## 2020-09-10 DIAGNOSIS — N30.01 ACUTE CYSTITIS WITH HEMATURIA: Primary | ICD-10-CM

## 2020-09-10 DIAGNOSIS — R39.9 UTI SYMPTOMS: Primary | ICD-10-CM

## 2020-09-10 PROCEDURE — 87077 CULTURE AEROBIC IDENTIFY: CPT | Performed by: NURSE PRACTITIONER

## 2020-09-10 PROCEDURE — 87086 URINE CULTURE/COLONY COUNT: CPT | Performed by: NURSE PRACTITIONER

## 2020-09-10 PROCEDURE — 99213 OFFICE O/P EST LOW 20 MIN: CPT | Performed by: NURSE PRACTITIONER

## 2020-09-10 PROCEDURE — 87186 SC STD MICRODIL/AGAR DIL: CPT | Performed by: NURSE PRACTITIONER

## 2020-09-10 NOTE — PROGRESS NOTES
Diagnoses and all orders for this visit:    1  Acute cystitis with hematuria  -     Urine culture  Will treat further based on results  Hydrate and flush in case kidney stone  All questions and concerns answered  Call with any questions  Pleasant 53 y o  female presents today with c/o dysuria in urethra which started today  Increasing pelvic pressure past few days mostly felt after urination  Denies flank pain, hematuria, F/C  She rates her pain a 4/10  Did not take anything for pain  Vitals:    09/10/20 1500   BP: 142/80   BP Location: Right arm   Patient Position: Sitting   Cuff Size: Standard   Temp: (!) 97 3 °F (36 3 °C)   Weight: 60 3 kg (133 lb)   Height: 5' 7" (1 702 m)     Body mass index is 20 83 kg/m²      Allergies   Allergen Reactions    Bee Venom      Bee sting    Contrast [Iodinated Diagnostic Agents] Anaphylaxis     Pt states her throat closes- kk rn     Pollen Extract Other (See Comments)     Throat clearing, difficulty swallowing, ears itchy    Prochlorperazine Other (See Comments)      Aka (compazine)  Delirious        Past Medical History:   Diagnosis Date    Benign positional vertigo, right     Cervicalgia     DVT (deep venous thrombosis) (HCC)     Graves disease     Headache     Thyroid disease      Past Surgical History:   Procedure Laterality Date    APPENDECTOMY      FL INJECTION LEFT HIP (NON ARTHROGRAM)  1/31/2019    LIPOMA RESECTION      NEUROMA EXCISION      STEROID INJECTION HIP Left 05/2018     Family History   Problem Relation Age of Onset    Hypertension Mother     Asthma Mother     Diabetes Mother     Hyperlipidemia Mother     Seizures Father     Migraines Brother     Seizures Brother     Cancer Sister 21        brain    No Known Problems Daughter     No Known Problems Maternal Grandmother     No Known Problems Paternal Grandmother     No Known Problems Maternal Aunt     No Known Problems Maternal Aunt     No Known Problems Maternal Aunt     No Known Problems Paternal Aunt     No Known Problems Paternal Aunt     No Known Problems Paternal Aunt     Breast cancer Neg Hx     Colon cancer Neg Hx     Ovarian cancer Neg Hx     Uterine cancer Neg Hx     Cervical cancer Neg Hx      Social History     Tobacco Use    Smoking status: Light Tobacco Smoker     Types: Cigarettes    Smokeless tobacco: Never Used    Tobacco comment: last smoked 19   Substance Use Topics    Alcohol use: Not Currently     Comment: occasional    Drug use: No       Current Outpatient Medications:     dexlansoprazole (Dexilant) 60 MG capsule, Take 1 capsule (60 mg total) by mouth daily, Disp: 30 capsule, Rfl: 3    dicyclomine (BENTYL) 20 mg tablet, Take 1 tablet (20 mg total) by mouth every 6 (six) hours, Disp: 30 tablet, Rfl: 2    ergocalciferol (VITAMIN D2) 50,000 units, Take 1 capsule (50,000 Units total) by mouth once a week, Disp: 8 capsule, Rfl: 0    glucosamine-chondroitin 500-400 MG tablet, Take 1 tablet by mouth daily , Disp: , Rfl:     multivitamin (THERAGRAN) TABS, Take 1 tablet by mouth daily  , Disp: , Rfl:     alosetron (LOTRONEX) 0 5 MG tablet, Take 1 tablet (0 5 mg total) by mouth daily (Patient not taking: Reported on 9/10/2020), Disp: 30 tablet, Rfl: 3    aspirin-acetaminophen-caffeine (EXCEDRIN MIGRAINE) 250-250-65 MG per tablet, Take 1 tablet by mouth every 6 (six) hours as needed, Disp: , Rfl:     chlorthalidone 25 mg tablet, Take 1/2 tablet daily (Patient not taking: Reported on 9/10/2020), Disp: 30 tablet, Rfl: 5    EPINEPHrine (EPIPEN) 0 3 mg/0 3 mL SOAJ, Inject 0 3 mL (0 3 mg total) into a muscle once for 1 dose, Disp: 0 6 mL, Rfl: 1    EPINEPHrine (EPIPEN) 0 3 mg/0 3 mL SOAJ, INJECT 0 3 ML (0 3 MG TOTAL) INTO A MUSCLE ONCE FOR 1 DOSE, Disp: , Rfl:     SUMAtriptan (IMITREX) 100 mg tablet, Take by mouth once as needed  , Disp: , Rfl:     OB History    Para Term  AB Living   2 2 2     1   SAB TAB Ectopic Multiple Live Births           2      # Outcome Date GA Lbr Mirza/2nd Weight Sex Delivery Anes PTL Lv   2 Term            1 Term                Review of Systems     Review of Systems   Constitutional: Negative for chills, fatigue, fever and unexpected weight change  Respiratory: Negative for shortness of breath  Gastrointestinal: Negative for anal bleeding, blood in stool, constipation and diarrhea  Genitourinary: Negative for difficulty urinating, dysuria and hematuria  OBGyn Exam     Physical Exam   Constitutional: She appears well-developed and well-nourished  No distress  Head: Normocephalic  Neck: Normal range of motion  Neck supple  Pulmonary: Effort normal  Lung sounds clear  Cardiac: S1 & S2, Regular rate & Rhythm  Abdominal: Soft   Non-tender  Pelvic exam was deferred

## 2020-09-11 DIAGNOSIS — R10.2 PELVIC PAIN IN FEMALE: Primary | ICD-10-CM

## 2020-09-11 RX ORDER — TAMSULOSIN HYDROCHLORIDE 0.4 MG/1
0.4 CAPSULE ORAL
Qty: 30 CAPSULE | Refills: 0 | Status: SHIPPED | OUTPATIENT
Start: 2020-09-11 | End: 2020-11-04 | Stop reason: ALTCHOICE

## 2020-09-12 LAB — BACTERIA UR CULT: ABNORMAL

## 2020-09-14 DIAGNOSIS — R39.9 UTI SYMPTOMS: Primary | ICD-10-CM

## 2020-09-14 RX ORDER — NITROFURANTOIN 25; 75 MG/1; MG/1
100 CAPSULE ORAL 2 TIMES DAILY
Qty: 14 CAPSULE | Refills: 0 | Status: SHIPPED | OUTPATIENT
Start: 2020-09-14 | End: 2020-09-21

## 2020-09-22 ENCOUNTER — OFFICE VISIT (OUTPATIENT)
Dept: GASTROENTEROLOGY | Facility: CLINIC | Age: 55
End: 2020-09-22
Payer: COMMERCIAL

## 2020-09-22 VITALS
BODY MASS INDEX: 20.97 KG/M2 | SYSTOLIC BLOOD PRESSURE: 124 MMHG | DIASTOLIC BLOOD PRESSURE: 100 MMHG | HEART RATE: 66 BPM | TEMPERATURE: 97.6 F | HEIGHT: 67 IN | WEIGHT: 133.6 LBS

## 2020-09-22 DIAGNOSIS — K58.0 IRRITABLE BOWEL SYNDROME WITH DIARRHEA: Primary | ICD-10-CM

## 2020-09-22 DIAGNOSIS — K21.9 GASTROESOPHAGEAL REFLUX DISEASE, ESOPHAGITIS PRESENCE NOT SPECIFIED: ICD-10-CM

## 2020-09-22 PROCEDURE — 99213 OFFICE O/P EST LOW 20 MIN: CPT | Performed by: PHYSICIAN ASSISTANT

## 2020-09-22 NOTE — PROGRESS NOTES
[facility] - Outpatient Follow-up Note  Tomy Frost 53 y o  female MRN: [id number]  Encounter: [id number]          ASSESSMENT AND PLAN:      1  Irritable bowel syndrome with diarrhea  She is following a Pescatarian diet and notes significant improvement in symptoms  She responded well to Xifaxan but symptoms returned immediately after course finished  She had reactions to Imodium, Colestid and Lotronex  Will continue fiber and probiotic  Use dicyclomine prn  Continue to work to get anxiety controlled    2  Gastroesophageal reflux disease, esophagitis presence not specified  Dian White resolves this symptom  Will continue for now    ______________________________________________________________________    SUBJECTIVE:  54-year-old female with diarrhea predominant irritable bowel syndrome and GERD presents for routine follow-up  She continues to follow a Pescatarian diet and notes significant improvement in her diarrhea symptoms  She previously was treated with Xifaxan and noted significant improvement while taking the medication but symptoms return immediately after the medicine was stopped  She reported reactions to Imodium, Colestid on Lotronex  She continues on fiber and probiotic  She uses dicyclomine as needed  She continues to work to get her anxiety control  She is not taking any medications as she tends not to tolerate them but is seeing her therapist more regularly  Her acid reflux and upper abdominal pain and nausea remain well controlled on Dexilant 60 mg daily  She notes that she does not have this medication her symptoms return immediately  She previously failed treatment with Prilosec, Protonix and Nexium  REVIEW OF SYSTEMS IS OTHERWISE NEGATIVE        Historical Information   Past Medical History:   Diagnosis Date    Benign positional vertigo, right     Cervicalgia     DVT (deep venous thrombosis) (HCC)     Graves disease     Headache     Thyroid disease      Past Surgical History:   Procedure Laterality Date    APPENDECTOMY      FL INJECTION LEFT HIP (NON ARTHROGRAM)  1/31/2019    LIPOMA RESECTION      NEUROMA EXCISION      STEROID INJECTION HIP Left 05/2018     Social History   Social History     Substance and Sexual Activity   Alcohol Use Not Currently    Comment: occasional     Social History     Substance and Sexual Activity   Drug Use No     Social History     Tobacco Use   Smoking Status Light Tobacco Smoker    Types: Cigarettes   Smokeless Tobacco Never Used   Tobacco Comment    last smoked 8/16/19     Family History   Problem Relation Age of Onset    Hypertension Mother     Asthma Mother     Diabetes Mother     Hyperlipidemia Mother     Seizures Father     Migraines Brother     Seizures Brother     Cancer Sister 21        brain    No Known Problems Daughter     No Known Problems Maternal Grandmother     No Known Problems Paternal Grandmother     No Known Problems Maternal Aunt     No Known Problems Maternal Aunt     No Known Problems Maternal Aunt     No Known Problems Paternal Aunt     No Known Problems Paternal Aunt     No Known Problems Paternal Aunt     Breast cancer Neg Hx     Colon cancer Neg Hx     Ovarian cancer Neg Hx     Uterine cancer Neg Hx     Cervical cancer Neg Hx        Meds/Allergies       Current Outpatient Medications:     alosetron (LOTRONEX) 0 5 MG tablet    chlorthalidone 25 mg tablet    dexlansoprazole (Dexilant) 60 MG capsule    dicyclomine (BENTYL) 20 mg tablet    EPINEPHrine (EPIPEN) 0 3 mg/0 3 mL SOAJ    ergocalciferol (VITAMIN D2) 50,000 units    glucosamine-chondroitin 500-400 MG tablet    multivitamin (THERAGRAN) TABS    SUMAtriptan (IMITREX) 100 mg tablet    aspirin-acetaminophen-caffeine (EXCEDRIN MIGRAINE) 250-250-65 MG per tablet    EPINEPHrine (EPIPEN) 0 3 mg/0 3 mL SOAJ    tamsulosin (FLOMAX) 0 4 mg    Allergies   Allergen Reactions    Bee Venom      Bee sting    Contrast [Iodinated Diagnostic Agents] Anaphylaxis     Pt states her throat closes- kk rn     Pollen Extract Other (See Comments)     Throat clearing, difficulty swallowing, ears itchy    Prochlorperazine Other (See Comments)      Aka (compazine)  Delirious            Objective     Blood pressure 124/100, pulse 66, temperature 97 6 °F (36 4 °C), temperature source Temporal, height 5' 7" (1 702 m), weight 60 6 kg (133 lb 9 6 oz), not currently breastfeeding  Body mass index is 20 92 kg/m²  PHYSICAL EXAM:      General Appearance:   Alert, cooperative, no distress   HEENT:   Normocephalic, atraumatic, anicteric      Neck:  Supple, symmetrical, trachea midline   Lungs:   Clear to auscultation bilaterally; no rales, rhonchi or wheezing; respirations unlabored    Heart[de-identified]   Regular rate and rhythm; no murmur, rub, or gallop  Abdomen:   Soft, non-tender, non-distended; normal bowel sounds; no masses, no organomegaly    Genitalia:   Deferred    Rectal:   Deferred    Extremities:  No cyanosis, clubbing or edema    Pulses:  2+ and symmetric    Skin:  No jaundice, rashes, or lesions    Lymph nodes:  No palpable cervical lymphadenopathy        Lab Results:   No visits with results within 1 Day(s) from this visit  Latest known visit with results is:   Office Visit on 09/10/2020   Component Date Value    Urine Culture 09/10/2020 60,000-69,000 cfu/ml Escherichia coli*         Radiology Results:   No results found

## 2020-10-02 DIAGNOSIS — K58.0 IRRITABLE BOWEL SYNDROME WITH DIARRHEA: ICD-10-CM

## 2020-10-02 DIAGNOSIS — R10.2 PELVIC PAIN IN FEMALE: ICD-10-CM

## 2020-10-02 DIAGNOSIS — K21.9 GASTROESOPHAGEAL REFLUX DISEASE: ICD-10-CM

## 2020-10-02 DIAGNOSIS — R79.89 LOW VITAMIN D LEVEL: ICD-10-CM

## 2020-10-02 RX ORDER — ERGOCALCIFEROL 1.25 MG/1
CAPSULE ORAL
Qty: 4 CAPSULE | Refills: 1 | Status: SHIPPED | OUTPATIENT
Start: 2020-10-02 | End: 2020-11-04 | Stop reason: ALTCHOICE

## 2020-10-02 RX ORDER — TAMSULOSIN HYDROCHLORIDE 0.4 MG/1
CAPSULE ORAL
Qty: 30 CAPSULE | Refills: 0 | OUTPATIENT
Start: 2020-10-02

## 2020-11-04 ENCOUNTER — HOSPITAL ENCOUNTER (OUTPATIENT)
Dept: NON INVASIVE DIAGNOSTICS | Facility: CLINIC | Age: 55
Discharge: HOME | End: 2020-11-04
Payer: COMMERCIAL

## 2020-11-04 ENCOUNTER — OFFICE VISIT (OUTPATIENT)
Dept: CARDIOLOGY CLINIC | Facility: CLINIC | Age: 55
End: 2020-11-04
Payer: COMMERCIAL

## 2020-11-04 VITALS
DIASTOLIC BLOOD PRESSURE: 80 MMHG | BODY MASS INDEX: 21.05 KG/M2 | RESPIRATION RATE: 18 BRPM | HEIGHT: 67 IN | SYSTOLIC BLOOD PRESSURE: 124 MMHG | HEART RATE: 64 BPM | WEIGHT: 134.1 LBS | OXYGEN SATURATION: 93 %

## 2020-11-04 DIAGNOSIS — I10 ESSENTIAL HYPERTENSION: Primary | ICD-10-CM

## 2020-11-04 DIAGNOSIS — K58.0 IRRITABLE BOWEL SYNDROME WITH DIARRHEA: ICD-10-CM

## 2020-11-04 DIAGNOSIS — R07.9 CHEST PAIN, UNSPECIFIED TYPE: ICD-10-CM

## 2020-11-04 DIAGNOSIS — I10 ESSENTIAL HYPERTENSION: ICD-10-CM

## 2020-11-04 DIAGNOSIS — K21.9 GASTROESOPHAGEAL REFLUX DISEASE: ICD-10-CM

## 2020-11-04 DIAGNOSIS — Z76.89 ENCOUNTER TO ESTABLISH CARE: ICD-10-CM

## 2020-11-04 DIAGNOSIS — R07.9 CHEST PAIN, UNSPECIFIED TYPE: Primary | ICD-10-CM

## 2020-11-04 PROCEDURE — 93350 STRESS TTE ONLY: CPT

## 2020-11-04 PROCEDURE — 93351 STRESS TTE COMPLETE: CPT | Performed by: INTERNAL MEDICINE

## 2020-11-04 PROCEDURE — 99204 OFFICE O/P NEW MOD 45 MIN: CPT | Performed by: INTERNAL MEDICINE

## 2020-11-04 PROCEDURE — 93000 ELECTROCARDIOGRAM COMPLETE: CPT | Performed by: INTERNAL MEDICINE

## 2020-11-04 RX ORDER — LISINOPRIL 5 MG/1
5 TABLET ORAL DAILY
Qty: 30 TABLET | Refills: 3 | Status: SHIPPED | OUTPATIENT
Start: 2020-11-04 | End: 2020-11-04 | Stop reason: ALTCHOICE

## 2020-11-04 RX ORDER — CHLORTHALIDONE 25 MG/1
TABLET ORAL
Qty: 30 TABLET | Refills: 5 | Status: SHIPPED | OUTPATIENT
Start: 2020-11-04 | End: 2020-11-04 | Stop reason: ALTCHOICE

## 2020-11-04 RX ORDER — VERAPAMIL HYDROCHLORIDE 120 MG/1
240 CAPSULE, EXTENDED RELEASE ORAL
Qty: 30 CAPSULE | Refills: 3 | Status: SHIPPED | OUTPATIENT
Start: 2020-11-04 | End: 2020-11-04 | Stop reason: SDUPTHER

## 2020-11-04 RX ORDER — VERAPAMIL HYDROCHLORIDE 120 MG/1
120 CAPSULE, EXTENDED RELEASE ORAL
Qty: 30 CAPSULE | Refills: 3 | Status: SHIPPED | OUTPATIENT
Start: 2020-11-04 | End: 2020-12-15 | Stop reason: CLARIF

## 2020-11-05 LAB
ARRHY DURING EX: NORMAL
CHEST PAIN STATEMENT: NORMAL
MAX DIASTOLIC BP: 80 MMHG
MAX HEART RATE: 144 BPM
MAX PREDICTED HEART RATE: 165 BPM
MAX. SYSTOLIC BP: 162 MMHG
PROTOCOL NAME: NORMAL
REASON FOR TERMINATION: NORMAL
TARGET HR FORMULA: NORMAL
TEST INDICATION: NORMAL
TIME IN EXERCISE PHASE: NORMAL

## 2020-11-15 ENCOUNTER — APPOINTMENT (EMERGENCY)
Dept: RADIOLOGY | Facility: HOSPITAL | Age: 55
End: 2020-11-15
Payer: COMMERCIAL

## 2020-11-15 ENCOUNTER — HOSPITAL ENCOUNTER (EMERGENCY)
Facility: HOSPITAL | Age: 55
Discharge: HOME | End: 2020-11-15
Attending: EMERGENCY MEDICINE | Admitting: EMERGENCY MEDICINE
Payer: COMMERCIAL

## 2020-11-15 VITALS
HEART RATE: 54 BPM | RESPIRATION RATE: 21 BRPM | SYSTOLIC BLOOD PRESSURE: 131 MMHG | TEMPERATURE: 98.1 F | DIASTOLIC BLOOD PRESSURE: 67 MMHG | OXYGEN SATURATION: 99 %

## 2020-11-15 DIAGNOSIS — R07.89 ATYPICAL CHEST PAIN: Primary | ICD-10-CM

## 2020-11-15 LAB
ALBUMIN SERPL BCP-MCNC: 4.6 G/DL (ref 3.5–5)
ALP SERPL-CCNC: 172 U/L (ref 46–116)
ALT SERPL W P-5'-P-CCNC: 23 U/L (ref 12–78)
ANION GAP SERPL CALCULATED.3IONS-SCNC: 11 MMOL/L (ref 4–13)
APTT PPP: 28 SECONDS (ref 23–37)
AST SERPL W P-5'-P-CCNC: 24 U/L (ref 5–45)
ATRIAL RATE: 53 BPM
ATRIAL RATE: 64 BPM
ATRIAL RATE: 68 BPM
BASOPHILS # BLD AUTO: 0.02 THOUSANDS/ΜL (ref 0–0.1)
BASOPHILS NFR BLD AUTO: 0 % (ref 0–1)
BILIRUB SERPL-MCNC: 0.6 MG/DL (ref 0.2–1)
BUN SERPL-MCNC: 14 MG/DL (ref 5–25)
CALCIUM SERPL-MCNC: 9.5 MG/DL (ref 8.3–10.1)
CHLORIDE SERPL-SCNC: 104 MMOL/L (ref 100–108)
CO2 SERPL-SCNC: 27 MMOL/L (ref 21–32)
CREAT SERPL-MCNC: 0.72 MG/DL (ref 0.6–1.3)
EOSINOPHIL # BLD AUTO: 0.15 THOUSAND/ΜL (ref 0–0.61)
EOSINOPHIL NFR BLD AUTO: 2 % (ref 0–6)
ERYTHROCYTE [DISTWIDTH] IN BLOOD BY AUTOMATED COUNT: 13.1 % (ref 11.6–15.1)
GFR SERPL CREATININE-BSD FRML MDRD: 95 ML/MIN/1.73SQ M
GLUCOSE SERPL-MCNC: 94 MG/DL (ref 65–140)
HCT VFR BLD AUTO: 42.4 % (ref 34.8–46.1)
HGB BLD-MCNC: 13.4 G/DL (ref 11.5–15.4)
IMM GRANULOCYTES # BLD AUTO: 0.01 THOUSAND/UL (ref 0–0.2)
IMM GRANULOCYTES NFR BLD AUTO: 0 % (ref 0–2)
INR PPP: 0.95 (ref 0.84–1.19)
LIPASE SERPL-CCNC: 122 U/L (ref 73–393)
LYMPHOCYTES # BLD AUTO: 2.77 THOUSANDS/ΜL (ref 0.6–4.47)
LYMPHOCYTES NFR BLD AUTO: 44 % (ref 14–44)
MCH RBC QN AUTO: 29.6 PG (ref 26.8–34.3)
MCHC RBC AUTO-ENTMCNC: 31.6 G/DL (ref 31.4–37.4)
MCV RBC AUTO: 94 FL (ref 82–98)
MONOCYTES # BLD AUTO: 0.56 THOUSAND/ΜL (ref 0.17–1.22)
MONOCYTES NFR BLD AUTO: 9 % (ref 4–12)
NEUTROPHILS # BLD AUTO: 2.83 THOUSANDS/ΜL (ref 1.85–7.62)
NEUTS SEG NFR BLD AUTO: 45 % (ref 43–75)
NRBC BLD AUTO-RTO: 0 /100 WBCS
P AXIS: 69 DEGREES
P AXIS: 69 DEGREES
P AXIS: 70 DEGREES
PLATELET # BLD AUTO: 274 THOUSANDS/UL (ref 149–390)
PMV BLD AUTO: 9.8 FL (ref 8.9–12.7)
POTASSIUM SERPL-SCNC: 3.8 MMOL/L (ref 3.5–5.3)
PR INTERVAL: 128 MS
PR INTERVAL: 130 MS
PR INTERVAL: 132 MS
PROT SERPL-MCNC: 8.4 G/DL (ref 6.4–8.2)
PROTHROMBIN TIME: 12.8 SECONDS (ref 11.6–14.5)
QRS AXIS: 89 DEGREES
QRSD INTERVAL: 90 MS
QRSD INTERVAL: 92 MS
QRSD INTERVAL: 94 MS
QT INTERVAL: 444 MS
QT INTERVAL: 450 MS
QT INTERVAL: 464 MS
QTC INTERVAL: 435 MS
QTC INTERVAL: 464 MS
QTC INTERVAL: 472 MS
RBC # BLD AUTO: 4.52 MILLION/UL (ref 3.81–5.12)
SODIUM SERPL-SCNC: 142 MMOL/L (ref 136–145)
T WAVE AXIS: 54 DEGREES
T WAVE AXIS: 82 DEGREES
T WAVE AXIS: 91 DEGREES
TROPONIN I SERPL-MCNC: 0.02 NG/ML
TROPONIN I SERPL-MCNC: <0.02 NG/ML
VENTRICULAR RATE: 53 BPM
VENTRICULAR RATE: 64 BPM
VENTRICULAR RATE: 68 BPM
WBC # BLD AUTO: 6.34 THOUSAND/UL (ref 4.31–10.16)

## 2020-11-15 PROCEDURE — 93005 ELECTROCARDIOGRAM TRACING: CPT

## 2020-11-15 PROCEDURE — 99285 EMERGENCY DEPT VISIT HI MDM: CPT

## 2020-11-15 PROCEDURE — 83690 ASSAY OF LIPASE: CPT | Performed by: PHYSICIAN ASSISTANT

## 2020-11-15 PROCEDURE — 36415 COLL VENOUS BLD VENIPUNCTURE: CPT | Performed by: PHYSICIAN ASSISTANT

## 2020-11-15 PROCEDURE — 85025 COMPLETE CBC W/AUTO DIFF WBC: CPT | Performed by: PHYSICIAN ASSISTANT

## 2020-11-15 PROCEDURE — 85610 PROTHROMBIN TIME: CPT | Performed by: PHYSICIAN ASSISTANT

## 2020-11-15 PROCEDURE — 80053 COMPREHEN METABOLIC PANEL: CPT | Performed by: PHYSICIAN ASSISTANT

## 2020-11-15 PROCEDURE — 85730 THROMBOPLASTIN TIME PARTIAL: CPT | Performed by: PHYSICIAN ASSISTANT

## 2020-11-15 PROCEDURE — 93010 ELECTROCARDIOGRAM REPORT: CPT | Performed by: INTERNAL MEDICINE

## 2020-11-15 PROCEDURE — 87637 SARSCOV2&INF A&B&RSV AMP PRB: CPT | Performed by: PHYSICIAN ASSISTANT

## 2020-11-15 PROCEDURE — 99285 EMERGENCY DEPT VISIT HI MDM: CPT | Performed by: PHYSICIAN ASSISTANT

## 2020-11-15 PROCEDURE — 84484 ASSAY OF TROPONIN QUANT: CPT | Performed by: PHYSICIAN ASSISTANT

## 2020-11-15 PROCEDURE — 96360 HYDRATION IV INFUSION INIT: CPT

## 2020-11-15 PROCEDURE — 71045 X-RAY EXAM CHEST 1 VIEW: CPT

## 2020-11-15 PROCEDURE — 96361 HYDRATE IV INFUSION ADD-ON: CPT

## 2020-11-15 RX ORDER — ACETAMINOPHEN 325 MG/1
975 TABLET ORAL ONCE
Status: COMPLETED | OUTPATIENT
Start: 2020-11-15 | End: 2020-11-15

## 2020-11-15 RX ADMIN — SODIUM CHLORIDE 1000 ML: 0.9 INJECTION, SOLUTION INTRAVENOUS at 09:02

## 2020-11-15 RX ADMIN — ACETAMINOPHEN 975 MG: 325 TABLET, FILM COATED ORAL at 09:01

## 2020-11-16 ENCOUNTER — TELEPHONE (OUTPATIENT)
Dept: CARDIOLOGY CLINIC | Facility: CLINIC | Age: 55
End: 2020-11-16

## 2020-11-20 LAB
FLUAV RNA NPH QL NAA+PROBE: NOT DETECTED
FLUBV RNA NPH QL NAA+PROBE: NOT DETECTED
RSV RNA NPH QL NAA+PROBE: NOT DETECTED
SARS-COV-2 RNA NPH QL NAA+PROBE: NOT DETECTED

## 2020-12-15 ENCOUNTER — TELEMEDICINE (OUTPATIENT)
Dept: INTERNAL MEDICINE CLINIC | Facility: CLINIC | Age: 55
End: 2020-12-15
Payer: COMMERCIAL

## 2020-12-15 DIAGNOSIS — Z20.822 EXPOSURE TO COVID-19 VIRUS: ICD-10-CM

## 2020-12-15 DIAGNOSIS — Z20.822 EXPOSURE TO COVID-19 VIRUS: Primary | ICD-10-CM

## 2020-12-15 PROCEDURE — U0003 INFECTIOUS AGENT DETECTION BY NUCLEIC ACID (DNA OR RNA); SEVERE ACUTE RESPIRATORY SYNDROME CORONAVIRUS 2 (SARS-COV-2) (CORONAVIRUS DISEASE [COVID-19]), AMPLIFIED PROBE TECHNIQUE, MAKING USE OF HIGH THROUGHPUT TECHNOLOGIES AS DESCRIBED BY CMS-2020-01-R: HCPCS | Performed by: NURSE PRACTITIONER

## 2020-12-15 PROCEDURE — 99214 OFFICE O/P EST MOD 30 MIN: CPT | Performed by: NURSE PRACTITIONER

## 2020-12-16 LAB — SARS-COV-2 RNA SPEC QL NAA+PROBE: NOT DETECTED

## 2020-12-22 ENCOUNTER — IMMUNIZATIONS (OUTPATIENT)
Dept: FAMILY MEDICINE CLINIC | Facility: HOSPITAL | Age: 55
End: 2020-12-22
Payer: COMMERCIAL

## 2020-12-22 DIAGNOSIS — Z23 ENCOUNTER FOR IMMUNIZATION: ICD-10-CM

## 2020-12-22 PROCEDURE — 91300 SARS-COV-2 / COVID-19 MRNA VACCINE (PFIZER-BIONTECH) 30 MCG: CPT

## 2020-12-22 PROCEDURE — 0001A SARS-COV-2 / COVID-19 MRNA VACCINE (PFIZER-BIONTECH) 30 MCG: CPT

## 2021-01-10 ENCOUNTER — IMMUNIZATIONS (OUTPATIENT)
Dept: FAMILY MEDICINE CLINIC | Facility: HOSPITAL | Age: 56
End: 2021-01-10

## 2021-01-10 DIAGNOSIS — Z23 ENCOUNTER FOR IMMUNIZATION: ICD-10-CM

## 2021-01-10 PROCEDURE — 0002A SARS-COV-2 / COVID-19 MRNA VACCINE (PFIZER-BIONTECH) 30 MCG: CPT

## 2021-01-10 PROCEDURE — 91300 SARS-COV-2 / COVID-19 MRNA VACCINE (PFIZER-BIONTECH) 30 MCG: CPT

## 2021-02-24 ENCOUNTER — APPOINTMENT (OUTPATIENT)
Dept: RADIOLOGY | Facility: MEDICAL CENTER | Age: 56
End: 2021-02-24
Payer: COMMERCIAL

## 2021-02-24 ENCOUNTER — OFFICE VISIT (OUTPATIENT)
Dept: OBGYN CLINIC | Facility: MEDICAL CENTER | Age: 56
End: 2021-02-24
Payer: COMMERCIAL

## 2021-02-24 VITALS
HEART RATE: 62 BPM | SYSTOLIC BLOOD PRESSURE: 159 MMHG | BODY MASS INDEX: 21.03 KG/M2 | HEIGHT: 67 IN | WEIGHT: 134 LBS | DIASTOLIC BLOOD PRESSURE: 85 MMHG

## 2021-02-24 DIAGNOSIS — M25.571 PAIN, JOINT, ANKLE AND FOOT, RIGHT: ICD-10-CM

## 2021-02-24 DIAGNOSIS — S92.424A NONDISPLACED FRACTURE OF DISTAL PHALANX OF RIGHT GREAT TOE, INITIAL ENCOUNTER FOR CLOSED FRACTURE: Primary | ICD-10-CM

## 2021-02-24 PROCEDURE — 99214 OFFICE O/P EST MOD 30 MIN: CPT | Performed by: PHYSICAL MEDICINE & REHABILITATION

## 2021-02-24 PROCEDURE — 73630 X-RAY EXAM OF FOOT: CPT

## 2021-02-24 NOTE — PATIENT INSTRUCTIONS
We recommend taking the following supplements to help with bone healing: Vitamin D 5,000U (units) daily and Calcium 1,200 mg daily  You should take these supplements for the next 6 weeks  You should avoid NSAID's like ibuprofen and naproxen (brand names are Motrin, Advil and Aleve) unless absolutely needed  You should avoid nicotine products  These things can slow and impair healing

## 2021-02-24 NOTE — PROGRESS NOTES
1  Nondisplaced fracture of distal phalanx of right great toe, initial encounter for closed fracture     2  Pain, joint, ankle and foot, right  XR foot 3+ vw right     Orders Placed This Encounter   Procedures    XR foot 3+ vw right        Impression:  Right toe pain secondary to nondisplaced fracture of distal phalanx of great toe with extension into joint space  Date of injury as 2/22/2021  We placed the patient into a stiff soled shoe that she will have on for all weight bearing  She will start RICE protocol  Can use Tylenol  Avoid vaping/nicotine  RTC in 3 weeks for repeat x-rays  Return in about 3 weeks (around 3/17/2021)  HPI:  Jet Garcia is a 63 y o  female  who presents for evaluation of   Chief Complaint   Patient presents with    Right Foot - Pain       Onset/Mechanism: 2/23/2021- she tripped on the last two steps of her house  Location: Big toe  Radiation: Denies  Provocative: Pushing off  Severity: Severe  Associated Symptoms: Bruising and swelling  Treatment so far: No treatment so far  Review of Systems   Constitutional: Positive for activity change  Negative for fever  HENT: Negative for sore throat  Eyes: Negative for visual disturbance  Respiratory: Negative for shortness of breath  Cardiovascular: Negative for chest pain  Gastrointestinal: Negative for abdominal pain  Endocrine: Negative for polydipsia  Genitourinary: Negative for difficulty urinating  Musculoskeletal: Positive for arthralgias, gait problem and joint swelling  Skin: Negative for rash  Allergic/Immunologic: Negative for immunocompromised state  Neurological: Negative for numbness  Hematological: Does not bruise/bleed easily  Psychiatric/Behavioral: Negative for confusion         Following history reviewed and updated:  Past Medical History:   Diagnosis Date    Benign positional vertigo, right     Cervicalgia     DVT (deep venous thrombosis) (HCC)     Graves disease     Headache     Thyroid disease      Past Surgical History:   Procedure Laterality Date    APPENDECTOMY      FL INJECTION LEFT HIP (NON ARTHROGRAM)  1/31/2019    LIPOMA RESECTION      NEUROMA EXCISION      STEROID INJECTION HIP Left 05/2018     Social History   Social History     Substance and Sexual Activity   Alcohol Use Not Currently     Social History     Substance and Sexual Activity   Drug Use No     Social History     Tobacco Use   Smoking Status Light Tobacco Smoker    Packs/day: 0 01    Years: 2 00    Pack years: 0 02    Types: Cigarettes   Smokeless Tobacco Never Used   Tobacco Comment    last smoked 8/16/19     Family History   Problem Relation Age of Onset    Hypertension Mother     Asthma Mother     Diabetes Mother     Hyperlipidemia Mother     Seizures Father    Valaria Reil Migraines Brother     Seizures Brother     Cancer Sister 21        brain    No Known Problems Daughter     No Known Problems Maternal Grandmother     No Known Problems Paternal Grandmother     No Known Problems Maternal Aunt     No Known Problems Maternal Aunt     No Known Problems Maternal Aunt     No Known Problems Paternal Aunt     No Known Problems Paternal Aunt     No Known Problems Paternal Aunt     Breast cancer Neg Hx     Colon cancer Neg Hx     Ovarian cancer Neg Hx     Uterine cancer Neg Hx     Cervical cancer Neg Hx      Allergies   Allergen Reactions    Bee Venom      Bee sting    Contrast [Iodinated Diagnostic Agents] Anaphylaxis     Pt states her throat closes- kk rn     Pollen Extract Other (See Comments)     Throat clearing, difficulty swallowing, ears itchy    Prochlorperazine Other (See Comments)      Aka (compazine)  Delirious         Constitutional:  /85 (BP Location: Right arm, Patient Position: Sitting, Cuff Size: Standard)   Pulse 62   Ht 5' 7" (1 702 m)   Wt 60 8 kg (134 lb)   BMI 20 99 kg/m²    General: NAD  Eyes: Anicteric sclerae  Neck: Supple    Lungs: Unlabored breathing  Cardiovascular: No lower extremity edema  Skin: Intact without erythema  Neurologic: Sensation intact to light touch  Psychiatric: Mood and affect are appropriate  Right Ankle Exam     Other   Scars: absent  Sensation: normal  Pulse: present     Comments:  Ecchymosis and swollen great toe with TTP throughout the great toe  Warm and well perfused with normal sensory-motor testing  Procedures    Imaging Studies (I personally reviewed images in PACS and report):  Right foot x-rays most recent to this encounter reviewed  These images show a nondisplaced fracture of the medial base of the first distal phalanx with extension into joint space

## 2021-03-17 ENCOUNTER — APPOINTMENT (OUTPATIENT)
Dept: RADIOLOGY | Facility: MEDICAL CENTER | Age: 56
End: 2021-03-17
Payer: COMMERCIAL

## 2021-03-17 ENCOUNTER — OFFICE VISIT (OUTPATIENT)
Dept: OBGYN CLINIC | Facility: MEDICAL CENTER | Age: 56
End: 2021-03-17
Payer: COMMERCIAL

## 2021-03-17 VITALS
WEIGHT: 134 LBS | HEIGHT: 67 IN | BODY MASS INDEX: 21.03 KG/M2 | DIASTOLIC BLOOD PRESSURE: 84 MMHG | SYSTOLIC BLOOD PRESSURE: 136 MMHG | HEART RATE: 69 BPM

## 2021-03-17 DIAGNOSIS — S92.424A NONDISPLACED FRACTURE OF DISTAL PHALANX OF RIGHT GREAT TOE, INITIAL ENCOUNTER FOR CLOSED FRACTURE: Primary | ICD-10-CM

## 2021-03-17 DIAGNOSIS — S92.424A NONDISPLACED FRACTURE OF DISTAL PHALANX OF RIGHT GREAT TOE, INITIAL ENCOUNTER FOR CLOSED FRACTURE: ICD-10-CM

## 2021-03-17 PROCEDURE — 73630 X-RAY EXAM OF FOOT: CPT

## 2021-03-17 PROCEDURE — 99213 OFFICE O/P EST LOW 20 MIN: CPT | Performed by: PHYSICAL MEDICINE & REHABILITATION

## 2021-03-17 NOTE — PROGRESS NOTES
1  Nondisplaced fracture of distal phalanx of right great toe, initial encounter for closed fracture  XR foot 3+ vw right     Orders Placed This Encounter   Procedures    XR foot 3+ vw right        Impression:  Patient is here in follow up of right toe pain secondary to nondisplaced fracture of distal phalanx of great toe with extension into joint space  Date of injury as 2/22/2021  Continue with stiff soled shoe that she will have on for all weight bearing  Patient is a nicotine user and we discussed the potential for delayed healing  RTC in 3 weeks and repeat x-rays  Return in about 3 weeks (around 4/7/2021)  HPI:  Terri Iverson is a 63 y o  female  who presents in follow up  Here for   Chief Complaint   Patient presents with    Right Foot - Follow-up       Date of injury: 2/23/2021- she tripped on the last two steps of her house  Trajectory of symptoms: Improved but has discomfort with flexion when driving  She understands she should not be doing this  Review of Systems   Constitutional: Positive for activity change  Negative for fever  HENT: Negative for sore throat  Eyes: Negative for visual disturbance  Respiratory: Negative for shortness of breath  Cardiovascular: Negative for chest pain  Gastrointestinal: Negative for abdominal pain  Endocrine: Negative for polydipsia  Genitourinary: Negative for difficulty urinating  Musculoskeletal: Positive for arthralgias  Skin: Negative for rash  Allergic/Immunologic: Negative for immunocompromised state  Neurological: Negative for numbness  Hematological: Does not bruise/bleed easily  Psychiatric/Behavioral: Negative for confusion         Following history reviewed and updated:  Past Medical History:   Diagnosis Date    Benign positional vertigo, right     Cervicalgia     DVT (deep venous thrombosis) (HCC)     Graves disease     Headache     Thyroid disease      Past Surgical History:   Procedure Laterality Date  APPENDECTOMY      FL INJECTION LEFT HIP (NON ARTHROGRAM)  1/31/2019    LIPOMA RESECTION      NEUROMA EXCISION      STEROID INJECTION HIP Left 05/2018     Social History   Social History     Substance and Sexual Activity   Alcohol Use Not Currently     Social History     Substance and Sexual Activity   Drug Use No     Social History     Tobacco Use   Smoking Status Light Tobacco Smoker    Packs/day: 0 01    Years: 2 00    Pack years: 0 02    Types: Cigarettes   Smokeless Tobacco Never Used   Tobacco Comment    last smoked 8/16/19     Family History   Problem Relation Age of Onset    Hypertension Mother     Asthma Mother     Diabetes Mother     Hyperlipidemia Mother     Seizures Father    Osker Ok Migraines Brother     Seizures Brother     Cancer Sister 21        brain    No Known Problems Daughter     No Known Problems Maternal Grandmother     No Known Problems Paternal Grandmother     No Known Problems Maternal Aunt     No Known Problems Maternal Aunt     No Known Problems Maternal Aunt     No Known Problems Paternal Aunt     No Known Problems Paternal Aunt     No Known Problems Paternal Aunt     Breast cancer Neg Hx     Colon cancer Neg Hx     Ovarian cancer Neg Hx     Uterine cancer Neg Hx     Cervical cancer Neg Hx      Allergies   Allergen Reactions    Bee Venom      Bee sting    Contrast [Iodinated Diagnostic Agents] Anaphylaxis     Pt states her throat closes- kk rn     Pollen Extract Other (See Comments)     Throat clearing, difficulty swallowing, ears itchy    Prochlorperazine Other (See Comments)      Aka (compazine)  Delirious         Constitutional:  /84 (BP Location: Right arm, Patient Position: Sitting, Cuff Size: Standard)   Pulse 69   Ht 5' 7" (1 702 m)   Wt 60 8 kg (134 lb)   BMI 20 99 kg/m²    General: NAD  Eyes: Clear sclerae  ENT: No inflammation, lesion, or mass of lips  No tracheal deviation  Musculoskeletal: As mentioned below    Integumentary: No visible rashes or skin lesions  Pulmonary/Chest: Effort normal  No respiratory distress  Neuro: CN's grossly intact, RICHARDS  Psych: Normal affect and judgement  Vascular: WWP  Right Ankle Exam     Tenderness   Right ankle tenderness location: Tender along the distal phalanx, especially medially  Swelling: none    Range of Motion   The patient has normal right ankle ROM  Other   Erythema: absent  Scars: absent  Sensation: normal  Pulse: present              Procedures    Imaging Studies (I personally reviewed images in PACS and report):  Right foot x-rays most recent to this encounter reviewed  These images show a nondisplaced fracture of the medial base of the first distal phalanx with extension into joint space  Repeat x-rays on 3/17/2021 show slight interval healing along the base  Not fully healed  Stable

## 2021-03-17 NOTE — LETTER
To Whom It May Concern,    Tomy Cook is under my professional care  She was seen in my office on March 17, 2021  She is cleared to return to work with the following restrictions:     Must wear stiff soled shoe at all times   She will be reassessed in about 3 weeks from this appointment date  Please excuse Tomy Cook from any work missed on this appointment date  If you have any questions or concerns, please do not hesitate to call          Sincerely,          Cristine Patricio, DO

## 2021-03-18 DIAGNOSIS — R10.2 PELVIC PAIN: Primary | ICD-10-CM

## 2021-03-25 DIAGNOSIS — Z78.0 POSTMENOPAUSAL: Primary | ICD-10-CM

## 2021-04-07 ENCOUNTER — OFFICE VISIT (OUTPATIENT)
Dept: OBGYN CLINIC | Facility: MEDICAL CENTER | Age: 56
End: 2021-04-07
Payer: COMMERCIAL

## 2021-04-07 ENCOUNTER — APPOINTMENT (OUTPATIENT)
Dept: RADIOLOGY | Facility: MEDICAL CENTER | Age: 56
End: 2021-04-07
Payer: COMMERCIAL

## 2021-04-07 VITALS
WEIGHT: 134 LBS | DIASTOLIC BLOOD PRESSURE: 87 MMHG | HEIGHT: 67 IN | BODY MASS INDEX: 21.03 KG/M2 | SYSTOLIC BLOOD PRESSURE: 138 MMHG | HEART RATE: 80 BPM

## 2021-04-07 DIAGNOSIS — G89.29 CHRONIC LOW BACK PAIN WITHOUT SCIATICA, UNSPECIFIED BACK PAIN LATERALITY: ICD-10-CM

## 2021-04-07 DIAGNOSIS — M54.42 ACUTE LEFT-SIDED LOW BACK PAIN WITH LEFT-SIDED SCIATICA: Primary | ICD-10-CM

## 2021-04-07 DIAGNOSIS — S92.424A NONDISPLACED FRACTURE OF DISTAL PHALANX OF RIGHT GREAT TOE, INITIAL ENCOUNTER FOR CLOSED FRACTURE: ICD-10-CM

## 2021-04-07 DIAGNOSIS — M54.50 CHRONIC LOW BACK PAIN WITHOUT SCIATICA, UNSPECIFIED BACK PAIN LATERALITY: ICD-10-CM

## 2021-04-07 PROCEDURE — 73630 X-RAY EXAM OF FOOT: CPT

## 2021-04-07 PROCEDURE — 72100 X-RAY EXAM L-S SPINE 2/3 VWS: CPT

## 2021-04-07 PROCEDURE — 99214 OFFICE O/P EST MOD 30 MIN: CPT | Performed by: PHYSICAL MEDICINE & REHABILITATION

## 2021-04-07 RX ORDER — LIDOCAINE 50 MG/G
1 PATCH TOPICAL DAILY
Qty: 30 PATCH | Refills: 0 | Status: SHIPPED | OUTPATIENT
Start: 2021-04-07 | End: 2022-06-29 | Stop reason: ALTCHOICE

## 2021-04-07 RX ORDER — METHYLPREDNISOLONE 4 MG/1
TABLET ORAL
Qty: 1 EACH | Refills: 0 | Status: SHIPPED | OUTPATIENT
Start: 2021-04-07 | End: 2021-08-03 | Stop reason: HOSPADM

## 2021-04-07 NOTE — PROGRESS NOTES
1  Acute left-sided low back pain with left-sided sciatica  XR spine lumbar 2 or 3 views injury   2  Nondisplaced fracture of distal phalanx of right great toe, initial encounter for closed fracture  XR foot 3+ vw right     Orders Placed This Encounter   Procedures    XR foot 3+ vw right    XR spine lumbar 2 or 3 views injury        Impression:  Patient is here in follow up of right toe pain secondary to nondisplaced fracture of distal phalanx of great toe with extension into joint space   Date of injury as 2/22/2021   We reviewed her x-rays as below  Patient presents today in normal footwear   Patient is a nicotine user and we discussed the potential for delayed healing  Patient should continue to wear stiff soled shoes  RTC in 4 weeks  No repeat x-rays unless pain worsens  Patient also presents with a new complaint, left sided low back pain  She has a history of neuroma or lipoma which they tried to "suck out "  Pain started about 5 days ago when she bent over  Pain is severe enough that she has to crawl  The pain is constant since she pulled her back this past Saturday  Pain is worse when she goes from sit to stand  She has some weakness in the left leg  She points to a spot in her back where she feels a bump  No red flag symptoms  Patient's left sided low back pain is likely secondary to episacral lipoma/iliac crest syndrome and possibly piriformis syndrome/SI joint dysfunction  She is quite flared up in the left low back and hip region today  We obtained x-rays as below  We will prescribe her a medrol dose pack along with Voltaren and lidoderm  She will start physical therapy  I will see her back in 4 weeks to reassess  We discussed that lipomas are often not painful but can get inflamed  The above should help with her symptoms,  If not, can consider having these removed      Imaging Studies (I personally reviewed images in PACS and report):  Right foot x-rays most recent to this encounter reviewed  These images show  interval healing of the 1st distal phalanx fracture that extends into the joint space  Lumbar spine x-rays obtained on 4/7/2021:  These images show a soft tissue mass best seen on lateral view just posterior to the upper coccygeal region which likely represents a soft tissue mass like a lipoma  There are no malignant characteristics to it  No follow-ups on file  HPI:  Joey Shearer is a 63 y o  female  who presents for evaluation of   Chief Complaint   Patient presents with    Right Foot - Follow-up       Trajectory of symptoms: The toe is feeling a lot better  It is still slightly swollen  She is wearing normal footwear  Review of Systems   Constitutional: Positive for activity change  Negative for fever  HENT: Negative for sore throat and trouble swallowing  Eyes: Negative for visual disturbance  Respiratory: Negative for shortness of breath  Cardiovascular: Negative for chest pain  Gastrointestinal: Negative for abdominal pain  Denies bowel incontinence  Endocrine: Negative for polydipsia  Genitourinary: Negative for difficulty urinating  Denies urinary incontinence  Musculoskeletal: Positive for arthralgias and back pain  Negative for gait problem  Skin: Negative for rash  Allergic/Immunologic: Negative for immunocompromised state  Neurological: Negative for weakness and numbness  Denies saddle anesthesia  Hematological: Does not bruise/bleed easily  Psychiatric/Behavioral: Negative for confusion         Following history reviewed and updated:  Past Medical History:   Diagnosis Date    Benign positional vertigo, right     Cervicalgia     DVT (deep venous thrombosis) (HCC)     Graves disease     Headache     Thyroid disease      Past Surgical History:   Procedure Laterality Date    APPENDECTOMY      FL INJECTION LEFT HIP (NON ARTHROGRAM)  1/31/2019    LIPOMA RESECTION      NEUROMA EXCISION  STEROID INJECTION HIP Left 05/2018     Social History   Social History     Substance and Sexual Activity   Alcohol Use Not Currently     Social History     Substance and Sexual Activity   Drug Use No     Social History     Tobacco Use   Smoking Status Light Tobacco Smoker    Packs/day: 0 01    Years: 2 00    Pack years: 0 02    Types: Cigarettes   Smokeless Tobacco Never Used   Tobacco Comment    last smoked 8/16/19     Family History   Problem Relation Age of Onset    Hypertension Mother     Asthma Mother     Diabetes Mother     Hyperlipidemia Mother     Seizures Father    Vincenzo Polio Migraines Brother     Seizures Brother     Cancer Sister 21        brain    No Known Problems Daughter     No Known Problems Maternal Grandmother     No Known Problems Paternal Grandmother     No Known Problems Maternal Aunt     No Known Problems Maternal Aunt     No Known Problems Maternal Aunt     No Known Problems Paternal Aunt     No Known Problems Paternal Aunt     No Known Problems Paternal Aunt     Breast cancer Neg Hx     Colon cancer Neg Hx     Ovarian cancer Neg Hx     Uterine cancer Neg Hx     Cervical cancer Neg Hx      Allergies   Allergen Reactions    Bee Venom      Bee sting    Contrast [Iodinated Diagnostic Agents] Anaphylaxis     Pt states her throat closes- kk rn     Pollen Extract Other (See Comments)     Throat clearing, difficulty swallowing, ears itchy    Prochlorperazine Other (See Comments)      Aka (compazine)  Delirious         Constitutional:  /87 (BP Location: Right arm, Patient Position: Sitting, Cuff Size: Standard)   Pulse 80   Ht 5' 7" (1 702 m)   Wt 60 8 kg (134 lb)   BMI 20 99 kg/m²    General: NAD  Eyes: Anicteric sclerae  Neck: Supple  Lungs: Unlabored breathing  Cardiovascular: No lower extremity edema  Skin: Intact without erythema  Neurologic: Sensation intact to light touch  Psychiatric: Mood and affect are appropriate      Right Ankle Exam     Tenderness Right ankle tenderness location: Tender along the 1st distal phalanx but less than before  Swelling: mild    Range of Motion   The patient has normal right ankle ROM  Other   Erythema: absent  Scars: absent  Sensation: normal  Pulse: present       Left Hip Exam     Tenderness   The patient is experiencing tenderness in the greater trochanter  Tests   GALO: positive    Other   Erythema: absent  Scars: absent  Sensation: normal  Pulse: present    Comments:  Lateral hip pain with hip internal rotation  Back Exam     Tenderness   The patient is experiencing tenderness in the lumbar and sacroiliac  Range of Motion   Extension: normal   Flexion: abnormal     Muscle Strength   The patient has normal back strength      Tests   Straight leg raise right: negative  Straight leg raise left: positive    Reflexes   Patellar: normal  Achilles: normal    Other   Gait: normal   Erythema: no back redness  Scars: absent             Procedures

## 2021-04-13 ENCOUNTER — EVALUATION (OUTPATIENT)
Dept: PHYSICAL THERAPY | Facility: MEDICAL CENTER | Age: 56
End: 2021-04-13
Payer: COMMERCIAL

## 2021-04-13 DIAGNOSIS — M54.42 ACUTE LEFT-SIDED LOW BACK PAIN WITH LEFT-SIDED SCIATICA: ICD-10-CM

## 2021-04-13 PROCEDURE — 97140 MANUAL THERAPY 1/> REGIONS: CPT | Performed by: PHYSICAL THERAPIST

## 2021-04-13 PROCEDURE — 97112 NEUROMUSCULAR REEDUCATION: CPT | Performed by: PHYSICAL THERAPIST

## 2021-04-13 PROCEDURE — 97162 PT EVAL MOD COMPLEX 30 MIN: CPT | Performed by: PHYSICAL THERAPIST

## 2021-04-13 NOTE — PROGRESS NOTES
PT Evaluation     Today's date: 2021  Patient name: Anel Hill  : [id number]  MRN: [id number]  Referring provider: Sue Jaeger DO  Dx:   Encounter Diagnosis     ICD-10-CM    1  Acute left-sided low back pain with left-sided sciatica  M54 42 Ambulatory referral to Physical Therapy                  Assessment  Assessment details: Anel Hill is a 63 y o  female who presents with Acute left-sided low back pain with left-sided sciatica  Patient presents alert and oriented with the above impairments  Exam findings are consistent w/ SIJ dysfunction, compensatory muscle tightness, and significant core weakness  Ifra will benefit from PT to addres deficits in order to maximize and return to prior level of function  No further referral appears necessary at this time based upon examination results  Prognosis is good given HEP compliance  Please contact me if you have any questions or recommendations  Impairments: abnormal or restricted ROM, abnormal movement, impaired physical strength, lacks appropriate home exercise program, pain with function and weight-bearing intolerance  Understanding of Dx/Px/POC: good   Prognosis: good    Goals  Short Term Goals:   1  Pain decreased 2 ratings in 4 weeks  2  ROM increased 10* in 4 weeks  3  Strength increased 1/2 grade in 4 weeks    Long Term Goals:   1  ADLS/IADLS in related activities improved to maximal level in 8 weeks  2  Work performance improved to maximal level in 8 weeks  3  Recreational activities are improved to maximal level in 8 weeks  4   [place] with HEP in 8 weeks      Plan  Patient would benefit from: skilled physical therapy  Planned modality interventions: cryotherapy  Planned therapy interventions: abdominal trunk stabilization, stretching, strengthening, patient education, neuromuscular re-education, manual therapy, flexibility, functional ROM exercises, home exercise program and therapeutic exercise  Frequency: 1x week  Duration in weeks: 8  Treatment plan discussed with: patient        Subjective Evaluation    History of Present Illness  Mechanism of injury: Pt reports onset of L sided LBP on  after coughing  She was using ice and heat; however, pain persisted  She scheduled w/ ortho on   She underwent xrays and was prescribed steroid dose pack as well as Voltaren gel  She was also advised to begin PT  She presents today w/ reports of decreased pain since taking steroids  She now describes pain as discomfort and is located on L side of low back and does radiate into L buttock  Prior to taking steroids, pain was radiating into her L knee  Pain was most severe w/ sit to stand transfers, bending, driving, prolonged sitting  She now feels most discomfort w/ standing after prolonged sitting or lying  She still does have discomfort w/ any prolonged sitting or driving  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  She inially was having sleep disturbance, which has now improved  She 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 that she has had surgeries for about 30 years ago  Pain  At best pain ratin  At worst pain ratin    Patient Goals  Patient goals for therapy: decreased pain, increased motion, increased strength, independence with ADLs/IADLs and return to work          Objective     Palpation     Additional Palpation Details  Tightness and tenderness over L piriformis, glute med, QL, lower thoracic/upper lumbar L paraspinals    Active Range of Motion     Lumbar   Flexion: 35 degrees   Extension: 10 degrees     Strength/Myotome Testing     Left Hip   Planes of Motion   Flexion: 3+  Extension: 3  Abduction: 3+    Right Hip   Planes of Motion   Flexion: 4+  Abduction: 4    Tests     Left Hip   Positive long sit       Additional Tests Details  L anterior torsion       Flowsheet Rows      Most Recent Value   PT/OT G-Codes   Current Score  31   Projected Score  59 FOTO information reviewed  Yes   Assessment Type  Evaluation   G code set  Other PT/OT Primary   Other PT Primary Current Status ()  CL   Other PT Primary Goal Status ()  CK             Precautions: Graves, hypertension, L hip DJD      Manuals 4/13            Scissor MET to correct L ant torsion 5 min            TPR L upper lumbar/lower thoracic, piriformis, glute med, QL 10 min                                      Neuro Re-Ed 4/13            ppt 5"x10            bridges 5"x10            clamshells 5"x10            Ab brace w/ marching nv            Ab brace w/ SLR nv            Prone hip extensoin nv                                                   TherEx 4/13            Hamstring stretch nv            Piriformis stretch nv            Quad stretch nv                                                   Ther Activity                                       Gait Training                                       Modalities

## 2021-04-19 NOTE — PATIENT INSTRUCTIONS
Vaginal Atrophy   WHAT YOU NEED TO KNOW:   What is vaginal atrophy? Vaginal atrophy is a condition that causes thinning, drying, and inflammation of vaginal tissue  This condition is caused by decreased levels of estrogen (a female sex hormone)  Vaginal atrophy can increase your risk for vaginal and urinary tract infections  Vaginal atrophy can worsen over time if not treated  What causes or increases your risk of vaginal atrophy? · Menopause     · Medicines that lower your estrogen levels, such as those used to treat breast cancer, endometriosis, or fibroids    · Radiation to your pelvic area     · Surgery to remove the ovaries    · Breastfeeding  What are the signs and symptoms of vaginal atrophy? · Vaginal dryness, itching, and burning    · Vaginal discharge    · Pain or discomfort during sex    · Light bleeding after sex    · Burning during urination    · Frequent, sudden, strong urges to urinate    · Urinary incontinence (loss of control of your bladder)  How is vaginal atrophy diagnosed? Your healthcare provider will ask about your symptoms  A pelvic exam will be done to examine your vagina and cervix  Your healthcare provider will place a speculum into your vagina to open and examine it  A sample of discharge from your vagina may be collected and tested  A urine test may also be done  How is vaginal atrophy treated? · Over-the counter vaginal moisturizers  can help reduce dryness  Your healthcare provider may recommend that you use a vaginal moisturizer several times each week and during sex  Only use creams that are made for vaginal use  Do  not  use petroleum jelly  Lubricants can be used during sex to decrease pain and discomfort  · Estrogen  may help decrease dryness  It may also lower your risk of vaginal infections if you are going through menopause  It can also help to relieve urinary symptoms  Estrogen may be prescribed in the form of a cream, tablet, or ring   These medicines can be applied or inserted into the vagina  Estrogen can also be prescribed in the form of a pill  When should I contact my healthcare provider? · You have a foul-smelling odor coming from your vagina  · You have a thick, cheese-like discharge from your vagina  · You have itching, swelling, or redness in your vagina  · You have pain or burning when you urinate  · Your urine smells bad  · Your symptoms do not improve, or they get worse  · You have questions or concerns about your condition or care  CARE AGREEMENT:   You have the right to help plan your care  Learn about your health condition and how it may be treated  Discuss treatment options with your caregivers to decide what care you want to receive  You always have the right to refuse treatment  The above information is an  only  It is not intended as medical advice for individual conditions or treatments  Talk to your doctor, nurse or pharmacist before following any medical regimen to see if it is safe and effective for you  © 2017 [employer] Information is for End User's use only and may not be sold, redistributed or otherwise used for commercial purposes  All illustrations and images included in CareNotes® are the copyrighted property of [employer]  or [employer]  Menopause   WHAT YOU NEED TO KNOW:   What is menopause? Menopause is a normal stage in a woman's life when her monthly periods stop  Menopause starts when the ovaries slowly stop making the female hormones estrogen and progesterone  After menopause, a woman is no longer able to become pregnant  A woman who has not had a period for a full year after the age of 39 is considered to be in menopause  Perimenopause is a stage before menopause that may cause signs and symptoms similar to menopause  Perimenopause can last an average of 4 to 5 years  What are the signs and symptoms of menopause?   The signs and symptoms of menopause can be different from woman to woman:  · Menstrual period changes such as skipped periods or periods that are closer together, or lighter or heavier than usual    · Hot flashes (feeling warm, flushed, and sweaty)     · Mood changes such as irritability or decreased desire to have sex    · Breast changes such as tenderness or pain    · Hair changes such as thinning hair or increased hair on your face    · Vaginal changes such as increased dryness     · Urinary changes such as increased urinary tract infections (UTIs) or urgency (feeling that you need to urinate right away)    · Other symptoms such as headaches, trouble sleeping, fatigue, or heart palpitations (strong, fast heartbeats)  What do I need to know about menopause? · You can still get pregnant while you have periods  Continue to use birth control if you do not want to get pregnant  You may need to use birth control until it has been 1 year since your periods stopped  Ask your healthcare provider when you can stop using birth control to prevent pregnancy  · Hormone replacement therapy can be used to treat symptoms of menopause  Hormone replacement therapy (HRT) is medicine that replaces your low hormone levels  HRT contains estrogen and sometimes progestin  HRT has benefits and risks  HRT decreases your risk for bone fractures by helping to prevent osteoporosis  HRT also protects you from colon cancer  HRT may increase your risk for breast cancer, blood clots, heart disease, and stroke  Ask your healthcare provider if HRT is right for you  How can I live a healthy lifestyle during and after menopause? After menopause, your risk for heart disease and bone loss increases  Ask about these and other ways to stay healthy:  · Exercise regularly  Exercise helps you maintain a healthy weight  Exercise can also help to control your blood pressure and cholesterol levels  Include weight-bearing exercise for strong bones   Ask your healthcare provider about the best exercise plan for you  · Eat a variety of healthy foods  Include fruits, vegetables, whole grains (whole-wheat bread, pasta, and cereals), low-fat dairy, and lean protein foods (beans, poultry, and fish)  Limit foods high in sodium (salt)  Ask your healthcare provider for more information about a meal plan that is right for you  · Maintain a healthy weight  Check with your healthcare provider before you start any weight loss program      · Take supplements as directed  You may need extra calcium and vitamin D to help prevent osteoporosis  · Limit alcohol and caffeine  Alcohol and caffeine may worsen your symptoms  · Do not smoke  If you smoke, it is never too late to quit  You are more likely to have a heart attack, lung disease, blood clots, and cancer if you smoke  Ask your healthcare provider for information if you need help quitting  When should I contact my healthcare provider? · You have vaginal bleeding after menopause  · You have questions or concerns about your condition or care  CARE AGREEMENT:   You have the right to help plan your care  Learn about your health condition and how it may be treated  Discuss treatment options with your caregivers to decide what care you want to receive  You always have the right to refuse treatment  The above information is an  only  It is not intended as medical advice for individual conditions or treatments  Talk to your doctor, nurse or pharmacist before following any medical regimen to see if it is safe and effective for you  © 2017 [employer] Information is for End User's use only and may not be sold, redistributed or otherwise used for commercial purposes  All illustrations and images included in CareNotes® are the copyrighted property of [employer]  or [employer]  Dupixent Amount: 300 mg

## 2021-04-20 ENCOUNTER — APPOINTMENT (OUTPATIENT)
Dept: PHYSICAL THERAPY | Facility: MEDICAL CENTER | Age: 56
End: 2021-04-20
Payer: COMMERCIAL

## 2021-04-21 ENCOUNTER — HOSPITAL ENCOUNTER (OUTPATIENT)
Dept: MAMMOGRAPHY | Facility: CLINIC | Age: 56
Discharge: HOME | End: 2021-04-21
Payer: COMMERCIAL

## 2021-04-21 DIAGNOSIS — Z78.0 POSTMENOPAUSAL: ICD-10-CM

## 2021-04-21 PROCEDURE — 77080 DXA BONE DENSITY AXIAL: CPT

## 2021-04-27 ENCOUNTER — OFFICE VISIT (OUTPATIENT)
Dept: PHYSICAL THERAPY | Facility: MEDICAL CENTER | Age: 56
End: 2021-04-27
Payer: COMMERCIAL

## 2021-04-27 DIAGNOSIS — M54.42 ACUTE LEFT-SIDED LOW BACK PAIN WITH LEFT-SIDED SCIATICA: Primary | ICD-10-CM

## 2021-04-27 PROCEDURE — 97112 NEUROMUSCULAR REEDUCATION: CPT | Performed by: PHYSICAL THERAPIST

## 2021-04-27 PROCEDURE — 97110 THERAPEUTIC EXERCISES: CPT | Performed by: PHYSICAL THERAPIST

## 2021-04-27 PROCEDURE — 97140 MANUAL THERAPY 1/> REGIONS: CPT | Performed by: PHYSICAL THERAPIST

## 2021-04-27 NOTE — PROGRESS NOTES
Daily Note     Today's date: 2021  Patient name: Liana Leal  :   MRN: [id number]  Referring provider: Marcelina Pablo DO  Dx:   Encounter Diagnosis     ICD-10-CM    1  Acute left-sided low back pain with left-sided sciatica  M54 42                   Subjective: Pt reports feeling better compared to last visit  She does report poor HEP compliance  Objective: See treatment diary below      Assessment: Tolerated treatment well  Patient demonstrated fatigue post treatment, exhibited good technique with therapeutic exercises and would benefit from continued PT  Negative long sit test today  Reduction in muscle tightness' most tightness noted over QL and mid thoracic region  She does c/o pain w/ clamshells; held exercise  No complaints w/ remaining exercise  Plan: Continue per plan of care        Precautions: Graves, hypertension, L hip DJD      Manuals            Scissor MET to correct L ant torsion 5 min NP           TPR L upper lumbar/lower thoracic, piriformis, glute med, QL 10 min 15 min                                     Neuro Re-Ed            ppt 5"x10 5"x20           bridges 5"x10 5"x20           clamshells 5"x10 NP           Ab brace w/ marching nv x20           Ab brace w/ SLR nv x10 ea           Prone hip extensoin nv x10 ea                                                  TherEx            Hamstring stretch nv 30"x3           Piriformis stretch nv np           Quad stretch nv 30"x3                                                  Ther Activity                                       Gait Training                                       Modalities

## 2021-05-01 ENCOUNTER — HOSPITAL ENCOUNTER (EMERGENCY)
Facility: HOSPITAL | Age: 56
Discharge: HOME | End: 2021-05-01
Attending: EMERGENCY MEDICINE | Admitting: EMERGENCY MEDICINE
Payer: COMMERCIAL

## 2021-05-01 ENCOUNTER — APPOINTMENT (EMERGENCY)
Dept: CT IMAGING | Facility: HOSPITAL | Age: 56
End: 2021-05-01
Payer: COMMERCIAL

## 2021-05-01 VITALS
WEIGHT: 135 LBS | SYSTOLIC BLOOD PRESSURE: 155 MMHG | OXYGEN SATURATION: 97 % | TEMPERATURE: 97.9 F | DIASTOLIC BLOOD PRESSURE: 90 MMHG | BODY MASS INDEX: 21.14 KG/M2 | RESPIRATION RATE: 18 BRPM | HEART RATE: 80 BPM

## 2021-05-01 DIAGNOSIS — R11.0 NAUSEA: ICD-10-CM

## 2021-05-01 DIAGNOSIS — N23 RENAL COLIC ON RIGHT SIDE: ICD-10-CM

## 2021-05-01 DIAGNOSIS — N20.1 URETEROLITHIASIS: Primary | ICD-10-CM

## 2021-05-01 LAB
ALBUMIN SERPL BCP-MCNC: 4.1 G/DL (ref 3.5–5)
ALP SERPL-CCNC: 137 U/L (ref 46–116)
ALT SERPL W P-5'-P-CCNC: 18 U/L (ref 12–78)
ANION GAP SERPL CALCULATED.3IONS-SCNC: 11 MMOL/L (ref 4–13)
AST SERPL W P-5'-P-CCNC: 28 U/L (ref 5–45)
BACTERIA UR QL AUTO: ABNORMAL /HPF
BASOPHILS # BLD AUTO: 0.02 THOUSANDS/ΜL (ref 0–0.1)
BASOPHILS NFR BLD AUTO: 0 % (ref 0–1)
BILIRUB DIRECT SERPL-MCNC: 0.13 MG/DL (ref 0–0.2)
BILIRUB SERPL-MCNC: 0.58 MG/DL (ref 0.2–1)
BILIRUB UR QL STRIP: NEGATIVE
BUN SERPL-MCNC: 9 MG/DL (ref 5–25)
CALCIUM SERPL-MCNC: 9 MG/DL (ref 8.3–10.1)
CHLORIDE SERPL-SCNC: 104 MMOL/L (ref 100–108)
CLARITY UR: CLEAR
CO2 SERPL-SCNC: 26 MMOL/L (ref 21–32)
COLOR UR: YELLOW
CREAT SERPL-MCNC: 0.57 MG/DL (ref 0.6–1.3)
EOSINOPHIL # BLD AUTO: 0.04 THOUSAND/ΜL (ref 0–0.61)
EOSINOPHIL NFR BLD AUTO: 0 % (ref 0–6)
ERYTHROCYTE [DISTWIDTH] IN BLOOD BY AUTOMATED COUNT: 13.2 % (ref 11.6–15.1)
GFR SERPL CREATININE-BSD FRML MDRD: 104 ML/MIN/1.73SQ M
GLUCOSE SERPL-MCNC: 122 MG/DL (ref 65–140)
GLUCOSE UR STRIP-MCNC: NEGATIVE MG/DL
HCT VFR BLD AUTO: 43.3 % (ref 34.8–46.1)
HGB BLD-MCNC: 13.7 G/DL (ref 11.5–15.4)
HGB UR QL STRIP.AUTO: ABNORMAL
IMM GRANULOCYTES # BLD AUTO: 0.04 THOUSAND/UL (ref 0–0.2)
IMM GRANULOCYTES NFR BLD AUTO: 0 % (ref 0–2)
KETONES UR STRIP-MCNC: ABNORMAL MG/DL
LEUKOCYTE ESTERASE UR QL STRIP: NEGATIVE
LYMPHOCYTES # BLD AUTO: 1.64 THOUSANDS/ΜL (ref 0.6–4.47)
LYMPHOCYTES NFR BLD AUTO: 18 % (ref 14–44)
MCH RBC QN AUTO: 29.3 PG (ref 26.8–34.3)
MCHC RBC AUTO-ENTMCNC: 31.6 G/DL (ref 31.4–37.4)
MCV RBC AUTO: 93 FL (ref 82–98)
MONOCYTES # BLD AUTO: 0.58 THOUSAND/ΜL (ref 0.17–1.22)
MONOCYTES NFR BLD AUTO: 6 % (ref 4–12)
NEUTROPHILS # BLD AUTO: 6.74 THOUSANDS/ΜL (ref 1.85–7.62)
NEUTS SEG NFR BLD AUTO: 76 % (ref 43–75)
NITRITE UR QL STRIP: NEGATIVE
NON-SQ EPI CELLS URNS QL MICRO: ABNORMAL /HPF
NRBC BLD AUTO-RTO: 0 /100 WBCS
PH UR STRIP.AUTO: 7 [PH]
PLATELET # BLD AUTO: 221 THOUSANDS/UL (ref 149–390)
PMV BLD AUTO: 10 FL (ref 8.9–12.7)
POTASSIUM SERPL-SCNC: 4.2 MMOL/L (ref 3.5–5.3)
PROT SERPL-MCNC: 7.9 G/DL (ref 6.4–8.2)
PROT UR STRIP-MCNC: NEGATIVE MG/DL
RBC # BLD AUTO: 4.67 MILLION/UL (ref 3.81–5.12)
RBC #/AREA URNS AUTO: ABNORMAL /HPF
SODIUM SERPL-SCNC: 141 MMOL/L (ref 136–145)
SP GR UR STRIP.AUTO: 1.02 (ref 1–1.03)
UROBILINOGEN UR QL STRIP.AUTO: 0.2 E.U./DL
WBC # BLD AUTO: 9.06 THOUSAND/UL (ref 4.31–10.16)
WBC #/AREA URNS AUTO: ABNORMAL /HPF

## 2021-05-01 PROCEDURE — 36415 COLL VENOUS BLD VENIPUNCTURE: CPT | Performed by: EMERGENCY MEDICINE

## 2021-05-01 PROCEDURE — G1004 CDSM NDSC: HCPCS

## 2021-05-01 PROCEDURE — 74176 CT ABD & PELVIS W/O CONTRAST: CPT

## 2021-05-01 PROCEDURE — 96376 TX/PRO/DX INJ SAME DRUG ADON: CPT

## 2021-05-01 PROCEDURE — 85025 COMPLETE CBC W/AUTO DIFF WBC: CPT | Performed by: EMERGENCY MEDICINE

## 2021-05-01 PROCEDURE — 99284 EMERGENCY DEPT VISIT MOD MDM: CPT | Performed by: EMERGENCY MEDICINE

## 2021-05-01 PROCEDURE — 96374 THER/PROPH/DIAG INJ IV PUSH: CPT

## 2021-05-01 PROCEDURE — 81001 URINALYSIS AUTO W/SCOPE: CPT | Performed by: EMERGENCY MEDICINE

## 2021-05-01 PROCEDURE — 80048 BASIC METABOLIC PNL TOTAL CA: CPT | Performed by: EMERGENCY MEDICINE

## 2021-05-01 PROCEDURE — 80076 HEPATIC FUNCTION PANEL: CPT | Performed by: EMERGENCY MEDICINE

## 2021-05-01 PROCEDURE — 99284 EMERGENCY DEPT VISIT MOD MDM: CPT

## 2021-05-01 RX ORDER — KETOROLAC TROMETHAMINE 30 MG/ML
15 INJECTION, SOLUTION INTRAMUSCULAR; INTRAVENOUS ONCE
Status: COMPLETED | OUTPATIENT
Start: 2021-05-01 | End: 2021-05-01

## 2021-05-01 RX ORDER — ONDANSETRON 4 MG/1
4 TABLET, ORALLY DISINTEGRATING ORAL EVERY 6 HOURS PRN
Qty: 20 TABLET | Refills: 0 | Status: SHIPPED | OUTPATIENT
Start: 2021-05-01 | End: 2022-06-29 | Stop reason: ALTCHOICE

## 2021-05-01 RX ORDER — ONDANSETRON 2 MG/ML
4 INJECTION INTRAMUSCULAR; INTRAVENOUS ONCE
Status: DISCONTINUED | OUTPATIENT
Start: 2021-05-01 | End: 2021-05-01 | Stop reason: HOSPADM

## 2021-05-01 RX ORDER — TAMSULOSIN HYDROCHLORIDE 0.4 MG/1
0.4 CAPSULE ORAL
Qty: 14 CAPSULE | Refills: 0 | Status: SHIPPED | OUTPATIENT
Start: 2021-05-01 | End: 2022-06-29 | Stop reason: ALTCHOICE

## 2021-05-01 RX ORDER — HYDROCODONE BITARTRATE AND ACETAMINOPHEN 5; 325 MG/1; MG/1
1 TABLET ORAL EVERY 6 HOURS PRN
Qty: 10 TABLET | Refills: 0 | Status: SHIPPED | OUTPATIENT
Start: 2021-05-01 | End: 2021-08-03 | Stop reason: HOSPADM

## 2021-05-01 RX ADMIN — KETOROLAC TROMETHAMINE 15 MG: 30 INJECTION, SOLUTION INTRAMUSCULAR at 13:21

## 2021-05-01 RX ADMIN — KETOROLAC TROMETHAMINE 15 MG: 30 INJECTION, SOLUTION INTRAMUSCULAR at 12:18

## 2021-05-01 NOTE — Clinical Note
Blake Nj was seen and treated in our emergency department on 5/1/2021  Diagnosis:     Ifra  may return to work on return date  She may return on this date: 05/04/2021         If you have any questions or concerns, please don't hesitate to call        Guadalupe Brooke RN    ______________________________           _______________          _______________  Hospital Representative                              Date                                Time

## 2021-05-01 NOTE — ED PROVIDER NOTES
History  Chief Complaint   Patient presents with    Flank Pain     pt states to have started with right lower quadrant abdominal pain that radiates to right flank/right back area  pt c/o nausea     HPI    Prior to Admission Medications   Prescriptions Last Dose Informant Patient Reported? Taking? Diclofenac Sodium (VOLTAREN) 1 %   No No   Sig: Apply 2 g topically 3 (three) times a day as needed (Pain)   EPINEPHrine (EPIPEN) 0 3 mg/0 3 mL SOAJ  Self No No   Sig: Inject 0 3 mL (0 3 mg total) into a muscle once for 1 dose   SUMAtriptan (IMITREX) 100 mg tablet  Self Yes No   Sig: Take by mouth once as needed     aspirin-acetaminophen-caffeine (EXCEDRIN MIGRAINE) 250-250-65 MG per tablet  Self Yes No   Sig: Take 1 tablet by mouth every 6 (six) hours as needed   dexlansoprazole (Dexilant) 60 MG capsule  Self No No   Sig: Take 1 capsule (60 mg total) by mouth daily   glucosamine-chondroitin 500-400 MG tablet  Self Yes No   Sig: Take 1 tablet by mouth as needed    lidocaine (LIDODERM) 5 %   No No   Sig: Apply 1 patch topically daily Remove & Discard patch within 12 hours or as directed     methylPREDNISolone 4 MG tablet therapy pack   No No   Sig: Use as directed on package   multivitamin (THERAGRAN) TABS  Self Yes No   Sig: Take 1 tablet by mouth daily        Facility-Administered Medications: None       Past Medical History:   Diagnosis Date    Benign positional vertigo, right     Cervicalgia     DVT (deep venous thrombosis) (HCC)     Graves disease     Headache     Thyroid disease        Past Surgical History:   Procedure Laterality Date    APPENDECTOMY      FL INJECTION LEFT HIP (NON ARTHROGRAM)  1/31/2019    LIPOMA RESECTION      NEUROMA EXCISION      STEROID INJECTION HIP Left 05/2018       Family History   Problem Relation Age of Onset    Hypertension Mother     Asthma Mother     Diabetes Mother     Hyperlipidemia Mother     Seizures Father     Migraines Brother     Seizures Brother     Cancer Sister 21        brain    No Known Problems Daughter     No Known Problems Maternal Grandmother     No Known Problems Paternal Grandmother     No Known Problems Maternal Aunt     No Known Problems Maternal Aunt     No Known Problems Maternal Aunt     No Known Problems Paternal Aunt     No Known Problems Paternal Aunt     No Known Problems Paternal Aunt     Breast cancer Neg Hx     Colon cancer Neg Hx     Ovarian cancer Neg Hx     Uterine cancer Neg Hx     Cervical cancer Neg Hx      I have reviewed and agree with the history as documented  E-Cigarette/Vaping    E-Cigarette Use Never User      E-Cigarette/Vaping Substances    Nicotine No     THC No     CBD No     Flavoring No     Other No     Unknown No      Social History     Tobacco Use    Smoking status: Light Tobacco Smoker     Packs/day: 0 01     Years: 2 00     Pack years: 0 02     Types: Cigarettes    Smokeless tobacco: Never Used    Tobacco comment: last smoked 8/16/19   Substance Use Topics    Alcohol use: Not Currently    Drug use: No       Review of Systems    Physical Exam  Physical Exam  Vitals signs and nursing note reviewed  Constitutional:       General: She is not in acute distress  Appearance: She is well-developed  Comments: Appears uncomfortable   HENT:      Head: Normocephalic and atraumatic  Eyes:      Conjunctiva/sclera: Conjunctivae normal       Pupils: Pupils are equal, round, and reactive to light  Neck:      Musculoskeletal: Normal range of motion  Trachea: No tracheal deviation  Cardiovascular:      Rate and Rhythm: Normal rate and regular rhythm  Heart sounds: Normal heart sounds  Pulmonary:      Effort: Pulmonary effort is normal  No respiratory distress  Breath sounds: Normal breath sounds  Abdominal:      General: There is no distension  Palpations: Abdomen is soft  Tenderness: There is abdominal tenderness in the right upper quadrant and right lower quadrant  There is right CVA tenderness  There is no guarding or rebound  Skin:     General: Skin is warm and dry  Neurological:      Mental Status: She is alert and oriented to person, place, and time  GCS: GCS eye subscore is 4  GCS verbal subscore is 5  GCS motor subscore is 6  Psychiatric:         Mood and Affect: Mood is anxious           Behavior: Behavior normal          Vital Signs  ED Triage Vitals   Temperature Pulse Respirations Blood Pressure SpO2   05/01/21 1119 05/01/21 1119 05/01/21 1119 05/01/21 1121 05/01/21 1119   97 9 °F (36 6 °C) 80 18 (!) 176/98 97 %      Temp Source Heart Rate Source Patient Position - Orthostatic VS BP Location FiO2 (%)   05/01/21 1119 05/01/21 1119 05/01/21 1119 05/01/21 1119 --   Oral Monitor Sitting Left arm       Pain Score       05/01/21 1119       Worst Possible Pain           Vitals:    05/01/21 1119 05/01/21 1121 05/01/21 1230   BP:  (!) 176/98 155/90   Pulse: 80     Patient Position - Orthostatic VS: Sitting           Visual Acuity      ED Medications  Medications   ondansetron (ZOFRAN) injection 4 mg (4 mg Intravenous Not Given 5/1/21 1220)   ketorolac (TORADOL) injection 15 mg (has no administration in time range)   ketorolac (TORADOL) injection 15 mg (15 mg Intravenous Given 5/1/21 1218)       Diagnostic Studies  Results Reviewed     Procedure Component Value Units Date/Time    Urine Microscopic [[id number]]  (Abnormal) Collected: 05/01/21 1217    Lab Status: Final result Specimen: Urine, Clean Catch Updated: 05/01/21 1257     RBC, UA 20-30 /hpf      WBC, UA 1-2 /hpf      Epithelial Cells Occasional /hpf      Bacteria, UA Occasional /hpf     Hepatic function panel [[id number]]  (Abnormal) Collected: 05/01/21 1217    Lab Status: Final result Specimen: Blood from Arm, Right Updated: 05/01/21 1244     Total Bilirubin 0 58 mg/dL      Bilirubin, Direct 0 13 mg/dL      Alkaline Phosphatase 137 U/L      AST 28 U/L      ALT 18 U/L      Total Protein 7 9 g/dL      Albumin 4 1 g/dL     UA w Reflex to Microscopic w Reflex to Culture [[id number]]  (Abnormal) Collected: 05/01/21 1217    Lab Status: Final result Specimen: Urine, Clean Catch Updated: 05/01/21 1244     Color, UA Yellow     Clarity, UA Clear     Specific [place], UA 1 020     pH, UA 7 0     Leukocytes, UA Negative     Nitrite, UA Negative     Protein, UA Negative mg/dl      Glucose, UA Negative mg/dl      Ketones, UA Trace mg/dl      Urobilinogen, UA 0 2 E U /dl      Bilirubin, UA Negative     Blood, UA Moderate    Basic metabolic panel [[id number]]  (Abnormal) Collected: 05/01/21 1217    Lab Status: Final result Specimen: Blood from Arm, Right Updated: 05/01/21 1242     Sodium 141 mmol/L      Potassium 4 2 mmol/L      Chloride 104 mmol/L      CO2 26 mmol/L      ANION GAP 11 mmol/L      BUN 9 mg/dL      Creatinine 0 57 mg/dL      Glucose 122 mg/dL      Calcium 9 0 mg/dL      eGFR 104 ml/min/1 73sq m     Narrative:      National Kidney Disease Foundation guidelines for Chronic Kidney Disease (CKD):     Stage 1 with normal or high GFR (GFR > 90 mL/min/1 73 square meters)    Stage 2 Mild CKD (GFR = 60-89 mL/min/1 73 square meters)    Stage 3A Moderate CKD (GFR = 45-59 mL/min/1 73 square meters)    Stage 3B Moderate CKD (GFR = 30-44 mL/min/1 73 square meters)    Stage 4 Severe CKD (GFR = 15-29 mL/min/1 73 square meters)    Stage 5 End Stage CKD (GFR <15 mL/min/1 73 square meters)  Note: GFR calculation is accurate only with a steady state creatinine    CBC and differential [[id number]]  (Abnormal) Collected: 05/01/21 1217    Lab Status: Final result Specimen: Blood from Arm, Right Updated: 05/01/21 1228     WBC 9 06 Thousand/uL      RBC 4 67 Million/uL      Hemoglobin 13 7 g/dL      Hematocrit 43 3 %      MCV 93 fL      MCH 29 3 pg      MCHC 31 6 g/dL      RDW 13 2 %      MPV 10 0 fL      Platelets 924 Thousands/uL      nRBC 0 /100 WBCs      Neutrophils Relative 76 %      Immat GRANS % 0 %      Lymphocytes Relative 18 % Monocytes Relative 6 %      Eosinophils Relative 0 %      Basophils Relative 0 %      Neutrophils Absolute 6 74 Thousands/µL      Immature Grans Absolute 0 04 Thousand/uL      Lymphocytes Absolute 1 64 Thousands/µL      Monocytes Absolute 0 58 Thousand/µL      Eosinophils Absolute 0 04 Thousand/µL      Basophils Absolute 0 02 Thousands/µL                  CT abdomen pelvis wo contrast   Final Result by Racquel Dominguez MD (05/01 1223)      Ureterovesical junction calculus, 3 mm in size resulting in severe right hydroureteronephrosis and perinephric inflammatory stranding  Workstation performed: [id number]                    Procedures  Procedures         ED Course                             SBIRT 22yo+      Most Recent Value   SBIRT (23 yo +)   In order to provide better care to our patients, we are screening all of our patients for alcohol and drug use  Would it be okay to ask you these screening questions? Unable to answer at this time Filed at: 05/01/2021 1222                    MDM  Number of Diagnoses or Management Options  Nausea: new and requires workup  Renal colic on right side: new and requires workup  Ureterolithiasis: new and requires workup  Diagnosis management comments: This is a 79-year-old female presents here today with sudden onset of right lower quadrant abdominal pain which began at about 0830 this morning  She states it is constant radiates to her right back and vagina  She states it feels similar to but much worse than when she had an ovarian cyst greater than 10 years ago  She is postmenopausal, and has not had a cyst since going through menopause  She has nausea but no vomiting  She had several episodes of diarrhea yesterday and early this morning, but states it has since stopped  She feels like she has a pressure sensation in needs to move her bowels but is unable to do so  She denies any dysuria, hematuria, frequency    She feels like she needs to urinate but is only getting a small amount out when she tries  She did an exploratory laparoscopy previously given recurrent ovarian cyst but denies other abdominal surgeries  She does have a history of kidney stones and states they blasted them but has not had recurrence of this  She states the pain feels far more severe than when she had kidney stones previously  She denies fevers or other URI symptoms  She has not taken or done anything for her pain  She denies any underlying medical problems  Review of systems:  Otherwise negative unless stated above    She is well-appearing, in no acute distress  She does appear uncomfortable  She does have tenderness to the right lower quadrant without peritoneal signs, as well as to the right CVA region  Exam is otherwise unremarkable  Suspicion is greatest for recurrent kidney stone, less likely appendicitis or other acute intra-abdominal infection  She is postmenopausal and has not had a cyst since going through menopause, suspicion for recurrent cyst is much less likely, unless the patient has cancer contributing to cyst formation  We will check lab work and CT scan to evaluate, and treat her symptoms  CT scan shows 3 millimeter UVJ stone causing hydro  Lab work and urine are unremarkable  She is still having pain, the pain has improved nausea has resolved with medications here  I discussed with the patient findings, continued treatment at home, follow-up, and indications for return, and she expresses understanding with this plan         Amount and/or Complexity of Data Reviewed  Clinical lab tests: ordered and reviewed  Tests in the radiology section of CPT®: ordered and reviewed  Independent visualization of images, tracings, or specimens: yes        Disposition  Final diagnoses:   Ureterolithiasis   Renal colic on right side   Nausea     Time reflects when diagnosis was documented in both MDM as applicable and the Disposition within this note     Time User Action Codes Description Comment    5/1/2021  1:12 PM Sirisha Nelson Add [N20 1] Ureterolithiasis     5/1/2021  1:12 PM Sirisha Nelson Add [X17] Renal colic     1/2/6426  6:08 PM Jennifer Nelson [Z75] Renal colic     9/5/5373  5:53 PM Sirisha Nelson Add [A58] Renal colic on right side     5/1/2021  1:12 PM Sirisha Nelson Add [R11 0] Nausea       ED Disposition     ED Disposition Condition Date/Time Comment    Discharge Good Sat May 1, 2021 2250 Mattie Abdifatahjb discharge to home/self care  Follow-up Information     Follow up With Specialties Details Why Contact Info Additional [address] For Urology [facility] Schedule an appointment as soon as possible for a visit  to follow up on your stone [id number]  [address]  [address] [place] [address]  [address] [place] [address] Urology [facility], [address] [address], [place], [place], [address] [phone]          Patient's Medications   Discharge Prescriptions    HYDROCODONE-ACETAMINOPHEN (NORCO) 5-325 MG PER TABLET    Take 1 tablet by mouth every 6 (six) hours as needed for pain (severe pain, not otherwise controlled)Max Daily Amount: 4 tablets       Start Date: 5/1/2021  End Date: --       Order Dose: 1 tablet       Quantity: 10 tablet    Refills: 0    ONDANSETRON (ZOFRAN-ODT) 4 MG DISINTEGRATING TABLET    Take 1 tablet (4 mg total) by mouth every 6 (six) hours as needed for nausea or vomiting       Start Date: 5/1/2021  End Date: --       Order Dose: 4 mg       Quantity: 20 tablet    Refills: 0    TAMSULOSIN (FLOMAX) 0 4 MG    Take 1 capsule (0 4 mg total) by mouth daily with dinner Until you pass your stone       Start Date: 5/1/2021  End Date: --       Order Dose: 0 4 mg       Quantity: 14 capsule    Refills: 0     No discharge procedures on file      PDMP Review     None          ED Provider  Electronically Signed by Nette Israel MD  05/01/21 2298

## 2021-05-01 NOTE — DISCHARGE INSTRUCTIONS
Take over-the-counter anti-inflammatories, such as ibuprofen (Motrin Advil) or naproxen (Naprosyn, Aleve) as needed for pain, as per the instructions  Take the prescription pain medication as needed for continued severe pain  Drink plenty of fluids to make more urine  Follow-up with urology as needed for further evaluation  Return if you develop severe worsening of pain and your unable to control at home, persistent vomiting or unable to tolerate fluids despite the nausea medication, are unable to urinate, or for any other concerns

## 2021-05-03 ENCOUNTER — TELEPHONE (OUTPATIENT)
Dept: UROLOGY | Facility: MEDICAL CENTER | Age: 56
End: 2021-05-03

## 2021-05-03 NOTE — TELEPHONE ENCOUNTER
Npt calling for [facility] ED follow,pt having intense pressure/pain and nausea,please review and contact her directly

## 2021-05-03 NOTE — TELEPHONE ENCOUNTER
Complaint/Diagnosis:Renal Colic    Insurance:[facility]    History of Cancer:no    Previous urologist:no    Outside testing/where:epic    If yes,what kind:epic    Records requested/where:[facility]    Preferred location:[place]

## 2021-05-04 ENCOUNTER — OFFICE VISIT (OUTPATIENT)
Dept: PHYSICAL THERAPY | Facility: MEDICAL CENTER | Age: 56
End: 2021-05-04
Payer: COMMERCIAL

## 2021-05-04 ENCOUNTER — TELEPHONE (OUTPATIENT)
Dept: UROLOGY | Facility: CLINIC | Age: 56
End: 2021-05-04

## 2021-05-04 ENCOUNTER — OFFICE VISIT (OUTPATIENT)
Dept: INTERNAL MEDICINE CLINIC | Facility: CLINIC | Age: 56
End: 2021-05-04
Payer: COMMERCIAL

## 2021-05-04 ENCOUNTER — OFFICE VISIT (OUTPATIENT)
Dept: UROLOGY | Facility: CLINIC | Age: 56
End: 2021-05-04
Payer: COMMERCIAL

## 2021-05-04 ENCOUNTER — APPOINTMENT (OUTPATIENT)
Dept: LAB | Facility: CLINIC | Age: 56
End: 2021-05-04
Payer: COMMERCIAL

## 2021-05-04 VITALS
DIASTOLIC BLOOD PRESSURE: 70 MMHG | HEIGHT: 67 IN | TEMPERATURE: 99.8 F | BODY MASS INDEX: 20.69 KG/M2 | WEIGHT: 131.8 LBS | HEART RATE: 72 BPM | SYSTOLIC BLOOD PRESSURE: 120 MMHG | OXYGEN SATURATION: 96 %

## 2021-05-04 VITALS
HEIGHT: 67 IN | HEART RATE: 65 BPM | BODY MASS INDEX: 20.34 KG/M2 | WEIGHT: 129.6 LBS | SYSTOLIC BLOOD PRESSURE: 122 MMHG | DIASTOLIC BLOOD PRESSURE: 78 MMHG

## 2021-05-04 DIAGNOSIS — N20.1 URETERAL STONE: ICD-10-CM

## 2021-05-04 DIAGNOSIS — Z13.6 SCREENING FOR CARDIOVASCULAR CONDITION: ICD-10-CM

## 2021-05-04 DIAGNOSIS — Z11.4 SCREENING FOR HIV (HUMAN IMMUNODEFICIENCY VIRUS): ICD-10-CM

## 2021-05-04 DIAGNOSIS — E05.90 HYPERTHYROIDISM: ICD-10-CM

## 2021-05-04 DIAGNOSIS — E55.9 VITAMIN D DEFICIENCY: ICD-10-CM

## 2021-05-04 DIAGNOSIS — Z23 ENCOUNTER FOR IMMUNIZATION: ICD-10-CM

## 2021-05-04 DIAGNOSIS — N20.0 NEPHROLITHIASIS: Primary | ICD-10-CM

## 2021-05-04 DIAGNOSIS — M54.42 ACUTE LEFT-SIDED LOW BACK PAIN WITH LEFT-SIDED SCIATICA: Primary | ICD-10-CM

## 2021-05-04 DIAGNOSIS — E21.3 HYPERPARATHYROIDISM (HCC): Primary | ICD-10-CM

## 2021-05-04 DIAGNOSIS — E21.3 HYPERPARATHYROIDISM (HCC): ICD-10-CM

## 2021-05-04 LAB
SL AMB  POCT GLUCOSE, UA: ABNORMAL
SL AMB LEUKOCYTE ESTERASE,UA: ABNORMAL
SL AMB POCT BILIRUBIN,UA: ABNORMAL
SL AMB POCT BLOOD,UA: + 3
SL AMB POCT CLARITY,UA: CLEAR
SL AMB POCT COLOR,UA: YELLOW
SL AMB POCT KETONES,UA: ABNORMAL
SL AMB POCT NITRITE,UA: ABNORMAL
SL AMB POCT PH,UA: 5
SL AMB POCT SPECIFIC GRAVITY,UA: 1.03
SL AMB POCT URINE PROTEIN: + 100
SL AMB POCT UROBILINOGEN: ABNORMAL

## 2021-05-04 PROCEDURE — 87389 HIV-1 AG W/HIV-1&-2 AB AG IA: CPT

## 2021-05-04 PROCEDURE — 81002 URINALYSIS NONAUTO W/O SCOPE: CPT | Performed by: PHYSICIAN ASSISTANT

## 2021-05-04 PROCEDURE — 99214 OFFICE O/P EST MOD 30 MIN: CPT | Performed by: NURSE PRACTITIONER

## 2021-05-04 PROCEDURE — 83970 ASSAY OF PARATHORMONE: CPT

## 2021-05-04 PROCEDURE — 84443 ASSAY THYROID STIM HORMONE: CPT

## 2021-05-04 PROCEDURE — 80061 LIPID PANEL: CPT

## 2021-05-04 PROCEDURE — 80053 COMPREHEN METABOLIC PANEL: CPT

## 2021-05-04 PROCEDURE — 36415 COLL VENOUS BLD VENIPUNCTURE: CPT

## 2021-05-04 PROCEDURE — 82306 VITAMIN D 25 HYDROXY: CPT

## 2021-05-04 PROCEDURE — 97112 NEUROMUSCULAR REEDUCATION: CPT | Performed by: PHYSICAL THERAPIST

## 2021-05-04 PROCEDURE — 97140 MANUAL THERAPY 1/> REGIONS: CPT | Performed by: PHYSICAL THERAPIST

## 2021-05-04 PROCEDURE — 99204 OFFICE O/P NEW MOD 45 MIN: CPT | Performed by: PHYSICIAN ASSISTANT

## 2021-05-04 RX ORDER — KETOROLAC TROMETHAMINE 10 MG/1
10 TABLET, FILM COATED ORAL EVERY 6 HOURS PRN
Qty: 20 TABLET | Refills: 0 | Status: SHIPPED | OUTPATIENT
Start: 2021-05-04 | End: 2022-06-29 | Stop reason: ALTCHOICE

## 2021-05-04 RX ORDER — IBUPROFEN 200 MG
1 CAPSULE ORAL DAILY
COMMUNITY
End: 2022-06-29 | Stop reason: ALTCHOICE

## 2021-05-04 NOTE — PROGRESS NOTES
Assessment/Plan:    Patient Instructions     Osteoporosis based on recent DEXA scan, him somewhat concerned for hyperparathyroidism with the setting of new onset of kidney stone  At this time I recommend vitamin-D hold off on calcium in till stone analysis is completed  Check labs as noted  Discussed if it has hyperparathyroidism we will need to address this 1st and secondarily treat osteoporosis  Conversation had about bisphosphonates risk benefits and side effects  Kidney stone as above she is currently on Flomax and Toradol following with urology         Diagnoses and all orders for this visit:    Hyperparathyroidism ([address] )  -     Comprehensive metabolic panel; Future  -     PTH, Intact and Calcium; Future    Encounter for immunization    Screening for HIV (human immunodeficiency virus)  -     HIV 1/2 Antigen/Antibody (4th Generation) w Reflex [facility]; Future    Vitamin D deficiency  -     Vitamin D 25 hydroxy; Future    Screening for cardiovascular condition  -     Lipid panel; Future    Hyperthyroidism  -     TSH, 3rd generation with Free T4 reflex; Future    Other orders  -     Cancel: PNEUMOCOCCAL POLYSACCHARIDE VACCINE 23-VALENT =>3YO SQ IM  -     Cancel: TDAP VACCINE GREATER THAN OR EQUAL TO 8YO IM  -     calcium citrate (CALCITRATE) 950 (200 Ca) MG tablet; Take 1 tablet by mouth daily  -     VITAMIN D PO; Take by mouth         Subjective:      Patient ID: Radha Hill is a 63 y o  female     Patient is here to review her bone density, she states unfortunately she was developing right lower back pain over the weekend was seen in the emergency room, has an obstructed kidney stone, she has been told that if she does not pass it she will need to have surgery  She is currently on Flomax and Toradol  She has mild discomfort in the right back going into the vaginal region          Current Outpatient Medications:     aspirin-acetaminophen-caffeine (EXCEDRIN MIGRAINE) 250-250-65 MG per tablet, Take 1 tablet by mouth every 6 (six) hours as needed, Disp: , Rfl:     calcium citrate (CALCITRATE) 950 (200 Ca) MG tablet, Take 1 tablet by mouth daily, Disp: , Rfl:     dexlansoprazole (Dexilant) 60 MG capsule, Take 1 capsule (60 mg total) by mouth daily, Disp: 90 capsule, Rfl: 3    glucosamine-chondroitin 500-400 MG tablet, Take 1 tablet by mouth as needed , Disp: , Rfl:     ketorolac (TORADOL) 10 mg tablet, Take 1 tablet (10 mg total) by mouth every 6 (six) hours as needed for moderate pain, Disp: 20 tablet, Rfl: 0    multivitamin (THERAGRAN) TABS, Take 1 tablet by mouth daily  , Disp: , Rfl:     SUMAtriptan (IMITREX) 100 mg tablet, Take by mouth once as needed  , Disp: , Rfl:     tamsulosin (FLOMAX) 0 4 mg, Take 1 capsule (0 4 mg total) by mouth daily with dinner Until you pass your stone, Disp: 14 capsule, Rfl: 0    VITAMIN D PO, Take by mouth, Disp: , Rfl:     Diclofenac Sodium (VOLTAREN) 1 %, Apply 2 g topically 3 (three) times a day as needed (Pain) (Patient not taking: Reported on 5/4/2021), Disp: 1 Tube, Rfl: 0    EPINEPHrine (EPIPEN) 0 3 mg/0 3 mL SOAJ, Inject 0 3 mL (0 3 mg total) into a muscle once for 1 dose, Disp: 0 6 mL, Rfl: 1    HYDROcodone-acetaminophen (NORCO) 5-325 mg per tablet, Take 1 tablet by mouth every 6 (six) hours as needed for pain (severe pain, not otherwise controlled)Max Daily Amount: 4 tablets (Patient not taking: Reported on 5/4/2021), Disp: 10 tablet, Rfl: 0    lidocaine (LIDODERM) 5 %, Apply 1 patch topically daily Remove & Discard patch within 12 hours or as directed , Disp: 30 patch, Rfl: 0    methylPREDNISolone 4 MG tablet therapy pack, Use as directed on package, Disp: 1 each, Rfl: 0    ondansetron (ZOFRAN-ODT) 4 mg disintegrating tablet, Take 1 tablet (4 mg total) by mouth every 6 (six) hours as needed for nausea or vomiting (Patient not taking: Reported on 5/4/2021), Disp: 20 tablet, Rfl: 0    Recent Results (from the past 1008 hour(s))   CBC and differential Collection Time: 05/01/21 12:17 PM   Result Value Ref Range    WBC 9 06 4 31 - 10 16 Thousand/uL    RBC 4 67 3 81 - 5 12 Million/uL    Hemoglobin 13 7 11 5 - 15 4 g/dL    Hematocrit 43 3 34 8 - 46 1 %    MCV 93 82 - 98 fL    MCH 29 3 26 8 - 34 3 pg    MCHC 31 6 31 4 - 37 4 g/dL    RDW 13 2 11 6 - 15 1 %    MPV 10 0 8 9 - 12 7 fL    Platelets 849 219 - 866 Thousands/uL    nRBC 0 /100 WBCs    Neutrophils Relative 76 (H) 43 - 75 %    Immat GRANS % 0 0 - 2 %    Lymphocytes Relative 18 14 - 44 %    Monocytes Relative 6 4 - 12 %    Eosinophils Relative 0 0 - 6 %    Basophils Relative 0 0 - 1 %    Neutrophils Absolute 6 74 1 85 - 7 62 Thousands/µL    Immature Grans Absolute 0 04 0 00 - 0 20 Thousand/uL    Lymphocytes Absolute 1 64 0 60 - 4 47 Thousands/µL    Monocytes Absolute 0 58 0 17 - 1 22 Thousand/µL    Eosinophils Absolute 0 04 0 00 - 0 61 Thousand/µL    Basophils Absolute 0 02 0 00 - 0 10 Thousands/µL   Basic metabolic panel    Collection Time: 05/01/21 12:17 PM   Result Value Ref Range    Sodium 141 136 - 145 mmol/L    Potassium 4 2 3 5 - 5 3 mmol/L    Chloride 104 100 - 108 mmol/L    CO2 26 21 - 32 mmol/L    ANION GAP 11 4 - 13 mmol/L    BUN 9 5 - 25 mg/dL    Creatinine 0 57 (L) 0 60 - 1 30 mg/dL    Glucose 122 65 - 140 mg/dL    Calcium 9 0 8 3 - 10 1 mg/dL    eGFR 104 ml/min/1 73sq m   Hepatic function panel    Collection Time: 05/01/21 12:17 PM   Result Value Ref Range    Total Bilirubin 0 58 0 20 - 1 00 mg/dL    Bilirubin, Direct 0 13 0 00 - 0 20 mg/dL    Alkaline Phosphatase 137 (H) 46 - 116 U/L    AST 28 5 - 45 U/L    ALT 18 12 - 78 U/L    Total Protein 7 9 6 4 - 8 2 g/dL    Albumin 4 1 3 5 - 5 0 g/dL   UA w Reflex to Microscopic w Reflex to Culture    Collection Time: 05/01/21 12:17 PM    Specimen: Urine, Clean Catch   Result Value Ref Range    Color, UA Yellow     Clarity, UA Clear     Specific Gravity, UA 1 020 1 003 - 1 030    pH, UA 7 0 4 5, 5 0, 5 5, 6 0, 6 5, 7 0, 7 5, 8 0    Leukocytes, UA Negative Negative    Nitrite, UA Negative Negative    Protein, UA Negative Negative mg/dl    Glucose, UA Negative Negative mg/dl    Ketones, UA Trace (A) Negative mg/dl    Urobilinogen, UA 0 2 0 2, 1 0 E U /dl E U /dl    Bilirubin, UA Negative Negative    Blood, UA Moderate (A) Negative   Urine Microscopic    Collection Time: 05/01/21 12:17 PM   Result Value Ref Range    RBC, UA 20-30 (A) None Seen, 0-1, 1-2, 2-4, 0-5 /hpf    WBC, UA 1-2 None Seen, 0-1, 1-2, 0-5, 2-4 /hpf    Epithelial Cells Occasional None Seen, Occasional /hpf    Bacteria, UA Occasional None Seen, Occasional /hpf   POCT urine dip    Collection Time: 05/04/21 10:51 AM   Result Value Ref Range    LEUKOCYTE ESTERASE,UA -     NITRITE,UA -     SL AMB POCT UROBILINOGEN -     POCT URINE PROTEIN + 100      PH,UA 5 0     BLOOD,UA + 3     SPECIFIC GRAVITY,UA 1 030     KETONES,UA small     BILIRUBIN,UA -     GLUCOSE, UA -      COLOR,UA yellow     CLARITY,UA clear        The following portions of the patient's history were reviewed and updated as appropriate: allergies, current medications, past family history, past medical history, past social history, past surgical history and problem list      Review of Systems   Constitutional: Negative for appetite change, chills, diaphoresis, fatigue, fever and unexpected weight change  HENT: Negative for postnasal drip and sneezing  Eyes: Negative for visual disturbance  Respiratory: Negative for chest tightness and shortness of breath  Cardiovascular: Negative for chest pain, palpitations and leg swelling  Gastrointestinal: Negative for abdominal pain and blood in stool  Endocrine: Negative for cold intolerance, heat intolerance, polydipsia, polyphagia and polyuria  Genitourinary: Negative for difficulty urinating, dysuria, frequency and urgency  Musculoskeletal: Positive for back pain  Negative for arthralgias and myalgias  Skin: Negative for rash and wound     Neurological: Negative for dizziness, weakness, light-headedness and headaches  Hematological: Negative for adenopathy  Psychiatric/Behavioral: Negative for confusion, dysphoric mood and sleep disturbance  The patient is not nervous/anxious  Objective:      /70 (BP Location: Right arm, Patient Position: Sitting, Cuff Size: Standard)   Pulse 72   Temp 99 8 °F (37 7 °C)   Ht 5' 7" (1 702 m)   Wt 59 8 kg (131 lb 12 8 oz)   SpO2 96%   BMI 20 64 kg/m²        Physical Exam  Constitutional:       General: She is not in acute distress  Appearance: She is well-developed  She is not diaphoretic  HENT:      Head: Normocephalic and atraumatic  Nose: Nose normal    Eyes:      Conjunctiva/sclera: Conjunctivae normal       Pupils: Pupils are equal, round, and reactive to light  Neck:      Musculoskeletal: Normal range of motion and neck supple  Thyroid: No thyromegaly  Vascular: No JVD  Trachea: No tracheal deviation  Cardiovascular:      Rate and Rhythm: Normal rate and regular rhythm  Heart sounds: Normal heart sounds  No murmur  No friction rub  No gallop  Pulmonary:      Effort: Pulmonary effort is normal  No respiratory distress  Breath sounds: Normal breath sounds  No wheezing or rales  Abdominal:      General: Bowel sounds are normal  There is no distension  Palpations: Abdomen is soft  Tenderness: There is no abdominal tenderness  Musculoskeletal: Normal range of motion  Lymphadenopathy:      Cervical: No cervical adenopathy  Skin:     General: Skin is warm and dry  Findings: No rash  Neurological:      Mental Status: She is alert and oriented to person, place, and time  Cranial Nerves: No cranial nerve deficit  Psychiatric:         Behavior: Behavior normal          Thought Content:  Thought content normal          Judgment: Judgment normal

## 2021-05-04 NOTE — PATIENT INSTRUCTIONS
Osteoporosis based on recent DEXA scan, him somewhat concerned for hyperparathyroidism with the setting of new onset of kidney stone  At this time I recommend vitamin-D hold off on calcium in till stone analysis is completed  Check labs as noted  Discussed if it has hyperparathyroidism we will need to address this 1st and secondarily treat osteoporosis  Conversation had about bisphosphonates risk benefits and side effects        Kidney stone as above she is currently on Flomax and Toradol following with urology

## 2021-05-04 NOTE — PROGRESS NOTES
1  Nephrolithiasis  POCT urine dip   2  Ureteral stone  ketorolac (TORADOL) 10 mg tablet    Case request operating room: CYSTOSCOPY URETEROSCOPY WITH LITHOTRIPSY HOLMIUM LASER, RETROGRADE PYELOGRAM AND INSERTION STENT URETERAL    Case request operating room: CYSTOSCOPY URETEROSCOPY WITH LITHOTRIPSY HOLMIUM LASER, RETROGRADE PYELOGRAM AND INSERTION STENT URETERAL         Assessment and plan:       1  3mm right UVJ stone  2  Right hydronephrosis     I reviewed with the patient her CT  We discussed her 3 mm right UVJ stone  We discussed options for continued medical expulsive therapy versus ureteroscopic removal   She has been symptomatic for the past 4 days  We discussed scheduling right ureteroscopy in approximately 1-2 weeks  If she remains symptomatic to proceed with surgery  If she passes stone of the has resolution of symptoms, surgery can be canceled  We reviewed the risks of procedure including but not limited to cardiopulmonary complication, VTE, bleeding, infection, ureteral stricture, and need for additional procedures  Patient was counseled on the need for ureteral stent in this scenario  Patient will continue with tamsulosin, hydration, pain control, and straining urine  ER precautions provided in the meantime  All questions answered  Terra Iglesias PA-C      Chief Complaint     nephrolithiasis    History of Present Illness     Tomy Galeana is a 63 y o  Female presenting today as a new patient for emergency department follow-up of nephrolithiasis  Patient was in the emergency department 05/01/2021 secondary to right flank pain radiating to her right lower quadrant  Patient had reported nausea without emesis  A CT was obtained revealing 3 mm right UVJ stone with severe right hydronephrosis and perinephric stranding  Urine was negative for infection,  Afebrile, WBC 9, creatinine 0 57     Patient was discharged home on medical expulsive therapy for trial of passage  patient does have a history of nephrolithiasis many years ago,  She believes she underwent ESWL approximately 15 years ago  CT does reveal some left intraparenchymal stones  Denies any issues over the past decade until recently  Patient unfortunately has ongoing right lower quadrant pain radiating to her right flank  Is having increased urinary frequency, urgency, and suprapubic pressure  Denies any dysuria or gross hematuria at this time  Does have nausea however she wishes to hold off on antiemetic       patient does report that she start a new job in [place] in approximately 2 weeks  Urine dip leukocyte and nitrite negative, positive protein and blood  Laboratory     Lab Results   Component Value Date    CREATININE 0 57 (L) 05/01/2021       Review of Systems     Review of Systems   Constitutional: Negative for activity change, appetite change, chills, diaphoresis, fatigue, fever and unexpected weight change  Respiratory: Negative for chest tightness and shortness of breath  Cardiovascular: Negative for chest pain, palpitations and leg swelling  Gastrointestinal: Positive for nausea  Negative for abdominal distention, abdominal pain, constipation, diarrhea and vomiting  Genitourinary: Positive for flank pain, frequency and urgency  Negative for decreased urine volume, difficulty urinating, dysuria, enuresis, genital sores and hematuria  RLQ pain   Musculoskeletal: Negative for back pain, gait problem and myalgias  Skin: Negative for color change, pallor, rash and wound  Psychiatric/Behavioral: Negative for behavioral problems  The patient is not nervous/anxious          Allergies     Allergies   Allergen Reactions    Bee Venom      Bee sting    Contrast [Iodinated Diagnostic Agents] Anaphylaxis     Pt states her throat closes- kk rn     Pollen Extract Other (See Comments)     Throat clearing, difficulty swallowing, ears itchy    Prochlorperazine Other (See Comments)      Bryant (compazine)  Delirious        Physical Exam     Physical Exam  Constitutional:       General: She is not in acute distress  Appearance: Normal appearance  She is normal weight  She is not ill-appearing, toxic-appearing or diaphoretic  HENT:      Head: Normocephalic and atraumatic  Eyes:      General:         Right eye: No discharge  Left eye: No discharge  Conjunctiva/sclera: Conjunctivae normal    Cardiovascular:      Rate and Rhythm: Normal rate and regular rhythm  Heart sounds: Normal heart sounds  No murmur  No friction rub  Pulmonary:      Effort: Pulmonary effort is normal  No respiratory distress  Breath sounds: Normal breath sounds  No stridor  No wheezing or rhonchi  Musculoskeletal: Normal range of motion  General: No swelling or tenderness  Skin:     General: Skin is warm and dry  Coloration: Skin is not jaundiced or pale  Neurological:      General: No focal deficit present  Mental Status: She is alert and oriented to person, place, and time  Psychiatric:         Mood and Affect: Mood normal          Behavior: Behavior normal          Thought Content:  Thought content normal          Judgment: Judgment normal            Vital Signs     Vitals:    05/04/21 1047   BP: 122/78   Pulse: 65   Weight: 58 8 kg (129 lb 9 6 oz)   Height: 5' 7" (1 702 m)         Current Medications       Current Outpatient Medications:     aspirin-acetaminophen-caffeine (EXCEDRIN MIGRAINE) 250-250-65 MG per tablet, Take 1 tablet by mouth every 6 (six) hours as needed, Disp: , Rfl:     dexlansoprazole (Dexilant) 60 MG capsule, Take 1 capsule (60 mg total) by mouth daily, Disp: 90 capsule, Rfl: 3    glucosamine-chondroitin 500-400 MG tablet, Take 1 tablet by mouth as needed , Disp: , Rfl:     multivitamin (THERAGRAN) TABS, Take 1 tablet by mouth daily  , Disp: , Rfl:     SUMAtriptan (IMITREX) 100 mg tablet, Take by mouth once as needed  , Disp: , Rfl:   tamsulosin (FLOMAX) 0 4 mg, Take 1 capsule (0 4 mg total) by mouth daily with dinner Until you pass your stone, Disp: 14 capsule, Rfl: 0    Diclofenac Sodium (VOLTAREN) 1 %, Apply 2 g topically 3 (three) times a day as needed (Pain) (Patient not taking: Reported on 5/4/2021), Disp: 1 Tube, Rfl: 0    EPINEPHrine (EPIPEN) 0 3 mg/0 3 mL SOAJ, Inject 0 3 mL (0 3 mg total) into a muscle once for 1 dose, Disp: 0 6 mL, Rfl: 1    HYDROcodone-acetaminophen (NORCO) 5-325 mg per tablet, Take 1 tablet by mouth every 6 (six) hours as needed for pain (severe pain, not otherwise controlled)Max Daily Amount: 4 tablets (Patient not taking: Reported on 5/4/2021), Disp: 10 tablet, Rfl: 0    ketorolac (TORADOL) 10 mg tablet, Take 1 tablet (10 mg total) by mouth every 6 (six) hours as needed for moderate pain, Disp: 20 tablet, Rfl: 0    lidocaine (LIDODERM) 5 %, Apply 1 patch topically daily Remove & Discard patch within 12 hours or as directed , Disp: 30 patch, Rfl: 0    methylPREDNISolone 4 MG tablet therapy pack, Use as directed on package, Disp: 1 each, Rfl: 0    ondansetron (ZOFRAN-ODT) 4 mg disintegrating tablet, Take 1 tablet (4 mg total) by mouth every 6 (six) hours as needed for nausea or vomiting (Patient not taking: Reported on 5/4/2021), Disp: 20 tablet, Rfl: 0      Active Problems     Patient Active Problem List   Diagnosis    Breast cyst    Nephrolithiasis    Thyroid disease    Encounter for annual routine gynecological examination    Asymptomatic postmenopausal status    Malaise and fatigue    Left ventricular hypertrophy    Secondary hypertension    Post-menopausal atrophic vaginitis    Pelvic pain    Mastalgia in female    Acute cystitis with hematuria    Nondisplaced fracture of distal phalanx of right great toe, initial encounter for closed fracture    Acute left-sided low back pain with left-sided sciatica         Past Medical History     Past Medical History:   Diagnosis Date    Benign positional vertigo, right     Cervicalgia     DVT (deep venous thrombosis) ([facility] [address] )     Graves disease     Headache     Thyroid disease          Surgical History     Past Surgical History:   Procedure Laterality Date    APPENDECTOMY      FL INJECTION LEFT HIP (NON ARTHROGRAM)  1/31/2019    LIPOMA RESECTION      NEUROMA EXCISION      STEROID INJECTION HIP Left 05/2018         Family History     Family History   Problem Relation Age of Onset    Hypertension Mother     Asthma Mother     Diabetes Mother     Hyperlipidemia Mother     Seizures Father    Blair Quezada Migraines Brother     Seizures Brother     Cancer Sister 21        brain    No Known Problems Daughter     No Known Problems Maternal Grandmother     No Known Problems Paternal Grandmother     No Known Problems Maternal Aunt     No Known Problems Maternal Aunt     No Known Problems Maternal Aunt     No Known Problems Paternal Aunt     No Known Problems Paternal Aunt     No Known Problems Paternal Aunt     Breast cancer Neg Hx     Colon cancer Neg Hx     Ovarian cancer Neg Hx     Uterine cancer Neg Hx     Cervical cancer Neg Hx          Social History     Social History       Radiology

## 2021-05-04 NOTE — PROGRESS NOTES
Daily Note and Discharge    Today's date: 2021  Patient name: Paddy Mcdonough  :   MRN: [id number]  Referring provider: Georgie Bunch DO  Dx:   Encounter Diagnosis     ICD-10-CM    1  Acute left-sided low back pain with left-sided sciatica  M54 42                   Subjective: Pt reports ongoing L sided back pain  She is also experiencing R sided hip/pelvic pain and was recently diagnosed w/ stone in bladder that may require surgical removal       Objective: See treatment diary below      Assessment: Tolerated treatment well  Patient demonstrated fatigue post treatment, exhibited good technique with therapeutic exercises and would benefit from continued PT  Negative long sit test; pelvis remaining stabilized  Most palpable tightness present over L glute med today  She was provided w/ updated HEP today    Plan: Continue per plan of care        Precautions: Graves, hypertension, L hip DJD      Manuals           Scissor MET to correct L ant torsion 5 min NP NP          TPR L upper lumbar/lower thoracic, piriformis, glute med, QL 10 min 15 min 15 min                                    Neuro Re-Ed           ppt 5"x10 5"x20 5"x20          bridges 5"x10 5"x20 5"x20          clamshells 5"x10 NP           Ab brace w/ marching nv x20 x20          Ab brace w/ SLR nv x10 ea x15 ea          Prone hip extensoin nv x10 ea x15 ea                                                 TherEx  5          Hamstring stretch nv 30"x3 30"x3          Piriformis stretch nv np np          Quad stretch nv 30"x3 np                                                 Ther Activity                                       Gait Training                                       Modalities

## 2021-05-05 LAB
25(OH)D3 SERPL-MCNC: 28.5 NG/ML (ref 30–100)
ALBUMIN SERPL BCP-MCNC: 4.2 G/DL (ref 3.5–5)
ALP SERPL-CCNC: 132 U/L (ref 46–116)
ALT SERPL W P-5'-P-CCNC: 17 U/L (ref 12–78)
ANION GAP SERPL CALCULATED.3IONS-SCNC: 2 MMOL/L (ref 4–13)
AST SERPL W P-5'-P-CCNC: 20 U/L (ref 5–45)
BILIRUB SERPL-MCNC: 0.4 MG/DL (ref 0.2–1)
BUN SERPL-MCNC: 10 MG/DL (ref 5–25)
CALCIUM SERPL-MCNC: 10.1 MG/DL (ref 8.3–10.1)
CHLORIDE SERPL-SCNC: 107 MMOL/L (ref 100–108)
CHOLEST SERPL-MCNC: 213 MG/DL (ref 50–200)
CO2 SERPL-SCNC: 31 MMOL/L (ref 21–32)
CREAT SERPL-MCNC: 0.73 MG/DL (ref 0.6–1.3)
GFR SERPL CREATININE-BSD FRML MDRD: 92 ML/MIN/1.73SQ M
GLUCOSE P FAST SERPL-MCNC: 100 MG/DL (ref 65–99)
HDLC SERPL-MCNC: 76 MG/DL
HIV 1+2 AB+HIV1 P24 AG SERPL QL IA: NORMAL
LDLC SERPL CALC-MCNC: 107 MG/DL (ref 0–100)
NONHDLC SERPL-MCNC: 137 MG/DL
POTASSIUM SERPL-SCNC: 4.1 MMOL/L (ref 3.5–5.3)
PROT SERPL-MCNC: 7.7 G/DL (ref 6.4–8.2)
PTH-INTACT SERPL-MCNC: 72.8 PG/ML (ref 18.4–80.1)
SODIUM SERPL-SCNC: 140 MMOL/L (ref 136–145)
TRIGL SERPL-MCNC: 151 MG/DL
TSH SERPL DL<=0.05 MIU/L-ACNC: 2.35 UIU/ML (ref 0.36–3.74)

## 2021-05-05 NOTE — TELEPHONE ENCOUNTER
Would recommend monitoring symptoms for the next couple of days  If continued symptoms can keep surgery date  Recommend bringing in specimen for stone analysis  If resolution of symptoms will plan to complete KUB and US in 2-3 weeks

## 2021-05-05 NOTE — TELEPHONE ENCOUNTER
Called and left a detailed message for patient regarding provider's recommendations  Reviewed that stone analysis was ordered in the system and she can drop the stone off at any lab, not the office  Pt remained intubated and sedated on propofol gtt overnight to maintain RASS -2; follows simple commands. Intermittently coughs over vent; moderate amount oral secretions. SR-ST on monitor. Afebrile. NS infusing per order. OGT in place with tube feedings at 55ml/hr, feedings stopped at 04:00 per order for SAT this morning.  PIVs intact x4. Gomez intact, UOP >30ml/hr. Turned/repositioned q2h, bilateral heels floated in boots, waffle overlay on mattress. BSWRs in place d/t pulling at lines, no injuries noted. POC reviewed with pt, no evidence of learning, frequent reorientation provided. No family present or called overnight.

## 2021-05-05 NOTE — TELEPHONE ENCOUNTER
Patient called back requesting a call from [facility] regarding scheduling surgery  FYI- Clinical    She did also state that she feels like she may have passed the stone, but the stone isn't rock hard, it is more on the squishy side  She would like to know if she should drop it off at our office or if she can be scheduled for KUB or stat CT  Her last day at [facility] is in a few days, so she is under a time constraint

## 2021-05-05 NOTE — TELEPHONE ENCOUNTER
Called and spoke with patient  She is not really sure what to expect for a passed stone she states it looks like a grain of sane but seem too "mushy  "  She does have a picture that she will attempt to send through mychart  She does still note pain in the right lower abd through to vagina  Will review with provider to determine if fu imaging warranted  [phone]

## 2021-05-06 ENCOUNTER — OFFICE VISIT (OUTPATIENT)
Dept: OBGYN CLINIC | Facility: MEDICAL CENTER | Age: 56
End: 2021-05-06
Payer: COMMERCIAL

## 2021-05-06 ENCOUNTER — APPOINTMENT (OUTPATIENT)
Dept: LAB | Facility: MEDICAL CENTER | Age: 56
End: 2021-05-06
Payer: COMMERCIAL

## 2021-05-06 ENCOUNTER — HOSPITAL ENCOUNTER (OUTPATIENT)
Dept: CT IMAGING | Facility: HOSPITAL | Age: 56
Discharge: HOME | End: 2021-05-06
Payer: COMMERCIAL

## 2021-05-06 VITALS
HEART RATE: 78 BPM | HEIGHT: 67 IN | WEIGHT: 131 LBS | DIASTOLIC BLOOD PRESSURE: 85 MMHG | SYSTOLIC BLOOD PRESSURE: 145 MMHG | BODY MASS INDEX: 20.56 KG/M2

## 2021-05-06 DIAGNOSIS — S92.424A NONDISPLACED FRACTURE OF DISTAL PHALANX OF RIGHT GREAT TOE, INITIAL ENCOUNTER FOR CLOSED FRACTURE: ICD-10-CM

## 2021-05-06 DIAGNOSIS — N20.0 NEPHROLITHIASIS: ICD-10-CM

## 2021-05-06 DIAGNOSIS — M54.42 ACUTE LEFT-SIDED LOW BACK PAIN WITH LEFT-SIDED SCIATICA: Primary | ICD-10-CM

## 2021-05-06 PROCEDURE — 82360 CALCULUS ASSAY QUANT: CPT

## 2021-05-06 PROCEDURE — 99213 OFFICE O/P EST LOW 20 MIN: CPT | Performed by: PHYSICAL MEDICINE & REHABILITATION

## 2021-05-06 PROCEDURE — G1004 CDSM NDSC: HCPCS

## 2021-05-06 PROCEDURE — 74176 CT ABD & PELVIS W/O CONTRAST: CPT

## 2021-05-06 NOTE — ADDENDUM NOTE
Addended [facility]: [facility] on: 5/6/2021 09:05 AM     Modules accepted: Orders

## 2021-05-06 NOTE — TELEPHONE ENCOUNTER
Called and spoke with patient  She reports this morning she passed something else and she thinks this is the real stone  She is still having pressure vaginally and in her right flank  Reviewed that prior to surgical planning the provider has recommended stat ct scan  Order was placed this morning  Patient will call central scheduling to schedule CT scan for today  Will FU as results become available

## 2021-05-06 NOTE — TELEPHONE ENCOUNTER
LM advising patient that she must get a CT done, that we have placed orders  Left the number for central scheduling and asked that she pls cb

## 2021-05-06 NOTE — TELEPHONE ENCOUNTER
Carlos [id number], EVONNE  [address] [facility] Clinical             I called the patient to let her know the good news that she has passed her stone  Iglesia Denis is almost complete resolution of her previously noted hydronephrosis   Patient did not answer in a voicemail was left for her to contact the office   No surgical intervention is needed at this time   Please advise continued conservative measures as it may take 1-2 days for her symptoms of subside   Thank you

## 2021-05-06 NOTE — PROGRESS NOTES
1  Acute left-sided low back pain with left-sided sciatica     2  Nondisplaced fracture of distal phalanx of right great toe, initial encounter for closed fracture       No orders of the defined types were placed in this encounter  Impression:  Patient is here in follow up of right toe pain secondary to nondisplaced fracture of distal phalanx of great toe with extension into joint space   Date of injury as 2/22/2021   Continue to wear stiff sneakers  Should only heal from here on out  Patient's left sided low back pain is likely secondary to episacral lipoma/iliac crest syndrome and possibly piriformis syndrome/SI joint dysfunction  Patient is doing well  She is concerned about the aesthetics of her back  Her pain is improving  She can continue with PT for 3-4 more weeks  If aesthetics are her concern, can consider seeing dermatology or plastics  RTC PRN  Imaging Studies (I personally reviewed images in PACS and report):  Right foot x-rays most recent to this encounter reviewed  These images show  interval healing of the 1st distal phalanx fracture that extends into the joint space      Lumbar spine x-rays obtained on 4/7/2021:  These images show a soft tissue mass best seen on lateral view just posterior to the upper coccygeal region which likely represents a soft tissue mass like a lipoma  There are no malignant characteristics to it  No follow-ups on file  HPI:  Ame Lynn is a 63 y o  female  who presents in follow up  Here for   Chief Complaint   Patient presents with    Right Great Toe - Follow-up       Date of injury: The toe is feeling a lot better  It is still slightly swollen  She is wearing normal footwear  Trajectory of symptoms: See above  Review of Systems   Constitutional: Positive for activity change  Negative for fever  HENT: Negative for trouble swallowing  Eyes: Negative for visual disturbance  Respiratory: Negative for shortness of breath  Cardiovascular: Negative for chest pain  Gastrointestinal: Negative for abdominal pain  Denies bowel incontinence  Endocrine: Negative for polydipsia  Genitourinary:        Denies urinary incontinence  Musculoskeletal: Positive for back pain  Negative for arthralgias and gait problem  Skin: Negative for rash  Allergic/Immunologic: Negative for immunocompromised state  Neurological: Negative for weakness and numbness  Denies saddle anesthesia  Hematological: Does not bruise/bleed easily  Psychiatric/Behavioral: Negative for confusion         Following history reviewed and updated:  Past Medical History:   Diagnosis Date    Benign positional vertigo, right     Cervicalgia     DVT (deep venous thrombosis) (HCC)     Graves disease     Headache     Thyroid disease      Past Surgical History:   Procedure Laterality Date    APPENDECTOMY      FL INJECTION LEFT HIP (NON ARTHROGRAM)  1/31/2019    LIPOMA RESECTION      NEUROMA EXCISION      STEROID INJECTION HIP Left 05/2018     Social History   Social History     Substance and Sexual Activity   Alcohol Use Not Currently     Social History     Substance and Sexual Activity   Drug Use No     Social History     Tobacco Use   Smoking Status Light Tobacco Smoker    Packs/day: 0 01    Years: 2 00    Pack years: 0 02    Types: Cigarettes   Smokeless Tobacco Never Used   Tobacco Comment    last smoked 8/16/19     Family History   Problem Relation Age of Onset    Hypertension Mother     Asthma Mother     Diabetes Mother     Hyperlipidemia Mother     Seizures Father    [facility] Migraines Brother     Seizures Brother     Cancer Sister 21        brain    No Known Problems Daughter     No Known Problems Maternal Grandmother     No Known Problems Paternal Grandmother     No Known Problems Maternal Aunt     No Known Problems Maternal Aunt     No Known Problems Maternal Aunt     No Known Problems Paternal Aunt     No Known Problems Paternal Aunt     No Known Problems Paternal Aunt     Breast cancer Neg Hx     Colon cancer Neg Hx     Ovarian cancer Neg Hx     Uterine cancer Neg Hx     Cervical cancer Neg Hx      Allergies   Allergen Reactions    Bee Venom      Bee sting    Contrast [Iodinated Diagnostic Agents] Anaphylaxis     Pt states her throat closes- kk rn     Pollen Extract Other (See Comments)     Throat clearing, difficulty swallowing, ears itchy    Prochlorperazine Other (See Comments)      Aka (compazine)  Delirious         Constitutional:  /85 (BP Location: Right arm, Patient Position: Sitting, Cuff Size: Standard)   Pulse 78   Ht 5' 7" (1 702 m)   Wt 59 4 kg (131 lb)   BMI 20 52 kg/m²    General: NAD  Eyes: Clear sclerae  ENT: No inflammation, lesion, or mass of lips  No tracheal deviation  Musculoskeletal: As mentioned below  Integumentary: No visible rashes or skin lesions  Pulmonary/Chest: Effort normal  No respiratory distress  Neuro: CN's grossly intact, RICHARDS  Psych: Normal affect and judgement  Vascular: WWP  Right Ankle Exam     Tenderness   Right ankle tenderness location: Slightly TTP along the lateral edge of the 1st IP joint  Swelling: none    Other   Erythema: absent  Scars: absent  Sensation: normal  Pulse: present       Back Exam     Tenderness   The patient is experiencing tenderness in the lumbar  Range of Motion   The patient has normal back ROM  Muscle Strength   The patient has normal back strength      Other   Sensation: normal  Gait: normal   Erythema: no back redness  Scars: absent             Procedures

## 2021-05-11 ENCOUNTER — APPOINTMENT (OUTPATIENT)
Dept: PHYSICAL THERAPY | Facility: MEDICAL CENTER | Age: 56
End: 2021-05-11
Payer: COMMERCIAL

## 2021-05-12 LAB
CALCIUM OXALATE DIHYDRATE MFR STONE IR: 100 %
COLOR STONE: NORMAL
COMMENT-STONE3: NORMAL
COMPOSITION: NORMAL
LABORATORY COMMENT REPORT: NORMAL
PHOTO: NORMAL
SIZE STONE: NORMAL MM
SPEC SOURCE SUBJ: NORMAL
STONE ANALYSIS-IMP: NORMAL
WT STONE: 13 MG

## 2021-05-13 ENCOUNTER — TELEPHONE (OUTPATIENT)
Dept: UROLOGY | Facility: CLINIC | Age: 56
End: 2021-05-13

## 2021-05-13 NOTE — TELEPHONE ENCOUNTER
Called and spoke with patient  Reviewed stone analysis per provider along with recommendation  She verbalized understanding  Will send my chart message with additional dietary stone management

## 2021-05-13 NOTE — TELEPHONE ENCOUNTER
----- Message from Mirza Flores PA-C sent at 5/13/2021 10:21 AM EDT -----  Please inform patient that stone analysis was 100% calcium oxalate  Recommend dietary modifications including adequate water intake, adding lemon/citrus to water, and food low in oxalate to prevent future stone formation

## 2021-08-01 ENCOUNTER — APPOINTMENT (INPATIENT)
Dept: RADIOLOGY | Facility: HOSPITAL | Age: 56
End: 2021-08-01
Payer: COMMERCIAL

## 2021-08-01 ENCOUNTER — APPOINTMENT (EMERGENCY)
Dept: ULTRASOUND IMAGING | Facility: HOSPITAL | Age: 56
End: 2021-08-01
Payer: COMMERCIAL

## 2021-08-01 ENCOUNTER — ANESTHESIA EVENT (INPATIENT)
Dept: PERIOP | Facility: HOSPITAL | Age: 56
End: 2021-08-01
Payer: COMMERCIAL

## 2021-08-01 ENCOUNTER — HOSPITAL ENCOUNTER (OUTPATIENT)
Facility: HOSPITAL | Age: 56
Setting detail: OUTPATIENT SURGERY
LOS: 1 days | Discharge: HOME | End: 2021-08-03
Attending: EMERGENCY MEDICINE | Admitting: EMERGENCY MEDICINE
Payer: COMMERCIAL

## 2021-08-01 ENCOUNTER — APPOINTMENT (EMERGENCY)
Dept: RADIOLOGY | Facility: HOSPITAL | Age: 56
End: 2021-08-01
Payer: COMMERCIAL

## 2021-08-01 ENCOUNTER — APPOINTMENT (EMERGENCY)
Dept: CT IMAGING | Facility: HOSPITAL | Age: 56
End: 2021-08-01
Payer: COMMERCIAL

## 2021-08-01 ENCOUNTER — ANESTHESIA (INPATIENT)
Dept: PERIOP | Facility: HOSPITAL | Age: 56
End: 2021-08-01
Payer: COMMERCIAL

## 2021-08-01 DIAGNOSIS — K82.8 SLUDGE IN GALLBLADDER: ICD-10-CM

## 2021-08-01 DIAGNOSIS — R10.11 RIGHT UPPER QUADRANT PAIN: Primary | ICD-10-CM

## 2021-08-01 DIAGNOSIS — R79.89 ELEVATED LFTS: ICD-10-CM

## 2021-08-01 DIAGNOSIS — R10.9 RIGHT FLANK PAIN: ICD-10-CM

## 2021-08-01 PROBLEM — I34.0 MITRAL REGURGITATION: Status: ACTIVE | Noted: 2021-08-01

## 2021-08-01 PROBLEM — I82.409 DVT (DEEP VENOUS THROMBOSIS) (HCC): Status: ACTIVE | Noted: 2021-08-01

## 2021-08-01 PROBLEM — K80.50 BILIARY COLIC: Status: ACTIVE | Noted: 2021-08-01

## 2021-08-01 LAB
ALBUMIN SERPL BCP-MCNC: 3.6 G/DL (ref 3.5–5)
ALP SERPL-CCNC: 216 U/L (ref 46–116)
ALT SERPL W P-5'-P-CCNC: 100 U/L (ref 12–78)
AMORPH URATE CRY URNS QL MICRO: ABNORMAL /HPF
ANION GAP SERPL CALCULATED.3IONS-SCNC: 12 MMOL/L (ref 4–13)
APTT PPP: 29 SECONDS (ref 23–37)
AST SERPL W P-5'-P-CCNC: 118 U/L (ref 5–45)
ATRIAL RATE: 97 BPM
BACTERIA UR QL AUTO: ABNORMAL /HPF
BASOPHILS # BLD AUTO: 0.01 THOUSANDS/ΜL (ref 0–0.1)
BASOPHILS NFR BLD AUTO: 0 % (ref 0–1)
BILIRUB DIRECT SERPL-MCNC: 0.32 MG/DL (ref 0–0.2)
BILIRUB SERPL-MCNC: 0.79 MG/DL (ref 0.2–1)
BILIRUB UR QL STRIP: NEGATIVE
BUN SERPL-MCNC: 11 MG/DL (ref 5–25)
CALCIUM SERPL-MCNC: 8.9 MG/DL (ref 8.3–10.1)
CHLORIDE SERPL-SCNC: 103 MMOL/L (ref 100–108)
CLARITY UR: CLEAR
CO2 SERPL-SCNC: 24 MMOL/L (ref 21–32)
COLOR UR: YELLOW
CREAT SERPL-MCNC: 0.81 MG/DL (ref 0.6–1.3)
EOSINOPHIL # BLD AUTO: 0.02 THOUSAND/ΜL (ref 0–0.61)
EOSINOPHIL NFR BLD AUTO: 0 % (ref 0–6)
ERYTHROCYTE [DISTWIDTH] IN BLOOD BY AUTOMATED COUNT: 12.6 % (ref 11.6–15.1)
GFR SERPL CREATININE-BSD FRML MDRD: 81 ML/MIN/1.73SQ M
GLUCOSE SERPL-MCNC: 128 MG/DL (ref 65–140)
GLUCOSE UR STRIP-MCNC: NEGATIVE MG/DL
HCT VFR BLD AUTO: 41 % (ref 34.8–46.1)
HGB BLD-MCNC: 13.4 G/DL (ref 11.5–15.4)
HGB UR QL STRIP.AUTO: ABNORMAL
IMM GRANULOCYTES # BLD AUTO: 0.05 THOUSAND/UL (ref 0–0.2)
IMM GRANULOCYTES NFR BLD AUTO: 1 % (ref 0–2)
INR PPP: 1.19 (ref 0.84–1.19)
KETONES UR STRIP-MCNC: ABNORMAL MG/DL
LACTATE SERPL-SCNC: 1.1 MMOL/L (ref 0.5–2)
LEUKOCYTE ESTERASE UR QL STRIP: NEGATIVE
LIPASE SERPL-CCNC: 92 U/L (ref 73–393)
LYMPHOCYTES # BLD AUTO: 1.05 THOUSANDS/ΜL (ref 0.6–4.47)
LYMPHOCYTES NFR BLD AUTO: 10 % (ref 14–44)
MCH RBC QN AUTO: 30.2 PG (ref 26.8–34.3)
MCHC RBC AUTO-ENTMCNC: 32.7 G/DL (ref 31.4–37.4)
MCV RBC AUTO: 92 FL (ref 82–98)
MONOCYTES # BLD AUTO: 0.95 THOUSAND/ΜL (ref 0.17–1.22)
MONOCYTES NFR BLD AUTO: 9 % (ref 4–12)
NEUTROPHILS # BLD AUTO: 8.63 THOUSANDS/ΜL (ref 1.85–7.62)
NEUTS SEG NFR BLD AUTO: 80 % (ref 43–75)
NITRITE UR QL STRIP: NEGATIVE
NON-SQ EPI CELLS URNS QL MICRO: ABNORMAL /HPF
NRBC BLD AUTO-RTO: 0 /100 WBCS
P AXIS: 71 DEGREES
PH UR STRIP.AUTO: 5.5 [PH]
PLATELET # BLD AUTO: 219 THOUSANDS/UL (ref 149–390)
PMV BLD AUTO: 10.1 FL (ref 8.9–12.7)
POTASSIUM SERPL-SCNC: 3 MMOL/L (ref 3.5–5.3)
PR INTERVAL: 118 MS
PROCALCITONIN SERPL-MCNC: 0.38 NG/ML
PROT SERPL-MCNC: 7.8 G/DL (ref 6.4–8.2)
PROT UR STRIP-MCNC: ABNORMAL MG/DL
PROTHROMBIN TIME: 14.5 SECONDS (ref 11.6–14.5)
QRS AXIS: 87 DEGREES
QRSD INTERVAL: 92 MS
QT INTERVAL: 340 MS
QTC INTERVAL: 431 MS
RBC # BLD AUTO: 4.44 MILLION/UL (ref 3.81–5.12)
RBC #/AREA URNS AUTO: ABNORMAL /HPF
SARS-COV-2 RNA RESP QL NAA+PROBE: NEGATIVE
SODIUM SERPL-SCNC: 139 MMOL/L (ref 136–145)
SP GR UR STRIP.AUTO: 1.02 (ref 1–1.03)
T WAVE AXIS: -9 DEGREES
TROPONIN I SERPL-MCNC: <0.02 NG/ML
UROBILINOGEN UR QL STRIP.AUTO: 1 E.U./DL
VENTRICULAR RATE: 97 BPM
WBC # BLD AUTO: 10.71 THOUSAND/UL (ref 4.31–10.16)
WBC #/AREA URNS AUTO: ABNORMAL /HPF

## 2021-08-01 PROCEDURE — 99285 EMERGENCY DEPT VISIT HI MDM: CPT | Performed by: PHYSICIAN ASSISTANT

## 2021-08-01 PROCEDURE — 83690 ASSAY OF LIPASE: CPT | Performed by: PHYSICIAN ASSISTANT

## 2021-08-01 PROCEDURE — 80048 BASIC METABOLIC PNL TOTAL CA: CPT | Performed by: PHYSICIAN ASSISTANT

## 2021-08-01 PROCEDURE — 88304 TISSUE EXAM BY PATHOLOGIST: CPT | Performed by: PATHOLOGY

## 2021-08-01 PROCEDURE — 93010 ELECTROCARDIOGRAM REPORT: CPT | Performed by: INTERNAL MEDICINE

## 2021-08-01 PROCEDURE — 99223 1ST HOSP IP/OBS HIGH 75: CPT | Performed by: SURGERY

## 2021-08-01 PROCEDURE — 99285 EMERGENCY DEPT VISIT HI MDM: CPT

## 2021-08-01 PROCEDURE — 87040 BLOOD CULTURE FOR BACTERIA: CPT | Performed by: PHYSICIAN ASSISTANT

## 2021-08-01 PROCEDURE — 81001 URINALYSIS AUTO W/SCOPE: CPT | Performed by: PHYSICIAN ASSISTANT

## 2021-08-01 PROCEDURE — 84484 ASSAY OF TROPONIN QUANT: CPT | Performed by: PHYSICIAN ASSISTANT

## 2021-08-01 PROCEDURE — 80074 ACUTE HEPATITIS PANEL: CPT | Performed by: PHYSICIAN ASSISTANT

## 2021-08-01 PROCEDURE — 83605 ASSAY OF LACTIC ACID: CPT | Performed by: PHYSICIAN ASSISTANT

## 2021-08-01 PROCEDURE — 36415 COLL VENOUS BLD VENIPUNCTURE: CPT | Performed by: PHYSICIAN ASSISTANT

## 2021-08-01 PROCEDURE — 80076 HEPATIC FUNCTION PANEL: CPT | Performed by: PHYSICIAN ASSISTANT

## 2021-08-01 PROCEDURE — 96375 TX/PRO/DX INJ NEW DRUG ADDON: CPT

## 2021-08-01 PROCEDURE — 76705 ECHO EXAM OF ABDOMEN: CPT

## 2021-08-01 PROCEDURE — 96361 HYDRATE IV INFUSION ADD-ON: CPT

## 2021-08-01 PROCEDURE — 47562 LAPAROSCOPIC CHOLECYSTECTOMY: CPT | Performed by: CLINICAL NURSE SPECIALIST

## 2021-08-01 PROCEDURE — 84145 PROCALCITONIN (PCT): CPT | Performed by: PHYSICIAN ASSISTANT

## 2021-08-01 PROCEDURE — 96365 THER/PROPH/DIAG IV INF INIT: CPT

## 2021-08-01 PROCEDURE — U0003 INFECTIOUS AGENT DETECTION BY NUCLEIC ACID (DNA OR RNA); SEVERE ACUTE RESPIRATORY SYNDROME CORONAVIRUS 2 (SARS-COV-2) (CORONAVIRUS DISEASE [COVID-19]), AMPLIFIED PROBE TECHNIQUE, MAKING USE OF HIGH THROUGHPUT TECHNOLOGIES AS DESCRIBED BY CMS-2020-01-R: HCPCS | Performed by: PHYSICIAN ASSISTANT

## 2021-08-01 PROCEDURE — U0005 INFEC AGEN DETEC AMPLI PROBE: HCPCS | Performed by: PHYSICIAN ASSISTANT

## 2021-08-01 PROCEDURE — 71045 X-RAY EXAM CHEST 1 VIEW: CPT

## 2021-08-01 PROCEDURE — 74176 CT ABD & PELVIS W/O CONTRAST: CPT

## 2021-08-01 PROCEDURE — 85610 PROTHROMBIN TIME: CPT | Performed by: PHYSICIAN ASSISTANT

## 2021-08-01 PROCEDURE — 47562 LAPAROSCOPIC CHOLECYSTECTOMY: CPT | Performed by: SURGERY

## 2021-08-01 PROCEDURE — 85025 COMPLETE CBC W/AUTO DIFF WBC: CPT | Performed by: PHYSICIAN ASSISTANT

## 2021-08-01 PROCEDURE — 85730 THROMBOPLASTIN TIME PARTIAL: CPT | Performed by: PHYSICIAN ASSISTANT

## 2021-08-01 RX ORDER — MAGNESIUM HYDROXIDE 1200 MG/15ML
LIQUID ORAL AS NEEDED
Status: DISCONTINUED | OUTPATIENT
Start: 2021-08-01 | End: 2021-08-01 | Stop reason: HOSPADM

## 2021-08-01 RX ORDER — GLYCOPYRROLATE 0.2 MG/ML
INJECTION INTRAMUSCULAR; INTRAVENOUS AS NEEDED
Status: DISCONTINUED | OUTPATIENT
Start: 2021-08-01 | End: 2021-08-01

## 2021-08-01 RX ORDER — KETOROLAC TROMETHAMINE 30 MG/ML
30 INJECTION, SOLUTION INTRAMUSCULAR; INTRAVENOUS ONCE
Status: COMPLETED | OUTPATIENT
Start: 2021-08-01 | End: 2021-08-01

## 2021-08-01 RX ORDER — NEOSTIGMINE METHYLSULFATE 1 MG/ML
INJECTION INTRAVENOUS AS NEEDED
Status: DISCONTINUED | OUTPATIENT
Start: 2021-08-01 | End: 2021-08-01

## 2021-08-01 RX ORDER — MIDAZOLAM HYDROCHLORIDE 2 MG/2ML
INJECTION, SOLUTION INTRAMUSCULAR; INTRAVENOUS AS NEEDED
Status: DISCONTINUED | OUTPATIENT
Start: 2021-08-01 | End: 2021-08-01

## 2021-08-01 RX ORDER — HYDROMORPHONE HCL/PF 1 MG/ML
0.5 SYRINGE (ML) INJECTION EVERY 4 HOURS PRN
Status: DISCONTINUED | OUTPATIENT
Start: 2021-08-01 | End: 2021-08-03

## 2021-08-01 RX ORDER — ROCURONIUM BROMIDE 10 MG/ML
INJECTION, SOLUTION INTRAVENOUS AS NEEDED
Status: DISCONTINUED | OUTPATIENT
Start: 2021-08-01 | End: 2021-08-01

## 2021-08-01 RX ORDER — FENTANYL CITRATE 50 UG/ML
INJECTION, SOLUTION INTRAMUSCULAR; INTRAVENOUS AS NEEDED
Status: DISCONTINUED | OUTPATIENT
Start: 2021-08-01 | End: 2021-08-01

## 2021-08-01 RX ORDER — MORPHINE SULFATE 4 MG/ML
4 INJECTION, SOLUTION INTRAMUSCULAR; INTRAVENOUS ONCE
Status: COMPLETED | OUTPATIENT
Start: 2021-08-01 | End: 2021-08-01

## 2021-08-01 RX ORDER — DEXAMETHASONE SODIUM PHOSPHATE 4 MG/ML
INJECTION, SOLUTION INTRA-ARTICULAR; INTRALESIONAL; INTRAMUSCULAR; INTRAVENOUS; SOFT TISSUE AS NEEDED
Status: DISCONTINUED | OUTPATIENT
Start: 2021-08-01 | End: 2021-08-01

## 2021-08-01 RX ORDER — SODIUM CHLORIDE, SODIUM LACTATE, POTASSIUM CHLORIDE, CALCIUM CHLORIDE 600; 310; 30; 20 MG/100ML; MG/100ML; MG/100ML; MG/100ML
50 INJECTION, SOLUTION INTRAVENOUS CONTINUOUS
Status: DISCONTINUED | OUTPATIENT
Start: 2021-08-01 | End: 2021-08-03

## 2021-08-01 RX ORDER — ACETAMINOPHEN 325 MG/1
650 TABLET ORAL EVERY 6 HOURS PRN
Status: DISCONTINUED | OUTPATIENT
Start: 2021-08-01 | End: 2021-08-03 | Stop reason: HOSPADM

## 2021-08-01 RX ORDER — LABETALOL 20 MG/4 ML (5 MG/ML) INTRAVENOUS SYRINGE
AS NEEDED
Status: DISCONTINUED | OUTPATIENT
Start: 2021-08-01 | End: 2021-08-01

## 2021-08-01 RX ORDER — HYDROMORPHONE HCL/PF 1 MG/ML
0.2 SYRINGE (ML) INJECTION
Status: DISCONTINUED | OUTPATIENT
Start: 2021-08-01 | End: 2021-08-03

## 2021-08-01 RX ORDER — METOCLOPRAMIDE HYDROCHLORIDE 5 MG/ML
10 INJECTION INTRAMUSCULAR; INTRAVENOUS ONCE AS NEEDED
Status: DISCONTINUED | OUTPATIENT
Start: 2021-08-01 | End: 2021-08-01 | Stop reason: HOSPADM

## 2021-08-01 RX ORDER — ONDANSETRON 2 MG/ML
INJECTION INTRAMUSCULAR; INTRAVENOUS AS NEEDED
Status: DISCONTINUED | OUTPATIENT
Start: 2021-08-01 | End: 2021-08-01

## 2021-08-01 RX ORDER — DIPHENHYDRAMINE HYDROCHLORIDE 50 MG/ML
12.5 INJECTION INTRAMUSCULAR; INTRAVENOUS ONCE AS NEEDED
Status: DISCONTINUED | OUTPATIENT
Start: 2021-08-01 | End: 2021-08-01 | Stop reason: HOSPADM

## 2021-08-01 RX ORDER — CEFAZOLIN SODIUM 1 G/50ML
1000 SOLUTION INTRAVENOUS ONCE
Status: COMPLETED | OUTPATIENT
Start: 2021-08-01 | End: 2021-08-01

## 2021-08-01 RX ORDER — KETOROLAC TROMETHAMINE 30 MG/ML
15 INJECTION, SOLUTION INTRAMUSCULAR; INTRAVENOUS EVERY 6 HOURS PRN
Status: DISCONTINUED | OUTPATIENT
Start: 2021-08-01 | End: 2021-08-03 | Stop reason: HOSPADM

## 2021-08-01 RX ORDER — FENTANYL CITRATE/PF 50 MCG/ML
50 SYRINGE (ML) INJECTION
Status: DISCONTINUED | OUTPATIENT
Start: 2021-08-01 | End: 2021-08-01 | Stop reason: HOSPADM

## 2021-08-01 RX ORDER — PROPOFOL 10 MG/ML
INJECTION, EMULSION INTRAVENOUS AS NEEDED
Status: DISCONTINUED | OUTPATIENT
Start: 2021-08-01 | End: 2021-08-01

## 2021-08-01 RX ORDER — HYDROMORPHONE HCL/PF 1 MG/ML
0.5 SYRINGE (ML) INJECTION
Status: DISCONTINUED | OUTPATIENT
Start: 2021-08-01 | End: 2021-08-01 | Stop reason: HOSPADM

## 2021-08-01 RX ORDER — HYDROMORPHONE HCL 110MG/55ML
PATIENT CONTROLLED ANALGESIA SYRINGE INTRAVENOUS AS NEEDED
Status: DISCONTINUED | OUTPATIENT
Start: 2021-08-01 | End: 2021-08-01

## 2021-08-01 RX ORDER — ONDANSETRON 2 MG/ML
4 INJECTION INTRAMUSCULAR; INTRAVENOUS ONCE AS NEEDED
Status: DISCONTINUED | OUTPATIENT
Start: 2021-08-01 | End: 2021-08-01 | Stop reason: HOSPADM

## 2021-08-01 RX ORDER — LIDOCAINE HYDROCHLORIDE 10 MG/ML
INJECTION, SOLUTION EPIDURAL; INFILTRATION; INTRACAUDAL; PERINEURAL AS NEEDED
Status: DISCONTINUED | OUTPATIENT
Start: 2021-08-01 | End: 2021-08-01

## 2021-08-01 RX ORDER — PROPOFOL 10 MG/ML
INJECTION, EMULSION INTRAVENOUS CONTINUOUS PRN
Status: DISCONTINUED | OUTPATIENT
Start: 2021-08-01 | End: 2021-08-01

## 2021-08-01 RX ADMIN — NEOSTIGMINE METHYLSULFATE 2 MG: 1 INJECTION INTRAVENOUS at 18:09

## 2021-08-01 RX ADMIN — FENTANYL CITRATE 100 MCG: 50 INJECTION, SOLUTION INTRAMUSCULAR; INTRAVENOUS at 17:33

## 2021-08-01 RX ADMIN — SODIUM CHLORIDE, SODIUM LACTATE, POTASSIUM CHLORIDE, AND CALCIUM CHLORIDE 125 ML/HR: .6; .31; .03; .02 INJECTION, SOLUTION INTRAVENOUS at 20:14

## 2021-08-01 RX ADMIN — DEXAMETHASONE SODIUM PHOSPHATE 4 MG: 4 INJECTION, SOLUTION INTRAMUSCULAR; INTRAVENOUS at 17:36

## 2021-08-01 RX ADMIN — MORPHINE SULFATE 4 MG: 4 INJECTION INTRAVENOUS at 10:48

## 2021-08-01 RX ADMIN — MIDAZOLAM HYDROCHLORIDE 2 MG: 1 INJECTION, SOLUTION INTRAMUSCULAR; INTRAVENOUS at 17:27

## 2021-08-01 RX ADMIN — PROPOFOL 200 MG: 10 INJECTION, EMULSION INTRAVENOUS at 17:33

## 2021-08-01 RX ADMIN — ONDANSETRON 4 MG: 2 INJECTION INTRAMUSCULAR; INTRAVENOUS at 18:09

## 2021-08-01 RX ADMIN — ROCURONIUM BROMIDE 30 MG: 10 INJECTION, SOLUTION INTRAVENOUS at 17:33

## 2021-08-01 RX ADMIN — LIDOCAINE HYDROCHLORIDE 50 MG: 10 INJECTION, SOLUTION EPIDURAL; INFILTRATION; INTRACAUDAL; PERINEURAL at 17:33

## 2021-08-01 RX ADMIN — KETOROLAC TROMETHAMINE 15 MG: 30 INJECTION, SOLUTION INTRAMUSCULAR at 21:22

## 2021-08-01 RX ADMIN — SODIUM CHLORIDE, SODIUM LACTATE, POTASSIUM CHLORIDE, AND CALCIUM CHLORIDE: .6; .31; .03; .02 INJECTION, SOLUTION INTRAVENOUS at 17:28

## 2021-08-01 RX ADMIN — KETOROLAC TROMETHAMINE 30 MG: 30 INJECTION, SOLUTION INTRAMUSCULAR; INTRAVENOUS at 07:40

## 2021-08-01 RX ADMIN — HYDROMORPHONE HYDROCHLORIDE 0.5 MG: 2 INJECTION, SOLUTION INTRAMUSCULAR; INTRAVENOUS; SUBCUTANEOUS at 17:56

## 2021-08-01 RX ADMIN — GLYCOPYRROLATE 0.4 MG: 0.2 INJECTION, SOLUTION INTRAMUSCULAR; INTRAVENOUS at 18:09

## 2021-08-01 RX ADMIN — CEFTRIAXONE 2000 MG: 2 INJECTION, POWDER, FOR SOLUTION INTRAMUSCULAR; INTRAVENOUS at 14:30

## 2021-08-01 RX ADMIN — PROPOFOL 100 MCG/KG/MIN: 10 INJECTION, EMULSION INTRAVENOUS at 17:36

## 2021-08-01 RX ADMIN — SODIUM CHLORIDE 1000 ML: 0.9 INJECTION, SOLUTION INTRAVENOUS at 07:39

## 2021-08-01 RX ADMIN — CEFAZOLIN SODIUM 1000 MG: 1 SOLUTION INTRAVENOUS at 16:53

## 2021-08-01 RX ADMIN — LABETALOL 20 MG/4 ML (5 MG/ML) INTRAVENOUS SYRINGE 5 MG: at 17:53

## 2021-08-01 NOTE — PLAN OF CARE
Problem: PAIN - ADULT  Goal: Verbalizes/displays adequate comfort level or baseline comfort level  Description: Interventions:  - Encourage patient to monitor pain and request assistance  - Assess pain using appropriate pain scale  - Administer analgesics based on type and severity of pain and evaluate response  - Implement non-pharmacological measures as appropriate and evaluate response  - Consider cultural and social influences on pain and pain management  - Notify physician/advanced practitioner if interventions unsuccessful or patient reports new pain  Outcome: Progressing     Problem: INFECTION - ADULT  Goal: Absence or prevention of progression during hospitalization  Description: INTERVENTIONS:  - Assess and monitor for signs and symptoms of infection  - Monitor lab/diagnostic results  - Monitor all insertion sites, i e  indwelling lines, tubes, and drains  - Monitor endotracheal if appropriate and nasal secretions for changes in amount and color  - [place] appropriate cooling/warming therapies per order  - Administer medications as ordered  - Instruct and encourage patient and family to use good hand hygiene technique  - Identify and instruct in appropriate isolation precautions for identified infection/condition  Outcome: Progressing  Goal: Absence of fever/infection during neutropenic period  Description: INTERVENTIONS:  - Monitor WBC    Outcome: Progressing     Problem: SAFETY ADULT  Goal: Patient will remain free of falls  Description: INTERVENTIONS:  - Educate patient/family on patient safety including physical limitations  - Instruct patient to call for assistance with activity   - Consult OT/PT to assist with strengthening/mobility   - Keep Call bell within reach  - Keep bed low and locked with side rails adjusted as appropriate  - Keep care items and personal belongings within reach  - Initiate and maintain comfort rounds  - Make Fall Risk Sign visible to staff  - Offer Toileting every 2 Hours, in advance of need  - Initiate/Maintain alarm  - Obtain necessary fall risk management equipment:   - Apply yellow socks and bracelet for high fall risk patients  - Consider moving patient to room near nurses station  Outcome: Progressing  Goal: Maintain or return to baseline ADL function  Description: INTERVENTIONS:  -  Assess patient's ability to carry out ADLs; assess patient's baseline for ADL function and identify physical deficits which impact ability to perform ADLs (bathing, care of mouth/teeth, toileting, grooming, dressing, etc )  - Assess/evaluate cause of self-care deficits   - Assess range of motion  - Assess patient's mobility; develop plan if impaired  - Assess patient's need for assistive devices and provide as appropriate  - Encourage maximum independence but intervene and supervise when necessary  - Involve family in performance of ADLs  - Assess for home care needs following discharge   - Consider OT consult to assist with ADL evaluation and planning for discharge  - Provide patient education as appropriate  Outcome: Progressing  Goal: Maintains/Returns to pre admission functional level  Description: INTERVENTIONS:  - Perform BMAT or MOVE assessment daily    - Set and communicate daily mobility goal to care team and patient/family/caregiver  - Collaborate with rehabilitation services on mobility goals if consulted  - Perform Range of Motion 3 times a day  - Reposition patient every 2 hours    - Dangle patient 3 times a day  - Stand patient 3 times a day  - Ambulate patient 3 times a day  - Out of bed to chair 3 times a day   - Out of bed for meals 3 times a day  - Out of bed for toileting  - Record patient progress and toleration of activity level   Outcome: Progressing     Problem: DISCHARGE PLANNING  Goal: Discharge to home or other facility with appropriate resources  Description: INTERVENTIONS:  - Identify barriers to discharge w/patient and caregiver  - Arrange for needed discharge resources and transportation as appropriate  - Identify discharge learning needs (meds, wound care, etc )  - Arrange for interpretive services to assist at discharge as needed  - Refer to Case Management Department for coordinating discharge planning if the patient needs post-hospital services based on physician/advanced practitioner order or complex needs related to functional status, cognitive ability, or social support system  Outcome: Progressing     Problem: Knowledge Deficit  Goal: Patient/family/caregiver demonstrates understanding of disease process, treatment plan, medications, and discharge instructions  Description: Complete learning assessment and assess knowledge base    Interventions:  - Provide teaching at level of understanding  - Provide teaching via preferred learning methods  Outcome: Progressing

## 2021-08-01 NOTE — H&P
H&P Exam - General Surgery   Tomy Dixon 63 y o  female MRN: [id number]  Unit/Bed#: OR [place] Encounter: [id number]    Assessment/Plan     Assessment:  64 yo female with biliary colic which is acute on chronic, she also has some elevated LFTs which might represent a transient passage of a gallstone into the biliary tree  She also has some signs and symptoms (right perinephric stranding, blood in urine, fevers/chills, flank pain suggestive of a urinary tract etiology  Plan:  Laparoscopic cholecystectomy with cholangiogram  We discussed the risks and benefits of cholecystectomy and the cholangiogram to ensure the bile ducts are patent and without filling defect to suggest choledocholithiasis  We will address her history of postoperative nausea and vomiting and treat accordingly  I explained that while she may have two separate etiologies we cannot guarantee she will completely resolve after the surgery I do think she has symptoms consistent with biliary colic and will experience some improvement  She expressed understanding and signed consent  History of Present Illness     HPI:  Tomy Dixon is a 63 y o  female who presents with abdominal and flank pain  She has been having symptoms since at least Thursday or Friday with pain under her ribs on the right  This did progress to the flank making her think she had another kidney stone as she has a longstanding history of that since childhood  She started a po abx as well as flomax without relief  She is now experiencing subjective fevers/chills and nausea and vomiting  She thinks this does not feel exactly like her prior kidney stones  Review of Systems   Constitutional: Negative for unexpected weight change  HENT: Negative for hearing loss and trouble swallowing  Eyes: Negative for visual disturbance  Respiratory: Positive for cough (related to reflux/regurgitation)  Negative for shortness of breath and stridor      Cardiovascular: Negative for chest pain  Gastrointestinal: Positive for abdominal pain and constipation  Endocrine: Positive for cold intolerance  Negative for heat intolerance  Genitourinary: Positive for flank pain and hematuria  Musculoskeletal: Negative for gait problem  Skin: Negative for color change  Allergic/Immunologic: Negative for immunocompromised state  Neurological: Negative for speech difficulty  Hematological: Does not bruise/bleed easily  Psychiatric/Behavioral: Negative for suicidal ideas  Historical Information   Past Medical History:   Diagnosis Date    Benign positional vertigo, right     Cervicalgia     DVT (deep venous thrombosis) (HCC)     Graves disease     Headache     Hypertension     Thyroid disease      Past Surgical History:   Procedure Laterality Date    APPENDECTOMY      FL INJECTION LEFT HIP (NON ARTHROGRAM)  1/31/2019    LIPOMA RESECTION      NEUROMA EXCISION      STEROID INJECTION HIP Left 05/2018     Social History   Social History     Substance and Sexual Activity   Alcohol Use Not Currently     Social History     Substance and Sexual Activity   Drug Use No     Social History     Tobacco Use   Smoking Status Light Tobacco Smoker    Packs/day: 0 01    Years: 2 00    Pack years: 0 02    Types: Cigarettes   Smokeless Tobacco Never Used   Tobacco Comment    last smoked 8/16/19     E-Cigarette/Vaping    E-Cigarette Use Never User      E-Cigarette/Vaping Substances    Nicotine No     THC No     CBD No     Flavoring No     Other No     Unknown No      Family History: non-contributory    Meds/Allergies   PTA meds:   Prior to Admission Medications   Prescriptions Last Dose Informant Patient Reported? Taking?    Diclofenac Sodium (VOLTAREN) 1 % Unknown at Unknown time Self No No   Sig: Apply 2 g topically 3 (three) times a day as needed (Pain)   EPINEPHrine (EPIPEN) 0 3 mg/0 3 mL SOAJ  Self No No   Sig: Inject 0 3 mL (0 3 mg total) into a muscle once for 1 dose   HYDROcodone-acetaminophen (NORCO) 5-325 mg per tablet Unknown at Unknown time Self No No   Sig: Take 1 tablet by mouth every 6 (six) hours as needed for pain (severe pain, not otherwise controlled)Max Daily Amount: 4 tablets   SUMAtriptan (IMITREX) 100 mg tablet Unknown at Unknown time Self Yes No   Sig: Take by mouth once as needed     VITAMIN D PO Unknown at Unknown time  Yes No   Sig: Take by mouth   aspirin-acetaminophen-caffeine (EXCEDRIN MIGRAINE) 250-250-65 MG per tablet 7/31/2021 at Unknown time Self Yes Yes   Sig: Take 1 tablet by mouth every 6 (six) hours as needed   calcium citrate (CALCITRATE) 950 (200 Ca) MG tablet Unknown at Unknown time  Yes No   Sig: Take 1 tablet by mouth daily   dexlansoprazole (Dexilant) 60 MG capsule Not Taking at Unknown time Self No No   Sig: Take 1 capsule (60 mg total) by mouth daily   Patient not taking: Reported on 5/6/2021   glucosamine-chondroitin 500-400 MG tablet Unknown at Unknown time Self Yes No   Sig: Take 1 tablet by mouth as needed    ketorolac (TORADOL) 10 mg tablet Unknown at Unknown time  No No   Sig: Take 1 tablet (10 mg total) by mouth every 6 (six) hours as needed for moderate pain   lidocaine (LIDODERM) 5 %  Self No No   Sig: Apply 1 patch topically daily Remove & Discard patch within 12 hours or as directed     methylPREDNISolone 4 MG tablet therapy pack  Self No No   Sig: Use as directed on package   multivitamin (THERAGRAN) TABS Unknown at Unknown time Self Yes No   Sig: Take 1 tablet by mouth daily     Patient not taking: Reported on 8/1/2021   ondansetron (ZOFRAN-ODT) 4 mg disintegrating tablet Unknown at Unknown time Self No No   Sig: Take 1 tablet (4 mg total) by mouth every 6 (six) hours as needed for nausea or vomiting   tamsulosin (FLOMAX) 0 4 mg 7/31/2021 at Unknown time Self No Yes   Sig: Take 1 capsule (0 4 mg total) by mouth daily with dinner Until you pass your stone      Facility-Administered Medications: None     Allergies   Allergen Reactions    Bee Venom      Bee sting    Contrast [Iodinated Diagnostic Agents] Anaphylaxis     Pt states her throat closes- kk rn     Pollen Extract Other (See Comments)     Throat clearing, difficulty swallowing, ears itchy    Prochlorperazine Other (See Comments)      Aka (compazine)  Delirious        Objective   First Vitals:   Blood Pressure: 129/84 (08/01/21 0635)  Pulse: (!) 115 (08/01/21 0635)  Temperature: 99 2 °F (37 3 °C) (08/01/21 0638)  Temp Source: Oral (08/01/21 6746)  Respirations: 19 (08/01/21 0635)  Weight - Scale: 59 8 kg (131 lb 13 4 oz) (08/01/21 0635)  SpO2: 96 % (08/01/21 0635)    Current Vitals:   Blood Pressure: 147/81 (08/01/21 1600)  Pulse: 86 (08/01/21 1600)  Temperature: 98 5 °F (36 9 °C) (08/01/21 1600)  Temp Source: Temporal (08/01/21 1600)  Respirations: 16 (08/01/21 1600)  Weight - Scale: 59 8 kg (131 lb 13 4 oz) (08/01/21 0635)  SpO2: 98 % (08/01/21 1600)    No intake or output data in the 24 hours ending 08/01/21 1714    Invasive Devices     Peripheral Intravenous Line            Peripheral IV 05/01/21 Right Antecubital 92 days    Peripheral IV 08/01/21 Right Antecubital <1 day                Physical Exam  Vitals reviewed  Constitutional:       General: She is not in acute distress  Appearance: Normal appearance  She is normal weight  She is ill-appearing  She is not toxic-appearing or diaphoretic  HENT:      Head: Normocephalic and atraumatic  Right Ear: External ear normal       Left Ear: External ear normal       Nose: Nose normal       Mouth/Throat:      Mouth: Mucous membranes are moist    Eyes:      General: No scleral icterus  Cardiovascular:      Rate and Rhythm: Normal rate  Pulses: Normal pulses  Pulmonary:      Effort: Pulmonary effort is normal  No respiratory distress  Abdominal:      General: Abdomen is flat  Palpations: Abdomen is soft  Tenderness: There is abdominal tenderness (RUQ/[place])  There is right CVA tenderness   There is no left CVA tenderness  Musculoskeletal:         General: Normal range of motion  Cervical back: Normal range of motion  Skin:     General: Skin is warm  Coloration: Skin is not jaundiced  Neurological:      Mental Status: She is alert and oriented to person, place, and time  Gait: Gait normal    Psychiatric:         Mood and Affect: Mood normal          Behavior: Behavior normal          Thought Content: Thought content normal          Judgment: Judgment normal          Lab Results:   I have personally reviewed pertinent lab results  , CBC:   Lab Results   Component Value Date    WBC 10 71 (H) 08/01/2021    HGB 13 4 08/01/2021    HCT 41 0 08/01/2021    MCV 92 08/01/2021     08/01/2021    MCH 30 2 08/01/2021    MCHC 32 7 08/01/2021    RDW 12 6 08/01/2021    MPV 10 1 08/01/2021    NRBC 0 08/01/2021   , CMP:   Lab Results   Component Value Date    SODIUM 139 08/01/2021    K 3 0 (L) 08/01/2021     08/01/2021    CO2 24 08/01/2021    BUN 11 08/01/2021    CREATININE 0 81 08/01/2021    CALCIUM 8 9 08/01/2021     (H) 08/01/2021     (H) 08/01/2021    ALKPHOS 216 (H) 08/01/2021    EGFR 81 08/01/2021   , Coagulation:   Lab Results   Component Value Date    INR 1 19 08/01/2021   , Urinalysis:   Lab Results   Component Value Date    COLORU Yellow 08/01/2021    CLARITYU Clear 08/01/2021    SPECGRAV 1 020 08/01/2021    PHUR 5 5 08/01/2021    LEUKOCYTESUR Negative 08/01/2021    NITRITE Negative 08/01/2021    GLUCOSEU Negative 08/01/2021    KETONESU 15 (1+) (A) 08/01/2021    BILIRUBINUR Negative 08/01/2021    BLOODU Large (A) 08/01/2021   , Amylase: No results found for: AMYLASE, Lipase:   Lab Results   Component Value Date    LIPASE 92 08/01/2021     Imaging: I have personally reviewed pertinent reports  and I have personally reviewed pertinent films in PACS  EKG, Pathology, and Other Studies: I have personally reviewed pertinent reports        Code Status: No Order  Advance Directive and Living Will:      Power of :    POLST:      Counseling / Coordination of Care  Total floor / unit time spent today 20 minutes  Greater than 50% of total time was spent with the patient and / or family counseling and / or coordination of care  A description of the counseling / coordination of care: informed consent, discussing with ED provider plan of care

## 2021-08-01 NOTE — ED PROVIDER NOTES
History  Chief Complaint   Patient presents with    Flank Pain     PT c/o right sided flank pain starting friday  Pt has hx kidney stones and started herself on flomax and since has been getting worse with fever  Tomy Ashraf is a 41-KCCX-WENDY female with past medical history including nephrolithiasis arriving to the emergency department for evaluation with chief complaint of right flank pain x 3 days  She reports onset of fevers yesterday evening with temperature of 99 2 degrees F oral on arrival   Patient additionally reports constitutional symptoms of headache, malaise, body aches, and dry cough  The patient also states that she is experiencing abdominal pain located in the right upper quadrant, underneath her ribs  She denies episodes of vomiting, diarrhea  She denies any stool changes to include melena or bright red blood per rectum  The patient states that she was initially concerned that she may have a kidney infection/kidney stone given her history of kidney stones, stating that she had taken Flomax and Keflex yesterday which did not offer symptom alleviation  She does not appreciate any exacerbating or alleviating factors with regard to her discomfort, expressing difficulty in finding a position of comfort  She notes that her pain is not exacerbated in relation to meals, coughing, or deep breathing  She has no appreciable urinary complaints, denying dysuria, urgency, frequency, hematuria, malodorous urine, or cloudy urine  She has not had any known sick contact  Her past surgical history is notable for appendectomy  Patient offers no further complaints at this time  Prior to Admission Medications   Prescriptions Last Dose Informant Patient Reported? Taking?    Diclofenac Sodium (VOLTAREN) 1 %  Self No No   Sig: Apply 2 g topically 3 (three) times a day as needed (Pain)   EPINEPHrine (EPIPEN) 0 3 mg/0 3 mL SOAJ  Self No No   Sig: Inject 0 3 mL (0 3 mg total) into a muscle once for 1 dose   HYDROcodone-acetaminophen (NORCO) 5-325 mg per tablet  Self No No   Sig: Take 1 tablet by mouth every 6 (six) hours as needed for pain (severe pain, not otherwise controlled)Max Daily Amount: 4 tablets   SUMAtriptan (IMITREX) 100 mg tablet  Self Yes No   Sig: Take by mouth once as needed     VITAMIN D PO   Yes No   Sig: Take by mouth   aspirin-acetaminophen-caffeine (EXCEDRIN MIGRAINE) 250-250-65 MG per tablet  Self Yes No   Sig: Take 1 tablet by mouth every 6 (six) hours as needed   calcium citrate (CALCITRATE) 950 (200 Ca) MG tablet   Yes No   Sig: Take 1 tablet by mouth daily   dexlansoprazole (Dexilant) 60 MG capsule  Self No No   Sig: Take 1 capsule (60 mg total) by mouth daily   Patient not taking: Reported on 5/6/2021   glucosamine-chondroitin 500-400 MG tablet  Self Yes No   Sig: Take 1 tablet by mouth as needed    ketorolac (TORADOL) 10 mg tablet   No No   Sig: Take 1 tablet (10 mg total) by mouth every 6 (six) hours as needed for moderate pain   lidocaine (LIDODERM) 5 %  Self No No   Sig: Apply 1 patch topically daily Remove & Discard patch within 12 hours or as directed     methylPREDNISolone 4 MG tablet therapy pack  Self No No   Sig: Use as directed on package   multivitamin (THERAGRAN) TABS  Self Yes No   Sig: Take 1 tablet by mouth daily     ondansetron (ZOFRAN-ODT) 4 mg disintegrating tablet  Self No No   Sig: Take 1 tablet (4 mg total) by mouth every 6 (six) hours as needed for nausea or vomiting   tamsulosin (FLOMAX) 0 4 mg  Self No No   Sig: Take 1 capsule (0 4 mg total) by mouth daily with dinner Until you pass your stone      Facility-Administered Medications: None       Past Medical History:   Diagnosis Date    Benign positional vertigo, right     Cervicalgia     DVT (deep venous thrombosis) (HCC)     Graves disease     Headache     Thyroid disease        Past Surgical History:   Procedure Laterality Date    APPENDECTOMY      FL INJECTION LEFT HIP (NON ARTHROGRAM)  1/31/2019  LIPOMA RESECTION      NEUROMA EXCISION      STEROID INJECTION HIP Left 05/2018       Family History   Problem Relation Age of Onset    Hypertension Mother     Asthma Mother     Diabetes Mother     Hyperlipidemia Mother     Seizures Father     Migraines Brother     Seizures Brother     Cancer Sister 21        brain    No Known Problems Daughter     No Known Problems Maternal Grandmother     No Known Problems Paternal Grandmother     No Known Problems Maternal Aunt     No Known Problems Maternal Aunt     No Known Problems Maternal Aunt     No Known Problems Paternal Aunt     No Known Problems Paternal Aunt     No Known Problems Paternal Aunt     Breast cancer Neg Hx     Colon cancer Neg Hx     Ovarian cancer Neg Hx     Uterine cancer Neg Hx     Cervical cancer Neg Hx      I have reviewed and agree with the history as documented  E-Cigarette/Vaping    E-Cigarette Use Never User      E-Cigarette/Vaping Substances    Nicotine No     THC No     CBD No     Flavoring No     Other No     Unknown No      Social History     Tobacco Use    Smoking status: Light Tobacco Smoker     Packs/day: 0 01     Years: 2 00     Pack years: 0 02     Types: Cigarettes    Smokeless tobacco: Never Used    Tobacco comment: last smoked 8/16/19   Vaping Use    Vaping Use: Never used   Substance Use Topics    Alcohol use: Not Currently    Drug use: No       Review of Systems   Constitutional: Positive for fever  Negative for chills  Respiratory: Negative for chest tightness and shortness of breath  Cardiovascular: Negative for chest pain  Gastrointestinal: Positive for abdominal pain  Negative for blood in stool, constipation, diarrhea, nausea and vomiting  All other systems reviewed and are negative  Physical Exam  Physical Exam  Vitals and nursing note reviewed  Constitutional:       General: She is not in acute distress  Appearance: Normal appearance  She is well-developed   She is not ill-appearing, toxic-appearing or diaphoretic  Comments: Patient appears malaised   HENT:      Head: Normocephalic and atraumatic  Right Ear: External ear normal       Left Ear: External ear normal       Mouth/Throat:      Comments: Oropharynx not examined as patient is a COVID rule out  The patient has no oropharyngeal complaints  Eyes:      Conjunctiva/sclera: Conjunctivae normal    Cardiovascular:      Rate and Rhythm: Normal rate and regular rhythm  Pulmonary:      Effort: Pulmonary effort is normal  No respiratory distress  Breath sounds: Normal breath sounds  No wheezing  Abdominal:      General: There is no distension  Palpations: Abdomen is soft  Tenderness: There is abdominal tenderness in the right upper quadrant  There is right CVA tenderness  There is no guarding or rebound  Negative signs include Fu's sign  Comments: Tenderness to palpation in the right upper quadrant and right CVA tenderness  Abdomen is soft, nondistended  No overlying skin changes of the abdomen or flank region bilaterally  No peritoneal signs  Musculoskeletal:         General: Normal range of motion  Cervical back: Normal range of motion and neck supple  Skin:     General: Skin is warm and dry  Capillary Refill: Capillary refill takes less than 2 seconds  Neurological:      General: No focal deficit present  Mental Status: She is alert  Motor: Motor function is intact     Psychiatric:         Mood and Affect: Mood normal          Vital Signs  ED Triage Vitals   Temperature Pulse Respirations Blood Pressure SpO2   08/01/21 0638 08/01/21 0635 08/01/21 0635 08/01/21 0635 08/01/21 0635   99 2 °F (37 3 °C) (!) 115 19 129/84 96 %      Temp Source Heart Rate Source Patient Position - Orthostatic VS BP Location FiO2 (%)   08/01/21 0638 08/01/21 0635 08/01/21 0635 08/01/21 0635 --   Oral Monitor Lying Right arm       Pain Score       08/01/21 0740       Worst Possible Pain           Vitals:    08/01/21 0635 08/01/21 0645   BP: 129/84 129/84   Pulse: (!) 115 100   Patient Position - Orthostatic VS: Lying          Visual Acuity      ED Medications  Medications   sodium chloride 0 9 % bolus 1,794 mL (0 mL Intravenous Stopped 8/1/21 1039)   ketorolac (TORADOL) injection 30 mg (30 mg Intravenous Given 8/1/21 0740)   morphine (PF) 4 mg/mL injection 4 mg (4 mg Intravenous Given 8/1/21 1048)       Diagnostic Studies  Results Reviewed     Procedure Component Value Units Date/Time    Hepatitis panel, acute [[id number]] Collected: 08/01/21 1045    Lab Status: In process Specimen: Blood from Arm, Right Updated: 08/01/21 1052    Urine Microscopic [[id number]]  (Abnormal) Collected: 08/01/21 0935    Lab Status: Final result Specimen: Urine, Other Updated: 08/01/21 1028     RBC, UA 4-10 /hpf      WBC, UA 1-2 /hpf      Epithelial Cells None Seen /hpf      Bacteria, UA None Seen /hpf      AMORPH URATES Occasional /hpf     UA w Reflex to Microscopic w Reflex to Culture [[id number]]  (Abnormal) Collected: 08/01/21 0935    Lab Status: Final result Specimen: Urine, Other Updated: 08/01/21 1006     Color, UA Yellow     Clarity, UA Clear     Specific [place], UA 1 020     pH, UA 5 5     Leukocytes, UA Negative     Nitrite, UA Negative     Protein, UA Trace mg/dl      Glucose, UA Negative mg/dl      Ketones, UA 15 (1+) mg/dl      Urobilinogen, UA 1 0 E U /dl      Bilirubin, UA Negative     Blood, UA Large    Novel Coronavirus (Covid-19),PCR [facility] - 2 Hour Stat [[id number]]  (Normal) Collected: 08/01/21 0721    Lab Status: Final result Specimen: Nares from Nose Updated: 08/01/21 0840     SARS-CoV-2 Negative    Narrative: The specimen collection materials, transport medium, and/or testing methodology utilized in the production of these test results have been proven to be reliable in a limited validation with an abbreviated program under the Emergency Utilization Authorization provided by the FDA  Testing reported as "Presumptive positive" will be confirmed with secondary testing to ensure result accuracy  Clinical caution and judgement should be used with the interpretation of these results with consideration of the clinical impression and other laboratory testing  Testing reported as "Positive" or "Negative" has been proven to be accurate according to standard laboratory validation requirements  All testing is performed with control materials showing appropriate reactivity at standard intervals        Basic metabolic panel [[id number]]  (Abnormal) Collected: 08/01/21 0721    Lab Status: Final result Specimen: Blood from Arm, Right Updated: 08/01/21 0805     Sodium 139 mmol/L      Potassium 3 0 mmol/L      Chloride 103 mmol/L      CO2 24 mmol/L      ANION GAP 12 mmol/L      BUN 11 mg/dL      Creatinine 0 81 mg/dL      Glucose 128 mg/dL      Calcium 8 9 mg/dL      eGFR 81 ml/min/1 73sq m     Narrative:      [place] guidelines for Chronic Kidney Disease (CKD):     Stage 1 with normal or high GFR (GFR > 90 mL/min/1 73 square meters)    Stage 2 Mild CKD (GFR = 60-89 mL/min/1 73 square meters)    Stage 3A Moderate CKD (GFR = 45-59 mL/min/1 73 square meters)    Stage 3B Moderate CKD (GFR = 30-44 mL/min/1 73 square meters)    Stage 4 Severe CKD (GFR = 15-29 mL/min/1 73 square meters)    Stage 5 End Stage CKD (GFR <15 mL/min/1 73 square meters)  Note: GFR calculation is accurate only with a steady state creatinine    Hepatic function panel [[id number]]  (Abnormal) Collected: 08/01/21 0721    Lab Status: Final result Specimen: Blood from Arm, Right Updated: 08/01/21 0805     Total Bilirubin 0 79 mg/dL      Bilirubin, Direct 0 32 mg/dL      Alkaline Phosphatase 216 U/L       U/L       U/L      Total Protein 7 8 g/dL      Albumin 3 6 g/dL     Lipase [[id number]]  (Normal) Collected: 08/01/21 0721    Lab Status: Final result Specimen: Blood from Arm, Right Updated: 08/01/21 0805     Lipase 92 u/L     Troponin I [[id number]]  (Normal) Collected: 08/01/21 0721    Lab Status: Final result Specimen: Blood from Arm, Right Updated: 08/01/21 0802     Troponin I <0 02 ng/mL     Lactic acid [[id number]]  (Normal) Collected: 08/01/21 0721    Lab Status: Final result Specimen: Blood from Arm, Right Updated: 08/01/21 0758     LACTIC ACID 1 1 mmol/L     Narrative:      Result may be elevated if tourniquet was used during collection  Sesar Newberry [[id number]]  (Normal) Collected: 08/01/21 0721    Lab Status: Final result Specimen: Blood from Arm, Right Updated: 08/01/21 0751     Protime 14 5 seconds      INR 1 19    APTT [[id number]]  (Normal) Collected: 08/01/21 0721    Lab Status: Final result Specimen: Blood from Arm, Right Updated: 08/01/21 0751     PTT 29 seconds     Blood culture #1 [[id number]] Collected: 08/01/21 0722    Lab Status: In process Specimen: Blood from Arm, Right Updated: 08/01/21 0739    Procalcitonin with AM Reflex [[id number]] Collected: 08/01/21 0721    Lab Status:  In process Specimen: Blood from Arm, Right Updated: 08/01/21 0739    CBC and differential [[id number]]  (Abnormal) Collected: 08/01/21 0721    Lab Status: Final result Specimen: Blood from Arm, Right Updated: 08/01/21 0730     WBC 10 71 Thousand/uL      RBC 4 44 Million/uL      Hemoglobin 13 4 g/dL      Hematocrit 41 0 %      MCV 92 fL      MCH 30 2 pg      MCHC 32 7 g/dL      RDW 12 6 %      MPV 10 1 fL      Platelets 143 Thousands/uL      nRBC 0 /100 WBCs      Neutrophils Relative 80 %      Immat GRANS % 1 %      Lymphocytes Relative 10 %      Monocytes Relative 9 %      Eosinophils Relative 0 %      Basophils Relative 0 %      Neutrophils Absolute 8 63 Thousands/µL      Immature Grans Absolute 0 05 Thousand/uL      Lymphocytes Absolute 1 05 Thousands/µL      Monocytes Absolute 0 95 Thousand/µL      Eosinophils Absolute 0 02 Thousand/µL      Basophils Absolute 0 01 Thousands/µL     Blood culture #2 [[id number]] Collected: 08/01/21 0721    Lab Status: In process Specimen: Blood from Arm, Left Updated: 08/01/21 0727                 US right upper quadrant   Final Result by Minal Gtz MD (08/01 1142)      Normal appearance of the gallbladder with no wall thickening or pericholecystic fluid  Sludge/stones suggested on CT are also not visualized  However, the technologist reported a positive sonographic Fu sign  The clinical significance of this is    uncertain though if there is continued clinical concern for acute cholecystitis, consider HIDA scan  The study was marked in [facility] for immediate notification  Workstation performed: [id number]         CT abdomen pelvis wo contrast   Final Result by Jens Lemus DO (08/01 2539)   1  Mild right-sided perinephric inflammation without evidence of renal or ureteric calculi  Correlate for urinary tract infection  2   Layering sludge or small calculi within the gallbladder  No CT evidence for acute cholecystitis  If there is continued concern for acute cholecystitis, consider follow-up nuclear medicine HIDA scan to evaluate for cystic duct patency        Workstation performed: [id number]         XR chest portable    (Results Pending)              Procedures  ECG 12 Lead Documentation Only    Date/Time: 8/1/2021 6:49 AM  Performed by: Alisson Sepulveda PA-C  Authorized by: Alisson Sepulveda PA-C     Indications / Diagnosis:  Chest pain  Previous ECG:     Previous ECG:  Compared to current    Comparison ECG info:  11/15/2020  Interpretation:     Interpretation: abnormal    Rate:     ECG rate:  97    ECG rate assessment: normal    Rhythm:     Rhythm: sinus rhythm    Ectopy:     Ectopy: none    QRS:     QRS axis:  Normal    QRS intervals:  Normal  Conduction:     Conduction: abnormal      Abnormal conduction: incomplete RBBB    ST segments:     ST segments:  Non-specific  T waves:     T waves: inverted      Inverted:  III  Other findings:     Other findings: [facility] ED Course       ED Course as of Aug 01 1401   Sun Aug 01, 2021   1201 General Surgery attending paged to discuss case      1310 Patient evaluated at bedside by Dr Mary Pacheco, General Surgery attending  Will admit to her service for cholecystectomy  Patient agreeable with disposition plan  Bridging orders placed  SBIRT 22yo+      Most Recent Value   SBIRT (22 yo +)   In order to provide better care to our patients, we are screening all of our patients for alcohol and drug use  Would it be okay to ask you these screening questions? Yes Filed at: 08/01/2021 1104   Initial Alcohol Screen: US AUDIT-C    1  How often do you have a drink containing alcohol?  0 Filed at: 08/01/2021 1104   2  How many drinks containing alcohol do you have on a typical day you are drinking? 0 Filed at: 08/01/2021 1104   3a  Male UNDER 65: How often do you have five or more drinks on one occasion? 0 Filed at: 08/01/2021 1104   3b  FEMALE Any Age, or MALE 65+: How often do you have 4 or more drinks on one occassion? 0 Filed at: 08/01/2021 1104   Audit-C Score  0 Filed at: 08/01/2021 1104   HUMZA: How many times in the past year have you    Used an illegal drug or used a prescription medication for non-medical reasons? Never Filed at: 08/01/2021 1104                    MDM  Number of Diagnoses or Management Options  Elevated LFTs  Right flank pain: new and requires workup  Right upper quadrant pain: new and requires workup  Diagnosis management comments: This is a 50-year-old female presenting with chief complaint of right flank pain x3 days  Patient reports pain now involving the right upper quadrant of her abdomen  She was initially concern for possibility of kidney stone as she has a history of this, stating that she had taken Keflex and Flomax at home which did not offer any symptom improvement  She had subsequently developed constitutional complaints including fevers, malaise, and dry cough  No COVID exposures  She denies vomiting or diarrhea, hematemesis, melena, or bright red blood per rectum  Her pain is essentially constant without appreciable exacerbating or alleviating factors  Past surgical history includes appendectomy  Differential diagnosis includes but not limited to:  Esophagitis, gastritis, GERD, peptic ulcer disease, perforated viscus ulcer, acute cholecystitis, choledocholithiasis, cholangitis, hepatitis, biliary duct obstruction, pancreatitis, sbo, diverticulitis, colitis, nephrolithiasis, infected stone, pyelonephritis, cystitis, urinary tract infection; low suspicion for cardiac etiology but will obtain screening ECG/troponin given location of abdominal pain    Initial ED plan:  ECG, chest x-ray, lab work, imaging, supportive care and reassess    Final ED Assessment:  Vital signs on hospital arrival notable for tachycardia which had improved with IV fluids  Patient afebrile  Examination as above  All labs and imaging independently reviewed with imaging interpreted by the radiologist   ECG/troponin without ischemic changes  Labs demonstrate elevated transaminases with , , alk-phos 216 from essentially normal baseline  CT reports mild right-sided perinephric inflammation without evidence of renal or ureteral calculi, recommending correlation with urinalysis for urinary tract infection though UA without distinct evidence of urinary tract infection  CT also mentions layering of sludge in the gallbladder or small stones, no CT evidence of acute cholecystitis  RUQ US also without evidence of acute cholecystitis though reported positive sonographic Fu's  The case was discussed with general surgery attending Dr Marni Garcia who had evaluated the patient at bedside  She had recommended admission for cholecystectomy which the patient was agreeable with    The patient may also require further evaluation of right flank pain during hospital course as CT had demonstrated mild perinephric inflammation  Patient comfortable and agreeable with admission plan  Bridging orders placed  Patient stable for hospital admission           Amount and/or Complexity of Data Reviewed  Clinical lab tests: ordered and reviewed  Tests in the radiology section of CPT®: ordered and reviewed  Review and summarize past medical records: yes  Discuss the patient with other providers: yes  Independent visualization of images, tracings, or specimens: yes    Risk of Complications, Morbidity, and/or Mortality  Presenting problems: moderate  Diagnostic procedures: moderate  Management options: moderate    Patient Progress  Patient progress: stable      Disposition  Final diagnoses:   Right upper quadrant pain   Elevated LFTs   Right flank pain - Mild right-sided perinephric inflammation reported on CT     Time reflects when diagnosis was documented in both MDM as applicable and the Disposition within this note     Time User Action Codes Description Comment    8/1/2021  1:10 PM HargetteGarrettayah Add [R10 11] Right upper quadrant pain     8/1/2021  1:10 PM HaryelenateImelda Add [R79 89] Elevated LFTs     8/1/2021  1:11 PM Imelda Patel Add [K82 8] Sludge in gallbladder     8/1/2021  6:04 PM [place], Daniella Modify [R10 11] Right upper quadrant pain     8/1/2021  6:04 PM Ajay-Stafford, Daniella Modify [R79 89] Elevated LFTs     8/1/2021  6:04 PM [place], Daniella Modify [K82 8] Sludge in gallbladder     8/1/2021  9:33 PM Terryl Pellant Modify [R10 11] Right upper quadrant pain     8/1/2021  9:33 PM Barbie Phan [R79 89] Elevated LFTs     8/1/2021  9:33 PM Terryl Pellant Modify [K82 8] Sludge in gallbladder     8/3/2021  1:02 PM Barnet Schwalbe [R10 11] Right upper quadrant pain     8/3/2021  1:02 PM Katie Espinosa Add [R10 9] Right flank pain     8/3/2021  1:02 PM Barnet Schwalbe [R10 9] Right flank pain Mild right-sided perinephric inflammation reported on CT      ED Disposition     ED Disposition Condition Date/Time Comment    Admit Stable Sun Aug 1, 2021  2:15 PM Case was discussed with Dr Ajay Weiner and the patient's admission status was agreed to be Admission Status: observation status to the service of Dr Ajay Weiner  Follow-up Information    None         Patient's Medications   Discharge Prescriptions    No medications on file     No discharge procedures on file      PDMP Review     None          ED Provider  Electronically Signed by           Chuy Peace PA-C  08/07/21 2086

## 2021-08-01 NOTE — ANESTHESIA PREPROCEDURE EVALUATION
Procedure:  CHOLECYSTECTOMY LAPAROSCOPIC W/ INTRAOP CHOLANGIOGRAM (N/A Abdomen)    Relevant Problems   CARDIO   (+) DVT (deep venous thrombosis) (HCC)   (+) Mastalgia in female   (+) Mitral regurgitation   (+) Secondary hypertension      /RENAL   (+) Nephrolithiasis      MUSCULOSKELETAL   (+) Acute left-sided low back pain with left-sided sciatica        Physical Exam    Airway    Mallampati score: II         Dental   No notable dental hx     Cardiovascular  Rhythm: regular, Rate: normal,     Pulmonary  Pulmonary exam normal     Other Findings        Anesthesia Plan  ASA Score- 2     Anesthesia Type- general with ASA Monitors  Additional Monitors:   Airway Plan: ETT  Plan Factors-Exercise tolerance (METS): >4 METS  Chart reviewed  Existing labs reviewed  Patient is a current smoker  Patient instructed to abstain from smoking on day of procedure  Patient did not smoke on day of surgery  Induction- intravenous  Postoperative Plan- Plan for postoperative opioid use  Planned trial extubation    Informed Consent- Anesthetic plan and risks discussed with patient  I personally reviewed this patient with the CRNA  Discussed and agreed on the Anesthesia Plan with the CRNA  Jorge Luis Jenkins

## 2021-08-01 NOTE — OP NOTE
OPERATIVE REPORT  PATIENT NAME: Danyell Villarreal    :    MRN: [id number]  Pt Location: [facility] OR ROOM 03    SURGERY DATE: 2021    Surgeon(s) and Role:     * Kevin Melara MD - Primary     * Dre Fernández PA-C - Assisting    Preop Diagnosis:  Right upper quadrant pain [R10 11]  Elevated LFTs [R79 89]  Sludge in gallbladder [K82 8]    Post-Op Diagnosis Codes:     * Right upper quadrant pain [R10 11]     * Elevated LFTs [R79 89]     * Sludge in gallbladder [K82 8]    Procedure(s) (LRB):  CHOLECYSTECTOMY LAPAROSCOPIC W/ INTRAOP CHOLANGIOGRAM (N/A) - unable to perform cholangiogram likely secondary to anatomic variation vs obstruction    Specimen(s):  ID Type Source Tests Collected by Time Destination   1 : Gallbladder Tissue Gallbladder TISSUE EXAM Kevin Melara MD 2021 1753        Estimated Blood Loss:   Minimal    Drains:  none    Anesthesia Type:   General    Operative Indications:  Right upper quadrant pain [R10 11]  Elevated LFTs [R79 89]  Sludge in gallbladder [K82 8]      Operative Findings:  Distended gallbladder    Complications:   None    Procedure and Technique:  The patient was brought to the operating room and placed in supine position  The patient  was sedated and intubated and preoperative antibiotics were given  The abdomen was prepped and draped in the usual sterile fashion  A time-out was carried out and initiated by myself and confirmed the correct patient, procedure, antibiotics any additional concerns  In the infra-umbilical position a combination of 1% lidocaine and 0 25% Marcaine was instilled  An 11 blade was used to make a 10 mm incision  This was carried down to the fascia and a Jeff Lass was used to grasp the umbilical stalk  The fascia was incised in the midline using the 11 blade  A 0 Vicryl on a UR6 needle was used to place a Waters port and the abdomen was insufflated  Additional 5 mm ports were placed in the subxiphoid and right subcostal positions  The gallbladder was distended but without palpable stones or wall thickening  The gallbladder was grasped with a locking grasper and retracted cephalad  The gallbladder was grasped and the triangle of calot was dissected using a maryland   Using a maryland - the critical view was obtained  An incision was made in the cystic duct using scissors and the cholangiogram catheter introduced with difficulty - it was difficult to pass beyond the ductotomy  The catheter was introduced just slightly and secured using an endoclip  The catheter would not flush despite multiple attempts to reposition  The cholangiogram was aborted  Clips were used to clip and then ligate the duct and artery  The gallbladder was then taken off of the gallbladder fossa using the hook electrocautery  The gallbladder was placed in an Endo-Catch bag and removed from the abdomen  Under direct vision the 5 mm ports were removed and the abdomen was desufflated  The Rima port was then also removed and the Vicryl sutures were tied in the midline to close the umbilical port site  A 4 0 Monocryl was used to close the skin of all of the ports  Steri strips were placed over the incisions  The patient was then awakened and transported over to the stretcher and to PACU       I was present for the entire procedure, A qualified resident physician was not available and A physician assistant was required during the procedure for retraction tissue handling,dissection and suturing    Patient Disposition:  PACU  and extubated and stable    SIGNATURE: Johanna Cook MD  DATE: August 1, 2021  TIME: 6:20 PM

## 2021-08-01 NOTE — ANESTHESIA POSTPROCEDURE EVALUATION
Post-Op Assessment Note    CV Status:  Stable  Pain Score: 0    Pain management: adequate     Mental Status:  Alert and awake   Hydration Status:  Euvolemic   PONV Controlled:  Controlled   Airway Patency:  Patent      Post Op Vitals Reviewed: Yes      Staff: CRNA         No complications documented      BP (P) 128/74 (08/01/21 1824)    Temp (P) 100 °F (37 8 °C) (08/01/21 1824)    Pulse (P) 79 (08/01/21 1824)   Resp (P) 16 (08/01/21 1824)    SpO2 (P) 94 % (08/01/21 1824)

## 2021-08-01 NOTE — LETTER
[facility] [facility] FLOOR MED SURG UNIT  [facility] [address]  Dept: [phone]    August 3, 2021     Patient: Zakia Swanson   YOB: 1965   Date of Visit: 8/1/2021 - 8/3/2021       To Whom it May Concern:    Yoly Espitia is under my professional care  She was seen in the hospital from 8/1/2021   to 08/03/21  She may return to work on 08/11/2021 with the following limitations lifting restriction of 20lbs  If you have any questions or concerns, please don't hesitate to call           Sincerely,      Carlos Caruso PA-C  08/03/2021

## 2021-08-02 LAB
ALBUMIN SERPL BCP-MCNC: 2.9 G/DL (ref 3.5–5)
ALP SERPL-CCNC: 165 U/L (ref 46–116)
ALT SERPL W P-5'-P-CCNC: 53 U/L (ref 12–78)
ANION GAP SERPL CALCULATED.3IONS-SCNC: 10 MMOL/L (ref 4–13)
AST SERPL W P-5'-P-CCNC: 39 U/L (ref 5–45)
BILIRUB SERPL-MCNC: 0.36 MG/DL (ref 0.2–1)
BUN SERPL-MCNC: 10 MG/DL (ref 5–25)
CALCIUM ALBUM COR SERPL-MCNC: 9.9 MG/DL (ref 8.3–10.1)
CALCIUM SERPL-MCNC: 9 MG/DL (ref 8.3–10.1)
CHLORIDE SERPL-SCNC: 103 MMOL/L (ref 100–108)
CO2 SERPL-SCNC: 25 MMOL/L (ref 21–32)
CREAT SERPL-MCNC: 0.7 MG/DL (ref 0.6–1.3)
ERYTHROCYTE [DISTWIDTH] IN BLOOD BY AUTOMATED COUNT: 12.6 % (ref 11.6–15.1)
GFR SERPL CREATININE-BSD FRML MDRD: 97 ML/MIN/1.73SQ M
GLUCOSE SERPL-MCNC: 112 MG/DL (ref 65–140)
HAV IGM SER QL: NORMAL
HBV CORE IGM SER QL: NORMAL
HBV SURFACE AG SER QL: NORMAL
HCT VFR BLD AUTO: 37 % (ref 34.8–46.1)
HCV AB SER QL: NORMAL
HGB BLD-MCNC: 11.8 G/DL (ref 11.5–15.4)
MCH RBC QN AUTO: 30 PG (ref 26.8–34.3)
MCHC RBC AUTO-ENTMCNC: 31.9 G/DL (ref 31.4–37.4)
MCV RBC AUTO: 94 FL (ref 82–98)
PLATELET # BLD AUTO: 176 THOUSANDS/UL (ref 149–390)
PMV BLD AUTO: 10 FL (ref 8.9–12.7)
POTASSIUM SERPL-SCNC: 3.7 MMOL/L (ref 3.5–5.3)
PROT SERPL-MCNC: 7 G/DL (ref 6.4–8.2)
RBC # BLD AUTO: 3.93 MILLION/UL (ref 3.81–5.12)
SODIUM SERPL-SCNC: 138 MMOL/L (ref 136–145)
WBC # BLD AUTO: 7.61 THOUSAND/UL (ref 4.31–10.16)

## 2021-08-02 PROCEDURE — 80053 COMPREHEN METABOLIC PANEL: CPT | Performed by: CLINICAL NURSE SPECIALIST

## 2021-08-02 PROCEDURE — 85027 COMPLETE CBC AUTOMATED: CPT | Performed by: CLINICAL NURSE SPECIALIST

## 2021-08-02 PROCEDURE — 99024 POSTOP FOLLOW-UP VISIT: CPT | Performed by: PHYSICIAN ASSISTANT

## 2021-08-02 RX ORDER — DIPHENHYDRAMINE HCL 25 MG
25 TABLET ORAL ONCE
Status: COMPLETED | OUTPATIENT
Start: 2021-08-02 | End: 2021-08-02

## 2021-08-02 RX ORDER — DOCUSATE SODIUM 100 MG/1
100 CAPSULE, LIQUID FILLED ORAL 2 TIMES DAILY
Status: DISCONTINUED | OUTPATIENT
Start: 2021-08-02 | End: 2021-08-03 | Stop reason: HOSPADM

## 2021-08-02 RX ORDER — SIMETHICONE 80 MG
80 TABLET,CHEWABLE ORAL EVERY 6 HOURS PRN
Status: DISCONTINUED | OUTPATIENT
Start: 2021-08-02 | End: 2021-08-03 | Stop reason: HOSPADM

## 2021-08-02 RX ADMIN — KETOROLAC TROMETHAMINE 15 MG: 30 INJECTION, SOLUTION INTRAMUSCULAR at 03:28

## 2021-08-02 RX ADMIN — SIMETHICONE 80 MG: 80 TABLET, CHEWABLE ORAL at 13:07

## 2021-08-02 RX ADMIN — ACETAMINOPHEN 650 MG: 325 TABLET, FILM COATED ORAL at 18:48

## 2021-08-02 RX ADMIN — SODIUM CHLORIDE, SODIUM LACTATE, POTASSIUM CHLORIDE, AND CALCIUM CHLORIDE 125 ML/HR: .6; .31; .03; .02 INJECTION, SOLUTION INTRAVENOUS at 10:31

## 2021-08-02 RX ADMIN — ACETAMINOPHEN 650 MG: 325 TABLET, FILM COATED ORAL at 02:42

## 2021-08-02 RX ADMIN — ENOXAPARIN SODIUM 40 MG: 40 INJECTION SUBCUTANEOUS at 15:30

## 2021-08-02 RX ADMIN — KETOROLAC TROMETHAMINE 15 MG: 30 INJECTION, SOLUTION INTRAMUSCULAR at 17:53

## 2021-08-02 RX ADMIN — SODIUM CHLORIDE, SODIUM LACTATE, POTASSIUM CHLORIDE, AND CALCIUM CHLORIDE 125 ML/HR: .6; .31; .03; .02 INJECTION, SOLUTION INTRAVENOUS at 02:42

## 2021-08-02 RX ADMIN — ACETAMINOPHEN 650 MG: 325 TABLET, FILM COATED ORAL at 09:13

## 2021-08-02 RX ADMIN — DOCUSATE SODIUM 100 MG: 100 CAPSULE, LIQUID FILLED ORAL at 17:46

## 2021-08-02 RX ADMIN — DIPHENHYDRAMINE HYDROCHLORIDE 25 MG: 25 TABLET ORAL at 21:33

## 2021-08-02 RX ADMIN — KETOROLAC TROMETHAMINE 15 MG: 30 INJECTION, SOLUTION INTRAMUSCULAR at 10:21

## 2021-08-02 NOTE — PLAN OF CARE
Problem: PAIN - ADULT  Goal: Verbalizes/displays adequate comfort level or baseline comfort level  Description: Interventions:  - Encourage patient to monitor pain and request assistance  - Assess pain using appropriate pain scale  - Administer analgesics based on type and severity of pain and evaluate response  - Implement non-pharmacological measures as appropriate and evaluate response  - Consider cultural and social influences on pain and pain management  - Notify physician/advanced practitioner if interventions unsuccessful or patient reports new pain  Outcome: Progressing     Problem: INFECTION - ADULT  Goal: Absence or prevention of progression during hospitalization  Description: INTERVENTIONS:  - Assess and monitor for signs and symptoms of infection  - Monitor lab/diagnostic results  - Monitor all insertion sites, i e  indwelling lines, tubes, and drains  - Monitor endotracheal if appropriate and nasal secretions for changes in amount and color  - [place] appropriate cooling/warming therapies per order  - Administer medications as ordered  - Instruct and encourage patient and family to use good hand hygiene technique  - Identify and instruct in appropriate isolation precautions for identified infection/condition  Outcome: Progressing  Goal: Absence of fever/infection during neutropenic period  Description: INTERVENTIONS:  - Monitor WBC    Outcome: Progressing     Problem: SAFETY ADULT  Goal: Patient will remain free of falls  Description: INTERVENTIONS:  - Educate patient/family on patient safety including physical limitations  - Instruct patient to call for assistance with activity   - Consult OT/PT to assist with strengthening/mobility   - Keep Call bell within reach  - Keep bed low and locked with side rails adjusted as appropriate  - Keep care items and personal belongings within reach  - Initiate and maintain comfort rounds  - Make Fall Risk Sign visible to staff  - Offer Toileting every 2 Hours, in advance of need  - Initiate/Maintain bed alarm  - Obtain necessary fall risk management equipment:   - Apply yellow socks and bracelet for high fall risk patients  - Consider moving patient to room near nurses station  Outcome: Progressing  Goal: Maintain or return to baseline ADL function  Description: INTERVENTIONS:  -  Assess patient's ability to carry out ADLs; assess patient's baseline for ADL function and identify physical deficits which impact ability to perform ADLs (bathing, care of mouth/teeth, toileting, grooming, dressing, etc )  - Assess/evaluate cause of self-care deficits   - Assess range of motion  - Assess patient's mobility; develop plan if impaired  - Assess patient's need for assistive devices and provide as appropriate  - Encourage maximum independence but intervene and supervise when necessary  - Involve family in performance of ADLs  - Assess for home care needs following discharge   - Consider OT consult to assist with ADL evaluation and planning for discharge  - Provide patient education as appropriate  Outcome: Progressing     Problem: DISCHARGE PLANNING  Goal: Discharge to home or other facility with appropriate resources  Description: INTERVENTIONS:  - Identify barriers to discharge w/patient and caregiver  - Arrange for needed discharge resources and transportation as appropriate  - Identify discharge learning needs (meds, wound care, etc )  - Arrange for interpretive services to assist at discharge as needed  - Refer to Case Management Department for coordinating discharge planning if the patient needs post-hospital services based on physician/advanced practitioner order or complex needs related to functional status, cognitive ability, or social support system  Outcome: Progressing     Problem: Knowledge Deficit  Goal: Patient/family/caregiver demonstrates understanding of disease process, treatment plan, medications, and discharge instructions  Description: Complete learning assessment and assess knowledge base    Interventions:  - Provide teaching at level of understanding  - Provide teaching via preferred learning methods  Outcome: Progressing

## 2021-08-02 NOTE — PROGRESS NOTES
Progress Note - General Surgery   Tomy Rowell 63 y o  female MRN: [id number]  Unit/Bed#: -[facility] Encounter: [id number]    Assessment/Plan  Tomy Rowell is a 63 y o  female     POD1 s/p laparoscopic cholecystectomy, unable to perform IOC 2/2 patient anatomy  AVSS, LFTs trending down, WBC 7 61  Complaining of persistent RUQ pain radiating to her back similar to presentation and bloating  Tolerating solid diet, +flatus  Denies dysuria, notes strange odor to urine    Continue current care, diet as tolerated, decrease IVF, will heplock once patient is able to tolerate adequate PO intake  Serial abdominal exams   Pain control PRN  Ambulation/OOB  DVT prophylaxis  IS     Subjective/Objective     Subjective: Still with persistent RUQ pain radiating to her back, denies dysuria but notes strange odor with urination     Objective:     Blood pressure 132/71, pulse 59, temperature 97 7 °F (36 5 °C), temperature source Oral, resp  rate 18, height 5' 7" (1 702 m), weight 59 8 kg (131 lb 13 4 oz), SpO2 98 %, not currently breastfeeding  ,Body mass index is 20 65 kg/m²        Intake/Output Summary (Last 24 hours) at 8/2/2021 1354  Last data filed at 8/2/2021 1031  Gross per 24 hour   Intake 1900 ml   Output 1 ml   Net 1899 ml       Invasive Devices     Peripheral Intravenous Line            Peripheral IV 05/01/21 Right Antecubital 93 days    Peripheral IV 08/01/21 Right Antecubital 1 day                Physical Exam: /71   Pulse 59   Temp 97 7 °F (36 5 °C) (Oral)   Resp 18   Ht 5' 7" (1 702 m)   Wt 59 8 kg (131 lb 13 4 oz)   SpO2 98%   BMI 20 65 kg/m²   General appearance: alert and oriented, in no acute distress  Lungs: clear to auscultation bilaterally  Heart: regular rate and rhythm, S1, S2 normal, no murmur, click, rub or gallop  Abdomen: soft, non-distended, active bowel sounds, incisional tenderness to palpation, reports extreme tenderness to palpation along right costal margin  Extremities: extremities normal, warm and well-perfused; no cyanosis, clubbing, or edema    Lab, Imaging and other studies:I have personally reviewed pertinent lab results       VTE Pharmacologic Prophylaxis: Sequential compression device (Venodyne)   VTE Mechanical Prophylaxis: sequential compression device    Recent Results (from the past 36 hour(s))   ECG 12 lead    Collection Time: 08/01/21  6:49 AM   Result Value Ref Range    Ventricular Rate 97 BPM    Atrial Rate 97 BPM    SC Interval 118 ms    QRSD Interval 92 ms    QT Interval 340 ms    QTC Interval 431 ms    P Axis 71 degrees    QRS Axis 87 degrees    T Wave Axis -9 degrees   CBC and differential    Collection Time: 08/01/21  7:21 AM   Result Value Ref Range    WBC 10 71 (H) 4 31 - 10 16 Thousand/uL    RBC 4 44 3 81 - 5 12 Million/uL    Hemoglobin 13 4 11 5 - 15 4 g/dL    Hematocrit 41 0 34 8 - 46 1 %    MCV 92 82 - 98 fL    MCH 30 2 26 8 - 34 3 pg    MCHC 32 7 31 4 - 37 4 g/dL    RDW 12 6 11 6 - 15 1 %    MPV 10 1 8 9 - 12 7 fL    Platelets 198 848 - 120 Thousands/uL    nRBC 0 /100 WBCs    Neutrophils Relative 80 (H) 43 - 75 %    Immat GRANS % 1 0 - 2 %    Lymphocytes Relative 10 (L) 14 - 44 %    Monocytes Relative 9 4 - 12 %    Eosinophils Relative 0 0 - 6 %    Basophils Relative 0 0 - 1 %    Neutrophils Absolute 8 63 (H) 1 85 - 7 62 Thousands/µL    Immature Grans Absolute 0 05 0 00 - 0 20 Thousand/uL    Lymphocytes Absolute 1 05 0 60 - 4 47 Thousands/µL    Monocytes Absolute 0 95 0 17 - 1 22 Thousand/µL    Eosinophils Absolute 0 02 0 00 - 0 61 Thousand/µL    Basophils Absolute 0 01 0 00 - 0 10 Thousands/µL   Lactic acid    Collection Time: 08/01/21  7:21 AM   Result Value Ref Range    LACTIC ACID 1 1 0 5 - 2 0 mmol/L   Procalcitonin with AM Reflex    Collection Time: 08/01/21  7:21 AM   Result Value Ref Range    Procalcitonin 0 38 (H) <=0 25 ng/ml   Protime-INR    Collection Time: 08/01/21  7:21 AM   Result Value Ref Range    Protime 14 5 11 6 - 14 5 seconds    INR 1  19 0 84 - 1 19   APTT    Collection Time: 08/01/21  7:21 AM   Result Value Ref Range    PTT 29 23 - 37 seconds   Blood culture #2    Collection Time: 08/01/21  7:21 AM    Specimen: Arm, Left; Blood   Result Value Ref Range    Blood Culture Received in Microbiology Lab  Culture in Progress  Basic metabolic panel    Collection Time: 08/01/21  7:21 AM   Result Value Ref Range    Sodium 139 136 - 145 mmol/L    Potassium 3 0 (L) 3 5 - 5 3 mmol/L    Chloride 103 100 - 108 mmol/L    CO2 24 21 - 32 mmol/L    ANION GAP 12 4 - 13 mmol/L    BUN 11 5 - 25 mg/dL    Creatinine 0 81 0 60 - 1 30 mg/dL    Glucose 128 65 - 140 mg/dL    Calcium 8 9 8 3 - 10 1 mg/dL    eGFR 81 ml/min/1 73sq m   Troponin I    Collection Time: 08/01/21  7:21 AM   Result Value Ref Range    Troponin I <0 02 <=0 04 ng/mL   Novel Coronavirus (Covid-19),PCR [facility] - 2 Hour Stat    Collection Time: 08/01/21  7:21 AM    Specimen: Nose; Nares   Result Value Ref Range    SARS-CoV-2 Negative Negative   Hepatic function panel    Collection Time: 08/01/21  7:21 AM   Result Value Ref Range    Total Bilirubin 0 79 0 20 - 1 00 mg/dL    Bilirubin, Direct 0 32 (H) 0 00 - 0 20 mg/dL    Alkaline Phosphatase 216 (H) 46 - 116 U/L     (H) 5 - 45 U/L     (H) 12 - 78 U/L    Total Protein 7 8 6 4 - 8 2 g/dL    Albumin 3 6 3 5 - 5 0 g/dL   Lipase    Collection Time: 08/01/21  7:21 AM   Result Value Ref Range    Lipase 92 73 - 393 u/L   Blood culture #1    Collection Time: 08/01/21  7:22 AM    Specimen: Arm, Right; Blood   Result Value Ref Range    Blood Culture Received in Microbiology Lab  Culture in Progress      UA w Reflex to Microscopic w Reflex to Culture    Collection Time: 08/01/21  9:35 AM    Specimen: Urine, Other   Result Value Ref Range    Color, UA Yellow     Clarity, UA Clear     Specific Gravity, UA 1 020 1 003 - 1 030    pH, UA 5 5 4 5, 5 0, 5 5, 6 0, 6 5, 7 0, 7 5, 8 0    Leukocytes, UA Negative Negative    Nitrite, UA Negative Negative Protein, UA Trace (A) Negative mg/dl    Glucose, UA Negative Negative mg/dl    Ketones, UA 15 (1+) (A) Negative mg/dl    Urobilinogen, UA 1 0 0 2, 1 0 E U /dl E U /dl    Bilirubin, UA Negative Negative    Blood, UA Large (A) Negative   Urine Microscopic    Collection Time: 08/01/21  9:35 AM   Result Value Ref Range    RBC, UA 4-10 (A) None Seen, 0-1, 1-2, 2-4, 0-5 /hpf    WBC, UA 1-2 None Seen, 0-1, 1-2, 0-5, 2-4 /hpf    Epithelial Cells None Seen None Seen, Occasional /hpf    Bacteria, UA None Seen None Seen, Occasional /hpf    AMORPH URATES Occasional /hpf   Hepatitis panel, acute    Collection Time: 08/01/21 10:45 AM   Result Value Ref Range    Hepatitis B Surface Ag Non-reactive Non-reactive, NonReactive - Confirmed    Hep A IgM Non-reactive Non-reactive, Equivocal-Suggest Recollect    Hepatitis C Ab Non-reactive Non-reactive    Hep B C IgM Non-reactive Non-reactive   Comprehensive metabolic panel    Collection Time: 08/02/21  7:55 AM   Result Value Ref Range    Sodium 138 136 - 145 mmol/L    Potassium 3 7 3 5 - 5 3 mmol/L    Chloride 103 100 - 108 mmol/L    CO2 25 21 - 32 mmol/L    ANION GAP 10 4 - 13 mmol/L    BUN 10 5 - 25 mg/dL    Creatinine 0 70 0 60 - 1 30 mg/dL    Glucose 112 65 - 140 mg/dL    Calcium 9 0 8 3 - 10 1 mg/dL    Corrected Calcium 9 9 8 3 - 10 1 mg/dL    AST 39 5 - 45 U/L    ALT 53 12 - 78 U/L    Alkaline Phosphatase 165 (H) 46 - 116 U/L    Total Protein 7 0 6 4 - 8 2 g/dL    Albumin 2 9 (L) 3 5 - 5 0 g/dL    Total Bilirubin 0 36 0 20 - 1 00 mg/dL    eGFR 97 ml/min/1 73sq m   CBC    Collection Time: 08/02/21  7:55 AM   Result Value Ref Range    WBC 7 61 4 31 - 10 16 Thousand/uL    RBC 3 93 3 81 - 5 12 Million/uL    Hemoglobin 11 8 11 5 - 15 4 g/dL    Hematocrit 37 0 34 8 - 46 1 %    MCV 94 82 - 98 fL    MCH 30 0 26 8 - 34 3 pg    MCHC 31 9 31 4 - 37 4 g/dL    RDW 12 6 11 6 - 15 1 %    Platelets 316 119 - 237 Thousands/uL    MPV 10 0 8 9 - 12 7 fL

## 2021-08-02 NOTE — CASE MANAGEMENT
[place] 1 day    Pt IS NOT A 30 DAY RE-ADMIT  PT IS NOT A BUNDLE  PT IS NOT OBS    HRR 7    PT resides alone in a 2 story home with 3 SEGUNDO and 15 steps to her room  Pt does not use any DME and is able to complete all of her ADLs without any assistance  Pt has no STR/VNA or MH/SA  Pt fills Rx at [facility] in [facility] and is able to afford her co-pays  Pt dtr Nadine is her POA  There is no pw, it was offered and declined  Pt is not employed and drives herself to and from appts  The Pt smokes socially  Pt pcp is Dr Adrien Kanner  CM reviewed discharge planning process including the following: identifying caregivers at home, preference for d/c planning needs, availability of treatment team to discuss questions or concerns patient and/or family may have regarding diagnosis, plan of care, old or new medications and discharge planning  Discharge Checklist reviewed and CM will continue to monitor for progress toward discharge goals in nursing and provider rounds  Pt states that she does not need any HHC  Pt friend will drive her home

## 2021-08-03 VITALS
SYSTOLIC BLOOD PRESSURE: 142 MMHG | HEIGHT: 67 IN | WEIGHT: 131.84 LBS | TEMPERATURE: 98.1 F | DIASTOLIC BLOOD PRESSURE: 79 MMHG | RESPIRATION RATE: 18 BRPM | OXYGEN SATURATION: 97 % | HEART RATE: 66 BPM | BODY MASS INDEX: 20.69 KG/M2

## 2021-08-03 LAB
ALBUMIN SERPL BCP-MCNC: 2.6 G/DL (ref 3.5–5)
ALP SERPL-CCNC: 127 U/L (ref 46–116)
ALT SERPL W P-5'-P-CCNC: 43 U/L (ref 12–78)
ANION GAP SERPL CALCULATED.3IONS-SCNC: 7 MMOL/L (ref 4–13)
AST SERPL W P-5'-P-CCNC: 33 U/L (ref 5–45)
BASOPHILS # BLD AUTO: 0.01 THOUSANDS/ΜL (ref 0–0.1)
BASOPHILS NFR BLD AUTO: 0 % (ref 0–1)
BILIRUB SERPL-MCNC: 0.33 MG/DL (ref 0.2–1)
BUN SERPL-MCNC: 13 MG/DL (ref 5–25)
CALCIUM ALBUM COR SERPL-MCNC: 9.4 MG/DL (ref 8.3–10.1)
CALCIUM SERPL-MCNC: 8.3 MG/DL (ref 8.3–10.1)
CHLORIDE SERPL-SCNC: 108 MMOL/L (ref 100–108)
CO2 SERPL-SCNC: 28 MMOL/L (ref 21–32)
CREAT SERPL-MCNC: 0.74 MG/DL (ref 0.6–1.3)
EOSINOPHIL # BLD AUTO: 0.05 THOUSAND/ΜL (ref 0–0.61)
EOSINOPHIL NFR BLD AUTO: 1 % (ref 0–6)
ERYTHROCYTE [DISTWIDTH] IN BLOOD BY AUTOMATED COUNT: 12.8 % (ref 11.6–15.1)
GFR SERPL CREATININE-BSD FRML MDRD: 91 ML/MIN/1.73SQ M
GLUCOSE P FAST SERPL-MCNC: 100 MG/DL (ref 65–99)
GLUCOSE SERPL-MCNC: 100 MG/DL (ref 65–140)
HCT VFR BLD AUTO: 32.8 % (ref 34.8–46.1)
HGB BLD-MCNC: 10.6 G/DL (ref 11.5–15.4)
IMM GRANULOCYTES # BLD AUTO: 0.02 THOUSAND/UL (ref 0–0.2)
IMM GRANULOCYTES NFR BLD AUTO: 0 % (ref 0–2)
LYMPHOCYTES # BLD AUTO: 2.26 THOUSANDS/ΜL (ref 0.6–4.47)
LYMPHOCYTES NFR BLD AUTO: 35 % (ref 14–44)
MCH RBC QN AUTO: 30 PG (ref 26.8–34.3)
MCHC RBC AUTO-ENTMCNC: 32.3 G/DL (ref 31.4–37.4)
MCV RBC AUTO: 93 FL (ref 82–98)
MONOCYTES # BLD AUTO: 0.52 THOUSAND/ΜL (ref 0.17–1.22)
MONOCYTES NFR BLD AUTO: 8 % (ref 4–12)
NEUTROPHILS # BLD AUTO: 3.57 THOUSANDS/ΜL (ref 1.85–7.62)
NEUTS SEG NFR BLD AUTO: 56 % (ref 43–75)
NRBC BLD AUTO-RTO: 0 /100 WBCS
PLATELET # BLD AUTO: 188 THOUSANDS/UL (ref 149–390)
PMV BLD AUTO: 10.7 FL (ref 8.9–12.7)
POTASSIUM SERPL-SCNC: 3.7 MMOL/L (ref 3.5–5.3)
PROT SERPL-MCNC: 5.9 G/DL (ref 6.4–8.2)
RBC # BLD AUTO: 3.53 MILLION/UL (ref 3.81–5.12)
SODIUM SERPL-SCNC: 143 MMOL/L (ref 136–145)
WBC # BLD AUTO: 6.43 THOUSAND/UL (ref 4.31–10.16)

## 2021-08-03 PROCEDURE — 99024 POSTOP FOLLOW-UP VISIT: CPT | Performed by: PHYSICIAN ASSISTANT

## 2021-08-03 PROCEDURE — 85025 COMPLETE CBC W/AUTO DIFF WBC: CPT | Performed by: PHYSICIAN ASSISTANT

## 2021-08-03 PROCEDURE — 80053 COMPREHEN METABOLIC PANEL: CPT | Performed by: PHYSICIAN ASSISTANT

## 2021-08-03 RX ORDER — HYDROCODONE BITARTRATE AND ACETAMINOPHEN 5; 325 MG/1; MG/1
1 TABLET ORAL EVERY 6 HOURS PRN
Status: DISCONTINUED | OUTPATIENT
Start: 2021-08-03 | End: 2021-08-03 | Stop reason: HOSPADM

## 2021-08-03 RX ORDER — HYDROCODONE BITARTRATE AND ACETAMINOPHEN 5; 325 MG/1; MG/1
1 TABLET ORAL EVERY 6 HOURS PRN
Qty: 10 TABLET | Refills: 0 | Status: SHIPPED | OUTPATIENT
Start: 2021-08-03 | End: 2021-08-06

## 2021-08-03 RX ADMIN — ACETAMINOPHEN 650 MG: 325 TABLET, FILM COATED ORAL at 03:07

## 2021-08-03 RX ADMIN — Medication 10 MG: at 13:38

## 2021-08-03 RX ADMIN — ACETAMINOPHEN 650 MG: 325 TABLET, FILM COATED ORAL at 09:06

## 2021-08-03 RX ADMIN — SODIUM CHLORIDE, SODIUM LACTATE, POTASSIUM CHLORIDE, AND CALCIUM CHLORIDE 50 ML/HR: .6; .31; .03; .02 INJECTION, SOLUTION INTRAVENOUS at 00:49

## 2021-08-03 RX ADMIN — HYDROCODONE BITARTRATE AND ACETAMINOPHEN 0.5 TABLET: 5; 325 TABLET ORAL at 13:38

## 2021-08-03 RX ADMIN — KETOROLAC TROMETHAMINE 15 MG: 30 INJECTION, SOLUTION INTRAMUSCULAR at 00:52

## 2021-08-03 RX ADMIN — DOCUSATE SODIUM 100 MG: 100 CAPSULE, LIQUID FILLED ORAL at 09:06

## 2021-08-03 RX ADMIN — KETOROLAC TROMETHAMINE 15 MG: 30 INJECTION, SOLUTION INTRAMUSCULAR at 06:55

## 2021-08-03 NOTE — DISCHARGE SUMMARY
Discharge Summary - Nuris Muniz 63 y o  female   MRN: [id number]  Unit/Bed#: -01 Encounter: [id number]    Admission Date: 8/1/2021   Discharge Date: 8/3/2021    Admitting Diagnosis:   Right upper quadrant pain [R10 11]  Elevated LFTs [R79 89]  Flank pain [R10 9]  Sludge in gallbladder [K82 8]    Principle/ Secondary Diagnosis:  Past Medical History:   Diagnosis Date    Benign positional vertigo, right     Cervicalgia     DVT (deep venous thrombosis) ([address] )     Graves disease     Headache     Hypertension     Thyroid disease      Past Surgical History:   Procedure Laterality Date    APPENDECTOMY      CHOLECYSTECTOMY LAPAROSCOPIC N/A 8/1/2021    Procedure: CHOLECYSTECTOMY LAPAROSCOPIC W/ INTRAOP CHOLANGIOGRAM;  Surgeon: Mertie Lundborg, MD;  Location: [facility] MAIN OR;  Service: General    FL INJECTION LEFT HIP (NON ARTHROGRAM)  1/31/2019    LIPOMA RESECTION      NEUROMA EXCISION      STEROID INJECTION HIP Left 05/2018       Discharge Diagnoses:   Principal Problem:    Right upper quadrant pain  Active Problems:    Mitral regurgitation    DVT (deep venous thrombosis) (HCC)    Biliary colic    Elevated LFTs      Procedures Performed:  Orders Placed This Encounter   Procedures    ED ECG Documentation Only     CHOLECYSTECTOMY LAPAROSCOPIC W/ INTRAOP CHOLANGIOGRAM (N/A Abdomen)  Procedure(s):  CHOLECYSTECTOMY LAPAROSCOPIC W/ INTRAOP CHOLANGIOGRAM    Consultants:   n/a    HPI: As per Dr Kelby Hong: "Nuris Muniz is a 63 y o  female who presents with abdominal and flank pain  She has been having symptoms since at least Thursday or Friday with pain under her ribs on the right  This did progress to the flank making her think she had another kidney stone as she has a longstanding history of that since childhood  She started a po abx as well as flomax without relief  She is now experiencing subjective fevers/chills and nausea and vomiting   She thinks this does not feel exactly like her prior kidney stones  "    Summary of Hospital Course: Patient presented to [facility] on 08/01 with flank pain as well as fevers, malaise, and body aches  A CT abdomen/pelvis was ordered showing sludge or small calculi within the gallbladder  A RUQ ultrasound was ordered and was significant for a positive sonographic Fu sign  She was also found to have elevated LFTs on lab work  The decision was made to preform a laparoscopic cholecystectomy with IOC  They were unable to preform cholangiogram secondary to anatomic variation  Patient was closely monitored postoperatively  Currently patient has clinically progressed  Her LFTs are trending down, she is afebrile, vital signs stable,  pain is managed, she has been ambulating without difficulty, no urinary complications, and she is tolerating a regular diet without nausea or vomiting  At this point she is cleared for discharge from a surgical standpoint  All questions and concerns were addressed  She may return to work on 8/11/21 with a lifting restriction of 20lbs  Instructions provided to patient at time of discharge  Recommend follow up with Urology as an outpatient due to findings on CT and flank pain  Patient is already  established with a urology practice      Physical Exam: /79   Pulse 58   Temp 97 7 °F (36 5 °C)   Resp 16   Ht 5' 7" (1 702 m)   Wt 59 8 kg (131 lb 13 4 oz)   SpO2 96%   BMI 20 65 kg/m²   General appearance: alert and oriented, in no acute distress, appears stated age and cooperative  Head: Normocephalic, without obvious abnormality, atraumatic  Lungs: clear to auscultation bilaterally  Heart: regular rate and rhythm, S1, S2 normal, no murmur, click, rub or gallop  Abdomen: soft, mildly tender to palpation around incision sites, incisions C/D/I, active bowel sounds  Extremities: extremities normal, warm and well-perfused; no cyanosis, clubbing, or edema    Significant Findings, Care, Treatment and Services Provided: See Above  CT abdomen pelvis wo contrast    Result Date: 8/1/2021  Impression: 1  Mild right-sided perinephric inflammation without evidence of renal or ureteric calculi  Correlate for urinary tract infection  2   Layering sludge or small calculi within the gallbladder  No CT evidence for acute cholecystitis  If there is continued concern for acute cholecystitis, consider follow-up nuclear medicine HIDA scan to evaluate for cystic duct patency  Workstation performed: [id number]     XR chest portable    Result Date: 8/2/2021  Impression: No acute cardiopulmonary disease  Workstation performed: [id number]     US right upper quadrant    Result Date: 8/1/2021  Impression: Normal appearance of the gallbladder with no wall thickening or pericholecystic fluid  Sludge/stones suggested on CT are also not visualized  However, the technologist reported a positive sonographic Fu sign  The clinical significance of this is uncertain though if there is continued clinical concern for acute cholecystitis, consider HIDA scan  The study was marked in [facility] for immediate notification  Workstation performed: [id number]       Complications: none    Discharge Diagnosis: RUQ abdominal pain s/p laparoscopic cholecystectomy     Medical Problems     Resolved Problems  Date Reviewed: 8/3/2021    None              Principal Problem:    Right upper quadrant pain  Active Problems:    Mitral regurgitation    DVT (deep venous thrombosis) (HCC)    Biliary colic    Elevated LFTs      Condition at Discharge: good         Discharge instructions/Information to patient and family:   See after visit summary for information provided to patient and family  Provisions for Follow-Up Care:  See after visit summary for information related to follow-up care and any pertinent home health orders  PCP: Cally Nickerson MD    Disposition: Home    Planned Readmission: No    Discharge Statement   I spent 25 minutes discharging the patient   This time was spent on the day of discharge  I had direct contact with the patient on the day of discharge  Additional documentation is required if more than 30 minutes were spent on discharge  Discharge Medications:  See after visit summary for reconciled discharge medications provided to patient and family        Janiya Fulton PA-C   8/3/2021

## 2021-08-03 NOTE — QUICK NOTE
Pt called stating that she felt like she was having an allergic reaction noting some flushing, red marks and swelling around her eyes  Surgery on call notified- Vitals WNL, face, arms and chest with red markings- Verbal received for benadryl which was given  When hourly rounding at 2200 pt was resting comfortably in bed with no more redness  Will continue to monitor

## 2021-08-03 NOTE — DISCHARGE INSTRUCTIONS
Laparoscopic Cholecystectomy   WHAT YOU NEED TO KNOW:   Laparoscopic cholecystectomy is surgery to remove your gallbladder  DISCHARGE INSTRUCTIONS:   Medicines: You may need any of the following:  · Prescription pain medicine  helps decrease pain  Do not wait until the pain is severe before you take this medicine  · NSAIDs  decrease swelling and pain  This medicine can be bought with or without a doctor's order  This medicine can cause stomach bleeding or kidney problems in certain people  If you take blood thinner medicine, always ask your healthcare provider if NSAIDs are safe for you  Read the medicine label and follow the directions on it before using this medicine  · Take your medicine as directed  Contact your healthcare provider if you think your medicine is not helping or if you have side effects  Tell him or her if you are allergic to any medicine  Keep a list of the medicines, vitamins, and herbs you take  Include the amounts, and when and why you take them  Bring the list or the pill bottles to follow-up visits  Carry your medicine list with you in case of an emergency  Follow up with your healthcare provider 2 weeks after surgery, or as directed:  Write down your questions so you remember to ask them during your visits  Wound care:  Care for your surgical wounds as directed  Keep the wounds clean and dry  You may take a shower the day after your surgery  What to eat after surgery:  Eat low-fat foods for 4 to 6 weeks while your body learns to digest fat without a gallbladder  Slowly increase the amount of fat that you eat  Drink plenty of liquids  Ask how much liquid to drink and which liquids are best for you  When to return to work and other activities: You may return to work or other activities as soon as your pain is controlled and you feel comfortable  For many people, this is 5 to 7 days after surgery     Contact your healthcare provider if:   · You have a fever over 101°F (38°C) or chills  · You have pain or nausea that is not relieved by medicine  · You have redness and swelling around your incisions, or blood or pus is leaking from your incisions  · You are constipated or have diarrhea  · Your skin or eyes are yellow, or your bowel movements are pale  · You have questions or concerns about your surgery, condition, or care  Seek care immediately or call 911 if:   · You cannot stop vomiting  · Your bowel movements are black or bloody  · You have pain in your abdomen and it is swollen or hard  · Your arm or leg feels warm, tender, and painful  It may look swollen and red  · You feel lightheaded, short of breath, and have chest pain  · You cough up blood  © Copyright [address] 2018 Information is for End User's use only and may not be sold, redistributed or otherwise used for commercial purposes  All illustrations and images included in CareNotes® are the copyrighted property of [employer] D [employer] M , [employer]  or [address] [place] [address]  The above information is an  only  It is not intended as medical advice for individual conditions or treatments  Talk to your doctor, nurse or pharmacist before following any medical regimen to see if it is safe and effective for you  Low Fat Diet   WHAT YOU NEED TO KNOW:   A low-fat diet is an eating plan that is low in total fat, unhealthy fat, and cholesterol  You may need to follow a low-fat diet if you have trouble digesting or absorbing fat  You may also need to follow this diet if you have high cholesterol  You can also lower your cholesterol by increasing the amount of fiber in your diet  Soluble fiber is a type of fiber that helps to decrease cholesterol levels  DISCHARGE INSTRUCTIONS:   Different types of fat in food:   · Limit unhealthy fats  A diet that is high in cholesterol, saturated fat, and trans fat may cause unhealthy cholesterol levels   Unhealthy cholesterol levels increase your risk of heart disease  ? Cholesterol:  Limit intake of cholesterol to less than 200 mg per day  Cholesterol is found in meat, eggs, and dairy  ? Saturated fat:  Limit saturated fat to less than 7% of your total daily calories  Ask your dietitian how many calories you need each day  Saturated fat is found in butter, cheese, ice cream, whole milk, and palm oil  Saturated fat is also found in meat, such as beef, pork, chicken skin, and processed meats  Processed meats include sausage, hot dogs, and bologna  ? Trans fat:  Avoid trans fat as much as possible  Trans fat is used in fried and baked foods  Foods that say trans fat free on the label may still have up to 0 5 grams of trans fat per serving  · Include healthy fats  Replace foods that are high in saturated and trans fat with foods high in healthy fats  This may help to decrease high cholesterol levels  ? Monounsaturated fats: These are found in avocados, nuts, and vegetable oils, such as olive, canola, and sunflower oil  ? Polyunsaturated fats: These can be found in vegetable oils, such as soybean or corn oil  Omega-3 fats can help to decrease the risk of heart disease  Omega-3 fats are found in fish, such as salmon, herring, trout, and tuna  Omega-3 fats can also be found in plant foods, such as walnuts, flaxseed, soybeans, and canola oil  Foods to limit or avoid:   · Grains:      ? Snacks that are made with partially hydrogenated oils, such as chips, regular crackers, and butter-flavored popcorn    ? High-fat baked goods, such as biscuits, croissants, doughnuts, pies, cookies, and pastries    · Dairy:      ? Whole milk, 2% milk, and yogurt and ice cream made with whole milk    ? Half and half creamer, heavy cream, and whipping cream    ? Cheese, cream cheese, and sour cream    · Meats and proteins:      ? High-fat cuts of meat (T-bone steak, regular hamburger, and ribs)    ? Fried meat, poultry (turkey and chicken), and fish    ?  Poultry (chicken and turkey) with skin    ? Cold cuts (salami or bologna), hot dogs, beckham, and sausage    ? Whole eggs and egg yolks    · Vegetables and fruits with added fat:      ? Fried vegetables or vegetables in butter or high-fat sauces, such as cream or cheese sauces    ? Fried fruit or fruit served with butter or cream    · Fats:      ? Butter, stick margarine, and shortening    ? Coconut, palm oil, and palm kernel oil    Foods to include:   · Grains:      ? Whole-grain breads, cereals, pasta, and brown rice    ? Low-fat crackers and pretzels    · Vegetables and fruits:      ? Fresh, frozen, or canned vegetables (no salt or low-sodium)    ? Fresh, frozen, dried, or canned fruit (canned in light syrup or fruit juice)    ? Avocado    · Low-fat dairy products:      ? Nonfat (skim) or 1% milk    ? Nonfat or low-fat cheese, yogurt, and cottage cheese    · Meats and proteins:      ? Chicken or turkey with no skin    ? Baked or broiled fish    ? Lean beef and pork (loin, round, extra lean hamburger)    ? Beans and peas, unsalted nuts, soy products    ? Egg whites and substitutes    ? Seeds and nuts    · Fats:      ? Unsaturated oil, such as canola, olive, peanut, soybean, or sunflower oil    ? Soft or liquid margarine and vegetable oil spread    ? Low-fat salad dressing    Other ways to decrease fat:   · Read food labels before you buy foods  Choose foods that have less than 30% of calories from fat  Choose low-fat or fat-free dairy products  Remember that fat free does not mean calorie free  These foods still contain calories, and too many calories can lead to weight gain  · Trim fat from meat and avoid fried food  Trim all visible fat from meat before you cook it  Remove the skin from poultry  Do not simental meat, fish, or poultry  Bake, roast, boil, or broil these foods instead  Avoid fried foods  Eat a baked potato instead of [place] fries  Steam vegetables instead of sautéing them in butter       · Add less fat to foods   Use imitation beckham bits on salads and baked potatoes instead of regular beckham bits  Use fat-free or low-fat salad dressings instead of regular dressings  Use low-fat or nonfat butter-flavored topping instead of regular butter or margarine on popcorn and other foods  Ways to decrease fat in recipes:  Replace high-fat ingredients with low-fat or nonfat ones  This may cause baked goods to be drier than usual  You may need to use nonfat cooking spray on pans to prevent food from sticking  You also may need to change the amount of other ingredients, such as water, in the recipe  Try the following:  · Use low-fat or light margarine instead of regular margarine or shortening  · Use lean ground turkey breast or chicken, or lean ground beef (less than 5% fat) instead of hamburger  · Add 1 teaspoon of canola oil to 8 ounces of skim milk instead of using cream or half and half  · Use grated zucchini, carrots, or apples in breads instead of coconut  · Use blenderized, low-fat cottage cheese, plain tofu, or low-fat ricotta cheese instead of cream cheese  · Use 1 egg white and 1 teaspoon of canola oil, or use ¼ cup (2 ounces) of fat-free egg substitute instead of a whole egg  · Replace half of the oil that is called for in a recipe with applesauce when you bake  Use 3 tablespoons of cocoa powder and 1 tablespoon of canola oil instead of a square of baking chocolate  How to increase fiber:  Eat enough high-fiber foods to get 20 to 30 grams of fiber every day  Slowly increase your fiber intake to avoid stomach cramps, gas, and other problems  · Eat 3 ounces of whole-grain foods each day  An ounce is about 1 slice of bread  Eat whole-grain breads, such as whole-wheat bread  Whole wheat, whole-wheat flour, or other whole grains should be listed as the first ingredient on the food label  Replace white flour with whole-grain flour or use half of each in recipes   Whole-grain flour is heavier than white flour, so you may have to add more yeast or baking powder  · Eat a high-fiber cereal for breakfast   Oatmeal is a good source of soluble fiber  Look for cereals that have bran or fiber in the name  Choose whole-grain products, such as brown rice, barley, and whole-wheat pasta  · Eat more beans, peas, and lentils  For example, add beans to soups or salads  Eat at least 5 cups of fruits and vegetables each day  Eat fruits and vegetables with the peel because the peel is high in fiber  © Copyright [employer] 2021 Information is for End User's use only and may not be sold, redistributed or otherwise used for commercial purposes  All illustrations and images included in CareNotes® are the copyrighted property of A D A M , Inc  or Gloria Reed  The above information is an  only  It is not intended as medical advice for individual conditions or treatments  Talk to your doctor, nurse or pharmacist before following any medical regimen to see if it is safe and effective for you

## 2021-08-03 NOTE — PROGRESS NOTES
Progress Note - General Surgery   Tomy Brown 63 y o  female MRN: [id number]  Unit/Bed#: -01 Encounter: [id number]    Assessment:  63yo F POD2 s/p laparoscopic cholecystectomy   - afebrile, VSS, no leukocytosis WBC 6 4 today  - LFTs down trending   - Patient states she is still having RUQ abdominal pain  - tolerating diet without nausea or vomiting    Plan:  - continue diet as tolerated   - Prn analgesia   - encourage ambulation   - encourage incentive spirometry   - DVT prophylaxis  - Plan for discharge later today    Subjective/Objective   Subjective: No acute events overnight  Patient stats she is still experiencing some RUQ abdominal pain  Tolerating diet well  Has been ambulating without difficulty  Denies chest pain, SOB, palpitations, nausea/vomiting, fever/chills, constipation/diarrhea  Objective: Blood pressure 143/79, pulse 58, temperature 97 7 °F (36 5 °C), resp  rate 16, height 5' 7" (1 702 m), weight 59 8 kg (131 lb 13 4 oz), SpO2 96 %, not currently breastfeeding  ,Body mass index is 20 65 kg/m²        Intake/Output Summary (Last 24 hours) at 8/3/2021 1119  Last data filed at 8/3/2021 6815  Gross per 24 hour   Intake 2175 ml   Output --   Net 2175 ml       Invasive Devices     Peripheral Intravenous Line            Peripheral IV 08/02/21 Right Forearm <1 day                Physical Exam: /79   Pulse 58   Temp 97 7 °F (36 5 °C)   Resp 16   Ht 5' 7" (1 702 m)   Wt 59 8 kg (131 lb 13 4 oz)   SpO2 96%   BMI 20 65 kg/m²   General appearance: alert and oriented, in no acute distress, appears stated age and cooperative  Head: Normocephalic, without obvious abnormality, atraumatic  Lungs: clear to auscultation bilaterally  Heart: regular rate and rhythm, S1, S2 normal, no murmur, click, rub or gallop  Abdomen: soft, appropriately tender to palpation, incisions clean, dry, intact   Extremities: extremities normal, warm and well-perfused; no cyanosis, clubbing, or edema    Lab, Imaging and other studies:  I have personally reviewed pertinent lab results    , CBC:   Lab Results   Component Value Date    WBC 6 43 08/03/2021    HGB 10 6 (L) 08/03/2021    HCT 32 8 (L) 08/03/2021    MCV 93 08/03/2021     08/03/2021    MCH 30 0 08/03/2021    MCHC 32 3 08/03/2021    RDW 12 8 08/03/2021    MPV 10 7 08/03/2021    NRBC 0 08/03/2021   , CMP:   Lab Results   Component Value Date    SODIUM 143 08/03/2021    K 3 7 08/03/2021     08/03/2021    CO2 28 08/03/2021    BUN 13 08/03/2021    CREATININE 0 74 08/03/2021    CALCIUM 8 3 08/03/2021    AST 33 08/03/2021    ALT 43 08/03/2021    ALKPHOS 127 (H) 08/03/2021    EGFR 91 08/03/2021     VTE Mechanical Prophylaxis: sequential compression device    Rosy Brunson PA-C  08/03/21

## 2021-08-04 NOTE — UTILIZATION REVIEW
Emergent OP Procedure Authorization Request   This patient came in through our ED and had an Emergency Outpatient Procedure   SERVICING FACILITY:   [address] [place] [address]  Tax ID: [id number]  NPI: [id number]  Place of Service: [address] [facility] Service Code: [address]:  Attending Name and NPI#: Alma Samson Md [[id number]]  Surgeon(s) and Role:     * Alma Samson MD - Primary     [facility], [facility] - Assisting  Address: [address] [place] [address]  Phone: [phone]     UTILIZATION REVIEW CONTACT:  Griselda Cooks, Utilization   Network Utilization Review Department  Phone: [phone]  Fax [phone]  Email: [email or web address]     PHYSICIAN ADVISORY SERVICES:  FOR [id number] REVIEW - MEDICAL NECESSITY DENIAL  Phone: [phone]  Fax: [phone]  Email: [email or web address]     TYPE OF REQUEST:  Emergent     STAY INFORMATION:  ADMISSION DATE/TIME:   SURGERY DATE: 8/1/2021    Preop Diagnosis:  Right upper quadrant pain [R10 11]  Elevated LFTs [R79 89]  Sludge in gallbladder [K82 8]  Post-Op Diagnosis Codes:     * Right upper quadrant pain [R10 11]     * Elevated LFTs [R79 89]     * Sludge in gallbladder [K82 8]    Operative Indications:  Right upper quadrant pain [R10 11]  Elevated LFTs [R79 89]  Sludge in gallbladder [K82 8]    Procedure(s) (LRB):  CHOLECYSTECTOMY LAPAROSCOPIC W/ INTRAOP CHOLANGIOGRAM (N/A)  CHOLECYSTECTOMY LAPAROSCOPIC W/ INTRAOP CHOLANGIOGRAM:    Procedures:    * CHOLECYSTECTOMY LAPAROSCOPIC W/ INTRAOP CHOLANGIOGRAM  DISCHARGE DATE/TIME: 8/3/2021  4:34 PM  DISCHARGE DISPOSITION (IF DISCHARGED): Home/Self Care     IMPORTANT INFORMATION:  Please contact the Griselda Cooks directly with any questions or concerns regarding this request  Department voicemails are confidential     Send requests for admission clinical reviews, concurrent reviews, approvals, and administrative denials due to lack of clinical to fax [phone]

## 2021-08-04 NOTE — UTILIZATION REVIEW
Initial Clinical Review    Admission: Date/Time/Statement: URGENT PRESENTATION TO [facility] ED 8/1/21 AT 0629   8/1/21 - TO OR - POST OP KEPT IN OUTPATIENT BED   8/2/21 AT 7493 OUTPATIENT NO CHARGE BED ORDER WRITTEN  8/3/21 DISCHARGE   08/02/21 0759  Outpatient No Charge Bed Once     Transfer Service: Surgery-General        08/02/21 0759     ED Arrival Information     Expected Arrival Acuity    - 8/1/2021 06:26 Urgent    Service Admission type    Surgery-General Urgent    Arrival complaint    flu like symptoms, flank pain     Chief Complaint   Patient presents with    Flank Pain     PT c/o right sided flank pain starting friday  Pt has hx kidney stones and started herself on flomax and since has been getting worse with fever  Initial Presentation:   63 ear old female with PMHx long-standing nephrolithiasis - presented urgently to [facility] ED on 8/1/21 2nd 3 day history of right sided pain under her ribs + flank pain with subjective fever + chills  Reports starting herself on flomax + an antibiotic without relief  In ED - Temp 99 2   BP 20027  Exam + abdominal tenderness (RUQ/[place]) and right CVA tenderness  CT +  Layering sludge or small calculi within the gallbladder  WBC 10,710   LFT's elevated   D  Bili 0 32  ED Tx:  NPO, IVF NSS 1794 cc, IV Rocephin, IV Toradol, IV MS  Seen by General Surgery     General Surgery:  Assessment:  64 yo female with biliary colic which is acute on chronic, she also has some elevated LFTs which might represent a transient passage of a gallstone into the biliary tree  She also has some signs and symptoms (right perinephric stranding, blood in urine, fevers/chills, flank pain suggestive of a urinary tract etiology       Plan:  Laparoscopic cholecystectomy with cholangiogram  We discussed the risks and benefits of cholecystectomy and the cholangiogram to ensure the bile ducts are patent and without filling defect to suggest choledocholithiasis   We will address her history of postoperative nausea and vomiting and treat accordingly   I explained that while she may have two separate etiologies we cannot guarantee she will completely resolve after the surgery I do think she has symptoms consistent with biliary colic and will experience some improvement       8/1/21  OPERATIVE REPORT:  Preop + Post-Op Diagnosis Codes:     * Right upper quadrant pain [R10 11]     * Elevated LFTs [R79 89]     * Sludge in gallbladder [K82 8]   Procedure(s) (LRB):  CHOLECYSTECTOMY LAPAROSCOPIC W/ INTRAOP CHOLANGIOGRAM (N/A) - unable to perform cholangiogram likely secondary to anatomic variation vs obstruction      8/2/21 POD # 1 GENERAL SURGERY:   POD1 s/p laparoscopic cholecystectomy, unable to perform IOC 2/2 patient anatomy  AVSS, LFTs trending down, WBC 7 61  Complaining of persistent RUQ pain radiating to her back similar to presentation and bloating  +flatus  Denies dysuria, notes strange odor to urine     Continue current care, diet as tolerated, decrease IVF, will heplock once patient is able to tolerate adequate PO intake  Serial abdominal exams   Pain control PRN  Ambulation/OOB  DVT prophylaxis       8/3/21 POD # 2 GENERAL SURGERY:   Assessment:  61yo F POD2 s/p laparoscopic cholecystectomy   - afebrile, VSS, no leukocytosis WBC 6 4 today  - LFTs down trending   - Patient states she is still having RUQ abdominal pain  - tolerating diet without nausea or vomiting     Plan:  - continue diet as tolerated   - Prn analgesia   - encourage ambulation   - encourage incentive spirometry   - DVT prophylaxis  - Plan for discharge later today    ED Triage Vitals   Temperature Pulse Respirations Blood Pressure SpO2   08/01/21 0638 08/01/21 0635 08/01/21 0635 08/01/21 0635 08/01/21 0635   99 2 °F (37 3 °C) (!) 115 19 129/84 96 %      Temp Source Heart Rate Source Patient Position - Orthostatic VS BP Location FiO2 (%)   08/01/21 0042 08/01/21 0635 08/01/21 0635 08/01/21 1951 --   Oral Monitor Lying Right arm       Pain Score       08/01/21 0740       Worst Possible Pain          Wt Readings from Last 1 Encounters:   08/01/21 59 8 kg (131 lb 13 4 oz)     Additional Vital Signs:  08/02/21 07:10:58  97 7 °F (36 5 °C)  59  18  132/71  91  98 %  None (Room air)  --  --   08/01/21 22:54:29  97 7 °F (36 5 °C)  66  17  104/82  89  96 %  --  --  --   08/01/21 2002  --  --  --  112/72  --  --  --  --  --   08/01/21 1900  --  79  --  133/84  100  --  --  --  --   08/01/21 18:54:44  98 6 °F (37 °C)  79  18  133/84  100  94 %  --  --  --   08/01/21 1838  99 1 °F (37 3 °C)  80  16  149/83  --  96 %  None (Room air)  --  --   08/01/21 1830  98 8 °F (37 1 °C)  78  18  134/78  --  95 %  None (Room air)  WDL  --   08/01/21 1824  100 °F (37 8 °C)  79  16  128/74  --  94 %  None (Room air)  WDL  --   08/01/21 1600  98 5 °F (36 9 °C)  86  16  147/81  --  98 %  None (Room air)  --  --   08/01/21 1400  --  87  20  144/75  101  97 %  --  --  --   08/01/21 1200  --  78  17  137/73  99  99 %  None (Room air)  --  Lying   08/01/21 0747  98 8 °F (37 1 °C)  --  --  --  --  --  --  --       I/O 08/02 0701 08/03 0700 08/03 0701      P O  1260 600   I V  (mL/kg) 1995 (33 4)    Total Intake(mL/kg) 3255 (54 4) 600 (10)   Urine (mL/kg/hr)     Total Output     Net +3255 +600        Unmeasured Urine Occurrence 2 x      Pertinent Labs/Diagnostic Test Results:   Results from last 7 days   Lab Units 08/01/21  0721   SARS-COV-2  Negative     Results from last 7 days   Lab Units 08/03/21  0444 08/02/21  0755 08/01/21  0721   WBC Thousand/uL 6 43 7 61 10 71*   HEMOGLOBIN g/dL 10 6* 11 8 13 4   HEMATOCRIT % 32 8* 37 0 41 0   PLATELETS Thousands/uL 188 176 219   NEUTROS ABS Thousands/µL 3 57  --  8 63*       Results from last 7 days   Lab Units 08/03/21  0444 08/02/21  0755 08/01/21  0721   SODIUM mmol/L 143 138 139   POTASSIUM mmol/L 3 7 3 7 3 0*   CHLORIDE mmol/L 108 103 103   CO2 mmol/L 28 25 24   ANION GAP mmol/L 7 10 12   BUN mg/dL 13 10 11 CREATININE mg/dL 0 74 0 70 0 81   EGFR ml/min/1 73sq m 91 97 81   CALCIUM mg/dL 8 3 9 0 8 9     Results from last 7 days   Lab Units 08/03/21  0444 08/02/21  0755 08/01/21  0721   AST U/L 33 39 118*   ALT U/L 43 53 100*   ALK PHOS U/L 127* 165* 216*   TOTAL PROTEIN g/dL 5 9* 7 0 7 8   ALBUMIN g/dL 2 6* 2 9* 3 6   TOTAL BILIRUBIN mg/dL 0 33 0 36 0 79   BILIRUBIN DIRECT mg/dL  --   --  0 32*     Results from last 7 days   Lab Units 08/03/21  0444 08/02/21  0755 08/01/21  0721   GLUCOSE RANDOM mg/dL 100 112 128     Results from last 7 days   Lab Units 08/01/21  0721   TROPONIN I ng/mL <0 02     Results from last 7 days   Lab Units 08/01/21  0721   PROTIME seconds 14 5   INR  1 19   PTT seconds 29       Results from last 7 days   Lab Units 08/01/21  0721   PROCALCITONIN ng/ml 0 38*     Results from last 7 days   Lab Units 08/01/21  0721   LACTIC ACID mmol/L 1 1     Results from last 7 days   Lab Units 08/01/21  1045   HEP B S AG  Non-reactive   HEP C AB  Non-reactive   HEP B C IGM  Non-reactive     Results from last 7 days   Lab Units 08/01/21  0721   LIPASE u/L 92     Results from last 7 days   Lab Units 08/01/21  0935   CLARITY UA  Clear   COLOR UA  Yellow   SPEC GRAV UA  1 020   PH UA  5 5   GLUCOSE UA mg/dl Negative   KETONES UA mg/dl 15 (1+)*   BLOOD UA  Large*   PROTEIN UA mg/dl Trace*   NITRITE UA  Negative   BILIRUBIN UA  Negative   UROBILINOGEN UA E U /dl 1 0   LEUKOCYTES UA  Negative   WBC UA /hpf 1-2   RBC UA /hpf 4-10*   BACTERIA UA /hpf None Seen   EPITHELIAL CELLS WET PREP /hpf None Seen     Results from last 7 days   Lab Units 08/01/21  0722 08/01/21  0721   BLOOD CULTURE  No Growth at 72 hrs  No Growth at 72 hrs  CT ABDOMEN AND PELVIS WITHOUT IV CONTRAST   1   Mild right-sided perinephric inflammation without evidence of renal or ureteric calculi   Correlate for urinary tract infection     2   Layering sludge or small calculi within the gallbladder   No CT evidence for acute cholecystitis   If there is continued concern for acute cholecystitis, consider follow-up nuclear medicine HIDA scan to evaluate for cystic duct patency    ED Treatment:   Medication Administration from 08/01/2021 0626 to 08/01/2021 1519       Date/Time Order Dose Route Action     08/01/2021 1039 sodium chloride 0 9 % bolus 1,794 mL 0 mL Intravenous Stopped     08/01/2021 0740 ketorolac (TORADOL) injection 30 mg 30 mg Intravenous Given     08/01/2021 1048 morphine (PF) 4 mg/mL injection 4 mg 4 mg Intravenous Given     08/01/2021 1430 cefTRIAXone (ROCEPHIN) 2,000 mg in dextrose 5 % 50 mL IVPB 2,000 mg Intravenous New Bag     Past Medical History:   Diagnosis Date    Benign positional vertigo, right     Cervicalgia     [facility]deep venous [facility]) [facility] [address] [facility]     Graves disease     Headache     Hypertension     Thyroid disease      Present on Admission:   DVT (deep venous thrombosis) ([facility])   Biliary colic   Right upper quadrant pain   Elevated LFTs    Admitting Diagnosis: Right upper quadrant pain [R10 11]  Elevated LFTs [R79 89]  Flank pain [R10 9]  Sludge in gallbladder [K82 8]    Age/Sex: 63 y o  female    Admission Orders:  VS q4hrs  Continuous Pulse Oximetry  Up + OOB as tolerated  NPO FOR OR  I+O q shift    Scheduled Medications:  Medications 08/01 08/02 08/03   bisacodyl (DULCOLAX) EC tablet 10 mg   Dose: 10 mg  Freq: Once Route: PO  Start: 08/03/21 1215 End: 08/03/21 1338      1338        ceFAZolin (ANCEF) IVPB (premix in dextrose) 1,000 mg 50 mL   Dose: 1,000 mg  Freq: Once Route: IV  Last Dose: Stopped (08/01/21 1750)  Start: 08/01/21 1700 End: 08/01/21 1750    1653     1750          cefTRIAXone (ROCEPHIN) 2,000 mg in dextrose 5 % 50 mL IVPB   Dose: 2,000 mg  Freq: Once Route: IV  Last Dose: Stopped (08/01/21 1500)  Start: 08/01/21 1315 End: 08/01/21 1500    1430     1500          diphenhydrAMINE (BENADRYL) tablet 25 mg   Dose: 25 mg  Freq:  Once Route: PO  Indications of Use: URTICARIA  Start: 08/02/21 2130 End: 08/02/21 2133 2133         docusate sodium (COLACE) capsule 100 mg   Dose: 100 mg  Freq: 2 times daily Route: PO  Start: 08/02/21 1800 End: 08/03/21 1834     1746      0906     1834-D/C'd      enoxaparin (LOVENOX) subcutaneous injection 40 mg   Dose: 40 mg  Freq: Every 24 hours scheduled Route: SC  Start: 08/02/21 1415 End: 08/03/21 1834     1530      1600     1834-D/C'd      ketorolac (TORADOL) injection 30 mg   Dose: 30 mg  Freq: Once Route: IV  Start: 08/01/21 0730 End: 08/01/21 0740    0740          morphine (PF) 4 mg/mL injection 4 mg   Dose: 4 mg  Freq: Once Route: IV  Start: 08/01/21 1045 End: 08/01/21 1048    1048          sodium chloride 0 9 % bolus 1,794 mL   Dose: 1,794 mL  Freq: Once Route: IV  Indications of Use: FLUID AND ELECTROLYTE DISTURBANCE  Last Dose: Stopped (08/01/21 1039)  Start: 08/01/21 0730 End: 08/01/21 1039    0739     1039          Medications 08/01 08/02 08/03         Continuous IV Infusions:  IVF lactated ringers infusion    Ordered Dose: 50 mL/hr Route: Intravenous Frequency: Continuous @ 50 mL/hr   Scheduled Start Date/Time: 08/01/21 1700 End Date/Time: 08/03/21 1208      PRN Meds:  Medications 08/01 08/02 08/03   acetaminophen (TYLENOL) tablet 650 mg   Dose: 650 mg  Freq: Every 6 hours PRN Route: PO  PRN Reasons: mild pain,headaches,fever  Start: 08/01/21 1827 End: 08/03/21 1834     0242     0913     1848      0307     0906     1834-D/C'd      bupivacaine (PF) (MARCAINE) 0 25 % 1 mL, lidocaine (PF) (XYLOCAINE-MPF) 1 % 1 mL infiltration   Freq: As needed  Start: 08/01/21 1812 End: 08/01/21 1823    1812     1823-D/C'd        dexamethasone (DECADRON) injection   Freq: As needed Route: IV  Start: 08/01/21 1736 End: 08/01/21 1823    1736     1823-D/C'd        diphenhydrAMINE (BENADRYL) injection 12 5 mg   Dose: 12 5 mg  Freq:  Once as needed Route: IV  PRN Reason: itching  PRN Comment: Second Line For Nausea  Start: 08/01/21 1824 End: 08/01/21 1840 1840-D/C'd        fentaNYL (SUBLIMAZE) injection 50 mcg   Dose: 50 mcg  Freq: Every 5 minutes PRN Route: IV  PRN Reason: Pain - PACU  PRN Comment: Breakthrough - first line  Start: 08/01/21 1824 End: 08/01/21 1840    1840-D/C'd        fentanyl citrate (PF) 100 MCG/2ML   Freq: As needed Route: IV  Start: 08/01/21 1733 End: 08/01/21 1823    1733     1823-D/C'd        glycopyrrolate (ROBINUL) injection   Freq: As needed Route: IV  Start: 08/01/21 1809 End: 08/01/21 1823    1809     1823-D/C'd        HYDROcodone-acetaminophen (NORCO) 5-325 mg per tablet 1 tablet   Dose: 1 tablet  Freq: Every 6 hours PRN Route: PO  PRN Reason: moderate pain  Start: 08/03/21 1207 End: 08/03/21 1834      1338     1834-D/C'd      HYDROmorphone (DILAUDID) injection   Freq: As needed Route: IV  Start: 08/01/21 1756 End: 08/01/21 1823    1756     1823-D/C'd        HYDROmorphone (DILAUDID) injection 0 2 mg   Dose: 0 2 mg  Freq: Every 3 hours PRN Route: IV  PRN Reason: severe pain  Start: 08/01/21 1827 End: 08/03/21 1208     (0744)      1208-D/C'd      HYDROmorphone (DILAUDID) injection 0 5 mg   Dose: 0 5 mg  Freq: Every 4 hours PRN Route: IV  PRN Reason: breakthrough pain  Start: 08/01/21 1827 End: 08/03/21 1208      1208-D/C'd      HYDROmorphone (DILAUDID) injection 0 5 mg   Dose: 0 5 mg  Freq: Every 10 minutes PRN Route: IV  PRN Reason: Pain - PACU  PRN Comment: Breakthrough Pain   Second Line  Start: 08/01/21 1824 End: 08/01/21 1840    1840-D/C'd        ketorolac (TORADOL) injection 15 mg   Dose: 15 mg  Freq: Every 6 hours PRN Route: IV  PRN Reason: moderate pain  Start: 08/01/21 1826 End: 08/03/21 1825    2122      0328     1021     1753      0052     3267     1825-D/C'd      Labetalol HCl (NORMODYNE) injection   Freq: As needed Route: IV  Start: 08/01/21 1753 End: 08/01/21 1823 1753 1823-D/C'd        lidocaine (PF) (XYLOCAINE-MPF) 1 % injection   Freq: As needed Route: IV  Start: 08/01/21 1733 End: 08/01/21 1823 1733 1823-D/C'd        metoclopramide (REGLAN) injection 10 mg   Dose: 10 mg  Freq: Once as needed Route: IV  PRN Reason: nausea  PRN Comment: Third Line For Nausea  Start: 08/01/21 1824 End: 08/01/21 1840    1840-D/C'd        midazolam (VERSED) injection   Freq: As needed Route: IV  Start: 08/01/21 1727 End: 08/01/21 1823    1727     1823-D/C'd        neostigmine (BLOXIVERZ) injection   Freq: As needed Route: IV  Start: 08/01/21 1809 End: 08/01/21 1823    1809     1823-D/C'd        ondansetron (ZOFRAN) injection   Freq: As needed Route: IV  Start: 08/01/21 1809 End: 08/01/21 1823    1809     1823-D/C'd        ondansetron (ZOFRAN) injection 4 mg   Dose: 4 mg  Freq: Once as needed Route: IV  PRN Reason: nausea  PRN Comment: First Line For Nausea  Start: 08/01/21 1824 End: 08/01/21 1840    1840-D/C'd        phenylephrine bolus from bag   Freq: As needed Route: IV  Start: 08/01/21 1745 End: 08/01/21 1823    1745     1823-D/C'd        propofol (DIPRIVAN) 1000 mg in 100 mL infusion (premix)   Freq: Continuous PRN Route: IV  Start: 08/01/21 1736 End: 08/01/21 1823    1736     1747     1806     1812     1823-D/C'd        propofol (DIPRIVAN) 200 MG/20ML bolus injection   Freq: As needed Route: IV  Start: 08/01/21 1733 End: 08/01/21 1823    1733     1823-D/C'd        ROCuronium (ZEMURON) injection   Freq: As needed Route: IV  Start: 08/01/21 1733 End: 08/01/21 1823    1733     1823-D/C'd        simethicone (MYLICON) chewable tablet 80 mg   Dose: 80 mg  Freq: Every 6 hours PRN Route: PO  PRN Reason: flatulence  Start: 08/02/21 1250 End: 08/03/21 1834     1307      1834-D/C'd      sodium chloride 0 9 % irrigation solution   Freq: As needed  Start: 08/01/21 1753 End: 08/01/21 1823    1753     1823-D/C'd        Medications 08/01 08/02 08/03     Network Utilization Review Department  ATTENTION: Please call with any questions or concerns to [phone] and carefully listen to the prompts so that you are directed to the right person   All voicemails are confidential   [place] ham requests for admission clinical reviews, approved or denied determinations and any other requests to dedicated fax number below belonging to the campus where the patient is receiving treatment   List of dedicated fax numbers for the Facilities:  [address] DENIALS (Administrative/Medical Necessity) [phone]   [address] (Maternity/NICU/Pediatrics) [phone] [address] [facility] [address] [address] [address] Calvo [address] [facility] [place] [address] [phone]

## 2021-08-06 LAB
BACTERIA BLD CULT: NORMAL
BACTERIA BLD CULT: NORMAL

## 2021-08-12 NOTE — UTILIZATION REVIEW
Millicent Acevedo, RN   Registered Nurse   Specialty:  Utilization Review   Utilization Review       Signed   Date of Service:  8/3/2021  4:34 PM     Initial Clinical Review    Admission: Date/Time/Statement: URGENT PRESENTATION TO [facility] ED 8/1/21 AT 0629   8/1/21 - TO OR - POST OP KEPT IN OUTPATIENT BED   8/2/21 AT 0759 OUTPATIENT NO CHARGE BED ORDER WRITTEN  8/3/21 DISCHARGE   08/02/21 0759  Outpatient No Charge Bed Once     Transfer Service: Surgery-General        08/02/21 0759     ED Arrival Information     Expected Arrival Acuity    - 8/1/2021 06:26 Urgent    Service Admission type    Surgery-General Urgent    Arrival complaint    flu like symptoms, flank pain     Chief Complaint   Patient presents with    Flank Pain     PT c/o right sided flank pain starting friday  Pt has hx kidney stones and started herself on flomax and since has been getting worse with fever  Initial Presentation:   63 ear old female with PMHx long-standing nephrolithiasis - presented urgently to [facility] ED on 8/1/21 2nd 3 day history of right sided pain under her ribs + flank pain with subjective fever + chills  Reports starting herself on flomax + an antibiotic without relief  In ED - Temp 99 2   BP 63063  Exam + abdominal tenderness (RUQ/[place]) and right CVA tenderness  CT +  Layering sludge or small calculi within the gallbladder  WBC 10,710   LFT's elevated   D  Bili 0 32  ED Tx:  NPO, IVF NSS 1794 cc, IV Rocephin, IV Toradol, IV MS  Seen by General Surgery     General Surgery:  Assessment:  64 yo female with biliary colic which is acute on chronic, she also has some elevated LFTs which might represent a transient passage of a gallstone into the biliary tree   She also has some signs and symptoms (right perinephric stranding, blood in urine, fevers/chills, flank pain suggestive of a urinary tract etiology       Plan:  Laparoscopic cholecystectomy with cholangiogram  We discussed the risks and benefits of cholecystectomy and the cholangiogram to ensure the bile ducts are patent and without filling defect to suggest choledocholithiasis  We will address her history of postoperative nausea and vomiting and treat accordingly   I explained that while she may have two separate etiologies we cannot guarantee she will completely resolve after the surgery I do think she has symptoms consistent with biliary colic and will experience some improvement       8/1/21  OPERATIVE REPORT:  Preop + Post-Op Diagnosis Codes:     * Right upper quadrant pain [R10 11]     * Elevated LFTs [R79 89]     * Sludge in gallbladder [K82 8]   Procedure(s) (LRB):  CHOLECYSTECTOMY LAPAROSCOPIC W/ INTRAOP CHOLANGIOGRAM (N/A) - unable to perform cholangiogram likely secondary to anatomic variation vs obstruction      8/2/21 POD # 1 GENERAL SURGERY:   POD1 s/p laparoscopic cholecystectomy, unable to perform IOC 2/2 patient anatomy  AVSS, LFTs trending down, WBC 7 61  Complaining of persistent RUQ pain radiating to her back similar to presentation and bloating  +flatus  Denies dysuria, notes strange odor to urine     Continue current care, diet as tolerated, decrease IVF, will heplock once patient is able to tolerate adequate PO intake  Serial abdominal exams   Pain control PRN  Ambulation/OOB  DVT prophylaxis       8/3/21 POD # 2 GENERAL SURGERY:   Assessment:  63yo F POD2 s/p laparoscopic cholecystectomy   - afebrile, VSS, no leukocytosis WBC 6 4 today  - LFTs down trending   - Patient states she is still having RUQ abdominal pain  - tolerating diet without nausea or vomiting     Plan:  - continue diet as tolerated   - Prn analgesia   - encourage ambulation   - encourage incentive spirometry   - DVT prophylaxis  - Plan for discharge later today    ED Triage Vitals   Temperature Pulse Respirations Blood Pressure SpO2   08/01/21 0638 08/01/21 0635 08/01/21 0635 08/01/21 0635 08/01/21 0635   99 2 °F (37 3 °C) (!) 115 19 129/84 96 %      Temp Source Heart Rate Source Patient Position - Orthostatic VS BP Location FiO2 (%)   08/01/21 0638 08/01/21 0635 08/01/21 0635 08/01/21 0635 --   Oral Monitor Lying Right arm       Pain Score       08/01/21 0740       Worst Possible Pain          Wt Readings from Last 1 Encounters:   08/01/21 59 8 kg (131 lb 13 4 oz)     Additional Vital Signs:  08/02/21 07:10:58  97 7 °F (36 5 °C)  59  18  132/71  91  98 %  None (Room air)  --  --   08/01/21 22:54:29  97 7 °F (36 5 °C)  66  17  104/82  89  96 %  --  --  --   08/01/21 2002  --  --  --  112/72  --  --  --  --  --   08/01/21 1900  --  79  --  133/84  100  --  --  --  --   08/01/21 18:54:44  98 6 °F (37 °C)  79  18  133/84  100  94 %  --  --  --   08/01/21 1838  99 1 °F (37 3 °C)  80  16  149/83  --  96 %  None (Room air)  --  --   08/01/21 1830  98 8 °F (37 1 °C)  78  18  134/78  --  95 %  None (Room air)  WDL  --   08/01/21 1824  100 °F (37 8 °C)  79  16  128/74  --  94 %  None (Room air)  WDL  --   08/01/21 1600  98 5 °F (36 9 °C)  86  16  147/81  --  98 %  None (Room air)  --  --   08/01/21 1400  --  87  20  144/75  101  97 %  --  --  --   08/01/21 1200  --  78  17  137/73  99  99 %  None (Room air)  --  Lying   08/01/21 0747  98 8 °F (37 1 °C)  --  --  --  --  --  --  --       I/O 08/02 0701 08/03 0700 08/03 0701      P O  1260 600   I V  (mL/kg) 1995 (33 4)    Total Intake(mL/kg) 3255 (54 4) 600 (10)   Urine (mL/kg/hr)     Total Output     Net +3255 +600        Unmeasured Urine Occurrence 2 x      Pertinent Labs/Diagnostic Test Results:                                                           CT ABDOMEN AND PELVIS WITHOUT IV CONTRAST   1   Mild right-sided perinephric inflammation without evidence of renal or ureteric calculi   Correlate for urinary tract infection     2   Layering sludge or small calculi within the gallbladder   No CT evidence for acute cholecystitis   If there is continued concern for acute cholecystitis, consider follow-up nuclear medicine HIDA scan to evaluate for cystic duct patency    ED Treatment:   Medication Administration from 08/01/2021 0626 to 08/01/2021 1519       Date/Time Order Dose Route Action     08/01/2021 1039 sodium chloride 0 9 % bolus 1,794 mL 0 mL Intravenous Stopped     08/01/2021 0740 ketorolac (TORADOL) injection 30 mg 30 mg Intravenous Given     08/01/2021 1048 morphine (PF) 4 mg/mL injection 4 mg 4 mg Intravenous Given     08/01/2021 1430 cefTRIAXone (ROCEPHIN) 2,000 mg in dextrose 5 % 50 mL IVPB 2,000 mg Intravenous New Bag     Past Medical History:   Diagnosis Date    Benign positional vertigo, right     Cervicalgia     [facility] [address] )     Graves disease     Headache     Hypertension     Thyroid disease      Present on Admission:   [facility]   Biliary colic   Right upper quadrant pain   Elevated LFTs    Admitting Diagnosis: Right upper quadrant pain [R10 11]  Elevated LFTs [R79 89]  Flank pain [R10 9]  Sludge in gallbladder [K82 8]    Age/Sex: 63 y o  female    Admission Orders:  VS q4hrs  Continuous Pulse Oximetry  Up + OOB as tolerated  NPO FOR OR  I+O q shift    Scheduled Medications:  Medications 08/01 08/02 08/03   bisacodyl (DULCOLAX) EC tablet 10 mg   Dose: 10 mg  Freq: Once Route: PO  Start: 08/03/21 1215 End: 08/03/21 1338      1338        ceFAZolin (ANCEF) IVPB (premix in dextrose) 1,000 mg 50 mL   Dose: 1,000 mg  Freq: Once Route: IV  Last Dose: Stopped (08/01/21 1750)  Start: 08/01/21 1700 End: 08/01/21 1750    1653     1750          cefTRIAXone (ROCEPHIN) 2,000 mg in dextrose 5 % 50 mL IVPB   Dose: 2,000 mg  Freq: Once Route: IV  Last Dose: Stopped (08/01/21 1500)  Start: 08/01/21 1315 End: 08/01/21 1500    1430     1500          diphenhydrAMINE (BENADRYL) tablet 25 mg   Dose: 25 mg  Freq:  Once Route: PO  Indications of Use: URTICARIA  Start: 08/02/21 2130 End: 08/02/21 2133 2133         docusate sodium (COLACE) capsule 100 mg   Dose: 100 mg  Freq: 2 times daily Route: PO  Start: 08/02/21 1800 End: 08/03/21 1834     1746      0906     1834-D/C'd      enoxaparin (LOVENOX) subcutaneous injection 40 mg   Dose: 40 mg  Freq: Every 24 hours scheduled Route: SC  Start: 08/02/21 1415 End: 08/03/21 1834     1530      1600     1834-D/C'd      ketorolac (TORADOL) injection 30 mg   Dose: 30 mg  Freq: Once Route: IV  Start: 08/01/21 0730 End: 08/01/21 0740    0740          morphine (PF) 4 mg/mL injection 4 mg   Dose: 4 mg  Freq: Once Route: IV  Start: 08/01/21 1045 End: 08/01/21 1048    1048          sodium chloride 0 9 % bolus 1,794 mL   Dose: 1,794 mL  Freq: Once Route: IV  Indications of Use: FLUID AND ELECTROLYTE DISTURBANCE  Last Dose: Stopped (08/01/21 1039)  Start: 08/01/21 0730 End: 08/01/21 1039    0739     1039          Medications 08/01 08/02 08/03         Continuous IV Infusions:  IVF lactated ringers infusion    Ordered Dose: 50 mL/hr Route: Intravenous Frequency: Continuous @ 50 mL/hr   Scheduled Start Date/Time: 08/01/21 1700 End Date/Time: 08/03/21 1208      PRN Meds:  Medications 08/01 08/02 08/03   acetaminophen (TYLENOL) tablet 650 mg   Dose: 650 mg  Freq: Every 6 hours PRN Route: PO  PRN Reasons: mild pain,headaches,fever  Start: 08/01/21 1827 End: 08/03/21 1834     0242     0913     1848      0307     0906     1834-D/C'd      bupivacaine (PF) (MARCAINE) 0 25 % 1 mL, lidocaine (PF) (XYLOCAINE-MPF) 1 % 1 mL infiltration   Freq: As needed  Start: 08/01/21 1812 End: 08/01/21 1823    1812     1823-D/C'd        dexamethasone (DECADRON) injection   Freq: As needed Route: IV  Start: 08/01/21 1736 End: 08/01/21 1823    1736     1823-D/C'd        diphenhydrAMINE (BENADRYL) injection 12 5 mg   Dose: 12 5 mg  Freq:  Once as needed Route: IV  PRN Reason: itching  PRN Comment: Second Line For Nausea  Start: 08/01/21 1824 End: 08/01/21 1840 1840-D/C'd        fentaNYL (SUBLIMAZE) injection 50 mcg   Dose: 50 mcg  Freq: Every 5 minutes PRN Route: IV  PRN Reason: Pain - PACU  PRN Comment: Breakthrough - first line  Start: 08/01/21 1824 End: 08/01/21 1840    1840-D/C'd        fentanyl citrate (PF) 100 MCG/2ML   Freq: As needed Route: IV  Start: 08/01/21 1733 End: 08/01/21 1823    1733     1823-D/C'd        glycopyrrolate (ROBINUL) injection   Freq: As needed Route: IV  Start: 08/01/21 1809 End: 08/01/21 1823    1809     1823-D/C'd        HYDROcodone-acetaminophen (NORCO) 5-325 mg per tablet 1 tablet   Dose: 1 tablet  Freq: Every 6 hours PRN Route: PO  PRN Reason: moderate pain  Start: 08/03/21 1207 End: 08/03/21 1834      1338     1834-D/C'd      HYDROmorphone (DILAUDID) injection   Freq: As needed Route: IV  Start: 08/01/21 1756 End: 08/01/21 1823    1756     1823-D/C'd        HYDROmorphone (DILAUDID) injection 0 2 mg   Dose: 0 2 mg  Freq: Every 3 hours PRN Route: IV  PRN Reason: severe pain  Start: 08/01/21 1827 End: 08/03/21 1208     0744      1208-D/C'd      HYDROmorphone (DILAUDID) injection 0 5 mg   Dose: 0 5 mg  Freq: Every 4 hours PRN Route: IV  PRN Reason: breakthrough pain  Start: 08/01/21 1827 End: 08/03/21 1208      1208-D/C'd      HYDROmorphone (DILAUDID) injection 0 5 mg   Dose: 0 5 mg  Freq: Every 10 minutes PRN Route: IV  PRN Reason: Pain - PACU  PRN Comment: Breakthrough Pain  Second Line  Start: 08/01/21 1824 End: 08/01/21 1840    1840-D/C'd        ketorolac (TORADOL) injection 15 mg   Dose: 15 mg  Freq: Every 6 hours PRN Route: IV  PRN Reason: moderate pain  Start: 08/01/21 1826 End: 08/03/21 1825 2122      0328     1021     1753      0052     0655     1825-D/C'd      Labetalol HCl (NORMODYNE) injection   Freq: As needed Route: IV  Start: 08/01/21 1753 End: 08/01/21 1823 1753 1823-D/C'd        lidocaine (PF) (XYLOCAINE-MPF) 1 % injection   Freq: As needed Route: IV  Start: 08/01/21 1733 End: 08/01/21 1823 1733 1823-D/C'd        metoclopramide (REGLAN) injection 10 mg   Dose: 10 mg  Freq:  Once as needed Route: IV  PRN Reason: nausea  PRN Comment: Third Line For Nausea  Start: 08/01/21 1824 End: 08/01/21 1840    1840-D/C'd        midazolam (VERSED) injection   Freq: As needed Route: IV  Start: 08/01/21 1727 End: 08/01/21 1823    1727     1823-D/C'd        neostigmine (BLOXIVERZ) injection   Freq: As needed Route: IV  Start: 08/01/21 1809 End: 08/01/21 1823    1809     1823-D/C'd        ondansetron (ZOFRAN) injection   Freq: As needed Route: IV  Start: 08/01/21 1809 End: 08/01/21 1823    1809     1823-D/C'd        ondansetron (ZOFRAN) injection 4 mg   Dose: 4 mg  Freq: Once as needed Route: IV  PRN Reason: nausea  PRN Comment: First Line For Nausea  Start: 08/01/21 1824 End: 08/01/21 1840    1840-D/C'd        phenylephrine bolus from bag   Freq: As needed Route: IV  Start: 08/01/21 1745 End: 08/01/21 1823    1745     1823-D/C'd        propofol (DIPRIVAN) 1000 mg in 100 mL infusion (premix)   Freq: Continuous PRN Route: IV  Start: 08/01/21 1736 End: 08/01/21 1823    1736     1747     1806     1812     1823-D/C'd        propofol (DIPRIVAN) 200 MG/20ML bolus injection   Freq: As needed Route: IV  Start: 08/01/21 1733 End: 08/01/21 1823    1733     1823-D/C'd        ROCuronium (ZEMURON) injection   Freq: As needed Route: IV  Start: 08/01/21 1733 End: 08/01/21 1823    1733     1823-D/C'd        simethicone (MYLICON) chewable tablet 80 mg   Dose: 80 mg  Freq: Every 6 hours PRN Route: PO  PRN Reason: flatulence  Start: 08/02/21 1250 End: 08/03/21 1834     1307      1834-D/C'd      sodium chloride 0 9 % irrigation solution   Freq: As needed  Start: 08/01/21 1753 End: 08/01/21 1823    1753     1823-D/C'd        Medications 08/01 08/02 08/03     Network Utilization Review Department  ATTENTION: Please call with any questions or concerns to [phone] and carefully listen to the prompts so that you are directed to the right person   All voicemails are confidential   Chaim Fetch all requests for admission clinical reviews, approved or denied determinations and any other requests to dedicated fax number below belonging to the campus where the patient is receiving treatment   List of dedicated fax numbers for the Facilities:  [address] [place] [address] DENIALS (Administrative/Medical Necessity) [phone]   [address] (Maternity/NICU/Pediatrics) [phone]   [address] [place] [address] [facility] [address] [id number] [address] [facility] [place] [address] [phone]

## 2021-08-17 ENCOUNTER — OFFICE VISIT (OUTPATIENT)
Dept: SURGERY | Facility: CLINIC | Age: 56
End: 2021-08-17

## 2021-08-17 ENCOUNTER — HOSPITAL ENCOUNTER (OUTPATIENT)
Dept: MAMMOGRAPHY | Facility: CLINIC | Age: 56
Discharge: HOME | End: 2021-08-17
Payer: COMMERCIAL

## 2021-08-17 ENCOUNTER — TELEPHONE (OUTPATIENT)
Dept: GASTROENTEROLOGY | Facility: CLINIC | Age: 56
End: 2021-08-17

## 2021-08-17 ENCOUNTER — HOSPITAL ENCOUNTER (OUTPATIENT)
Dept: ULTRASOUND IMAGING | Facility: CLINIC | Age: 56
Discharge: HOME | End: 2021-08-17
Payer: COMMERCIAL

## 2021-08-17 VITALS
DIASTOLIC BLOOD PRESSURE: 82 MMHG | TEMPERATURE: 97.6 F | BODY MASS INDEX: 19.37 KG/M2 | WEIGHT: 123.4 LBS | HEART RATE: 76 BPM | HEIGHT: 67 IN | SYSTOLIC BLOOD PRESSURE: 128 MMHG

## 2021-08-17 VITALS — HEIGHT: 67 IN | WEIGHT: 123 LBS | BODY MASS INDEX: 19.3 KG/M2

## 2021-08-17 DIAGNOSIS — N64.4 BREAST PAIN, LEFT: ICD-10-CM

## 2021-08-17 DIAGNOSIS — Z09 POSTOP CHECK: ICD-10-CM

## 2021-08-17 DIAGNOSIS — N60.02 CYST OF LEFT BREAST: ICD-10-CM

## 2021-08-17 DIAGNOSIS — Z90.49 S/P LAPAROSCOPIC CHOLECYSTECTOMY: Primary | ICD-10-CM

## 2021-08-17 PROCEDURE — 77066 DX MAMMO INCL CAD BI: CPT

## 2021-08-17 PROCEDURE — 76642 ULTRASOUND BREAST LIMITED: CPT

## 2021-08-17 PROCEDURE — 99024 POSTOP FOLLOW-UP VISIT: CPT | Performed by: SURGERY

## 2021-08-17 PROCEDURE — G0279 TOMOSYNTHESIS, MAMMO: HCPCS

## 2021-08-17 NOTE — LETTER
[id number]  [place] [address]  [place] [address]  [phone]  Dept: [phone]    August 20, 2021     Patient: Elizabeth Ferrer   YOB: 1965   Date of Visit: 8/17/2021       To Whom it May Concern:    Ava Barrera is under my professional care  She was seen in the hospital from 8/17/2021   to 08/17/21  She {Return to school/sport/work:97460}  If you have any questions or concerns, please don't hesitate to call           Sincerely,          Vanessa Griffin MA

## 2021-08-22 NOTE — PROGRESS NOTES
Assessment/Plan:    Pathology Results:  Final Diagnosis   A  Gallbladder:  - Chronic cholecystitis  - Negative for malignancy  1  S/P laparoscopic cholecystectomy    2  Postop check      Recovering well after laparoscopic cholecystectomy  She is having some expected incisional soreness  No signs or symptoms of infection  Pathology was reviewed and there were no unusual or unexpected findings and no malignancy  Resume regular activity  Followup if needed  [id number] [address] for travel  Subjective:      Patient ID: Ilya Shetty is a 63 y o  female  Triage Notes:    Ms Helga Roy is following up after surgery and discharge  She has returned to work  No fevers or chills  No erythema or drainage  She is tolerating diet which she is slowly advancing  She is going away next week but is a bit worried about food tolerance  The following portions of the patient's history were reviewed and updated as appropriate: allergies, current medications, past family history, past medical history, past social history, past surgical history and problem list     Review of Systems      Objective:      /82   Pulse 76   Temp 97 6 °F (36 4 °C)   Ht 5' 7" (1 702 m)   Wt 56 kg (123 lb 6 4 oz)   BMI 19 33 kg/m²     Below is the patient's most recent value for Albumin, ALT, AST, BUN, Calcium, Chloride, Cholesterol, CO2, Creatinine, GFR, Glucose, HDL, Hematocrit, Hemoglobin, Hemoglobin A1C, LDL, Magnesium, Phosphorus, Platelets, Potassium, PSA, Sodium, Triglycerides, and WBC     Lab Results   Component Value Date    ALT 43 08/03/2021    AST 33 08/03/2021    BUN 13 08/03/2021    CALCIUM 8 3 08/03/2021     08/03/2021    CO2 28 08/03/2021    CREATININE 0 74 08/03/2021    HDL 76 05/04/2021    HCT 32 8 (L) 08/03/2021    HGB 10 6 (L) 08/03/2021    HGBA1C 5 8 08/07/2019    MG 2 2 05/21/2018    PHOS 3 3 10/11/2018     08/03/2021    K 3 7 08/03/2021    TRIG 151 (H) 05/04/2021    WBC 6 43 08/03/2021     Note: for a comprehensive list of the patient's lab results, access the Results Review activity  Physical Exam  Vitals and nursing note reviewed  Constitutional:       General: She is not in acute distress  Appearance: Normal appearance  She is well-developed and normal weight  She is not diaphoretic  Comments: She is well appearing, well groomed   HENT:      Head: Normocephalic and atraumatic  Eyes:      Pupils: Pupils are equal, round, and reactive to light  Cardiovascular:      Rate and Rhythm: Normal rate  Pulmonary:      Effort: Pulmonary effort is normal  No respiratory distress  Abdominal:      General: There is no distension  Palpations: Abdomen is soft  There is no mass  Tenderness: There is no guarding or rebound  Hernia: No hernia is present  Comments: The laparoscopic port sites are clean and dry with no erythema or drainage  Musculoskeletal:         General: Normal range of motion  Cervical back: Normal range of motion and neck supple  Skin:     General: Skin is warm and dry  Neurological:      Mental Status: She is alert and oriented to person, place, and time  Psychiatric:         Mood and Affect: Mood normal          Behavior: Behavior normal          Thought Content:  Thought content normal          Judgment: Judgment normal            Procedures

## 2021-12-15 ENCOUNTER — HOSPITAL ENCOUNTER (EMERGENCY)
Facility: HOSPITAL | Age: 56
Discharge: HOME | End: 2021-12-15
Attending: EMERGENCY MEDICINE | Admitting: EMERGENCY MEDICINE
Payer: COMMERCIAL

## 2021-12-15 ENCOUNTER — APPOINTMENT (EMERGENCY)
Dept: CT IMAGING | Facility: HOSPITAL | Age: 56
End: 2021-12-15
Payer: COMMERCIAL

## 2021-12-15 VITALS
WEIGHT: 130 LBS | DIASTOLIC BLOOD PRESSURE: 93 MMHG | SYSTOLIC BLOOD PRESSURE: 140 MMHG | RESPIRATION RATE: 18 BRPM | HEART RATE: 61 BPM | BODY MASS INDEX: 20.4 KG/M2 | OXYGEN SATURATION: 99 % | HEIGHT: 67 IN | TEMPERATURE: 98.8 F

## 2021-12-15 DIAGNOSIS — R10.9 ABDOMINAL PAIN: Primary | ICD-10-CM

## 2021-12-15 DIAGNOSIS — R19.7 DIARRHEA: ICD-10-CM

## 2021-12-15 DIAGNOSIS — R11.0 NAUSEA: ICD-10-CM

## 2021-12-15 LAB
ALBUMIN SERPL BCP-MCNC: 4.1 G/DL (ref 3.5–5)
ALP SERPL-CCNC: 177 U/L (ref 46–116)
ALT SERPL W P-5'-P-CCNC: 18 U/L (ref 12–78)
ANION GAP SERPL CALCULATED.3IONS-SCNC: 10 MMOL/L (ref 4–13)
AST SERPL W P-5'-P-CCNC: 23 U/L (ref 5–45)
BASOPHILS # BLD AUTO: 0.02 THOUSANDS/ΜL (ref 0–0.1)
BASOPHILS NFR BLD AUTO: 0 % (ref 0–1)
BILIRUB SERPL-MCNC: 0.41 MG/DL (ref 0.2–1)
BUN SERPL-MCNC: 7 MG/DL (ref 5–25)
CALCIUM SERPL-MCNC: 9.3 MG/DL (ref 8.3–10.1)
CHLORIDE SERPL-SCNC: 106 MMOL/L (ref 100–108)
CO2 SERPL-SCNC: 26 MMOL/L (ref 21–32)
CREAT SERPL-MCNC: 0.69 MG/DL (ref 0.6–1.3)
EOSINOPHIL # BLD AUTO: 0.25 THOUSAND/ΜL (ref 0–0.61)
EOSINOPHIL NFR BLD AUTO: 4 % (ref 0–6)
ERYTHROCYTE [DISTWIDTH] IN BLOOD BY AUTOMATED COUNT: 13.3 % (ref 11.6–15.1)
GFR SERPL CREATININE-BSD FRML MDRD: 97 ML/MIN/1.73SQ M
GLUCOSE SERPL-MCNC: 105 MG/DL (ref 65–140)
HCT VFR BLD AUTO: 46.8 % (ref 34.8–46.1)
HGB BLD-MCNC: 14.9 G/DL (ref 11.5–15.4)
IMM GRANULOCYTES # BLD AUTO: 0.01 THOUSAND/UL (ref 0–0.2)
IMM GRANULOCYTES NFR BLD AUTO: 0 % (ref 0–2)
LIPASE SERPL-CCNC: 69 U/L (ref 73–393)
LYMPHOCYTES # BLD AUTO: 2.2 THOUSANDS/ΜL (ref 0.6–4.47)
LYMPHOCYTES NFR BLD AUTO: 39 % (ref 14–44)
MAGNESIUM SERPL-MCNC: 1.8 MG/DL (ref 1.6–2.6)
MCH RBC QN AUTO: 29.5 PG (ref 26.8–34.3)
MCHC RBC AUTO-ENTMCNC: 31.8 G/DL (ref 31.4–37.4)
MCV RBC AUTO: 93 FL (ref 82–98)
MONOCYTES # BLD AUTO: 0.59 THOUSAND/ΜL (ref 0.17–1.22)
MONOCYTES NFR BLD AUTO: 10 % (ref 4–12)
NEUTROPHILS # BLD AUTO: 2.6 THOUSANDS/ΜL (ref 1.85–7.62)
NEUTS SEG NFR BLD AUTO: 47 % (ref 43–75)
NRBC BLD AUTO-RTO: 0 /100 WBCS
PLATELET # BLD AUTO: 228 THOUSANDS/UL (ref 149–390)
PMV BLD AUTO: 9.4 FL (ref 8.9–12.7)
POTASSIUM SERPL-SCNC: 3.6 MMOL/L (ref 3.5–5.3)
PROT SERPL-MCNC: 7.9 G/DL (ref 6.4–8.2)
RBC # BLD AUTO: 5.05 MILLION/UL (ref 3.81–5.12)
SODIUM SERPL-SCNC: 142 MMOL/L (ref 136–145)
WBC # BLD AUTO: 5.67 THOUSAND/UL (ref 4.31–10.16)

## 2021-12-15 PROCEDURE — G1004 CDSM NDSC: HCPCS

## 2021-12-15 PROCEDURE — 99284 EMERGENCY DEPT VISIT MOD MDM: CPT | Performed by: EMERGENCY MEDICINE

## 2021-12-15 PROCEDURE — 96365 THER/PROPH/DIAG IV INF INIT: CPT

## 2021-12-15 PROCEDURE — 83690 ASSAY OF LIPASE: CPT | Performed by: EMERGENCY MEDICINE

## 2021-12-15 PROCEDURE — 80053 COMPREHEN METABOLIC PANEL: CPT | Performed by: EMERGENCY MEDICINE

## 2021-12-15 PROCEDURE — 36415 COLL VENOUS BLD VENIPUNCTURE: CPT

## 2021-12-15 PROCEDURE — 74176 CT ABD & PELVIS W/O CONTRAST: CPT

## 2021-12-15 PROCEDURE — 99284 EMERGENCY DEPT VISIT MOD MDM: CPT

## 2021-12-15 PROCEDURE — 85025 COMPLETE CBC W/AUTO DIFF WBC: CPT | Performed by: EMERGENCY MEDICINE

## 2021-12-15 PROCEDURE — 96375 TX/PRO/DX INJ NEW DRUG ADDON: CPT

## 2021-12-15 PROCEDURE — 83735 ASSAY OF MAGNESIUM: CPT | Performed by: EMERGENCY MEDICINE

## 2021-12-15 RX ORDER — ONDANSETRON 4 MG/1
4 TABLET, ORALLY DISINTEGRATING ORAL EVERY 6 HOURS PRN
Qty: 20 TABLET | Refills: 0 | Status: SHIPPED | OUTPATIENT
Start: 2021-12-15 | End: 2022-06-29 | Stop reason: ALTCHOICE

## 2021-12-15 RX ORDER — ONDANSETRON 2 MG/ML
4 INJECTION INTRAMUSCULAR; INTRAVENOUS ONCE
Status: COMPLETED | OUTPATIENT
Start: 2021-12-15 | End: 2021-12-15

## 2021-12-15 RX ORDER — KETOROLAC TROMETHAMINE 30 MG/ML
15 INJECTION, SOLUTION INTRAMUSCULAR; INTRAVENOUS ONCE
Status: COMPLETED | OUTPATIENT
Start: 2021-12-15 | End: 2021-12-15

## 2021-12-15 RX ORDER — DICYCLOMINE HCL 20 MG
20 TABLET ORAL ONCE
Status: COMPLETED | OUTPATIENT
Start: 2021-12-15 | End: 2021-12-15

## 2021-12-15 RX ORDER — DICYCLOMINE HCL 20 MG
20 TABLET ORAL EVERY 6 HOURS PRN
Qty: 50 TABLET | Refills: 0 | Status: SHIPPED | OUTPATIENT
Start: 2021-12-15 | End: 2022-06-29 | Stop reason: ALTCHOICE

## 2021-12-15 RX ADMIN — ONDANSETRON 4 MG: 2 INJECTION INTRAMUSCULAR; INTRAVENOUS at 14:58

## 2021-12-15 RX ADMIN — SODIUM CHLORIDE, SODIUM LACTATE, POTASSIUM CHLORIDE, AND CALCIUM CHLORIDE 1000 ML: .6; .31; .03; .02 INJECTION, SOLUTION INTRAVENOUS at 15:01

## 2021-12-15 RX ADMIN — DICYCLOMINE HYDROCHLORIDE 20 MG: 20 TABLET ORAL at 14:58

## 2021-12-15 RX ADMIN — KETOROLAC TROMETHAMINE 15 MG: 30 INJECTION, SOLUTION INTRAMUSCULAR at 14:59

## 2021-12-16 ENCOUNTER — APPOINTMENT (OUTPATIENT)
Dept: LAB | Facility: HOSPITAL | Age: 56
End: 2021-12-16
Payer: COMMERCIAL

## 2021-12-16 DIAGNOSIS — R19.7 DIARRHEA: ICD-10-CM

## 2021-12-16 LAB — C DIFF TOX GENS STL QL NAA+PROBE: NEGATIVE

## 2021-12-16 PROCEDURE — 87209 SMEAR COMPLEX STAIN: CPT

## 2021-12-16 PROCEDURE — 87493 C DIFF AMPLIFIED PROBE: CPT

## 2021-12-16 PROCEDURE — 87177 OVA AND PARASITES SMEARS: CPT

## 2021-12-22 LAB — O+P STL CONC: NORMAL

## 2022-04-06 ENCOUNTER — APPOINTMENT (OUTPATIENT)
Dept: LAB | Facility: MEDICAL CENTER | Age: 57
End: 2022-04-06

## 2022-05-13 ENCOUNTER — APPOINTMENT (OUTPATIENT)
Dept: LAB | Facility: CLINIC | Age: 57
End: 2022-05-13

## 2022-05-13 DIAGNOSIS — Z00.8 HEALTH EXAMINATION IN POPULATION SURVEY: ICD-10-CM

## 2022-05-13 LAB
CHOLEST SERPL-MCNC: 198 MG/DL
EST. AVERAGE GLUCOSE BLD GHB EST-MCNC: 117 MG/DL
HBA1C MFR BLD: 5.7 %
HDLC SERPL-MCNC: 93 MG/DL
LDLC SERPL CALC-MCNC: 86 MG/DL (ref 0–100)
NONHDLC SERPL-MCNC: 105 MG/DL
TRIGL SERPL-MCNC: 97 MG/DL

## 2022-05-13 PROCEDURE — 80061 LIPID PANEL: CPT

## 2022-05-13 PROCEDURE — 83036 HEMOGLOBIN GLYCOSYLATED A1C: CPT

## 2022-05-13 PROCEDURE — 36415 COLL VENOUS BLD VENIPUNCTURE: CPT

## 2022-05-23 ENCOUNTER — HOSPITAL ENCOUNTER (OUTPATIENT)
Dept: VASCULAR ULTRASOUND | Facility: HOSPITAL | Age: 57
Discharge: HOME | End: 2022-05-23
Payer: COMMERCIAL

## 2022-05-23 DIAGNOSIS — M79.609 PAIN IN EXTREMITY, UNSPECIFIED EXTREMITY: Primary | ICD-10-CM

## 2022-05-23 DIAGNOSIS — M79.609 PAIN IN EXTREMITY, UNSPECIFIED EXTREMITY: ICD-10-CM

## 2022-05-23 PROCEDURE — 93971 EXTREMITY STUDY: CPT | Performed by: SURGERY

## 2022-05-23 PROCEDURE — 93971 EXTREMITY STUDY: CPT

## 2022-06-13 ENCOUNTER — TELEPHONE (OUTPATIENT)
Dept: CARDIOLOGY CLINIC | Facility: CLINIC | Age: 57
End: 2022-06-13

## 2022-06-13 NOTE — TELEPHONE ENCOUNTER
Returned call and left message for patient to contact office for scheduling.    Patient mentioned on v/m she has been experiencing chest pain and under arm pain.

## 2022-06-13 NOTE — TELEPHONE ENCOUNTER
LM for pt to contact office to further discuss symptoms and advised on machine if symptoms persist, to go to ED.

## 2022-06-15 ENCOUNTER — HOSPITAL ENCOUNTER (OUTPATIENT)
Dept: MAMMOGRAPHY | Facility: CLINIC | Age: 57
Discharge: HOME | End: 2022-06-15

## 2022-06-15 DIAGNOSIS — Z12.39 SCREENING FOR BREAST CANCER: ICD-10-CM

## 2022-06-15 DIAGNOSIS — Z12.31 SCREENING MAMMOGRAM FOR BREAST CANCER: Primary | ICD-10-CM

## 2022-06-22 ENCOUNTER — OFFICE VISIT (OUTPATIENT)
Dept: CARDIOLOGY CLINIC | Facility: CLINIC | Age: 57
End: 2022-06-22
Payer: COMMERCIAL

## 2022-06-22 ENCOUNTER — OCCMED (OUTPATIENT)
Dept: URGENT CARE | Facility: CLINIC | Age: 57
End: 2022-06-22
Payer: OTHER MISCELLANEOUS

## 2022-06-22 VITALS
DIASTOLIC BLOOD PRESSURE: 94 MMHG | BODY MASS INDEX: 20.56 KG/M2 | HEIGHT: 67 IN | RESPIRATION RATE: 16 BRPM | SYSTOLIC BLOOD PRESSURE: 140 MMHG | WEIGHT: 131 LBS | HEART RATE: 64 BPM | OXYGEN SATURATION: 98 %
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
## 2022-06-22 DIAGNOSIS — R07.9 CHEST PAIN, UNSPECIFIED TYPE: Primary | ICD-10-CM
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
## 2022-06-22 LAB — ALT SERPL W P-5'-P-CCNC: 16 U/L (ref 12–78)

## 2022-06-22 PROCEDURE — 99283 EMERGENCY DEPT VISIT LOW MDM: CPT

## 2022-06-22 PROCEDURE — 99214 OFFICE O/P EST MOD 30 MIN: CPT | Performed by: PHYSICIAN ASSISTANT

## 2022-06-22 PROCEDURE — G0382 LEV 3 HOSP TYPE B ED VISIT: HCPCS

## 2022-06-22 PROCEDURE — 93000 ELECTROCARDIOGRAM COMPLETE: CPT | Performed by: PHYSICIAN ASSISTANT

## 2022-06-22 RX ORDER — HYDROCHLOROTHIAZIDE 25 MG/1
25 TABLET ORAL DAILY
Qty: 90 TABLET | Refills: 3 | Status: SHIPPED | OUTPATIENT
Start: 2022-06-22 | End: 2022-07-20 | Stop reason: ALTCHOICE

## 2022-06-22 NOTE — PROGRESS NOTES
Cardiology Outpatient Follow up Note    Tomy Valadez Aristeo 61 y o  female MRN: [id number]    06/22/22          Assessment:  1  Chest pain   2  Hypertension   3  Hx of provoked DVT     Plan:  Patient continues to have chest discomfort similar to when she was evaluated with echo stress test    Echo stress test reviewed; ST depressions were noted in leads 2, 3, AVF and V4 through V6 which were consistent with myocardial ischemia, patient had isolated atrial premature beats during stress  Echocardiogram did not showed preserved LVEF without regional wall motion abnormalities with stress  In office EKG showed NSR with T-wave inversions in lead V1 and V2  Given continued symptoms patient was given options to include exercise nuclear stress test versus cardiac catheterization for definitive evaluation  Patient wished to proceed with exercise nuclear stress test for further evaluation  Patient was placed on chlorthalidone but could not tolerate due to dizziness/lightheadedness  Prefers a different agent  Will start HCTZ 25 mg QD  Patient to call with any issues with medication  If she has any issues will switch to lisinopril instead  Advised on a low-salt, low-cholesterol cardiac diet and exercise as tolerated  Advised on maintaining BP <140/90, call if any high readings at home  Advised to report to the ED with any persistent or worsening symptoms     RTO in 6 weeks or sooner if needed    Diagnostics:   Echo stress test (11/4/2020):  STRESS RESULTS:  Duration of exercise was 5 min and 30 sec  The patient exercised to protocol stage 2  Maximal work rate was 7 METs  Functional capacity was slightly decreased (20% to 30%)  Maximal heart rate during stress was 144 bpm (87 % of maximal predicted heart rate)  Target heart rate was achieved  The heart rate response to stress was normal   There was normal resting blood pressure with an appropriate response to stress    The patient experienced atypical chest pain during stress; pain persisted following the conclusion of the test       HPI: Alice Bowers is a 61y o  year old female with history of hypertension, provoked DVT (on OCPs) who presents for evaluation of chest pain  Patient is known to cardiologist Dr Ceasar Moraes  Patient was last seen 11/04/2020, patient had complained of sharp left-sided chest discomfort which occurred at rest  No radiation or associated symptoms  The pain is somewhat reproducible with palpation  She was started on chlorthalidone for hypertension but due to dizziness/lightheadedness she stopped taking it  Patient had been doing well until last week when she started having the same left-sided chest discomfort  Pain is similar but more focal to her left side  She feels it when she goes to the gym and when she lays on her side  She reports she identified a lump in her left breast which is tender and is scheduled for mammogram for further evaluation as well  She denies any associated palpitations, shortness of breath, diaphoresis, dizziness/lightheadedness or headache  She reports of slight lower extremity edema at the end of the day which usually resolves by the next morning  She reports she 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  Family History: Mother and father with hypertension, brother with "ruptured valve" at age 59    Social history: Social tobacco use  Denies ETOH or illicit drug use  Works as [place] for [employer]       Patient Active Problem List   Diagnosis    Breast cyst    Nephrolithiasis    Thyroid disease    Encounter for annual routine gynecological examination    Asymptomatic postmenopausal status    Malaise and fatigue    Left ventricular hypertrophy    Secondary hypertension    Post-menopausal atrophic vaginitis    Pelvic pain    Mastalgia in female    Acute cystitis with hematuria    Nondisplaced fracture of distal phalanx of right great toe, initial encounter for closed fracture    Acute left-sided low back pain with left-sided sciatica    Mitral regurgitation    DVT (deep venous thrombosis) (HCC)    Biliary colic    Right upper quadrant pain    Elevated LFTs     Allergies   Allergen Reactions    Bee Venom      Bee sting    Contrast [Iodinated Diagnostic Agents] Anaphylaxis     Pt states her throat closes- kk rn     Pollen Extract Other (See Comments)     Throat clearing, difficulty swallowing, ears itchy    Prochlorperazine Other (See Comments)      Aka (compazine)  Delirious        Current Outpatient Medications:     aspirin-acetaminophen-caffeine (EXCEDRIN MIGRAINE) 250-250-65 MG per tablet, Take 1 tablet by mouth every 6 (six) hours as needed (Patient not taking: Reported on 8/17/2021), Disp: , Rfl:     calcium citrate (CALCITRATE) 950 (200 Ca) MG tablet, Take 1 tablet by mouth daily (Patient not taking: Reported on 8/17/2021), Disp: , Rfl:     dexlansoprazole (Dexilant) 60 MG capsule, Take 1 capsule (60 mg total) by mouth daily (Patient not taking: Reported on 5/6/2021), Disp: 90 capsule, Rfl: 3    Diclofenac Sodium (VOLTAREN) 1 %, Apply 2 g topically 3 (three) times a day as needed (Pain) (Patient not taking: Reported on 8/17/2021), Disp: 1 Tube, Rfl: 0    dicyclomine (BENTYL) 20 mg tablet, Take 1 tablet (20 mg total) by mouth every 6 (six) hours as needed (abdominal pain), Disp: 50 tablet, Rfl: 0    EPINEPHrine (EPIPEN) 0 3 mg/0 3 mL SOAJ, Inject 0 3 mL (0 3 mg total) into a muscle once for 1 dose, Disp: 0 6 mL, Rfl: 1    glucosamine-chondroitin 500-400 MG tablet, Take 1 tablet by mouth as needed  (Patient not taking: Reported on 8/17/2021), Disp: , Rfl:     ketorolac (TORADOL) 10 mg tablet, Take 1 tablet (10 mg total) by mouth every 6 (six) hours as needed for moderate pain (Patient not taking: Reported on 8/17/2021), Disp: 20 tablet, Rfl: 0    lidocaine (LIDODERM) 5 %, Apply 1 patch topically daily Remove & Discard patch within 12 hours or as directed , Disp: 30 patch, Rfl: 0    multivitamin (THERAGRAN) TABS, Take 1 tablet by mouth daily   (Patient not taking: Reported on 8/1/2021), Disp: , Rfl:     ondansetron (ZOFRAN-ODT) 4 mg disintegrating tablet, Take 1 tablet (4 mg total) by mouth every 6 (six) hours as needed for nausea or vomiting (Patient not taking: Reported on 8/17/2021), Disp: 20 tablet, Rfl: 0    ondansetron (ZOFRAN-ODT) 4 mg disintegrating tablet, Take 1 tablet (4 mg total) by mouth every 6 (six) hours as needed for nausea or vomiting, Disp: 20 tablet, Rfl: 0    SUMAtriptan (IMITREX) 100 mg tablet, Take by mouth once as needed   (Patient not taking: Reported on 8/17/2021), Disp: , Rfl:     tamsulosin (FLOMAX) 0 4 mg, Take 1 capsule (0 4 mg total) by mouth daily with dinner Until you pass your stone (Patient not taking: Reported on 8/17/2021), Disp: 14 capsule, Rfl: 0    VITAMIN D PO, Take by mouth (Patient not taking: Reported on 8/17/2021), Disp: , Rfl:     Past Medical History:   Diagnosis Date    Benign positional vertigo, right     Cervicalgia     DVT (deep venous thrombosis) (HCC)     Graves disease     Headache     Hypertension     Thyroid disease      Family History   Problem Relation Age of Onset    Hypertension Mother     Asthma Mother     Diabetes Mother     Hyperlipidemia Mother     Seizures Father     Migraines Brother     Seizures Brother     Cancer Sister 21        brain    No Known Problems Daughter     No Known Problems Maternal Grandmother     No Known Problems Paternal Grandmother     No Known Problems Maternal Aunt     No Known Problems Maternal Aunt     No Known Problems Maternal Aunt     No Known Problems Paternal Aunt     No Known Problems Paternal Aunt     No Known Problems Paternal Aunt     Breast cancer Neg Hx     Colon cancer Neg Hx     Ovarian cancer Neg Hx     Uterine cancer Neg Hx     Cervical cancer Neg Hx      Past Surgical History:   Procedure Laterality Date    APPENDECTOMY      CHOLECYSTECTOMY LAPAROSCOPIC N/A 8/1/2021    Procedure: CHOLECYSTECTOMY LAPAROSCOPIC W/ INTRAOP CHOLANGIOGRAM;  Surgeon: Caren Meehan MD;  Location: [facility];  Service: General    FL INJECTION LEFT HIP (NON ARTHROGRAM)  1/31/2019    LIPOMA RESECTION      NEUROMA EXCISION      STEROID INJECTION HIP Left 05/2018     Social History     Socioeconomic History    Marital status: Single     Spouse name: Not on file    Number of children: Not on file    Years of education: Not on file    Highest education level: Not on file   Occupational History    Occupation:    Tobacco Use    Smoking status: Light Tobacco Smoker     Packs/day: 0 01     Years: 2 00     Pack years: 0 02     Types: Cigarettes    Smokeless tobacco: Never Used    Tobacco comment: last smoked 8/16/19   Vaping Use    Vaping Use: Never used   Substance and Sexual Activity    Alcohol use: Not Currently    Drug use: No    Sexual activity: Yes     Partners: Male     Birth control/protection: Post-menopausal   Other Topics Concern    Not on file   Social History Narrative    Not on file     Social Determinants of Health     Financial Resource Strain: Not on file   Food Insecurity: Not on file   Transportation Needs: Not on file   Physical Activity: Not on file   Stress: Not on file   Social Connections: Not on file   Intimate Partner Violence: Not on file   Housing Stability: Not on file     Review of Systems   Constitutional: Negative for chills, decreased appetite, diaphoresis, fever, malaise/fatigue, night sweats, weight gain and weight loss  HENT: Negative for nosebleeds, odynophagia and sore throat  Eyes: Negative for double vision, photophobia and visual disturbance  Cardiovascular: Positive for chest pain  Negative for dyspnea on exertion, irregular heartbeat, leg swelling, near-syncope, orthopnea, palpitations, paroxysmal nocturnal dyspnea and syncope     Respiratory: Negative for cough and shortness of breath  Hematologic/Lymphatic: Negative for bleeding problem  Musculoskeletal: Negative for muscle cramps, muscle weakness and myalgias  Gastrointestinal: Negative for bloating, abdominal pain, diarrhea, dysphagia, hematemesis, hematochezia, nausea and vomiting  Genitourinary: Negative for frequency and hematuria  Neurological: Negative for headaches, light-headedness, loss of balance, paresthesias and vertigo  Vitals: /94 (BP Location: Left arm, Patient Position: Sitting, Cuff Size: Standard)   Pulse 64   Resp 16   Ht 5' 7" (1 702 m)   Wt 59 4 kg (131 lb)   SpO2 98%   BMI 20 52 kg/m²     Physical Exam:   GEN: Alert and oriented x 3, in no acute distress  Well appearing and well nourished  HEENT: Sclera anicteric, conjunctivae pink, mucous membranes moist  Oropharynx clear  NECK: Supple, no carotid bruits, no significant JVD  Trachea midline, no thyromegaly  HEART: Regular rhythm, normal S1 and S2, no murmurs, clicks, gallops or rubs  PMI nondisplaced, no thrills  LUNGS: Clear to auscultation bilaterally; no wheezes, rales, or rhonchi  No increased work of breathing or signs of respiratory distress  ABDOMEN: Soft, nontender, nondistended, normoactive bowel sounds  EXTREMITIES: Skin warm and well perfused, no clubbing, cyanosis, or edema  NEURO: No focal findings  Normal speech  Mood and affect normal    SKIN: Normal without suspicious lesions on exposed skin      Lab Results:   Lab Results   Component Value Date    HGBA1C 5 7 (H) 05/13/2022    HGBA1C 5 8 08/07/2019    HGBA1C 5 9 10/08/2018     No results found for: CHOL  Lab Results   Component Value Date    HDL 93 05/13/2022    HDL 76 05/04/2021    HDL 77 08/11/2020     Lab Results   Component Value Date    LDLCALC 86 05/13/2022    LDLCALC 107 (H) 05/04/2021    LDLCALC 105 (H) 08/11/2020     Lab Results   Component Value Date    TRIG 97 05/13/2022    TRIG 151 (H) 05/04/2021    TRIG 97 08/11/2020     No results found for: CHOLHDL

## 2022-06-23 ENCOUNTER — HOSPITAL ENCOUNTER (OUTPATIENT)
Dept: MAMMOGRAPHY | Facility: CLINIC | Age: 57
Discharge: HOME | End: 2022-06-23
Payer: COMMERCIAL

## 2022-06-23 DIAGNOSIS — Z12.31 ENCOUNTER FOR SCREENING MAMMOGRAM FOR MALIGNANT NEOPLASM OF BREAST: ICD-10-CM

## 2022-06-23 DIAGNOSIS — N63.32 MASS OF AXILLARY TAIL OF LEFT BREAST: ICD-10-CM

## 2022-06-23 LAB
HBV SURFACE AB SER-ACNC: >1000 MIU/ML
HBV SURFACE AG SER QL: NORMAL
HCV AB SER QL: NORMAL

## 2022-06-23 PROCEDURE — 77066 DX MAMMO INCL CAD BI: CPT

## 2022-06-23 PROCEDURE — G0279 TOMOSYNTHESIS, MAMMO: HCPCS

## 2022-06-23 PROCEDURE — 76642 ULTRASOUND BREAST LIMITED: CPT

## 2022-06-24 LAB — HIV 1+2 AB+HIV1 P24 AG SERPL QL IA: NORMAL

## 2022-06-27 ENCOUNTER — TELEPHONE (OUTPATIENT)
Dept: CARDIOLOGY CLINIC | Facility: CLINIC | Age: 57
End: 2022-06-27

## 2022-06-27 NOTE — TELEPHONE ENCOUNTER
Peer to peer is needed  NM stress test    This is a call in  Pt scheduled 6/30/22  Call [facility] at [phone]  Case [id number]  Please send back to [facility] when done  Thanks

## 2022-06-27 NOTE — TELEPHONE ENCOUNTER
Patient states she wants to stop taking HCTZ until her symptoms resolve and take her BPs daily  If her symptoms resolve nd is still hyprtensiove she will call back to be started on a new medication

## 2022-06-27 NOTE — TELEPHONE ENCOUNTER
Peer to peer is on hold for 1 week  They need her prior Echo stress and EKG for approval if we can fax that over ASAP  Thanks

## 2022-06-27 NOTE — TELEPHONE ENCOUNTER
I believe you ordered this  If they dont approve then order a cardiac CTA   Only problem is they are being delayed due to contrast shortage

## 2022-06-29 ENCOUNTER — OFFICE VISIT (OUTPATIENT)
Dept: INTERNAL MEDICINE CLINIC | Facility: CLINIC | Age: 57
End: 2022-06-29
Payer: COMMERCIAL

## 2022-06-29 ENCOUNTER — APPOINTMENT (OUTPATIENT)
Dept: LAB | Facility: CLINIC | Age: 57
End: 2022-06-29
Payer: COMMERCIAL

## 2022-06-29 VITALS
HEIGHT: 67 IN | TEMPERATURE: 97.6 F | WEIGHT: 136 LBS | HEART RATE: 66 BPM | RESPIRATION RATE: 20 BRPM | BODY MASS INDEX: 21.35 KG/M2 | SYSTOLIC BLOOD PRESSURE: 122 MMHG | DIASTOLIC BLOOD PRESSURE: 76 MMHG | OXYGEN SATURATION: 96 %

## 2022-06-29 DIAGNOSIS — R11.0 NAUSEA: ICD-10-CM

## 2022-06-29 DIAGNOSIS — R19.7 DIARRHEA, UNSPECIFIED TYPE: ICD-10-CM

## 2022-06-29 DIAGNOSIS — Z00.00 WELL ADULT EXAM: ICD-10-CM

## 2022-06-29 DIAGNOSIS — R10.13 EPIGASTRIC PAIN: ICD-10-CM

## 2022-06-29 DIAGNOSIS — R73.03 PREDIABETES: ICD-10-CM

## 2022-06-29 DIAGNOSIS — E04.9 NODULAR GOITER: ICD-10-CM

## 2022-06-29 DIAGNOSIS — Z91.030 BEE STING ALLERGY: Primary | ICD-10-CM

## 2022-06-29 DIAGNOSIS — G43.009 MIGRAINE WITHOUT AURA AND WITHOUT STATUS MIGRAINOSUS, NOT INTRACTABLE: ICD-10-CM

## 2022-06-29 DIAGNOSIS — Z23 ENCOUNTER FOR IMMUNIZATION: ICD-10-CM

## 2022-06-29 PROBLEM — N30.01 ACUTE CYSTITIS WITH HEMATURIA: Status: RESOLVED | Noted: 2020-09-10 | Resolved: 2022-06-29

## 2022-06-29 PROBLEM — S92.424A NONDISPLACED FRACTURE OF DISTAL PHALANX OF RIGHT GREAT TOE, INITIAL ENCOUNTER FOR CLOSED FRACTURE: Status: RESOLVED | Noted: 2021-02-24 | Resolved: 2022-06-29

## 2022-06-29 LAB
ALBUMIN SERPL BCP-MCNC: 3.9 G/DL (ref 3.5–5)
ALP SERPL-CCNC: 145 U/L (ref 46–116)
ALT SERPL W P-5'-P-CCNC: 19 U/L (ref 12–78)
ANION GAP SERPL CALCULATED.3IONS-SCNC: 2 MMOL/L (ref 4–13)
AST SERPL W P-5'-P-CCNC: 23 U/L (ref 5–45)
BASOPHILS # BLD AUTO: 0.02 THOUSANDS/ΜL (ref 0–0.1)
BASOPHILS NFR BLD AUTO: 0 % (ref 0–1)
BILIRUB SERPL-MCNC: 0.38 MG/DL (ref 0.2–1)
BUN SERPL-MCNC: 13 MG/DL (ref 5–25)
CALCIUM SERPL-MCNC: 9.4 MG/DL (ref 8.3–10.1)
CHLORIDE SERPL-SCNC: 105 MMOL/L (ref 100–108)
CO2 SERPL-SCNC: 32 MMOL/L (ref 21–32)
CREAT SERPL-MCNC: 0.74 MG/DL (ref 0.6–1.3)
EOSINOPHIL # BLD AUTO: 0.16 THOUSAND/ΜL (ref 0–0.61)
EOSINOPHIL NFR BLD AUTO: 2 % (ref 0–6)
ERYTHROCYTE [DISTWIDTH] IN BLOOD BY AUTOMATED COUNT: 13 % (ref 11.6–15.1)
GFR SERPL CREATININE-BSD FRML MDRD: 90 ML/MIN/1.73SQ M
GLUCOSE SERPL-MCNC: 110 MG/DL (ref 65–140)
HCT VFR BLD AUTO: 41.9 % (ref 34.8–46.1)
HGB BLD-MCNC: 13.4 G/DL (ref 11.5–15.4)
IMM GRANULOCYTES # BLD AUTO: 0.02 THOUSAND/UL (ref 0–0.2)
IMM GRANULOCYTES NFR BLD AUTO: 0 % (ref 0–2)
LIPASE SERPL-CCNC: 136 U/L (ref 73–393)
LYMPHOCYTES # BLD AUTO: 3.18 THOUSANDS/ΜL (ref 0.6–4.47)
LYMPHOCYTES NFR BLD AUTO: 43 % (ref 14–44)
MCH RBC QN AUTO: 30.7 PG (ref 26.8–34.3)
MCHC RBC AUTO-ENTMCNC: 32 G/DL (ref 31.4–37.4)
MCV RBC AUTO: 96 FL (ref 82–98)
MONOCYTES # BLD AUTO: 0.78 THOUSAND/ΜL (ref 0.17–1.22)
MONOCYTES NFR BLD AUTO: 11 % (ref 4–12)
NEUTROPHILS # BLD AUTO: 3.27 THOUSANDS/ΜL (ref 1.85–7.62)
NEUTS SEG NFR BLD AUTO: 44 % (ref 43–75)
NRBC BLD AUTO-RTO: 0 /100 WBCS
PLATELET # BLD AUTO: 262 THOUSANDS/UL (ref 149–390)
PMV BLD AUTO: 10.2 FL (ref 8.9–12.7)
POTASSIUM SERPL-SCNC: 3.7 MMOL/L (ref 3.5–5.3)
PROT SERPL-MCNC: 7.7 G/DL (ref 6.4–8.2)
RBC # BLD AUTO: 4.36 MILLION/UL (ref 3.81–5.12)
SODIUM SERPL-SCNC: 139 MMOL/L (ref 136–145)
TSH SERPL DL<=0.05 MIU/L-ACNC: 2.43 UIU/ML (ref 0.45–4.5)
WBC # BLD AUTO: 7.43 THOUSAND/UL (ref 4.31–10.16)

## 2022-06-29 PROCEDURE — 83690 ASSAY OF LIPASE: CPT

## 2022-06-29 PROCEDURE — 85025 COMPLETE CBC W/AUTO DIFF WBC: CPT

## 2022-06-29 PROCEDURE — 80053 COMPREHEN METABOLIC PANEL: CPT

## 2022-06-29 PROCEDURE — 90715 TDAP VACCINE 7 YRS/> IM: CPT

## 2022-06-29 PROCEDURE — 99396 PREV VISIT EST AGE 40-64: CPT | Performed by: INTERNAL MEDICINE

## 2022-06-29 PROCEDURE — 84443 ASSAY THYROID STIM HORMONE: CPT

## 2022-06-29 PROCEDURE — 90471 IMMUNIZATION ADMIN: CPT

## 2022-06-29 PROCEDURE — 36415 COLL VENOUS BLD VENIPUNCTURE: CPT

## 2022-06-29 RX ORDER — CHOLECALCIFEROL (VITAMIN D3) 100000/G
POWDER (GRAM) MISCELLANEOUS
COMMUNITY
End: 2022-07-20 | Stop reason: ALTCHOICE

## 2022-06-29 NOTE — PATIENT INSTRUCTIONS
Health maintenance-due for Tdap today, otherwise up-to-date    Epigastric pain with nausea and diarrhea temporally related to plant based pre workout drink and should be self-limiting however epigastric pain and tenderness is certainly concerning  Continue on Dexilant  Check labs  Consider imaging if labs suggest infectious or inflammatory process  Go to ER if symptoms worsen  Continue on brat diet      Pre diabetes-dramatically improved with A1c down to 5 7    Nodular goiter with history of Graves-check TSH and thyroid ultrasound and follow accordingly    Chest pain undergoing workup with Cardiology with nuclear stress test scheduled    Migraines stable with over-the-counter medication

## 2022-06-29 NOTE — PROGRESS NOTES
Assessment/Plan:    Diagnoses and all orders for this visit:    Bee sting allergy    Encounter for immunization  -     TDAP VACCINE GREATER THAN OR EQUAL TO 8YO IM    Well adult exam    Prediabetes    Nausea    Diarrhea, unspecified type  -     CBC and differential; Future  -     Comprehensive metabolic panel; Future  -     TSH, 3rd generation with Free T4 reflex; Future    Migraine without aura and without status migrainosus, not intractable    Epigastric pain  -     Lipase; Future    Nodular goiter  -     US thyroid; Future    Other orders  -     Cholecalciferol (Vitamin D3) POWD; Take by mouth            Patient Instructions   Health maintenance-due for Tdap today, otherwise up-to-date    Epigastric pain with nausea and diarrhea temporally related to plant based pre workout drink and should be self-limiting however epigastric pain and tenderness is certainly concerning  Continue on Dexilant  Check labs  Consider imaging if labs suggest infectious or inflammatory process  Go to ER if symptoms worsen  Continue on brat diet  Pre diabetes-dramatically improved with A1c down to 5 7    Nodular goiter with history of Graves-check TSH and thyroid ultrasound and follow accordingly    Chest pain undergoing workup with Cardiology with nuclear stress test scheduled    Migraines stable with over-the-counter medication        Subjective:      Patient ID: Ric Stein is a 61 y o  female    Yearly well visit     Patient had been doing well until approximately 3 days ago when she was at the gym she had a pre and post workout drink and subsequently developed epigastric discomfort and green diarrhea  She had about 10 episodes of diarrhea while working on Monday so took yesterday off and today she has been able to manage her bowels with brat diet  She has had no fevers or chills but has persistent epigastric discomfort    When she developed the epigastric pain she started back on Dexilant that she had since prior cholecystectomy  She has had persistent nausea for quite some time but no vomiting  She is not using any alcohol  She has had left-sided chest pain radiates under the breast and into the axilla  She had mammogram and diagnostic ultrasound which were unremarkable and has been seen by Cardiology  EKG equivocal so stress test ordered  She has been having fairly frequent headaches but is not taking Triptan  She uses Excedrin migraine          Current Outpatient Medications:     aspirin-acetaminophen-caffeine (EXCEDRIN MIGRAINE) 250-250-65 MG per tablet, Take 1 tablet by mouth every 6 (six) hours as needed (Patient not taking: Reported on 8/17/2021), Disp: , Rfl:     Cholecalciferol (Vitamin D3) POWD, Take by mouth, Disp: , Rfl:     dexlansoprazole (Dexilant) 60 MG capsule, Take 1 capsule (60 mg total) by mouth daily (Patient not taking: Reported on 5/6/2021), Disp: 90 capsule, Rfl: 3    EPINEPHrine (EPIPEN) 0 3 mg/0 3 mL SOAJ, Inject 0 3 mL (0 3 mg total) into a muscle once for 1 dose, Disp: 0 6 mL, Rfl: 1    hydrochlorothiazide (HYDRODIURIL) 25 mg tablet, Take 1 tablet (25 mg total) by mouth daily, Disp: 90 tablet, Rfl: 3    Recent Results (from the past 1008 hour(s))   Hepatitis B surface antigen    Collection Time: 06/22/22  6:07 PM   Result Value Ref Range    Hepatitis B Surface Ag Non-reactive Non-reactive, NonReactive - Confirmed   Hepatitis C antibody    Collection Time: 06/22/22  6:07 PM   Result Value Ref Range    Hepatitis C Ab Non-reactive Non-reactive   HIV 1/2 Antigen/Antibody (4th Generation) w Reflex [facility]    Collection Time: 06/22/22  6:07 PM   Result Value Ref Range    HIV-1/HIV-2 Ab Non-Reactive Non-Reactive   ALT    Collection Time: 06/22/22  6:07 PM   Result Value Ref Range    ALT 16 12 - 78 U/L   Hepatitis B surface antibody    Collection Time: 06/22/22  6:07 PM   Result Value Ref Range    Hep B S Ab >1,000 00 mIU/mL       The following portions of the patient's history were reviewed and updated as appropriate: allergies, current medications, past family history, past medical history, past social history, past surgical history and problem list      Review of Systems      Objective:      Vitals:    06/29/22 1542   BP: 122/76   Pulse: 66   Resp: 20   Temp: 97 6 °F (36 4 °C)   SpO2: 96%          Physical Exam  Constitutional:       Appearance: She is well-developed  HENT:      Head: Normocephalic and atraumatic  Nose: Nose normal    Eyes:      General: No scleral icterus  Conjunctiva/sclera: Conjunctivae normal       Pupils: Pupils are equal, round, and reactive to light  Neck:      Thyroid: No thyromegaly  Vascular: No JVD  Trachea: No tracheal deviation  Comments: Nodular goiter  Cardiovascular:      Rate and Rhythm: Normal rate and regular rhythm  Heart sounds: No murmur heard  No friction rub  No gallop  Pulmonary:      Effort: Pulmonary effort is normal  No respiratory distress  Breath sounds: Normal breath sounds  No wheezing or rales  Abdominal:      General: Bowel sounds are normal  There is no distension  Palpations: Abdomen is soft  There is no mass  Tenderness: There is abdominal tenderness  There is guarding  There is no rebound  Comments: Mid epigastric tender to palpate   Musculoskeletal:         General: No tenderness  Cervical back: Normal range of motion and neck supple  Lymphadenopathy:      Cervical: No cervical adenopathy  Skin:     General: Skin is warm and dry  Findings: No erythema or rash  Neurological:      Mental Status: She is alert and oriented to person, place, and time  Cranial Nerves: No cranial nerve deficit  Psychiatric:         Behavior: Behavior normal          Thought Content:  Thought content normal          Judgment: Judgment normal

## 2022-06-30 ENCOUNTER — HOSPITAL ENCOUNTER (OUTPATIENT)
Dept: ULTRASOUND IMAGING | Facility: HOSPITAL | Age: 57
Discharge: HOME | End: 2022-06-30
Attending: INTERNAL MEDICINE
Payer: COMMERCIAL

## 2022-06-30 DIAGNOSIS — R74.8 ELEVATED ALKALINE PHOSPHATASE LEVEL: ICD-10-CM

## 2022-06-30 DIAGNOSIS — R10.13 EPIGASTRIC PAIN: ICD-10-CM

## 2022-06-30 DIAGNOSIS — R10.13 EPIGASTRIC PAIN: Primary | ICD-10-CM

## 2022-06-30 PROCEDURE — 76705 ECHO EXAM OF ABDOMEN: CPT

## 2022-07-01 ENCOUNTER — APPOINTMENT (OUTPATIENT)
Dept: LAB | Facility: CLINIC | Age: 57
End: 2022-07-01
Payer: COMMERCIAL

## 2022-07-01 DIAGNOSIS — R10.13 EPIGASTRIC PAIN: ICD-10-CM

## 2022-07-01 DIAGNOSIS — R74.8 ELEVATED ALKALINE PHOSPHATASE LEVEL: ICD-10-CM

## 2022-07-01 DIAGNOSIS — R74.8 ELEVATED ALKALINE PHOSPHATASE LEVEL: Primary | ICD-10-CM

## 2022-07-01 DIAGNOSIS — R19.7 DIARRHEA, UNSPECIFIED TYPE: ICD-10-CM

## 2022-07-01 LAB
FERRITIN SERPL-MCNC: 38 NG/ML (ref 8–388)
IRON SATN MFR SERPL: 26 % (ref 15–50)
IRON SERPL-MCNC: 84 UG/DL (ref 50–170)
TIBC SERPL-MCNC: 323 UG/DL (ref 250–450)

## 2022-07-01 PROCEDURE — 82728 ASSAY OF FERRITIN: CPT

## 2022-07-01 PROCEDURE — 83550 IRON BINDING TEST: CPT

## 2022-07-01 PROCEDURE — 84075 ASSAY ALKALINE PHOSPHATASE: CPT

## 2022-07-01 PROCEDURE — 84080 ASSAY ALKALINE PHOSPHATASES: CPT

## 2022-07-01 PROCEDURE — 83540 ASSAY OF IRON: CPT

## 2022-07-01 PROCEDURE — 86015 ACTIN ANTIBODY EACH: CPT

## 2022-07-01 PROCEDURE — 86381 MITOCHONDRIAL ANTIBODY EACH: CPT

## 2022-07-01 PROCEDURE — 36415 COLL VENOUS BLD VENIPUNCTURE: CPT

## 2022-07-01 PROCEDURE — 86038 ANTINUCLEAR ANTIBODIES: CPT

## 2022-07-02 LAB
ACTIN IGG SERPL-ACNC: 5 UNITS (ref 0–19)
MITOCHONDRIA M2 IGG SER-ACNC: <20 UNITS (ref 0–20)

## 2022-07-03 LAB
ALP BONE CFR SERPL: 60 % (ref 14–68)
ALP INTEST CFR SERPL: 3 % (ref 0–18)
ALP LIVER CFR SERPL: 37 % (ref 18–85)
ALP SERPL-CCNC: 140 IU/L (ref 44–121)

## 2022-07-05 ENCOUNTER — TELEPHONE (OUTPATIENT)
Dept: NEUROLOGY | Facility: CLINIC | Age: 57
End: 2022-07-05

## 2022-07-05 LAB — RYE IGE QN: NEGATIVE

## 2022-07-05 NOTE — TELEPHONE ENCOUNTER
This is a call in Peer to peer needed for nm stress  This was denied on same day I resent the echo stress they said they needed to review  A peer review is needed in order for them to review this information   This is the LAST day for a peer to peer     Please call [facility]    [phone]  Case#   [id number]  Send back to [facility]

## 2022-07-05 NOTE — TELEPHONE ENCOUNTER
Patient came in asking for an emergency appt with Dr Carmen Franco  Patient has a history of migraines on the right side  This past weekend during a migraine, patient began to experience facial numbness on the right side of her face  Patient has been seen in the past by Dr Carmen Franco  LOV was 06/19/2018  Patient has an appt hx of No Shows and Cancellations for our office  Let patient know we would try to get her in asap and she would be considered a NP due to not being seen since 2018  Patient understood and is willing to be seen by any physician within our practice  Contacted patient and let her know the soonest appt would be for 08/09/22 at 8:45am with Carrie  Patient was not happy with this  Informed patient that Dr Barrie Cardona and Dr Carmen Franco did not have any openings until towards the end of the year  Let patient know I was also going to put her on the wait list as well  Patient expressed she will be speaking to an admin about not being seen sooner       Patient has a scheduled appt and has been placed on the wait list

## 2022-07-05 NOTE — TELEPHONE ENCOUNTER
Contacted patient about seeing Dr Stan Carbajal  Patient stated she no longer works/lives in [place]  She lives and works in [place]  Let patient know Carrie said for her to go to the ER if she is unable to wait for her appt in August  If patient is admitted, she will be seen by a Neurologist at the hospital      Patient expressed understanding

## 2022-07-05 NOTE — TELEPHONE ENCOUNTER
----- Message from Lb Pratt sent at 7/5/2022 10:47 AM EDT -----  Regarding: RE: Emergency Appt  Hey! Since she hasnt been seen since 2018 she is considered a new patient  It looks like she has been under the care of [facility]- Dr Maggi Norwood last seen in November  Is she not seeing them anymore? Since she is established they could probably get her in sooner than we can  If she wants to see us, she'll need to be seen in the next available new patient opening (60 mins) any of the 3 of us have  For any new symptoms she feels she cannot wait, she would need to go to the ER and they can have Neurology see her if she is admitted   ----- Message -----  From: Brianda Luna  Sent: 7/5/2022   9:50 AM EDT  To: MARIANN Adler  Subject: Emergency Appt                                   Juan Rocha,    This patient was last seen by Dr Dodie Soto in 2018  She came over and asked for an emergency appt  Dr Dodie Soto does not have any openings  She is complaining of Migraines on the right side and this past weekend she experienced facial numbness on the right side with her migraines  Patient works within our building, but has a previous history of No Show or appt Cancellations  You have a 45 min slot for 3:15pm today and an 11:15am slot for Friday  Afterwards, you don't have any openings until August 5th       Thank You

## 2022-07-06 ENCOUNTER — TELEPHONE (OUTPATIENT)
Dept: INTERNAL MEDICINE CLINIC | Facility: CLINIC | Age: 57
End: 2022-07-06

## 2022-07-06 DIAGNOSIS — K59.4 RECTAL SPHINCTER SPASM: Primary | ICD-10-CM

## 2022-07-06 NOTE — PROGRESS NOTES
I called patient to review labs  Liver workup is unremarkable except for non alcoholic fatty liver disease  Patient has started low-fat diet  She also now complains of intense rectal spasm  She did a self digital rectal exam and a not feel any abnormalities  She has not had any bleeding or pain with defecation  She continues to have mild epigastric and right upper quadrant pain    I referred to GI and started on topical nitroglycerin for rectal spasm

## 2022-07-06 NOTE — TELEPHONE ENCOUNTER
Would like a return call from Dr Adelso Martinez regarding test results ( labs done 7/1/22) and what the next step is       Please advise: [phone]

## 2022-07-08 DIAGNOSIS — R10.9 FLANK PAIN: Primary | ICD-10-CM

## 2022-07-08 NOTE — PROGRESS NOTES
Patient having persistent epigastric discomfort that radiates into the right flank as well as back and having some nausea  She admits she has been constipated since she had diarrhea  She has no fevers or chills  Pain is better than it was yesterday  I advised her to check urinalysis and drink bottle of magnesium citrate  If symptoms persist or any hematuria will get CT scan abdomen and pelvis

## 2022-07-12 ENCOUNTER — HOSPITAL ENCOUNTER (OUTPATIENT)
Dept: NON INVASIVE DIAGNOSTICS | Facility: CLINIC | Age: 57
Discharge: HOME | End: 2022-07-12
Payer: COMMERCIAL

## 2022-07-12 VITALS
OXYGEN SATURATION: 100 % | HEART RATE: 61 BPM | WEIGHT: 131 LBS | DIASTOLIC BLOOD PRESSURE: 62 MMHG | BODY MASS INDEX: 20.56 KG/M2 | SYSTOLIC BLOOD PRESSURE: 130 MMHG | HEIGHT: 67 IN

## 2022-07-12 DIAGNOSIS — R07.9 CHEST PAIN, UNSPECIFIED TYPE: ICD-10-CM

## 2022-07-12 LAB
MAX DIASTOLIC BP: 92 MMHG
MAX HEART RATE: 146 BPM
MAX HR PERCENT: 90 %
MAX HR: 146 BPM
MAX PREDICTED HEART RATE: 163 BPM
MAX. SYSTOLIC BP: 158 MMHG
NUC STRESS EJECTION FRACTION: 62 %
PROTOCOL NAME: NORMAL
RATE PRESSURE PRODUCT: NORMAL
REASON FOR TERMINATION: NORMAL
SL CV REST NUCLEAR ISOTOPE DOSE: 9.7 MCI
SL CV STRESS NUCLEAR ISOTOPE DOSE: 32 MCI
SL CV STRESS RECOVERY BP: NORMAL MMHG
SL CV STRESS RECOVERY HR: 81 BPM
SL CV STRESS STAGE REACHED: 3
STRESS ANGINA INDEX: 0
STRESS BASELINE BP: NORMAL MMHG
STRESS BASELINE HR: 61 BPM
STRESS O2 SAT REST: 100 %
STRESS PEAK HR: 146 BPM
STRESS POST ESTIMATED WORKLOAD: 10.1 METS
STRESS POST EXERCISE DUR MIN: 7 MIN
STRESS POST EXERCISE DUR SEC: 30 SEC
STRESS POST O2 SAT PEAK: 96 %
STRESS POST PEAK BP: 158 MMHG
STRESS/REST PERFUSION RATIO: 1.02
TARGET HR FORMULA: NORMAL
TEST INDICATION: NORMAL
TIME IN EXERCISE PHASE: NORMAL

## 2022-07-12 PROCEDURE — A9502 TC99M TETROFOSMIN: HCPCS

## 2022-07-12 PROCEDURE — 78452 HT MUSCLE IMAGE SPECT MULT: CPT | Performed by: INTERNAL MEDICINE

## 2022-07-12 PROCEDURE — 78452 HT MUSCLE IMAGE SPECT MULT: CPT

## 2022-07-12 PROCEDURE — 93018 CV STRESS TEST I&R ONLY: CPT | Performed by: INTERNAL MEDICINE

## 2022-07-12 PROCEDURE — 93017 CV STRESS TEST TRACING ONLY: CPT

## 2022-07-12 PROCEDURE — 93016 CV STRESS TEST SUPVJ ONLY: CPT | Performed by: INTERNAL MEDICINE

## 2022-07-14 ENCOUNTER — TELEPHONE (OUTPATIENT)
Dept: CARDIOLOGY CLINIC | Facility: CLINIC | Age: 57
End: 2022-07-14

## 2022-07-14 NOTE — TELEPHONE ENCOUNTER
----- Message from Delaney Heard sent at 7/13/2022  3:44 PM EDT -----  Spoke with pt. Pt verbally understood Tyree's message and said that she is still having symptoms    Please schedule pt for a clinic visit as per Tyree's message.    Thanks!

## 2022-07-15 NOTE — TELEPHONE ENCOUNTER
Good Morning Dr. Mukherjee,    I was able to schedule her for 7/20/22 with you @ 8:20 thankfully you had a cancellation and I switched the 8:20 down to the cancellation time slot.

## 2022-07-20 ENCOUNTER — OFFICE VISIT (OUTPATIENT)
Dept: CARDIOLOGY CLINIC | Facility: CLINIC | Age: 57
End: 2022-07-20
Payer: COMMERCIAL

## 2022-07-20 ENCOUNTER — TELEPHONE (OUTPATIENT)
Dept: CARDIOLOGY CLINIC | Facility: CLINIC | Age: 57
End: 2022-07-20

## 2022-07-20 ENCOUNTER — PREP FOR PROCEDURE (OUTPATIENT)
Dept: CARDIOLOGY CLINIC | Facility: CLINIC | Age: 57
End: 2022-07-20

## 2022-07-20 VITALS
HEART RATE: 63 BPM | SYSTOLIC BLOOD PRESSURE: 108 MMHG | HEIGHT: 67 IN | WEIGHT: 131 LBS | RESPIRATION RATE: 16 BRPM | OXYGEN SATURATION: 97 % | BODY MASS INDEX: 20.56 KG/M2 | DIASTOLIC BLOOD PRESSURE: 80 MMHG

## 2022-07-20 DIAGNOSIS — R07.9 CHEST PAIN, UNSPECIFIED TYPE: Primary | ICD-10-CM

## 2022-07-20 DIAGNOSIS — R94.39 ABNORMAL STRESS TEST: ICD-10-CM

## 2022-07-20 PROCEDURE — 99214 OFFICE O/P EST MOD 30 MIN: CPT | Performed by: INTERNAL MEDICINE

## 2022-07-20 RX ORDER — DIPHENOXYLATE HYDROCHLORIDE AND ATROPINE SULFATE 2.5; .025 MG/1; MG/1
1 TABLET ORAL DAILY
COMMUNITY

## 2022-07-20 RX ORDER — METHYLPREDNISOLONE 32 MG/1
TABLET ORAL
Qty: 2 TABLET | Refills: 0 | Status: SHIPPED | OUTPATIENT
Start: 2022-07-20

## 2022-07-20 RX ORDER — DIPHENHYDRAMINE HCL 50 MG
CAPSULE ORAL
Qty: 1 CAPSULE | Refills: 0 | Status: SHIPPED | OUTPATIENT
Start: 2022-07-20

## 2022-07-20 NOTE — PROGRESS NOTES
Cardiology Office Note    Tomy James 61 y o  female MRN: [id number]    07/20/22          Assessment:  1  Chest pain   2  Abnormal stress test  3  Hx of provoked DVT     Plan:  · Will evaluate with a cardiac catheterization for definitive evaluation  · BP controlled off antihypertensive medications  She was advised to notify us symptom progression  Risks and benefits of cardiac catheterization discussed with patient  Patient understands the risks and benefits and wishes to proceed  Follow up: 3 months or sooner as needed    No diagnosis found  HPI: Nicole Pollock is a 61y o  year old female with history of hypertension and provoked DVT (14 years ago, while on OCPs) presents for routine follow up  She has had intermittent chest pain for the past almost 2 years  Stress echo in 11/2020 revealed ischemic stress ECG without inducible wall motion abnormalities  Recent exercise MPI revealed an equivocal stress ECG with a possible small amount of anterior ischemia  She is interested in definitive evaluation  Her blood pressure has been at goal off medical therapy  She has baseline stress/anxiety after the unfortunate passing of her son 3 years ago  Family History: mother and father with hypertension; brother passed from "ruptured valve" at the age 59      Social history: social tobacco abuse      Allergies   Allergen Reactions    Bee Venom      Bee sting    Contrast [Iodinated Diagnostic Agents] Anaphylaxis     Pt states her throat closes- kk rn     Bee Pollen Dizziness     Specifically bee sting which evokes reaction of swelling of throat, headache and vomitting    Pollen Extract Other (See Comments)     Throat clearing, difficulty swallowing, ears itchy    Prochlorperazine Other (See Comments)      Aka (compazine)  Delirious     Besifloxacin Hcl Palpitations     Other reaction(s): Unknown         Current Outpatient Medications:     dexlansoprazole (Dexilant) 60 MG capsule, Take 1 capsule (60 mg total) by mouth daily, Disp: 90 capsule, Rfl: 3    EPINEPHrine (EPIPEN) 0 3 mg/0 3 mL SOAJ, Inject 0 3 mL (0 3 mg total) into a muscle once for 1 dose, Disp: 0 6 mL, Rfl: 1    multivitamin (THERAGRAN) TABS, Take 1 tablet by mouth daily Artichoke, Disp: , Rfl:     aspirin-acetaminophen-caffeine (EXCEDRIN MIGRAINE) 250-250-65 MG per tablet, Take 1 tablet by mouth every 6 (six) hours as needed (Patient not taking: No sig reported), Disp: , Rfl:     Past Medical History:   Diagnosis Date    Benign positional vertigo, right     Cervicalgia     DVT (deep venous thrombosis) (HCC)     Graves disease     Headache     Hypertension     Thyroid disease        Family History   Problem Relation Age of Onset    Hypertension Mother    Ardeth Needs Asthma Mother     Diabetes Mother     Hyperlipidemia Mother     Seizures Father     Migraines Brother     Seizures Brother     Cancer Sister 21        brain    No Known Problems Daughter     No Known Problems Maternal Grandmother     No Known Problems Paternal Grandmother     No Known Problems Maternal Aunt     No Known Problems Maternal Aunt     No Known Problems Maternal Aunt     No Known Problems Paternal Aunt     No Known Problems Paternal Aunt     No Known Problems Paternal Aunt     Breast cancer Neg Hx     Colon cancer Neg Hx     Ovarian cancer Neg Hx     Uterine cancer Neg Hx     Cervical cancer Neg Hx        Past Surgical History:   Procedure Laterality Date    APPENDECTOMY      CHOLECYSTECTOMY LAPAROSCOPIC N/A 8/1/2021    Procedure: CHOLECYSTECTOMY LAPAROSCOPIC W/ INTRAOP CHOLANGIOGRAM;  Surgeon: Cynthia Rosario MD;  Location: [facility];  Service: General    FL INJECTION LEFT HIP (NON ARTHROGRAM)  1/31/2019    LIPOMA RESECTION      NEUROMA EXCISION      STEROID INJECTION HIP Left 05/2018       Social History     Socioeconomic History    Marital status: Single     Spouse name: Not on file    Number of children: Not on file    Years of education: Not on file    Highest education level: Not on file   Occupational History    Occupation:    Tobacco Use    Smoking status: Light Tobacco Smoker     Packs/day: 0 01     Years: 2 00     Pack years: 0 02     Types: Cigarettes    Smokeless tobacco: Never Used    Tobacco comment: last smoked 8/16/19   Vaping Use    Vaping Use: Never used   Substance and Sexual Activity    Alcohol use: Not Currently    Drug use: No    Sexual activity: Yes     Partners: Male     Birth control/protection: Post-menopausal   Other Topics Concern    Not on file   Social History Narrative    Not on file     Social Determinants of Health     Financial Resource Strain: Not on file   Food Insecurity: Not on file   Transportation Needs: Not on file   Physical Activity: Not on file   Stress: Not on file   Social Connections: Not on file   Intimate Partner Violence: Not on file   Housing Stability: Not on file       Review of Systems   Constitutional: Negative for diaphoresis, weight gain and weight loss  HENT: Negative for congestion  Cardiovascular: Positive for chest pain  Negative for dyspnea on exertion, irregular heartbeat, leg swelling, near-syncope, orthopnea, palpitations, paroxysmal nocturnal dyspnea and syncope  Respiratory: Negative for shortness of breath, sleep disturbances due to breathing and snoring  Hematologic/Lymphatic: Does not bruise/bleed easily  Skin: Negative for rash  Musculoskeletal: Negative for myalgias  Gastrointestinal: Negative for nausea and vomiting  Neurological: Negative for excessive daytime sleepiness and light-headedness  Psychiatric/Behavioral: The patient is not nervous/anxious  Vitals: /80 (BP Location: Left arm, Patient Position: Sitting)   Pulse 63   Resp 16   Ht 5' 7" (1 702 m)   Wt 59 4 kg (131 lb)   SpO2 97%   BMI 20 52 kg/m²       Physical Exam:     GEN: Alert and oriented x 3, in no acute distress    Well appearing and well nourished  HEENT: Sclera anicteric, conjunctivae pink, mucous membranes moist  Oropharynx clear  NECK: Supple, no carotid bruits, no significant JVD  Trachea midline, no thyromegaly  HEART: Regular rhythm, normal S1 and S2, no murmurs, clicks, gallops or rubs  PMI nondisplaced, no thrills  LUNGS: Clear to auscultation bilaterally; no wheezes, rales, or rhonchi  No increased work of breathing or signs of respiratory distress  ABDOMEN: Soft, nontender, nondistended, normoactive bowel sounds  EXTREMITIES: Skin warm and well perfused, no edema  NEURO: No focal findings  Normal speech  Mood and affect normal    SKIN: Normal without suspicious lesions on exposed skin

## 2022-07-20 NOTE — TELEPHONE ENCOUNTER
S/w the pt  prescreening completed  Aware BW is needed and [facility] lab confirmed  No medication holds needed  Contrast prep sent to pharmacy  Cath prep/info given to patient thru my chart  Can we get an auth for [facility] scheduled on 7/29/22? Thanks

## 2022-07-20 NOTE — TELEPHONE ENCOUNTER
----- Message from Letta Moritz, MD sent at 7/20/2022  9:19 AM EDT -----  Hi can you please arrange a left heart cath for her  Thanks       Dx chest pain   Abnormal stress test

## 2022-07-20 NOTE — H&P (VIEW-ONLY)
Cardiology Office Note    Tomy Lock 61 y o  female MRN: [id number]    07/20/22          Assessment:  1  Chest pain   2  Abnormal stress test  3  Hx of provoked DVT     Plan:  · Will evaluate with a cardiac catheterization for definitive evaluation  · BP controlled off antihypertensive medications  She was advised to notify us symptom progression  Risks and benefits of cardiac catheterization discussed with patient  Patient understands the risks and benefits and wishes to proceed  Follow up: 3 months or sooner as needed    No diagnosis found  HPI: Janay Cheatham is a 61y o  year old female with history of hypertension and provoked DVT (14 years ago, while on OCPs) presents for routine follow up  She has had intermittent chest pain for the past almost 2 years  Stress echo in 11/2020 revealed ischemic stress ECG without inducible wall motion abnormalities  Recent exercise MPI revealed an equivocal stress ECG with a possible small amount of anterior ischemia  She is interested in definitive evaluation  Her blood pressure has been at goal off medical therapy  She has baseline stress/anxiety after the unfortunate passing of her son 3 years ago  Family History: mother and father with hypertension; brother passed from "ruptured valve" at the age 59      Social history: social tobacco abuse      Allergies   Allergen Reactions    Bee Venom      Bee sting    Contrast [Iodinated Diagnostic Agents] Anaphylaxis     Pt states her throat closes- kk rn     Bee Pollen Dizziness     Specifically bee sting which evokes reaction of swelling of throat, headache and vomitting    Pollen Extract Other (See Comments)     Throat clearing, difficulty swallowing, ears itchy    Prochlorperazine Other (See Comments)      Aka (compazine)  Delirious     Besifloxacin Hcl Palpitations     Other reaction(s): Unknown         Current Outpatient Medications:     dexlansoprazole (Dexilant) 60 MG capsule, Take 1 capsule (60 mg total) by mouth daily, Disp: 90 capsule, Rfl: 3    EPINEPHrine (EPIPEN) 0 3 mg/0 3 mL SOAJ, Inject 0 3 mL (0 3 mg total) into a muscle once for 1 dose, Disp: 0 6 mL, Rfl: 1    multivitamin (THERAGRAN) TABS, Take 1 tablet by mouth daily Artichoke, Disp: , Rfl:     aspirin-acetaminophen-caffeine (EXCEDRIN MIGRAINE) 250-250-65 MG per tablet, Take 1 tablet by mouth every 6 (six) hours as needed (Patient not taking: No sig reported), Disp: , Rfl:     Past Medical History:   Diagnosis Date    Benign positional vertigo, right     Cervicalgia     DVT (deep venous thrombosis) (HCC)     Graves disease     Headache     Hypertension     Thyroid disease        Family History   Problem Relation Age of Onset    Hypertension Mother    Delcie Kael Asthma Mother     Diabetes Mother     Hyperlipidemia Mother     Seizures Father     Migraines Brother     Seizures Brother     Cancer Sister 21        brain    No Known Problems Daughter     No Known Problems Maternal Grandmother     No Known Problems Paternal Grandmother     No Known Problems Maternal Aunt     No Known Problems Maternal Aunt     No Known Problems Maternal Aunt     No Known Problems Paternal Aunt     No Known Problems Paternal Aunt     No Known Problems Paternal Aunt     Breast cancer Neg Hx     Colon cancer Neg Hx     Ovarian cancer Neg Hx     Uterine cancer Neg Hx     Cervical cancer Neg Hx        Past Surgical History:   Procedure Laterality Date    APPENDECTOMY      CHOLECYSTECTOMY LAPAROSCOPIC N/A 8/1/2021    Procedure: CHOLECYSTECTOMY LAPAROSCOPIC W/ INTRAOP CHOLANGIOGRAM;  Surgeon: Kathy Victoria MD;  Location: [facility];  Service: General    FL INJECTION LEFT HIP (NON ARTHROGRAM)  1/31/2019    LIPOMA RESECTION      NEUROMA EXCISION      STEROID INJECTION HIP Left 05/2018       Social History     Socioeconomic History    Marital status: Single     Spouse name: Not on file    Number of children: Not on file    Years of education: Not on file    Highest education level: Not on file   Occupational History    Occupation:    Tobacco Use    Smoking status: Light Tobacco Smoker     Packs/day: 0 01     Years: 2 00     Pack years: 0 02     Types: Cigarettes    Smokeless tobacco: Never Used    Tobacco comment: last smoked 8/16/19   Vaping Use    Vaping Use: Never used   Substance and Sexual Activity    Alcohol use: Not Currently    Drug use: No    Sexual activity: Yes     Partners: Male     Birth control/protection: Post-menopausal   Other Topics Concern    Not on file   Social History Narrative    Not on file     Social Determinants of Health     Financial Resource Strain: Not on file   Food Insecurity: Not on file   Transportation Needs: Not on file   Physical Activity: Not on file   Stress: Not on file   Social Connections: Not on file   Intimate Partner Violence: Not on file   Housing Stability: Not on file       Review of Systems   Constitutional: Negative for diaphoresis, weight gain and weight loss  HENT: Negative for congestion  Cardiovascular: Positive for chest pain  Negative for dyspnea on exertion, irregular heartbeat, leg swelling, near-syncope, orthopnea, palpitations, paroxysmal nocturnal dyspnea and syncope  Respiratory: Negative for shortness of breath, sleep disturbances due to breathing and snoring  Hematologic/Lymphatic: Does not bruise/bleed easily  Skin: Negative for rash  Musculoskeletal: Negative for myalgias  Gastrointestinal: Negative for nausea and vomiting  Neurological: Negative for excessive daytime sleepiness and light-headedness  Psychiatric/Behavioral: The patient is not nervous/anxious  Vitals: /80 (BP Location: Left arm, Patient Position: Sitting)   Pulse 63   Resp 16   Ht 5' 7" (1 702 m)   Wt 59 4 kg (131 lb)   SpO2 97%   BMI 20 52 kg/m²       Physical Exam:     GEN: Alert and oriented x 3, in no acute distress    Well appearing and well nourished  HEENT: Sclera anicteric, conjunctivae pink, mucous membranes moist  Oropharynx clear  NECK: Supple, no carotid bruits, no significant JVD  Trachea midline, no thyromegaly  HEART: Regular rhythm, normal S1 and S2, no murmurs, clicks, gallops or rubs  PMI nondisplaced, no thrills  LUNGS: Clear to auscultation bilaterally; no wheezes, rales, or rhonchi  No increased work of breathing or signs of respiratory distress  ABDOMEN: Soft, nontender, nondistended, normoactive bowel sounds  EXTREMITIES: Skin warm and well perfused, no edema  NEURO: No focal findings  Normal speech  Mood and affect normal    SKIN: Normal without suspicious lesions on exposed skin

## 2022-07-21 ENCOUNTER — APPOINTMENT (OUTPATIENT)
Dept: LAB | Facility: HOSPITAL | Age: 57
End: 2022-07-21
Payer: COMMERCIAL

## 2022-07-21 LAB
ANION GAP SERPL CALCULATED.3IONS-SCNC: 9 MMOL/L (ref 4–13)
BASOPHILS # BLD AUTO: 0.02 THOUSANDS/ΜL (ref 0–0.1)
BASOPHILS NFR BLD AUTO: 0 % (ref 0–1)
BUN SERPL-MCNC: 9 MG/DL (ref 5–25)
CALCIUM SERPL-MCNC: 9.9 MG/DL (ref 8.3–10.1)
CHLORIDE SERPL-SCNC: 105 MMOL/L (ref 96–108)
CO2 SERPL-SCNC: 28 MMOL/L (ref 21–32)
CREAT SERPL-MCNC: 0.86 MG/DL (ref 0.6–1.3)
EOSINOPHIL # BLD AUTO: 0.14 THOUSAND/ΜL (ref 0–0.61)
EOSINOPHIL NFR BLD AUTO: 3 % (ref 0–6)
ERYTHROCYTE [DISTWIDTH] IN BLOOD BY AUTOMATED COUNT: 12.9 % (ref 11.6–15.1)
GFR SERPL CREATININE-BSD FRML MDRD: 75 ML/MIN/1.73SQ M
GLUCOSE P FAST SERPL-MCNC: 104 MG/DL (ref 65–99)
HCT VFR BLD AUTO: 45.4 % (ref 34.8–46.1)
HGB BLD-MCNC: 14.6 G/DL (ref 11.5–15.4)
IMM GRANULOCYTES # BLD AUTO: 0.01 THOUSAND/UL (ref 0–0.2)
IMM GRANULOCYTES NFR BLD AUTO: 0 % (ref 0–2)
INR PPP: 0.97 (ref 0.84–1.19)
LYMPHOCYTES # BLD AUTO: 1.89 THOUSANDS/ΜL (ref 0.6–4.47)
LYMPHOCYTES NFR BLD AUTO: 40 % (ref 14–44)
MCH RBC QN AUTO: 30.4 PG (ref 26.8–34.3)
MCHC RBC AUTO-ENTMCNC: 32.2 G/DL (ref 31.4–37.4)
MCV RBC AUTO: 95 FL (ref 82–98)
MONOCYTES # BLD AUTO: 0.47 THOUSAND/ΜL (ref 0.17–1.22)
MONOCYTES NFR BLD AUTO: 10 % (ref 4–12)
NEUTROPHILS # BLD AUTO: 2.15 THOUSANDS/ΜL (ref 1.85–7.62)
NEUTS SEG NFR BLD AUTO: 47 % (ref 43–75)
NRBC BLD AUTO-RTO: 0 /100 WBCS
PLATELET # BLD AUTO: 263 THOUSANDS/UL (ref 149–390)
PMV BLD AUTO: 9.8 FL (ref 8.9–12.7)
POTASSIUM SERPL-SCNC: 4.2 MMOL/L (ref 3.5–5.3)
PROTHROMBIN TIME: 12.7 SECONDS (ref 11.6–14.5)
RBC # BLD AUTO: 4.8 MILLION/UL (ref 3.81–5.12)
SODIUM SERPL-SCNC: 142 MMOL/L (ref 135–147)
WBC # BLD AUTO: 4.68 THOUSAND/UL (ref 4.31–10.16)

## 2022-07-21 PROCEDURE — 85025 COMPLETE CBC W/AUTO DIFF WBC: CPT

## 2022-07-21 PROCEDURE — 80048 BASIC METABOLIC PNL TOTAL CA: CPT

## 2022-07-21 PROCEDURE — 36415 COLL VENOUS BLD VENIPUNCTURE: CPT

## 2022-07-21 PROCEDURE — 85610 PROTHROMBIN TIME: CPT

## 2022-07-25 ENCOUNTER — TELEPHONE (OUTPATIENT)
Dept: CARDIOLOGY CLINIC | Facility: CLINIC | Age: 57
End: 2022-07-25

## 2022-07-25 NOTE — TELEPHONE ENCOUNTER
Please see below [employer] Counseling:  I discussed with the patient the risks of mycophenolate mofetil including but not limited to infection/immunosuppression, GI upset, hypokalemia, hypercholesterolemia, bone marrow suppression, lymphoproliferative disorders, malignancy, GI ulceration/bleed/perforation, colitis, interstitial lung disease, kidney failure, progressive multifocal leukoencephalopathy, and birth defects.  The patient understands that monitoring is required including a baseline creatinine and regular CBC testing. In addition, patient must alert us immediately if symptoms of infection or other concerning signs are noted.

## 2022-07-29 ENCOUNTER — HOSPITAL ENCOUNTER (OUTPATIENT)
Facility: HOSPITAL | Age: 57
Setting detail: OUTPATIENT SURGERY
Discharge: HOME | End: 2022-07-29
Attending: INTERNAL MEDICINE | Admitting: INTERNAL MEDICINE
Payer: COMMERCIAL

## 2022-07-29 VITALS
WEIGHT: 130.73 LBS | HEIGHT: 67 IN | TEMPERATURE: 98 F | BODY MASS INDEX: 20.52 KG/M2 | RESPIRATION RATE: 21 BRPM | DIASTOLIC BLOOD PRESSURE: 85 MMHG | HEART RATE: 61 BPM | SYSTOLIC BLOOD PRESSURE: 132 MMHG | OXYGEN SATURATION: 99 %

## 2022-07-29 DIAGNOSIS — R94.39 ABNORMAL STRESS TEST: ICD-10-CM

## 2022-07-29 DIAGNOSIS — R07.9 CHEST PAIN, UNSPECIFIED TYPE: ICD-10-CM

## 2022-07-29 LAB
ANION GAP SERPL CALCULATED.3IONS-SCNC: 11 MMOL/L (ref 4–13)
BUN SERPL-MCNC: 18 MG/DL (ref 5–25)
CALCIUM SERPL-MCNC: 9.8 MG/DL (ref 8.3–10.1)
CHLORIDE SERPL-SCNC: 103 MMOL/L (ref 96–108)
CO2 SERPL-SCNC: 25 MMOL/L (ref 21–32)
CREAT SERPL-MCNC: 0.88 MG/DL (ref 0.6–1.3)
GFR SERPL CREATININE-BSD FRML MDRD: 73 ML/MIN/1.73SQ M
GLUCOSE P FAST SERPL-MCNC: 136 MG/DL (ref 65–99)
GLUCOSE SERPL-MCNC: 136 MG/DL (ref 65–140)
POTASSIUM SERPL-SCNC: 4.2 MMOL/L (ref 3.5–5.3)
SODIUM SERPL-SCNC: 139 MMOL/L (ref 135–147)

## 2022-07-29 PROCEDURE — 99153 MOD SED SAME PHYS/QHP EA: CPT | Performed by: INTERNAL MEDICINE

## 2022-07-29 PROCEDURE — 80048 BASIC METABOLIC PNL TOTAL CA: CPT | Performed by: INTERNAL MEDICINE

## 2022-07-29 PROCEDURE — 93454 CORONARY ARTERY ANGIO S&I: CPT | Performed by: INTERNAL MEDICINE

## 2022-07-29 PROCEDURE — C1769 GUIDE WIRE: HCPCS | Performed by: INTERNAL MEDICINE

## 2022-07-29 PROCEDURE — C1894 INTRO/SHEATH, NON-LASER: HCPCS | Performed by: INTERNAL MEDICINE

## 2022-07-29 PROCEDURE — 93005 ELECTROCARDIOGRAM TRACING: CPT

## 2022-07-29 PROCEDURE — 99152 MOD SED SAME PHYS/QHP 5/>YRS: CPT | Performed by: INTERNAL MEDICINE

## 2022-07-29 PROCEDURE — NC001 PR NO CHARGE: Performed by: INTERNAL MEDICINE

## 2022-07-29 RX ORDER — MIDAZOLAM HYDROCHLORIDE 2 MG/2ML
INJECTION, SOLUTION INTRAMUSCULAR; INTRAVENOUS AS NEEDED
Status: DISCONTINUED | OUTPATIENT
Start: 2022-07-29 | End: 2022-07-29 | Stop reason: HOSPADM

## 2022-07-29 RX ORDER — SODIUM CHLORIDE 9 MG/ML
75 INJECTION, SOLUTION INTRAVENOUS CONTINUOUS
Status: DISCONTINUED | OUTPATIENT
Start: 2022-07-29 | End: 2022-07-29 | Stop reason: HOSPADM

## 2022-07-29 RX ORDER — SODIUM CHLORIDE 9 MG/ML
100 INJECTION, SOLUTION INTRAVENOUS CONTINUOUS
Status: DISCONTINUED | OUTPATIENT
Start: 2022-07-29 | End: 2022-07-29 | Stop reason: HOSPADM

## 2022-07-29 RX ORDER — NITROGLYCERIN 20 MG/100ML
INJECTION INTRAVENOUS AS NEEDED
Status: DISCONTINUED | OUTPATIENT
Start: 2022-07-29 | End: 2022-07-29 | Stop reason: HOSPADM

## 2022-07-29 RX ORDER — LIDOCAINE WITH 8.4% SOD BICARB 0.9%(10ML)
SYRINGE (ML) INJECTION AS NEEDED
Status: DISCONTINUED | OUTPATIENT
Start: 2022-07-29 | End: 2022-07-29 | Stop reason: HOSPADM

## 2022-07-29 RX ORDER — ONDANSETRON 2 MG/ML
INJECTION INTRAMUSCULAR; INTRAVENOUS AS NEEDED
Status: DISCONTINUED | OUTPATIENT
Start: 2022-07-29 | End: 2022-07-29 | Stop reason: HOSPADM

## 2022-07-29 RX ORDER — HEPARIN SODIUM 1000 [USP'U]/ML
INJECTION, SOLUTION INTRAVENOUS; SUBCUTANEOUS AS NEEDED
Status: DISCONTINUED | OUTPATIENT
Start: 2022-07-29 | End: 2022-07-29 | Stop reason: HOSPADM

## 2022-07-29 RX ORDER — MAGNESIUM HYDROXIDE/ALUMINUM HYDROXICE/SIMETHICONE 120; 1200; 1200 MG/30ML; MG/30ML; MG/30ML
30 SUSPENSION ORAL ONCE
Status: COMPLETED | OUTPATIENT
Start: 2022-07-29 | End: 2022-07-29

## 2022-07-29 RX ORDER — FENTANYL CITRATE 50 UG/ML
INJECTION, SOLUTION INTRAMUSCULAR; INTRAVENOUS AS NEEDED
Status: DISCONTINUED | OUTPATIENT
Start: 2022-07-29 | End: 2022-07-29 | Stop reason: HOSPADM

## 2022-07-29 RX ADMIN — ALUMINUM HYDROXIDE, MAGNESIUM HYDROXIDE, AND SIMETHICONE 30 ML: 200; 200; 20 SUSPENSION ORAL at 10:43

## 2022-07-29 NOTE — INTERVAL H&P NOTE
Update: (This section must be completed if the H&P was completed greater than 24 hrs to procedure or admission)    H&P reviewed  After examining the patient, I find no changed to the H&P since it had been written  I have discussed in detail with patient regarding the indications, alternatives, risks and benefit of cardiac catheterization and possible PCI  Patient demonstrates clear understanding and wants to proceed with the procedure  Patient re-evaluated   Accept as history and physical     Azul Shabazz MD/July 29, 2022/8:05 AM

## 2022-07-29 NOTE — DISCHARGE INSTRUCTIONS
1  Please see the post cardiac catheterization dishcarge instructions  No heavy lifting, greater than 10 lbs  or strenuous  activity for 48 hrs  2 Remove band aid tomorrow  Shower and wash area- wrist gently with soap and water- beginning tomorrow  Rinse and pat dry  Apply new water seal band aid  Repeat this process for 5 days  No powders, creams lotions or antibiotic ointments  for 5 days  No tub baths, hot tubs or swimming for 5 days  3  Please call our office () if you have any fever, redness, swelling, discharge from your wrist access site      4 No driving for 2 days

## 2022-07-29 NOTE — NURSING NOTE
Pt c/o chest pain midsternal under left breast  EKG done and Dr Amy Magallon aware  Dr Kayla Ware evaluated the EKG and orders for Maalox rec'ed  Pt tolerated medication well and resting at present

## 2022-07-29 NOTE — NURSING NOTE
Pt was pain free upon discharge of the left breast pain and was able to eat a full lunch  D/C stable condition

## 2022-08-02 LAB
ATRIAL RATE: 57 BPM
P AXIS: 63 DEGREES
PR INTERVAL: 138 MS
QRS AXIS: 83 DEGREES
QRSD INTERVAL: 96 MS
QT INTERVAL: 450 MS
QTC INTERVAL: 438 MS
T WAVE AXIS: 81 DEGREES
VENTRICULAR RATE: 57 BPM

## 2022-08-02 PROCEDURE — 93010 ELECTROCARDIOGRAM REPORT: CPT | Performed by: INTERNAL MEDICINE

## 2022-09-06 ENCOUNTER — HOSPITAL ENCOUNTER (EMERGENCY)
Facility: HOSPITAL | Age: 57
Discharge: HOME | End: 2022-09-06
Attending: EMERGENCY MEDICINE
Payer: COMMERCIAL

## 2022-09-06 VITALS
SYSTOLIC BLOOD PRESSURE: 126 MMHG | OXYGEN SATURATION: 100 % | DIASTOLIC BLOOD PRESSURE: 83 MMHG | TEMPERATURE: 98.3 F | HEART RATE: 78 BPM | RESPIRATION RATE: 14 BRPM

## 2022-09-06 DIAGNOSIS — Z20.822 ENCOUNTER FOR LABORATORY TESTING FOR COVID-19 VIRUS: ICD-10-CM

## 2022-09-06 DIAGNOSIS — B34.9 VIRAL ILLNESS: Primary | ICD-10-CM

## 2022-09-06 LAB
FLUAV RNA RESP QL NAA+PROBE: NEGATIVE
FLUBV RNA RESP QL NAA+PROBE: NEGATIVE
RSV RNA RESP QL NAA+PROBE: NEGATIVE
SARS-COV-2 RNA RESP QL NAA+PROBE: POSITIVE

## 2022-09-06 PROCEDURE — 99284 EMERGENCY DEPT VISIT MOD MDM: CPT | Performed by: EMERGENCY MEDICINE

## 2022-09-06 PROCEDURE — 99283 EMERGENCY DEPT VISIT LOW MDM: CPT

## 2022-09-06 PROCEDURE — 0241U HB NFCT DS VIR RESP RNA 4 TRGT: CPT | Performed by: EMERGENCY MEDICINE

## 2022-09-06 RX ORDER — IBUPROFEN 600 MG/1
600 TABLET ORAL ONCE
Status: COMPLETED | OUTPATIENT
Start: 2022-09-06 | End: 2022-09-06

## 2022-09-06 RX ORDER — ACETAMINOPHEN 325 MG/1
650 TABLET ORAL ONCE
Status: COMPLETED | OUTPATIENT
Start: 2022-09-06 | End: 2022-09-06

## 2022-09-06 RX ADMIN — IBUPROFEN 600 MG: 600 TABLET ORAL at 08:05

## 2022-09-06 RX ADMIN — ACETAMINOPHEN 650 MG: 325 TABLET, FILM COATED ORAL at 08:05

## 2022-09-06 NOTE — Clinical Note
Carmella Millard was seen and treated in our emergency department on 9/6/2022  No work until cleared by Family Doctor/Orthopedics        Diagnosis:     Ifra    She may return on this date: If you have any questions or concerns, please don't hesitate to call        Megan Mitchell DO    ______________________________           _______________          _______________  Hospital Representative                              Date                                Time

## 2022-09-06 NOTE — Clinical Note
Reny Portillo was seen and treated in our emergency department on 9/6/2022  Diagnosis:     Ifra  may return to work on return date  She may return on this date: 09/12/2022         If you have any questions or concerns, please don't hesitate to call        Mavis Mello PA-C    ______________________________           _______________          _______________  Hospital Representative                              Date                                Time

## 2022-09-06 NOTE — ED PROVIDER NOTES
HPI: Patient is a 61 y o  female who presents with 3 days of fever, chills, cough, headache, sore throat, fatigue, myalgias, diarrhea and nausea which the patient describes at moderate The patient has not had contact with people with similar symptoms  The patient has not taken any medication  Allergies   Allergen Reactions    Bee Venom      Bee sting    Contrast [Iodinated Diagnostic Agents] Anaphylaxis     Pt states her throat closes- kk rn     Bee Pollen Dizziness     Specifically bee sting which evokes reaction of swelling of throat, headache and vomitting    Pollen Extract Other (See Comments)     Throat clearing, difficulty swallowing, ears itchy    Prochlorperazine Other (See Comments)      Aka (compazine)  Delirious     Besifloxacin Hcl Palpitations     Other reaction(s): Unknown       Past Medical History:   Diagnosis Date    Benign positional vertigo, right     Cervicalgia     DVT (deep venous thrombosis) (HCC)     Graves disease     Headache     Hypertension     Thyroid disease       Past Surgical History:   Procedure Laterality Date    APPENDECTOMY      CARDIAC CATHETERIZATION Left 7/29/2022    Procedure: Cardiac Left Heart Cath;  Surgeon: Yahir Zavala MD;  Location: [address] [place] [address] CATH LAB; Service: Cardiology    CARDIAC CATHETERIZATION N/A 7/29/2022    Procedure: Cardiac Coronary Angiogram;  Surgeon: Yahir Zavala MD;  Location: [address] [place] [address] CATH LAB;   Service: Cardiology    CHOLECYSTECTOMY LAPAROSCOPIC N/A 8/1/2021    Procedure: CHOLECYSTECTOMY LAPAROSCOPIC W/ INTRAOP CHOLANGIOGRAM;  Surgeon: Jer Young MD;  Location: [facility] OR;  Service: General    FL INJECTION LEFT HIP (NON ARTHROGRAM)  1/31/2019    LIPOMA RESECTION      NEUROMA EXCISION      STEROID INJECTION HIP Left 05/2018     Social History     Tobacco Use    Smoking status: Light Tobacco Smoker     Packs/day: 0 01     Years: 2 00     Pack years: 0 02     Types: Cigarettes    Smokeless tobacco: Never Used   Sharon Pall Tobacco comment: last smoked 8/16/19   Vaping Use    Vaping Use: Never used   Substance Use Topics    Alcohol use: Not Currently    Drug use: No       Nursing notes reviewed  Physical Exam:  ED Triage Vitals [09/06/22 0748]   Temperature Pulse Respirations Blood Pressure SpO2   98 3 °F (36 8 °C) 78 14 126/83 100 %      Temp Source Heart Rate Source Patient Position - Orthostatic VS BP Location FiO2 (%)   Oral Monitor Sitting Right arm --      Pain Score       --           ROS: Positive for cough, HA, nausea, diarrhea, sore throat, fatigue, rhinorrhea, the remainder of a 10 organ system ROS was otherwise unremarkable  General: awake, alert, no acute distress    Head: normocephalic, atraumatic    Eyes: no scleral icterus  Ears: external ears normal, hearing grossly intact  Nose: external exam grossly normal, positive nasal discharge  Neck: symmetric, No JVD noted, trachea midline  Pulmonary: no respiratory distress, no tachypnea noted  Cardiovascular: appears well perfused  Abdomen: no distention noted  Musculoskeletal: no deformities noted, tone normal  Neuro: grossly non-focal  Psych: mood and affect appropriate    The patient is stable and has a history and physical exam consistent with a viral illness  COVID19 testing has been performed  I considered the patient's other medical conditions as applicable/noted above in my medical decision making  The patient is stable upon discharge  The plan is for supportive care at home  The patient (and any family present) verbalized understanding of the discharge instructions and warnings that would necessitate return to the Emergency Department  All questions were answered prior to discharge      Medications   acetaminophen (TYLENOL) tablet 650 mg (650 mg Oral Given 9/6/22 0805)   ibuprofen (MOTRIN) tablet 600 mg (600 mg Oral Given 9/6/22 0805)     Final diagnoses:   Viral illness   Encounter for laboratory testing for COVID-19 virus     Time reflects when diagnosis was documented in both MDM as applicable and the Disposition within this note     Time User Action Codes Description Comment    9/6/2022  8:00 AM Darglena  Add [B34 9] Viral illness     9/6/2022  8:00 AM Abdifatah  Add [Z20 822] Encounter for laboratory testing for COVID-19 virus       ED Disposition     ED Disposition   Discharge    Condition   Stable    Date/Time   Tue Sep 6, 2022  8:00 AM    Comment   Liana Danger discharge to home/self care  Follow-up Information     Follow up With Specialties Details Why Contact Info    Marcy Barrett MD Internal Medicine, [facility], Palliative Care Call in 1 day  [address]  [phone]          Patient's Medications   Discharge Prescriptions    No medications on file     No discharge procedures on file      Electronically Signed by     Florence Foss DO  09/06/22 4899

## 2022-09-06 NOTE — RESULT ENCOUNTER NOTE
Patient advised of positive covid test result by telephone call  Reviewed current CDC guidelines and supportive care  Callback complete

## 2022-10-21 ENCOUNTER — TELEPHONE (OUTPATIENT)
Dept: GASTROENTEROLOGY | Facility: CLINIC | Age: 57
End: 2022-10-21

## 2022-10-21 DIAGNOSIS — K21.9 GASTROESOPHAGEAL REFLUX DISEASE: ICD-10-CM

## 2022-10-21 DIAGNOSIS — K58.0 IRRITABLE BOWEL SYNDROME WITH DIARRHEA: Primary | ICD-10-CM

## 2022-10-21 NOTE — TELEPHONE ENCOUNTER
OV 9/22/22 Yani  FU not scheduled    H/O  1  Irritable bowel syndrome with diarrhea  2  Gastroesophageal reflux disease, esophagitis presence not specified    MEDS  dexilant 60 mg daily- need refill    Spoke with pt reporting symptoms of RUQ that radiates to epigastric pain 0-6/10 with bloating, occasional nausea, approx 4 mucous diarrhea stools in a 24 hr time period with lower abdominal cramping that is resolved with BM  She is tolerating fluids  Pain escalates post eating ("even a cracker")  These symptoms started last week  She denies fever, chills, SOB, CP, bleeding  Is unaware of sick exposure however works in ED part time  Pt was in ED  12/15/21 for same symptoms and had gallbladder removed  She would also like her Dexilant prescription refilled  That was working well for her  Reviewed hydration, VILMA diet, possible stool studies to r/o infection  Orders pended, please advise if further orders

## 2022-10-21 NOTE — TELEPHONE ENCOUNTER
Iraj Bridges pt  Patient left message, having same symptoms as she did before gallbladder was taken out constant diarrhea not matter what she eats, even doing the [employer] and stomach pains, dexilant worked well before  Please call

## 2022-10-24 ENCOUNTER — TELEPHONE (OUTPATIENT)
Dept: GASTROENTEROLOGY | Facility: CLINIC | Age: 57
End: 2022-10-24

## 2022-10-24 DIAGNOSIS — K21.9 GASTROESOPHAGEAL REFLUX DISEASE: ICD-10-CM

## 2022-10-24 DIAGNOSIS — K58.0 IRRITABLE BOWEL SYNDROME WITH DIARRHEA: ICD-10-CM

## 2022-10-24 RX ORDER — DEXLANSOPRAZOLE 60 MG/1
60 CAPSULE, DELAYED RELEASE ORAL DAILY
Qty: 90 CAPSULE | Refills: 3 | Status: SHIPPED | OUTPATIENT
Start: 2022-10-24

## 2022-10-25 ENCOUNTER — PATIENT MESSAGE (OUTPATIENT)
Dept: INTERNAL MEDICINE CLINIC | Facility: CLINIC | Age: 57
End: 2022-10-25

## 2022-10-25 DIAGNOSIS — Z12.31 ENCOUNTER FOR SCREENING MAMMOGRAM FOR MALIGNANT NEOPLASM OF BREAST: Primary | ICD-10-CM

## 2022-11-07 RX ORDER — DEXLANSOPRAZOLE 60 MG/1
60 CAPSULE, DELAYED RELEASE ORAL DAILY
Qty: 30 CAPSULE | Refills: 11 | Status: SHIPPED | OUTPATIENT
Start: 2022-11-07

## 2022-11-07 NOTE — TELEPHONE ENCOUNTER
[facility] for status updated  PA is still under review should have a decision by the end of the week      [facility] [phone]

## 2022-11-07 NOTE — TELEPHONE ENCOUNTER
Dexlansoprazole 60 mg was approved from 10/25/22-10/25/23      Faxed approval to [facility] pharm

## 2022-11-23 ENCOUNTER — OFFICE VISIT (OUTPATIENT)
Dept: NEUROLOGY | Facility: CLINIC | Age: 57
End: 2022-11-23

## 2022-11-23 VITALS
HEART RATE: 72 BPM | HEIGHT: 67 IN | OXYGEN SATURATION: 99 % | WEIGHT: 132 LBS | BODY MASS INDEX: 20.72 KG/M2 | SYSTOLIC BLOOD PRESSURE: 122 MMHG | DIASTOLIC BLOOD PRESSURE: 80 MMHG | RESPIRATION RATE: 16 BRPM

## 2022-11-23 DIAGNOSIS — H81.11 BENIGN PAROXYSMAL POSITIONAL VERTIGO, RIGHT: ICD-10-CM

## 2022-11-23 DIAGNOSIS — G44.86 CERVICOGENIC HEADACHE: ICD-10-CM

## 2022-11-23 DIAGNOSIS — G95.9 CERVICAL MYELOPATHY (HCC): ICD-10-CM

## 2022-11-23 DIAGNOSIS — M54.2 CERVICALGIA: Primary | ICD-10-CM

## 2022-11-23 RX ORDER — GABAPENTIN 100 MG/1
CAPSULE ORAL
Qty: 100 CAPSULE | Refills: 5 | Status: SHIPPED | OUTPATIENT
Start: 2022-11-23

## 2022-11-23 NOTE — PROGRESS NOTES
Jarett Johnson is a 61 y o  female presents today with symptoms of neck pain, headaches and dizziness    Assessment:  1  Cervicalgia    2  Cervical myelopathy ([address] )    3  Benign paroxysmal positional vertigo, right    4  Cervicogenic headache        Plan:  MRI cervical spine   Physical therapy   Gabapentin 100 milligrams titrating to 300 milligrams times daily   Discontinue Excedrin   Follow-up 6 weeks      Discussion:  Tomy has a few different symptoms which I suspect are independent  She has developed symptoms and signs consistent with positional vertigo on the right side with a positive Hallpike maneuver  In addition she has significant right-sided neck pain with symptoms and signs suggestive of a cervical myelopathy with urinary urgency and frequency, hyperreflexia with nonsustained clonus on the left ankle and positive Sebastian sign bilaterally  Have recommended initiating physical therapy for her vertigo as well as her neck pain and will obtain an MRI of the cervical spine to rule out significant stenosis  Have recommended avoiding significant neck manipulation  She does have some vascular features to her headache but I suspect a strong component of her headaches are secondary to her underlying cervical spine disease  Have recommended a trial of gabapentin and hopes to reduce this  She may start with 100 milligrams 3 times daily and gradually increase the dose as recommended  We discussed potential adverse effects of the medication if she has problems with that she will notify me otherwise I will see her back in 6-8 weeks      Subjective:    PRATIMA Huitron is a right-handed woman who presents today with the above complaints  She was seen in 2016 as well as 2018 with similar symptoms  She states that she developed symptoms of severe vertigo the last week and in October  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    She states that it would not last long but any type of movement seems to aggravate it  She states that she became nauseous and was vomiting  She states was associated with a pressure sensation on the right side of her head  She states she went home took Excedrin and went to bed  She states the next day she did feel quite right but felt better and went to work  She states when she looked down to evaluate the patient she again became vertiginous  She states it was not as severe as the day before and not associated with severe nausea  She states that since that time different head movements will sometimes bring on a feeling a spinning sensation but not as strong as it had been a month ago  She reports that she has noted some decreased hearing in her left ear  She notes no tinnitus  In addition she reports progressively worsening pain on the right side of her neck  She states it hurts to turn her head to either side right greater than left  Occasionally the pain radiates to the shoulder  She also notes significant pain radiating from the base of her head up into the back of her head and into the front of the head all on the right side  She states she received a trigger point injection in shoulder area on the right that seemed to help things a little bit but when she received 1 more in the cervical region it seem to amplify her pain symptoms  She has noted over the last few months significant change in bladder function with frequent urination as well as urgency  She states that the morning she wakes up with a intense pressure type pain in the right side of her head that radiates from the back of her head up into the frontal region and behind her right eye with intense pain  She states that she has been taking Excedrin but she has gotten to the point where the Excedrin does not help anymore and she notes nausea and diarrhea associated with it    She states that she will put ice on her head and gradually the pain reduces to a level where she is able to function and work  She states the headache sometimes has a pulsating component to it  She notes sensitivity to light and noise with that as well as some nausea  She states that sometimes pain behind her eye hurts so much the to reach she has to close her right eye  When the pain isn't severe she notes no problems with her vision  She states that for about a week she took Topamax which she thinks was the 25 milligram dose that she had from sometime in the past   She found the carbonated beverages taste funny and noted a slowing in cognition and no improvement in her headaches  She discontinued the medication      Past Medical History:   Diagnosis Date   • Benign positional vertigo, right    • Cervicalgia    • DVT (deep venous thrombosis) (HCC)    • Graves disease    • Headache    • Hypertension    • Thyroid disease        Family History:  Family History   Problem Relation Age of Onset   • Hypertension Mother    • Asthma Mother    • Diabetes Mother    • Hyperlipidemia Mother    • Seizures Father    • Migraines Brother    • Seizures Brother    • Cancer Sister 21        brain   • No Known Problems Daughter    • No Known Problems Maternal Grandmother    • No Known Problems Paternal Grandmother    • No Known Problems Maternal Aunt    • No Known Problems Maternal Aunt    • No Known Problems Maternal Aunt    • No Known Problems Paternal Aunt    • No Known Problems Paternal Aunt    • No Known Problems Paternal Aunt    • Breast cancer Neg Hx    • Colon cancer Neg Hx    • Ovarian cancer Neg Hx    • Uterine cancer Neg Hx    • Cervical cancer Neg Hx        Past Surgical History:  Past Surgical History:   Procedure Laterality Date   • APPENDECTOMY     • CARDIAC CATHETERIZATION Left 7/29/2022    Procedure: Cardiac Left Heart Cath;  Surgeon: Tiara Patino MD;  Location: [address] [place] [address] CATH LAB;   Service: Cardiology   • CARDIAC CATHETERIZATION N/A 7/29/2022    Procedure: Cardiac Coronary Angiogram;  Surgeon: Tiraa Patino MD;  [facility]: [facility]; Service: Cardiology   • CHOLECYSTECTOMY LAPAROSCOPIC N/A 8/1/2021    Procedure: CHOLECYSTECTOMY LAPAROSCOPIC W/ INTRAOP CHOLANGIOGRAM;  Surgeon: Dora Faith MD;  Location: [facility];  Service: General   • FL INJECTION LEFT HIP (NON ARTHROGRAM)  1/31/2019   • LIPOMA RESECTION     • NEUROMA EXCISION     • STEROID INJECTION HIP Left 05/2018       Social History:   reports that she has been smoking cigarettes  She has a 0 02 pack-year smoking history  She has never used smokeless tobacco  She reports that she does not currently use alcohol  She reports that she does not use drugs      Allergies:  Bee venom, Contrast [iodinated diagnostic agents], Bee pollen, Pollen extract, Prochlorperazine, and Besifloxacin hcl      Current Outpatient Medications:   •  dexlansoprazole (DEXILANT) 60 MG capsule, Take 1 capsule (60 mg total) by mouth daily, Disp: 30 capsule, Rfl: 11  •  diphenhydrAMINE (BENADRYL) 50 mg capsule, Take 32 mg of medrol 12 hours prior to contrast administration and 32 mg 2 hours prior to contrast administration then take benadryl 50 mg prior to contrast administration, Disp: 1 capsule, Rfl: 0  •  EPINEPHrine (EPIPEN) 0 3 mg/0 3 mL SOAJ, Inject 0 3 mL (0 3 mg total) into a muscle once for 1 dose, Disp: 0 6 mL, Rfl: 1  •  gabapentin (NEURONTIN) 100 mg capsule, 1-3 pills 3 times daily as directed, Disp: 100 capsule, Rfl: 5  •  multivitamin (THERAGRAN) TABS, Take 1 tablet by mouth daily [place]ich[place], Disp: , Rfl:   •  methylPREDNISolone (MEDROL) 32 MG tablet, Take 32 mg of medrol 12 hours prior to contrast administration and 32 mg 2 hours prior to contrast administration then take benadryl 50 mg prior to contrast administration, Disp: 2 tablet, Rfl: 0    I have reviewed the past medical, social and family history, current medications, allergies, vitals, review of systems and updated this information as appropriate today     Objective:    Vitals:  Blood pressure 122/80, pulse 72, resp  rate 16, height 5' 7" (1 702 m), weight 59 9 kg (132 lb), SpO2 99 %, not currently breastfeeding  Physical Exam    Neurological Exam    GENERAL:  Cooperative in no acute distress  Well-developed and well-nourished    HEAD and NECK   Head is atraumatic normocephalic with no lesions or masses  Neck is supple with full range of motion    CARDIOVASCULAR  Carotid Arteries-no carotid bruits  NEUROLOGIC:  Mental Status-the patient is awake alert and oriented without aphasia or apraxia  Cranial Nerves: Visual fields are full to confrontation  Extraocular movements are full without nystagmus  Pupils are 2-1/2 mm and reactive  Face is symmetrical to light touch  Movements of facial expression move symmetrically  Hearing is normal to finger rub bilaterally  Soft palate lifts symmetrically  Shoulder shrug is symmetrical  Tongue is midline without atrophy  Motor: No drift is noted on arm extension  Strength is full in the upper and lower extremities with normal bulk and tone  Sensory: Intact to temperature and vibratory sensation in the upper and lower extremities bilaterally  Cortical function is intact  Coordination: Finger to nose testing is performed accurately  Romberg is remarkable for increased sway with eyes closed  Gait reveals a normal base with symmetrical arm swing  Tandem walk is normal   Reflexes:  2+ to 3 in the biceps, triceps brachioradialis regions, 3+ at the knees and 3+ at the ankles with nonsustained clonus on the left  Toes are neutral   Sebastian sign are positive bilaterally  Hallpike maneuver was performed and produced minimal nystagmus with head down to the right associated with vertigo and nausea  There was a brief latency  It did fatigue with repeated positioning  It was associated with nausea            ROS:    Review of Systems   Constitutional: Negative  Negative for appetite change and fever  HENT: Negative    Negative for hearing loss, tinnitus, trouble swallowing and voice change  Eyes: Positive for pain and visual disturbance  Negative for photophobia  Respiratory: Positive for shortness of breath  Cardiovascular: Negative  Negative for palpitations  Gastrointestinal: Negative  Negative for nausea and vomiting  Endocrine: Negative  Negative for cold intolerance  Genitourinary: Negative  Negative for dysuria, frequency and urgency  Musculoskeletal: Negative  Negative for gait problem, myalgias and neck pain  Skin: Negative  Negative for rash  Allergic/Immunologic: Negative  Neurological: Positive for headaches  Negative for dizziness, tremors, seizures, syncope, facial asymmetry, speech difficulty, weakness, light-headedness and numbness  Patient records migraines happening daily  Patient states Topamax gives her diarrhea  Hematological: Negative  Does not bruise/bleed easily  Psychiatric/Behavioral: Negative  Negative for confusion, hallucinations and sleep disturbance

## 2022-11-30 ENCOUNTER — OFFICE VISIT (OUTPATIENT)
Dept: GASTROENTEROLOGY | Facility: CLINIC | Age: 57
End: 2022-11-30

## 2022-11-30 VITALS
HEART RATE: 59 BPM | BODY MASS INDEX: 21.19 KG/M2 | DIASTOLIC BLOOD PRESSURE: 80 MMHG | WEIGHT: 135 LBS | SYSTOLIC BLOOD PRESSURE: 132 MMHG | OXYGEN SATURATION: 95 % | HEIGHT: 67 IN

## 2022-11-30 DIAGNOSIS — K21.9 GASTROESOPHAGEAL REFLUX DISEASE: ICD-10-CM

## 2022-11-30 DIAGNOSIS — K58.0 IRRITABLE BOWEL SYNDROME WITH DIARRHEA: ICD-10-CM

## 2022-11-30 RX ORDER — CHOLESTYRAMINE 4 G/9G
1 POWDER, FOR SUSPENSION ORAL 2 TIMES DAILY WITH MEALS
Qty: 60 PACKET | Refills: 2 | Status: SHIPPED | OUTPATIENT
Start: 2022-11-30

## 2022-11-30 RX ORDER — DEXLANSOPRAZOLE 60 MG/1
60 CAPSULE, DELAYED RELEASE ORAL DAILY
Qty: 30 CAPSULE | Refills: 11
Start: 2022-11-30

## 2022-11-30 NOTE — PROGRESS NOTES
[facility] - Outpatient Follow-up Note  Tomy Beckett Overall 61 y o  female MRN: [id number]  Encounter: [id number]          ASSESSMENT AND PLAN:      1  Gastroesophageal reflux disease  Controlled on Dexilant 60mg daily    2  Irritable bowel syndrome with diarrhea  Worsening in the past 3 months  Will trial Cholestryamine packets BID  Restart probiotic  Reviewed low FODMAP diet - handout provided    She admits her diet has been poor recently - she is going to work on this    ______________________________________________________________________    SUBJECTIVE:  48-year-old female with a history of GERD and diarrhea predominant irritable bowel syndrome presents for evaluation of exacerbation of symptoms  She notes that over the past 3 months her diarrhea has been quite severe  She reports that is daily  The biggest issue for her is urgency  Any time she eats she has to run to the bathroom  There is abdominal cramping and bloating but no rectal bleeding  She admits that over the past year so her diet has suffered  She is no longer eating passed a tarry in and is not making great choices  She is working for [employer] [address] [place] [address] [employer] 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  She reports however that she is able to eat routinely  She has been on fiber and probiotic in the past   She admits that she has not been taking these recently  She has tried Imodium without relief  She has tried Lotronex without relief  She was on a Xifaxan course a few years ago and although she had improvement while taking medication her symptoms returned immediately after stopping  She tried Colestid in the past and felt that she had a reaction although she cannot remember exactly what this was  She has worked hard to manage her depression and anxiety over the past 2 years and is has improved  REVIEW OF SYSTEMS IS OTHERWISE NEGATIVE        Historical Information   Past Medical History:   Diagnosis Date   • Benign positional vertigo, right    • Cervicalgia    • DVT (deep venous thrombosis) (HCC)    • Graves disease    • Headache    • Hypertension    • Thyroid disease      Past Surgical History:   Procedure Laterality Date   • APPENDECTOMY     • CARDIAC CATHETERIZATION Left 7/29/2022    Procedure: Cardiac Left Heart Cath;  Surgeon: Edil Poasdas MD;  [facility]: [facility]; Service: Cardiology   • CARDIAC CATHETERIZATION N/A 7/29/2022    Procedure: Cardiac Coronary Angiogram;  Surgeon: Edil Posadas MD;  Location: [address] [place] [address] CATH LAB;   Service: Cardiology   • CHOLECYSTECTOMY LAPAROSCOPIC N/A 8/1/2021    Procedure: CHOLECYSTECTOMY LAPAROSCOPIC W/ INTRAOP CHOLANGIOGRAM;  Surgeon: Fitz Weston MD;  Location: [facility];  Service: General   • FL INJECTION LEFT HIP (NON ARTHROGRAM)  1/31/2019   • LIPOMA RESECTION     • NEUROMA EXCISION     • STEROID INJECTION HIP Left 05/2018     Social History   Social History     Substance and Sexual Activity   Alcohol Use Not Currently     Social History     Substance and Sexual Activity   Drug Use No     Social History     Tobacco Use   Smoking Status Light Smoker   • Packs/day: 0 01   • Years: 2 00   • Pack years: 0 02   • Types: Cigarettes   Smokeless Tobacco Never   Tobacco Comments    last smoked 8/16/19     Family History   Problem Relation Age of Onset   • Hypertension Mother    • Asthma Mother    • Diabetes Mother    • Hyperlipidemia Mother    • Seizures Father    • Migraines Brother    • Seizures Brother    • Cancer Sister 21        brain   • No Known Problems Daughter    • No Known Problems Maternal Grandmother    • No Known Problems Paternal Grandmother    • No Known Problems Maternal Aunt    • No Known Problems Maternal Aunt    • No Known Problems Maternal Aunt    • No Known Problems Paternal Aunt    • No Known Problems Paternal Aunt    • No Known Problems Paternal Aunt    • Breast cancer Neg Hx    • Colon cancer Neg Hx    • Ovarian cancer Neg Hx    • Uterine cancer Neg Hx    • Cervical cancer Neg Hx        Meds/Allergies       Current Outpatient Medications:   •  cholestyramine (QUESTRAN) 4 g packet  •  dexlansoprazole (DEXILANT) 60 MG capsule  •  diphenhydrAMINE (BENADRYL) 50 mg capsule  •  gabapentin (NEURONTIN) 100 mg capsule  •  multivitamin (THERAGRAN) TABS  •  EPINEPHrine (EPIPEN) 0 3 mg/0 3 mL SOAJ    Allergies   Allergen Reactions   • Bee Venom      Bee sting   • Contrast [Iodinated Diagnostic Agents] Anaphylaxis     Pt states her throat closes- kk rn    • Bee Pollen Dizziness     Specifically bee sting which evokes reaction of swelling of throat, headache and vomitting   • Pollen Extract Other (See Comments)     Throat clearing, difficulty swallowing, ears itchy   • Prochlorperazine Other (See Comments)      Aka (compazine)  Delirious    • Besifloxacin Hcl Palpitations     Other reaction(s): Unknown           Objective     Blood pressure 132/80, pulse 59, height 5' 7" (1 702 m), weight 61 2 kg (135 lb), SpO2 95 %, not currently breastfeeding  Body mass index is 21 14 kg/m²  PHYSICAL EXAM:      General Appearance:   Alert, cooperative, no distress   HEENT:   Normocephalic, atraumatic, anicteric      Neck:  Supple, symmetrical, trachea midline   Lungs:   Clear to auscultation bilaterally; no rales, rhonchi or wheezing; respirations unlabored    Heart[de-identified]   Regular rate and rhythm; no murmur, rub, or gallop  Abdomen:   Soft, non-tender, non-distended; normal bowel sounds; no masses, no organomegaly    Genitalia:   Deferred    Rectal:   Deferred    Extremities:  No cyanosis, clubbing or edema    Pulses:  2+ and symmetric    Skin:  No jaundice, rashes, or lesions    Lymph nodes:  No palpable cervical lymphadenopathy        Lab Results:   No visits with results within 1 Day(s) from this visit     Latest known visit with results is:   Admission on 09/06/2022, Discharged on 09/06/2022   Component Date Value   • SARS-CoV-2 09/06/2022 Positive (A)    • INFLUENZA A PCR 09/06/2022 Negative    • INFLUENZA B PCR 09/06/2022 Negative    • RSV PCR 09/06/2022 Negative          Radiology Results:   No results found

## 2022-12-14 ENCOUNTER — TELEPHONE (OUTPATIENT)
Dept: NEUROLOGY | Facility: CLINIC | Age: 57
End: 2022-12-14

## 2022-12-14 ENCOUNTER — HOSPITAL ENCOUNTER (OUTPATIENT)
Dept: MRI IMAGING | Facility: CLINIC | Age: 57
Discharge: HOME | End: 2022-12-14

## 2022-12-14 DIAGNOSIS — F40.240 CLAUSTROPHOBIA: Primary | ICD-10-CM

## 2022-12-14 DIAGNOSIS — G95.9 CERVICAL MYELOPATHY (HCC): ICD-10-CM

## 2022-12-14 RX ORDER — LORAZEPAM 1 MG/1
TABLET ORAL
Qty: 3 TABLET | Refills: 0 | Status: SHIPPED | OUTPATIENT
Start: 2022-12-14

## 2022-12-14 NOTE — TELEPHONE ENCOUNTER
Spoke with patient and have recommended MRI to be performed at the hospital as it has a large bore, have also prescribed Ativan and explained that she has to have a        ----- Message from Maddy Flanagan RN sent at 12/14/2022 11:32 AM EST -----  Regarding: FW: Unable to perform MRI  Contact: [phone]    ----- Message -----  From: Cyndi Gonzales  Sent: 12/14/2022  11:09 AM EST  To: Neurology [place] [address] Team 6  Subject: FW: Unable to perform MRI                          ----- Message -----  From: Massiel Martinez  Sent: 12/14/2022  10:54 AM EST  To: Neurology BEHAVIORAL MEDICINE [facility]  Subject: Unable to perform MRI                            Good Morning Dr Perla Engel, unfortunately today I was unsuccessful in having my MRI as I became extremely claustrophobic  Is there any way we can order a CT - scan or some other diagnostic test?    thank you      Reny Portillo

## 2023-01-10 NOTE — TELEPHONE ENCOUNTER
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 Status: Symptomatic. Plan: Exam and OCT reveal a PED and atrophy OD. Exam and OCT reveal no IRF. An IVFA from 05/10/2022 demonstrated no leakage. Fundus photos from 05/10/2022 demonstrated drusen. Observe. Recommend AREANDREI and Jake.  Thanks, Clearnce Manners

## 2023-01-24 ENCOUNTER — EVALUATION (OUTPATIENT)
Dept: PHYSICAL THERAPY | Facility: CLINIC | Age: 58
End: 2023-01-24

## 2023-01-24 DIAGNOSIS — M54.2 CERVICALGIA: ICD-10-CM

## 2023-01-24 DIAGNOSIS — H81.11 BENIGN PAROXYSMAL POSITIONAL VERTIGO, RIGHT: ICD-10-CM

## 2023-01-24 NOTE — PROGRESS NOTES
PT Evaluation     Today's date: 2023  Patient name: Lashawn Yoon  :   MRN: [id number]  Referring provider: Nerissa Cornell MD  Dx:   Encounter Diagnosis     ICD-10-CM    1  Cervicalgia  M54 2 Ambulatory Referral to Physical Therapy      2  Benign paroxysmal positional vertigo, right  H81 11 Ambulatory Referral to Physical Therapy        Start Time: 1715  Stop Time: 1800  Total time in clinic (min): 45 minutes    Assessment  Assessment details: Patient is a 61 y o  Female who presents to skilled outpatient PT with dizziness, neck pain and vertigo  Sig PMHx of Graves disease, HTN, DVT, BPPV, and cervicalgia  Upon initial evaluation, pt presents with pain, dizziness, R hypertonic UT, dec c/s ROM, abnormal vestibular oculomotor exam, and dec activity tolerance  Pt reported nausea, and lightheadedness throughout session  Will progress as able  NV formally assess for BPPV, in accommodation for pt to get a ride to/from treatment session  Significant amount of time spent completing patient education about BPPV, causes of dizziness and balance issues, POC, prognosis and f/u with PCP/neurologists  Physical handouts provided of HEP  Will see pt 2x/week for 6 weeks  Pt agreeable  Patient verbalized understanding of POC  Please contact me if you have any questions or recommendations  Thank you for the referral and the opportunity to share in Lashawn Yoon care      Impairments: abnormal coordination, abnormal gait, abnormal muscle tone, abnormal or restricted ROM, activity intolerance, impaired balance, impaired physical strength, lacks appropriate HEP, poor posture, poor body mechanics, pain with function, safety issue and abnormal movement  Understanding of Dx/Px/POC: good  Prognosis: good    Goals  Vestibular Short Term Goals (4 weeks):  - Patient will display improved cervical spine STM by 50% to encourage improved AROM during functional tasks  - Patient will be independent with simple HEP  - Patient will tolerate 60 seconds of oculomotor exercises with minimal increase in symptoms  - Patient will demonstrate 10% decrease in symptom severity scoring with independent use of modalities  - Patient will demonstrate improved soft tissue density t/o cervical region with independent self-release  - Patient will be able to tolerate 30 seconds with eyes closed on foam surface without any loss of balance demonstrating improvement in vestibular system  - Patient will improve FGA score by 4 points per MDC to promote improved safety with dynamic tasks  - Improve self-selected gait speed by 0 1 m/s to indicate improving gait and decreased risk of falls  - Decrease time for 5xSTS to <15s to indicate improved LE strength/power and improving functional mobility capacity   - Perform home exercise program with min(A) or supervision   - Patient will have negative L Alfredito-Hallpike indicating clearance of BPPV  - Patient will have subjective 0/10 dizziness in the morning when getting out of bed       Vestibular Long Term Goals (12 weeks):  - Patient will display decreased forward head and rounded shoulders to promote improved resting posture and cervical mobility  - Patient will be independent with complex HEP  - Patient will tolerate >=2 minutes of oculomotor exercises to facilitate return to reading and computer work  - Patient will report >= 50% improvement on symptom severity scoring  - Patient will demonstrate ability to perform HT in gait without veering  - Patient will be able to perform 15 minutes of aerobic activity at HR 70% max to facilitate return to sport/normal functional tasks  - Patient will demonstrate normalized soft tissue t/o  - Patient will increase FOTO score to greater than predicted value  - Patient will score low risk for falls with FGA test with score of 23/30 or higher per current research data  - Patient will report baseline dizziness of 1/10 or less   - Patient will report 2/10 dizziness or less with visual stimulating surround with duration of 2 minutes   - Patient will report subjective improvement to 90% or higher to promote return to PLOF  - Patient will complete work related tasks without exacerbation of symptoms in order to maximize function and promote return to work  - Patient will complete RTP protocol in order to promote return to sports related tasks  - Improve fast gait speed by 0 2 m/s to indicate decreased risk of falls and improved functional mobility   - Increase FOTO to greater than predicted value  - Demonstrate ability to perform home exercise program independently to maintain/continue progress towards goals    Plan  Planned modality interventions: TENS  Planned therapy interventions: abdominal trunk stabilization, ADL training, balance, balance/WB training, breathing training, body mechanics training, coordination, flexibility, functional ROM exercises, gait training, HEP, motor coordination training, neuromuscular re-education, patient education, postural training, strengthening, stretching, therapeutic activities and therapeutic exercises  Frequency: 2x/wk  Duration in weeks: 12  Plan of Care beginning date: 1/24/2023  Plan of Care expiration date: 12 weeks - 4/18/2023  Treatment plan discussed with: Patient      Subjective Evaluation  History of Present Illness  Mechanism of injury: Pt reports pain in the neck on the R side which started 5 months ago  She reports it radiates through her UT and to the back of her neck  She did receive two rounds of steroidal injections, which temporarily helped her symptoms  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  She lives alone, but has bf and daughter as a support system  She does report PMHx of graves disease, syncope, and recent hearing loss that is intermittent  She has two fluid-like sacs on eyelids that are intermittent       Dizziness Subjective  How long does dizziness last: 2 and a half hours of dizziness and nausea  How would you describe the dizziness: like she's on a boat unless she is laying down   Rolling in bed: No  Supine to/from sit: Yes  Recent hearing loss: Yes feels like she is a vacuum   Tinnitus: No  Aural fullness/ear pain: Yes  Vision changes: Yes sensitivity to bright lights, eyes red in the morning  History of recent viral infections: No  History of migraines: Yes    Red Flag Screen  - Numbness: No  - Tingling: No  - Weakness: No  - Unilateral hearing loss: No  - Slurred speech: No  - Progressive hearing loss: No  - Tremors: No  - Poor coordination: No  - UMN signs: No  - LoC: Yes -syncope due dehydrated   - Rigidity: No  - Visual field loss: No  - Memory loss: No  - CN dysfunction: No  - Vertical nystagmus: No    Pain  Current pain ratin/10  At best pain ratin/10  At worst pain ratin/10  Location: [facility]     Social Support/Home Setup  Lives with: alone  Employment status: PRN ED, [employer] EMT, FT pain management  Exercise/PLOF: used to go 3x/week, since pain has started     Goals:  Make sure that she can function, dec headaches, dec pain in neck    Objective   Vestibular Objective  Cervical Spine AROM:  - Flexion: WFL pain at end range  - Extension: WFL no pain, nausea  - R Rotation: WFL pain at end range  - L Rotation: WFL pain at end range  - R Lateral Flexion: minimal limitation pain at end range  - L Lateral Flexion: minimal limitation pain at end range    Integrity Testing  - mVBI: unable to assess  [place] Douse Umberto: NT  - Alar Stability Test: NT    Oculomotor Screen  - Gaze Holding Nystagmus: H: Normal and V: Normal Dizziness: 0/10  - Spontaneous Nystagmus Room Light: H: Normal and V: Normal Dizziness: 0/10  - Smooth Pursuits (central): H: Normal and V: Normal Dizziness: 0/10, nausea  - Saccades (central): H: Normal Dizziness: 0/10, Observation: dysmetric, hypometric  - Near Point Convergence (normal: < 4"/10 cm - central): H: Abnormal Dizziness   - Head Thrust (moderate to severe hypofunction): H: Abnormal and V: Abnormal Dizziness    Positional Testing: Next session    Outcome Measures Initial Eval  1/24/2023        DHI NT        6MWT NT        ABC NT             Precautions:   Past Medical History:   Diagnosis Date   • Benign positional vertigo, right    • Cervicalgia    • DVT (deep venous thrombosis) (HCC)    • Graves disease    • Headache    • Hypertension    • Thyroid disease      Manuals 1/24                                                                Neuro Re-Ed             Pencil push ups             x1 viewing             Smooth pursuits             saccades                                                    Ther Ex                                                                                                                     Ther Activity             Patient education EB                         Gait Training                                       Modalities

## 2023-02-06 ENCOUNTER — TELEPHONE (OUTPATIENT)
Dept: NEUROLOGY | Facility: CLINIC | Age: 58
End: 2023-02-06

## 2023-02-06 NOTE — TELEPHONE ENCOUNTER
Pt left message  I will be having open MRI at [facility] I am claustrophobic  I was told that I should give you a call and let you know,so you can do the prior authorization    It is schedule for 2/18/23  #[phone]

## 2023-02-06 NOTE — TELEPHONE ENCOUNTER
Quincy Dean  You 6 hours ago (9:33 AM)     BM  Good morning Isai Quiles,     I called patient [phone] to see which location she will be going to and also to let her know that we work in date order and will work on this as soon as we start working on 2/18/23  She said that she is trying to find a closer location so she will call me back once she has this scheduled and then we can work on the authorization  Thank you so much  Have a great day     Quincy Dean

## 2023-02-07 ENCOUNTER — OFFICE VISIT (OUTPATIENT)
Dept: PHYSICAL THERAPY | Facility: CLINIC | Age: 58
End: 2023-02-07

## 2023-02-07 DIAGNOSIS — M54.2 CERVICALGIA: Primary | ICD-10-CM

## 2023-02-07 DIAGNOSIS — H81.11 BENIGN PAROXYSMAL POSITIONAL VERTIGO, RIGHT: ICD-10-CM

## 2023-02-07 NOTE — PROGRESS NOTES
Daily Note     Today's date: 2023  Patient name: Ernie William  :   MRN: [id number]  Referring provider: Ethel Barroso MD  Dx:   Encounter Diagnosis     ICD-10-CM    1  Cervicalgia  M54 2       2  Benign paroxysmal positional vertigo, right  H81 11         Start Time: 1715  Stop Time: 1800  Total time in clinic (min): 45 minutes    Subjective: Pt reports she did not do any exercises    Objective: See treatment diary below  Positional  BPPV testing: negative    Assessment: Tolerated treatment well  All positional testing negative, although noted inc nausea upon sitting up, and L eye discomfort  Significant amount of time spent completing patient education on prognosis HEP and POC  Physical handouts provided  Patient exhibited good technique with therapeutic exercises and would benefit from continued PT  Will continue to progress as able  NV initiate cervical manual therapy  Plan: Continue per plan of care        Precautions:   Past Medical History:   Diagnosis Date   • Benign positional vertigo, right    • Cervicalgia    • DVT (deep venous thrombosis) (HCC)    • Graves disease    • Headache    • Hypertension    • Thyroid disease      Manuals                                                                Neuro Re-Ed             Pencil push ups  1x10           x1 viewing  15s x2 ea H/V           Smooth pursuits  15s H/V x2           saccades  15s x2 ea H/V                                                  Ther Ex             UT/ LS stretch  30s x4 ea           Post s' rolls  3x10           scap retraction  3x10                                                                            Ther Activity             Patient education EB EB                        Gait Training                                       Modalities

## 2023-02-09 ENCOUNTER — APPOINTMENT (OUTPATIENT)
Dept: PHYSICAL THERAPY | Facility: CLINIC | Age: 58
End: 2023-02-09

## 2023-02-14 ENCOUNTER — APPOINTMENT (OUTPATIENT)
Dept: PHYSICAL THERAPY | Facility: CLINIC | Age: 58
End: 2023-02-14

## 2023-02-16 ENCOUNTER — OFFICE VISIT (OUTPATIENT)
Dept: PHYSICAL THERAPY | Facility: CLINIC | Age: 58
End: 2023-02-16

## 2023-02-16 DIAGNOSIS — M54.2 CERVICALGIA: Primary | ICD-10-CM

## 2023-02-16 DIAGNOSIS — H81.11 BENIGN PAROXYSMAL POSITIONAL VERTIGO, RIGHT: ICD-10-CM

## 2023-02-16 NOTE — PROGRESS NOTES
Daily Note     Today's date: 2023  Patient name: Kory High  :   MRN: [id number]  Referring provider: Srinivas Garay MD  Dx:   Encounter Diagnosis     ICD-10-CM    1  Cervicalgia  M54 2       2  Benign paroxysmal positional vertigo, right  H81 11                    Subjective: Pt reports noting inc "fluid pocket" above L eye that has now spread over eyelid  Noted some fluid over R eye  L>R  She reports it is painful  She also notes the L eye is twitching and exophoria when trying to focus and double vision  She is interested in seeing an ophthalmologist      Objective: See treatment diary below    Assessment: Pt tolerated treatment well  Initiated manual therapy and noted inc hypertonicity in R UT, painful  Noted improved c/s rotation B/L after exercise  She remains dizzy with HT and self mobilizations of c/s  HEP progressed and physical handouts provided  Significant amount of time spent completing patient education about HEP, POC, and resources for further assessment of eyes  Patient would benefit from continued PT  NV trial manual SOR, traction and continue STM  Plan: Continue per plan of care        Precautions:   Past Medical History:   Diagnosis Date   • Benign positional vertigo, right    • Cervicalgia    • DVT (deep venous thrombosis) (HCC)    • Graves disease    • Headache    • Hypertension    • Thyroid disease      Manuals           C/s SNAGs  Rot/ext   2x10 ea          STM   5'          traction   NV          SOR   NV          Neuro Re-Ed             Pencil push ups  1x10           x1 viewing  15s x2 ea H/V           Smooth pursuits  15s H/V x2 60s H/V          saccades  15s x2 ea H/V           Laser maze   NV                                    Ther Ex             UT/ LS stretch  30s x4 ea 30s x4          Post s' rolls  3x10           scap retraction  3x10           C/s ROM   20x                                                               Ther Activity Patient education EB EB EB          Therapeutic rest   EB          Gait Training                                       Modalities

## 2023-02-21 ENCOUNTER — APPOINTMENT (OUTPATIENT)
Dept: PHYSICAL THERAPY | Facility: CLINIC | Age: 58
End: 2023-02-21

## 2023-02-23 ENCOUNTER — OFFICE VISIT (OUTPATIENT)
Dept: PHYSICAL THERAPY | Facility: CLINIC | Age: 58
End: 2023-02-23

## 2023-02-23 DIAGNOSIS — M54.2 CERVICALGIA: Primary | ICD-10-CM

## 2023-02-23 DIAGNOSIS — H81.11 BENIGN PAROXYSMAL POSITIONAL VERTIGO, RIGHT: ICD-10-CM

## 2023-02-24 NOTE — PROGRESS NOTES
Daily Note     Today's date: 2023  Patient name: Anel Hill  :   MRN: [id number]  Referring provider: Stefania Galindo MD  Dx:   Encounter Diagnosis     ICD-10-CM    1  Cervicalgia  M54 2       2  Benign paroxysmal positional vertigo, right  H81 11         Start Time: 1718  Stop Time: 1746  Total time in clinic (min): 28 minutes    Subjective: Pt reports she had severe pain and migraines this week  Objective: See treatment diary below    Assessment: Tolerated treatment well  Initiated manual traction and SOR, which pt tolerated well  Reported inc nausea with manual therapy and pt reported inc tension due to anxiety  VC for diaphragmatic breathing  Patient would benefit from continued PT to dec dizziness, headaches, and pain  Plan: Continue per plan of care        Precautions:   Past Medical History:   Diagnosis Date   • Benign positional vertigo, right    • Cervicalgia    • DVT (deep venous thrombosis) (HCC)    • Graves disease    • Headache    • Hypertension    • Thyroid disease      Manuals          C/s SNAGs  Rot/ext   2x10 ea          STM   5'          traction   NV 5'         SOR   NV 5' x2         Neuro Re-Ed             Pencil push ups  1x10           x1 viewing  15s x2 ea H/V           Smooth pursuits  15s H/V x2 60s H/V          saccades  15s x2 ea H/V           Laser maze   NV                                    Ther Ex             UT/ LS stretch  30s x4 ea 30s x4          Post s' rolls  3x10           scap retraction  3x10           C/s ROM   20x                                                               Ther Activity             Patient education EB EB EB EB         Therapeutic rest   EB EB         Gait Training                                       Modalities             traction    10'

## 2023-03-07 ENCOUNTER — OFFICE VISIT (OUTPATIENT)
Dept: PHYSICAL THERAPY | Facility: CLINIC | Age: 58
End: 2023-03-07

## 2023-03-07 DIAGNOSIS — K21.9 GASTROESOPHAGEAL REFLUX DISEASE: ICD-10-CM

## 2023-03-07 DIAGNOSIS — M54.2 CERVICALGIA: Primary | ICD-10-CM

## 2023-03-07 DIAGNOSIS — K58.0 IRRITABLE BOWEL SYNDROME WITH DIARRHEA: ICD-10-CM

## 2023-03-07 RX ORDER — DEXLANSOPRAZOLE 60 MG/1
60 CAPSULE, DELAYED RELEASE ORAL DAILY
Qty: 30 CAPSULE | Refills: 0 | Status: SHIPPED | OUTPATIENT
Start: 2023-03-07

## 2023-03-07 NOTE — PROGRESS NOTES
Daily Note     Today's date: 3/7/2023  Patient name: Syeda Bailey  :   MRN: [id number]  Referring provider: Sussy Galindo MD  Dx:   Encounter Diagnosis     ICD-10-CM    1  Cervicalgia  M54 2         Start Time:   Stop Time:   Total time in clinic (min): 55 minutes    Subjective: Pt reports she feels better today and has been doing her exercises, and going to the gym (bike, ST)    Objective: See treatment diary below    Assessment: Pt tolerated treatment well  Initiated functional treadmill training with head turns which pt demonstrated good technique, inc difficulty with H HT vs V HTs  Continued manual therapy and cervical proprioception neuromuscular reeducation  Pt demonstrated good technique  Patient would benefit from continued PT  NV PN  Plan: Continue per plan of care  Progress note during next visit  Potential discharge next visit       Precautions:   Past Medical History:   Diagnosis Date   • Benign positional vertigo, right    • Cervicalgia    • DVT (deep venous thrombosis) (HCC)    • Graves disease    • Headache    • Hypertension    • Thyroid disease      Manuals 1/24 2/7 2/16 2/23 3/7        C/s SNAGs  Rot/ext   2x10 ea          STM   5'  10'        traction   NV 5' 10'        SOR   NV 5' x2 5'        Neuro Re-Ed             Pencil push ups  1x10           x1 viewing  15s x2 ea H/V           Smooth pursuits  15s H/V x2 60s H/V          saccades  15s x2 ea H/V           Laser maze   NV  10'        Treadmill HT     6' DT HT H/V 30s x 4                      Ther Ex             UT/ LS stretch  30s x4 ea 30s x4  PRN        Post s' rolls  3x10   PRN        scap retraction  3x10   PRN        C/s ROM   20x   PRN                                                            Ther Activity             Patient education EB EB EB EB EB        Therapeutic rest   EB EB EB        Gait Training                                       Modalities             traction    10'  10'

## 2023-03-09 ENCOUNTER — APPOINTMENT (OUTPATIENT)
Dept: PHYSICAL THERAPY | Facility: CLINIC | Age: 58
End: 2023-03-09

## 2023-03-15 ENCOUNTER — OFFICE VISIT (OUTPATIENT)
Dept: NEUROLOGY | Facility: CLINIC | Age: 58
End: 2023-03-15

## 2023-03-15 VITALS
SYSTOLIC BLOOD PRESSURE: 142 MMHG | HEIGHT: 67 IN | DIASTOLIC BLOOD PRESSURE: 98 MMHG | WEIGHT: 135 LBS | BODY MASS INDEX: 21.19 KG/M2 | HEART RATE: 61 BPM

## 2023-03-15 DIAGNOSIS — G44.86 CERVICOGENIC HEADACHE: ICD-10-CM

## 2023-03-15 DIAGNOSIS — G95.9 CERVICAL MYELOPATHY (HCC): ICD-10-CM

## 2023-03-15 DIAGNOSIS — G43.009 MIGRAINE WITHOUT AURA AND WITHOUT STATUS MIGRAINOSUS, NOT INTRACTABLE: ICD-10-CM

## 2023-03-15 DIAGNOSIS — M54.2 CERVICALGIA: Primary | ICD-10-CM

## 2023-03-15 DIAGNOSIS — H81.11 BENIGN PAROXYSMAL POSITIONAL VERTIGO, RIGHT: ICD-10-CM

## 2023-03-15 RX ORDER — RIZATRIPTAN BENZOATE 10 MG/1
TABLET ORAL
Qty: 9 TABLET | Refills: 5 | Status: SHIPPED | OUTPATIENT
Start: 2023-03-15

## 2023-03-15 NOTE — PROGRESS NOTES
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 and demonstrated just spondylitic changes however no report or imaging is available at the time of this dictation  She will try to get this information to me  She continues to report migraine headaches occurring 3 times weekly  She has difficulty tolerating Imitrex when working during the day due to lethargy  She has tried Topamax, propranolol and gabapentin in the past and has not been able to tolerate any of these  I recommended a trial of Ajovy injected monthly  She may try Maxalt as needed for breakthrough headaches and if she is not able to tolerate this consider Ubrevly or Nurtec  She will continue physical therapy and I will see her back in follow-up in 6 months      Subjective:    HPI  Tomy returns in follow-up today  She reports that since her last she has been to physical therapy and is found that her neck pain and symptoms of vertigo have improved  She states that therapist noted that sometimes with her head in certain positions she has a bit of disconjugate gaze but denies any diplopia  She anticipates following up with her optometrist in this regard  She states she did have her MRI of the cervical spine done at an outside facility with an open MRI  She was told that imaging demonstrated spondylitic changes but I have no access to report or imaging  She will try to get these for me    Her main issue is migraine headaches  She states she has been getting headaches 3 times a week and at times they are severe and disabling  They are associated with photophobia, sonophobia and nausea  She has tried Topamax, propranolol and most recently gabapentin and has not been able to tolerate these due to various adverse effects  Past Medical History:   Diagnosis Date   • Benign positional vertigo, right    • Cervicalgia    • DVT (deep venous thrombosis) (HCC)    • Graves disease    • Headache    • Hypertension    • Thyroid disease        Family History:  Family History   Problem Relation Age of Onset   • Hypertension Mother    • Asthma Mother    • Diabetes Mother    • Hyperlipidemia Mother    • Seizures Father    • Migraines Brother    • Seizures Brother    • Cancer Sister 21        brain   • No Known Problems Daughter    • No Known Problems Maternal Grandmother    • No Known Problems Paternal Grandmother    • No Known Problems Maternal Aunt    • No Known Problems Maternal Aunt    • No Known Problems Maternal Aunt    • No Known Problems Paternal Aunt    • No Known Problems Paternal Aunt    • No Known Problems Paternal Aunt    • Breast cancer Neg Hx    • Colon cancer Neg Hx    • Ovarian cancer Neg Hx    • Uterine cancer Neg Hx    • Cervical cancer Neg Hx        Past Surgical History:  Past Surgical History:   Procedure Laterality Date   • APPENDECTOMY     • CARDIAC CATHETERIZATION Left 7/29/2022    Procedure: Cardiac Left Heart Cath;  Surgeon: Mal Mitchell MD;  Location: [address] [place] [address] CATH LAB; Service: Cardiology   • CARDIAC CATHETERIZATION N/A 7/29/2022    Procedure: Cardiac Coronary Angiogram;  Surgeon: Mal Mitchell MD;  Location: [address] [place] [address] CATH LAB;   Service: Cardiology   • CHOLECYSTECTOMY LAPAROSCOPIC N/A 8/1/2021    Procedure: CHOLECYSTECTOMY LAPAROSCOPIC W/ INTRAOP CHOLANGIOGRAM;  Surgeon: Sharla Perez MD;  Location: [facility];  Service: General   • FL INJECTION LEFT HIP (NON ARTHROGRAM)  1/31/2019   • LIPOMA RESECTION     • NEUROMA EXCISION     • STEROID INJECTION HIP Left 05/2018       Social History:   reports that she has been smoking cigarettes  She has a 0 02 pack-year smoking history  She has never used smokeless tobacco  She reports that she does not currently use alcohol  She reports that she does not use drugs  Allergies:  Bee venom, Contrast [iodinated contrast media], Bee pollen, Pollen extract, Prochlorperazine, and Besifloxacin hcl      Current Outpatient Medications:   •  dexlansoprazole (DEXILANT) 60 MG capsule, Take 1 capsule (60 mg total) by mouth daily Must be sent to [facility], Disp: 30 capsule, Rfl: 0  •  EPINEPHrine (EPIPEN) 0 3 mg/0 3 mL SOAJ, Inject 0 3 mL (0 3 mg total) into a muscle once for 1 dose (Patient taking differently: Inject 0 3 mg into a muscle if needed As needed), Disp: 0 6 mL, Rfl: 1  •  multivitamin (THERAGRAN) TABS, Take 1 tablet by mouth daily Artichoke (Patient not taking: Reported on 3/15/2023), Disp: , Rfl:     I have reviewed the past medical, social and family history, current medications, allergies, vitals, review of systems and updated this information as appropriate today     Objective:    Vitals:  Blood pressure 142/98, pulse 61, height 5' 7" (1 702 m), weight 61 2 kg (135 lb), not currently breastfeeding  Physical Exam    Neurological Exam  GENERAL: Well-developed well-nourished woman in no acute distress  HEENT/NECK: Head is atraumatic normocephalic, neck is supple  NEUROLOGIC:  Mental Status: Awake and alert without aphasia  Cranial Nerves: Extraocular movements are full without nystagmus  Face is symmetrical  Coordination: Gait is stable      ROS:    Review of Systems   Constitutional: Negative  Negative for appetite change and fever  HENT: Negative  Negative for hearing loss, tinnitus, trouble swallowing and voice change  Eyes: Negative  Negative for photophobia, pain and visual disturbance  Respiratory: Negative  Negative for shortness of breath  Cardiovascular: Negative  Negative for palpitations  Gastrointestinal: Negative  Negative for nausea and vomiting  Endocrine: Negative  Negative for cold intolerance  Genitourinary: Negative  Negative for dysuria, frequency and urgency  Musculoskeletal: Negative  Negative for gait problem, myalgias and neck pain  Skin: Negative  Negative for rash  Allergic/Immunologic: Negative  Neurological: Positive for headaches  Negative for dizziness, tremors, seizures, syncope, facial asymmetry, speech difficulty, weakness, light-headedness and numbness  Hematological: Negative  Does not bruise/bleed easily  Psychiatric/Behavioral: Negative  Negative for confusion, hallucinations and sleep disturbance

## 2023-03-19 ENCOUNTER — APPOINTMENT (EMERGENCY)
Dept: CT IMAGING | Facility: HOSPITAL | Age: 58
End: 2023-03-19

## 2023-03-19 ENCOUNTER — HOSPITAL ENCOUNTER (EMERGENCY)
Facility: HOSPITAL | Age: 58
Discharge: HOME | End: 2023-03-19
Attending: EMERGENCY MEDICINE | Admitting: EMERGENCY MEDICINE

## 2023-03-19 VITALS
WEIGHT: 136.5 LBS | DIASTOLIC BLOOD PRESSURE: 123 MMHG | TEMPERATURE: 98.4 F | SYSTOLIC BLOOD PRESSURE: 160 MMHG | HEIGHT: 67 IN | RESPIRATION RATE: 16 BRPM | HEART RATE: 66 BPM | BODY MASS INDEX: 21.43 KG/M2 | OXYGEN SATURATION: 100 %

## 2023-03-19 DIAGNOSIS — N39.0 UTI (URINARY TRACT INFECTION): Primary | ICD-10-CM

## 2023-03-19 LAB
ALBUMIN SERPL BCP-MCNC: 5 G/DL (ref 3.5–5)
ALP SERPL-CCNC: 142 U/L (ref 34–104)
ALT SERPL W P-5'-P-CCNC: 12 U/L (ref 7–52)
ANION GAP SERPL CALCULATED.3IONS-SCNC: 8 MMOL/L (ref 4–13)
AST SERPL W P-5'-P-CCNC: 23 U/L (ref 13–39)
BACTERIA UR QL AUTO: ABNORMAL /HPF
BASOPHILS # BLD AUTO: 0.02 THOUSANDS/ÂΜL (ref 0–0.1)
BASOPHILS NFR BLD AUTO: 0 % (ref 0–1)
BILIRUB SERPL-MCNC: 0.77 MG/DL (ref 0.2–1)
BILIRUB UR QL STRIP: NEGATIVE
BUN SERPL-MCNC: 13 MG/DL (ref 5–25)
CALCIUM SERPL-MCNC: 10 MG/DL (ref 8.4–10.2)
CHLORIDE SERPL-SCNC: 104 MMOL/L (ref 96–108)
CLARITY UR: CLEAR
CO2 SERPL-SCNC: 27 MMOL/L (ref 21–32)
COLOR UR: ABNORMAL
CREAT SERPL-MCNC: 0.66 MG/DL (ref 0.6–1.3)
EOSINOPHIL # BLD AUTO: 0.01 THOUSAND/ÂΜL (ref 0–0.61)
EOSINOPHIL NFR BLD AUTO: 0 % (ref 0–6)
ERYTHROCYTE [DISTWIDTH] IN BLOOD BY AUTOMATED COUNT: 12.7 % (ref 11.6–15.1)
GFR SERPL CREATININE-BSD FRML MDRD: 97 ML/MIN/1.73SQ M
GLUCOSE SERPL-MCNC: 100 MG/DL (ref 65–140)
GLUCOSE UR STRIP-MCNC: NEGATIVE MG/DL
HCT VFR BLD AUTO: 45.5 % (ref 34.8–46.1)
HGB BLD-MCNC: 14.8 G/DL (ref 11.5–15.4)
HGB UR QL STRIP.AUTO: ABNORMAL
IMM GRANULOCYTES # BLD AUTO: 0.02 THOUSAND/UL (ref 0–0.2)
IMM GRANULOCYTES NFR BLD AUTO: 0 % (ref 0–2)
KETONES UR STRIP-MCNC: ABNORMAL MG/DL
LEUKOCYTE ESTERASE UR QL STRIP: NEGATIVE
LIPASE SERPL-CCNC: 19 U/L (ref 11–82)
LYMPHOCYTES # BLD AUTO: 2.43 THOUSANDS/ÂΜL (ref 0.6–4.47)
LYMPHOCYTES NFR BLD AUTO: 42 % (ref 14–44)
MCH RBC QN AUTO: 30.3 PG (ref 26.8–34.3)
MCHC RBC AUTO-ENTMCNC: 32.5 G/DL (ref 31.4–37.4)
MCV RBC AUTO: 93 FL (ref 82–98)
MONOCYTES # BLD AUTO: 0.59 THOUSAND/ÂΜL (ref 0.17–1.22)
MONOCYTES NFR BLD AUTO: 10 % (ref 4–12)
NEUTROPHILS # BLD AUTO: 2.79 THOUSANDS/ÂΜL (ref 1.85–7.62)
NEUTS SEG NFR BLD AUTO: 48 % (ref 43–75)
NITRITE UR QL STRIP: NEGATIVE
NON-SQ EPI CELLS URNS QL MICRO: ABNORMAL /HPF
NRBC BLD AUTO-RTO: 0 /100 WBCS
PH UR STRIP.AUTO: 5.5 [PH]
PLATELET # BLD AUTO: 276 THOUSANDS/UL (ref 149–390)
PMV BLD AUTO: 9.8 FL (ref 8.9–12.7)
POTASSIUM SERPL-SCNC: 4 MMOL/L (ref 3.5–5.3)
PROT SERPL-MCNC: 7.9 G/DL (ref 6.4–8.4)
PROT UR STRIP-MCNC: NEGATIVE MG/DL
RBC # BLD AUTO: 4.88 MILLION/UL (ref 3.81–5.12)
RBC #/AREA URNS AUTO: ABNORMAL /HPF
SODIUM SERPL-SCNC: 139 MMOL/L (ref 135–147)
SP GR UR STRIP.AUTO: 1.01 (ref 1–1.03)
UROBILINOGEN UR STRIP-ACNC: <2 MG/DL
WBC # BLD AUTO: 5.86 THOUSAND/UL (ref 4.31–10.16)
WBC #/AREA URNS AUTO: ABNORMAL /HPF

## 2023-03-19 RX ORDER — ONDANSETRON 2 MG/ML
4 INJECTION INTRAMUSCULAR; INTRAVENOUS ONCE
Status: DISCONTINUED | OUTPATIENT
Start: 2023-03-19 | End: 2023-03-19 | Stop reason: HOSPADM

## 2023-03-19 RX ORDER — KETOROLAC TROMETHAMINE 30 MG/ML
15 INJECTION, SOLUTION INTRAMUSCULAR; INTRAVENOUS ONCE
Status: COMPLETED | OUTPATIENT
Start: 2023-03-19 | End: 2023-03-19

## 2023-03-19 RX ORDER — MORPHINE SULFATE 4 MG/ML
4 INJECTION, SOLUTION INTRAMUSCULAR; INTRAVENOUS ONCE
Status: DISCONTINUED | OUTPATIENT
Start: 2023-03-19 | End: 2023-03-19

## 2023-03-19 RX ORDER — CEPHALEXIN 250 MG/1
500 CAPSULE ORAL 4 TIMES DAILY
Qty: 56 CAPSULE | Refills: 0 | Status: SHIPPED | OUTPATIENT
Start: 2023-03-19 | End: 2023-03-26

## 2023-03-19 RX ADMIN — KETOROLAC TROMETHAMINE 15 MG: 30 INJECTION, SOLUTION INTRAMUSCULAR; INTRAVENOUS at 09:49

## 2023-03-19 NOTE — ED PROVIDER NOTES
History  Chief Complaint   Patient presents with   • Abdominal Pain     Patient co RUQ abdomen pain now radiating to flank that started on Monday  + nausea  Patient also reports "I have been spitting up blood and clots "      HPI  48-year-old female presents with abdominal pain  Started 6 days ago, located in right upper quadrant and radiates to flank  Positive nausea  She is having dysuria  Has history of kidney stones  She has also been spitting out that she feels is draining blood from her nose  Prior to Admission Medications   Prescriptions Last Dose Informant Patient Reported? Taking? EPINEPHrine (EPIPEN) 0 3 mg/0 3 mL SOAJ   No No   Sig: Inject 0 3 mL (0 3 mg total) into a muscle once for 1 dose   Patient taking differently: Inject 0 3 mg into a muscle if needed As needed   dexlansoprazole (DEXILANT) 60 MG capsule   No No   Sig: Take 1 capsule (60 mg total) by mouth daily Must be sent to \[facility]\""   fremanezumab-vfrm (Ajovy) 225 MG/1 5ML auto-injector   No No   Sig: One subQ monthly   multivitamin (THERAGRAN) TABS   Yes No   Sig: Take 1 tablet by mouth daily [place]   Patient not taking: Reported on 3/15/2023   rizatriptan (MAXALT) 10 mg tablet   No No   Sig: One p o  At headache onset, may repeat 1 after 2 hours p r n  Maximum 2/24 hours      Facility-Administered Medications: None       Past Medical History:   Diagnosis Date   • Benign positional vertigo, right    • Cervicalgia    • DVT (deep venous thrombosis) (HCC)    • Graves disease    • Headache    • Hypertension    • Thyroid disease        Past Surgical History:   Procedure Laterality Date   • APPENDECTOMY     • CARDIAC CATHETERIZATION Left 7/29/2022    Procedure: Cardiac Left Heart Cath;  Surgeon: Azul Shabazz MD;  Location: [address] [place] [address] CATH LAB; Service: Cardiology   • CARDIAC CATHETERIZATION N/A 7/29/2022    Procedure: Cardiac Coronary Angiogram;  Surgeon: Azul Shabazz MD;  Location: [address] [place] [address] CATH LAB;   Service: Cardiology   • CHOLECYSTECTOMY LAPAROSCOPIC N/A 8/1/2021    Procedure: CHOLECYSTECTOMY LAPAROSCOPIC W/ INTRAOP CHOLANGIOGRAM;  Surgeon: Jayme Euceda MD;  Location: [facility];  Service: General   • FL INJECTION LEFT HIP (NON ARTHROGRAM)  1/31/2019   • LIPOMA RESECTION     • NEUROMA EXCISION     • STEROID INJECTION HIP Left 05/2018       Family History   Problem Relation Age of Onset   • Hypertension Mother    • Asthma Mother    • Diabetes Mother    • Hyperlipidemia Mother    • Seizures Father    • Migraines Brother    • Seizures Brother    • Cancer Sister 21        brain   • No Known Problems Daughter    • No Known Problems Maternal Grandmother    • No Known Problems Paternal Grandmother    • No Known Problems Maternal Aunt    • No Known Problems Maternal Aunt    • No Known Problems Maternal Aunt    • No Known Problems Paternal Aunt    • No Known Problems Paternal Aunt    • No Known Problems Paternal Aunt    • Breast cancer Neg Hx    • Colon cancer Neg Hx    • Ovarian cancer Neg Hx    • Uterine cancer Neg Hx    • Cervical cancer Neg Hx      I have reviewed and agree with the history as documented  E-Cigarette/Vaping   • E-Cigarette Use Never User      E-Cigarette/Vaping Substances   • Nicotine No    • THC No    • CBD No    • Flavoring No    • Other No    • Unknown No      Social History     Tobacco Use   • Smoking status: Light Smoker     Packs/day: 0 01     Years: 2 00     Pack years: 0 02     Types: Cigarettes   • Smokeless tobacco: Never   • Tobacco comments:     last smoked 8/16/19   Vaping Use   • Vaping Use: Never used   Substance Use Topics   • Alcohol use: Not Currently   • Drug use: No       Review of Systems   Constitutional: Negative for chills and fever  HENT: Positive for nosebleeds  Negative for dental problem and ear pain  Eyes: Negative for pain and redness  Respiratory: Negative for cough and shortness of breath  Cardiovascular: Negative for chest pain and palpitations     Gastrointestinal: Positive for abdominal pain and nausea  Endocrine: Negative for polydipsia and polyphagia  Genitourinary: Positive for dysuria and flank pain  Negative for frequency  Musculoskeletal: Negative for arthralgias and joint swelling  Skin: Negative for color change and rash  Neurological: Negative for dizziness and headaches  Psychiatric/Behavioral: Negative for behavioral problems and confusion  All other systems reviewed and are negative  Physical Exam  Physical Exam  Vitals and nursing note reviewed  Constitutional:       General: She is not in acute distress  Appearance: She is well-developed  She is not diaphoretic  HENT:      Head: Atraumatic  Right Ear: External ear normal       Left Ear: External ear normal       Nose: Nose normal       Comments: L anterior septum with punctuate lesion not actively bleeding  Eyes:      Conjunctiva/sclera: Conjunctivae normal       Pupils: Pupils are equal, round, and reactive to light  Neck:      Vascular: No JVD  Cardiovascular:      Rate and Rhythm: Normal rate and regular rhythm  Heart sounds: Normal heart sounds  No murmur heard  Pulmonary:      Effort: Pulmonary effort is normal  No respiratory distress  Breath sounds: Normal breath sounds  No wheezing  Abdominal:      General: Bowel sounds are normal  There is no distension  Palpations: Abdomen is soft  Tenderness: There is abdominal tenderness in the right upper quadrant  Musculoskeletal:         General: Normal range of motion  Cervical back: Normal range of motion and neck supple  Skin:     General: Skin is warm and dry  Capillary Refill: Capillary refill takes less than 2 seconds  Neurological:      Mental Status: She is alert and oriented to person, place, and time  Cranial Nerves: No cranial nerve deficit     Psychiatric:         Behavior: Behavior normal          Vital Signs  ED Triage Vitals [03/19/23 0855]   Temperature Pulse Respirations Blood Pressure SpO2   98 4 °F (36 9 °C) 66 16 (!) 160/123 100 %      Temp Source Heart Rate Source Patient Position - Orthostatic VS BP Location FiO2 (%)   Oral Monitor Sitting Left arm --      Pain Score       6           Vitals:    03/19/23 0855   BP: (!) 160/123   Pulse: 66   Patient Position - Orthostatic VS: Sitting         Visual Acuity      ED Medications  Medications   ondansetron (ZOFRAN) injection 4 mg (4 mg Intravenous Not Given 3/19/23 0952)   ketorolac (TORADOL) injection 15 mg (15 mg Intravenous Given 3/19/23 0949)       Diagnostic Studies  Results Reviewed     Procedure Component Value Units Date/Time    Comprehensive metabolic panel [[id number]]  (Abnormal) Collected: 03/19/23 0944    Lab Status: Final result Specimen: Blood from Arm, Right Updated: 03/19/23 1013     Sodium 139 mmol/L      Potassium 4 0 mmol/L      Chloride 104 mmol/L      CO2 27 mmol/L      ANION GAP 8 mmol/L      BUN 13 mg/dL      Creatinine 0 66 mg/dL      Glucose 100 mg/dL      Calcium 10 0 mg/dL      AST 23 U/L      ALT 12 U/L      Alkaline Phosphatase 142 U/L      Total Protein 7 9 g/dL      Albumin 5 0 g/dL      Total Bilirubin 0 77 mg/dL      eGFR 97 ml/min/1 73sq m     Narrative:      [place] guidelines for Chronic Kidney Disease (CKD):   •  Stage 1 with normal or high GFR (GFR > 90 mL/min/1 73 square meters)  •  Stage 2 Mild CKD (GFR = 60-89 mL/min/1 73 square meters)  •  Stage 3A Moderate CKD (GFR = 45-59 mL/min/1 73 square meters)  •  Stage 3B Moderate CKD (GFR = 30-44 mL/min/1 73 square meters)  •  Stage 4 Severe CKD (GFR = 15-29 mL/min/1 73 square meters)  •  Stage 5 End Stage CKD (GFR <15 mL/min/1 73 square meters)  Note: GFR calculation is accurate only with a steady state creatinine    Lipase [[id number]]  (Normal) Collected: 03/19/23 0944    Lab Status: Final result Specimen: Blood from Arm, Right Updated: 03/19/23 1013     Lipase 19 u/L     Urine Microscopic [[id number]] (Abnormal) Collected: 03/19/23 0944    Lab Status: Final result Specimen: Urine, Clean Catch Updated: 03/19/23 0955     RBC, UA 4-10 /hpf      WBC, UA 1-2 /hpf      Epithelial Cells Occasional /hpf      Bacteria, UA Occasional /hpf     UA w Reflex to Microscopic w Reflex to Culture [[id number]]  (Abnormal) Collected: 03/19/23 0944    Lab Status: Final result Specimen: Urine, Clean Catch Updated: 03/19/23 0952     Color, UA Light Yellow     Clarity, UA Clear     Specific Gravity, UA 1 006     pH, UA 5 5     Leukocytes, UA Negative     Nitrite, UA Negative     Protein, UA Negative mg/dl      Glucose, UA Negative mg/dl      Ketones, UA Trace mg/dl      Urobilinogen, UA <2 0 mg/dl      Bilirubin, UA Negative     Occult Blood, UA Moderate    CBC and differential [[id number]] Collected: 03/19/23 0944    Lab Status: Final result Specimen: Blood from Arm, Right Updated: 03/19/23 0950     WBC 5 86 Thousand/uL      RBC 4 88 Million/uL      Hemoglobin 14 8 g/dL      Hematocrit 45 5 %      MCV 93 fL      MCH 30 3 pg      MCHC 32 5 g/dL      RDW 12 7 %      MPV 9 8 fL      Platelets 258 Thousands/uL      nRBC 0 /100 WBCs      Neutrophils Relative 48 %      Immat GRANS % 0 %      Lymphocytes Relative 42 %      Monocytes Relative 10 %      Eosinophils Relative 0 %      Basophils Relative 0 %      Neutrophils Absolute 2 79 Thousands/µL      Immature Grans Absolute 0 02 Thousand/uL      Lymphocytes Absolute 2 43 Thousands/µL      Monocytes Absolute 0 59 Thousand/µL      Eosinophils Absolute 0 01 Thousand/µL      Basophils Absolute 0 02 Thousands/µL                  CT renal stone study abdomen pelvis wo contrast   Final Result by Frankie Mcgraw MD (03/19 1019)      No significant interval change since prior examination  No evidence for obstructive uropathy  Stable cortical renal calcification the left kidney as well as an area of scarring along the left renal midpole        Colonic diverticulosis without evidence for acute diverticulitis  Workstation performed: [id number]                    Procedures  Procedures         ED Course                               SBIRT 20yo+    Flowsheet Row Most Recent Value   SBIRT (23 yo +)    In order to provide better care to our patients, we are screening all of our patients for alcohol and drug use  Would it be okay to ask you these screening questions? Unable to answer at this time Filed at: 03/19/2023 6777                    MDM   62 yo F presents with RUQ/R flank pain with dysuria  CT scan negative  Urine +bacteria, given symptoms will treat for UTI  She will follow up with PCP for recheck  Disposition  Final diagnoses:   UTI (urinary tract infection)     Time reflects when diagnosis was documented in both MDM as applicable and the Disposition within this note     Time User Action Codes Description Comment    3/19/2023 10:25 AM [place] Score Add [N39 0] UTI (urinary tract infection)       ED Disposition     ED Disposition   Discharge    Condition   Stable    Date/Time   Sun Mar 19, 2023 10:25 AM    Comment   Ame Lynn discharge to home/self care                 Follow-up Information     Follow up With Specialties Details Why Jacobo Salmeron MD Internal Medicine, [facility], Palliative Care Call   [address]  [phone]            Discharge Medication List as of 3/19/2023 10:26 AM      START taking these medications    Details   cephalexin (KEFLEX) 250 mg capsule Take 2 capsules (500 mg total) by mouth 4 (four) times a day for 7 days, Starting Sun 3/19/2023, Until Sun 3/26/2023, Normal         CONTINUE these medications which have NOT CHANGED    Details   dexlansoprazole (DEXILANT) 60 MG capsule Take 1 capsule (60 mg total) by mouth daily Must be sent to [facility], Starting Tue 3/7/2023, Normal      EPINEPHrine (EPIPEN) 0 3 mg/0 3 mL SOAJ Inject 0 3 mL (0 3 mg total) into a muscle once for 1 dose, Starting Thu 6/11/2020, Normal      fremanezumab-vfrm (Ajovy) 225 MG/1 5ML auto-injector One subQ monthly, Normal      multivitamin (THERAGRAN) TABS Take 1 tablet by mouth daily [place], Historical Med      rizatriptan (MAXALT) 10 mg tablet One p o  At headache onset, may repeat 1 after 2 hours p r n  Maximum 2/24 hours, Normal             No discharge procedures on file      PDMP Review       Value Time User    PDMP Reviewed  Yes 8/3/2021  1:14 PM Adriel Castro PA-C          ED Provider  Electronically Signed by           Jessica Painting MD  03/19/23 6138

## 2023-03-22 ENCOUNTER — APPOINTMENT (OUTPATIENT)
Dept: LAB | Facility: CLINIC | Age: 58
End: 2023-03-22

## 2023-03-22 ENCOUNTER — HOSPITAL ENCOUNTER (OUTPATIENT)
Dept: ULTRASOUND IMAGING | Facility: CLINIC | Age: 58
Discharge: HOME | End: 2023-03-22

## 2023-03-22 ENCOUNTER — OFFICE VISIT (OUTPATIENT)
Dept: INTERNAL MEDICINE CLINIC | Facility: CLINIC | Age: 58
End: 2023-03-22

## 2023-03-22 VITALS
OXYGEN SATURATION: 99 % | DIASTOLIC BLOOD PRESSURE: 82 MMHG | SYSTOLIC BLOOD PRESSURE: 124 MMHG | TEMPERATURE: 98 F | HEIGHT: 67 IN | RESPIRATION RATE: 16 BRPM | BODY MASS INDEX: 21 KG/M2 | WEIGHT: 133.8 LBS | HEART RATE: 74 BPM

## 2023-03-22 DIAGNOSIS — T63.444D BEE STING-INDUCED ANAPHYLAXIS, UNDETERMINED INTENT, SUBSEQUENT ENCOUNTER: ICD-10-CM

## 2023-03-22 DIAGNOSIS — R10.11 RUQ ABDOMINAL PAIN: Primary | ICD-10-CM

## 2023-03-22 DIAGNOSIS — R10.11 RUQ ABDOMINAL PAIN: ICD-10-CM

## 2023-03-22 LAB
ALBUMIN SERPL BCP-MCNC: 4.4 G/DL (ref 3.5–5)
ALP SERPL-CCNC: 145 U/L (ref 46–116)
ALT SERPL W P-5'-P-CCNC: 22 U/L (ref 12–78)
AST SERPL W P-5'-P-CCNC: 23 U/L (ref 5–45)
BACTERIA UR QL AUTO: ABNORMAL /HPF
BILIRUB DIRECT SERPL-MCNC: 0.11 MG/DL (ref 0–0.2)
BILIRUB SERPL-MCNC: 0.48 MG/DL (ref 0.2–1)
BILIRUB UR QL STRIP: NEGATIVE
CAOX CRY URNS QL MICRO: ABNORMAL /HPF
CLARITY UR: CLEAR
COLOR UR: ABNORMAL
GLUCOSE UR STRIP-MCNC: NEGATIVE MG/DL
HGB UR QL STRIP.AUTO: ABNORMAL
KETONES UR STRIP-MCNC: NEGATIVE MG/DL
LEUKOCYTE ESTERASE UR QL STRIP: NEGATIVE
MUCOUS THREADS UR QL AUTO: ABNORMAL
NITRITE UR QL STRIP: NEGATIVE
NON-SQ EPI CELLS URNS QL MICRO: ABNORMAL /HPF
PH UR STRIP.AUTO: 6.5 [PH]
PROT SERPL-MCNC: 8 G/DL (ref 6.4–8.4)
PROT UR STRIP-MCNC: ABNORMAL MG/DL
RBC #/AREA URNS AUTO: ABNORMAL /HPF
SP GR UR STRIP.AUTO: 1.02 (ref 1–1.03)
UROBILINOGEN UR STRIP-ACNC: <2 MG/DL
WBC #/AREA URNS AUTO: ABNORMAL /HPF

## 2023-03-22 RX ORDER — EPINEPHRINE 0.3 MG/.3ML
0.3 INJECTION SUBCUTANEOUS AS NEEDED
Qty: 1 EACH | Refills: 2 | Status: SHIPPED | OUTPATIENT
Start: 2023-03-22

## 2023-03-22 NOTE — PROGRESS NOTES
INTERNAL MEDICINE FOLLOW-UP VISIT  [facility] [address] [place] [address]    NAME: Cody Garcia  AGE: 61 y o  SEX: female  : 1965     DATE: 3/22/2023     Assessment and Plan:   1  RUQ abdominal pain  Finish the antibiotics given at the ER  HX of fatty liver- will US this area as well as obtain hepatic panel  Lipase normal as well as CBC, CMP    ? For ductal stone vs obstruction  - US right upper quadrant; Future  - Hepatic function panel; Future    2  Bee sting-induced anaphylaxis, undetermined intent, subsequent encounter  - EPINEPHrine (EPIPEN) 0 3 mg/0 3 mL SOAJ; Inject 0 3 mL (0 3 mg total) into a muscle if needed for anaphylaxis As needed  Dispense: 1 each; Refill: 2        No follow-ups on file  Chief Complaint:     Chief Complaint   Patient presents with   • Follow-up     ER      History of Present Illness:     Patient with RUQ pain for 1-2 weeks  Was eating poor;y but has since changed her diet and does have minimal improvement  The pain is RUQ with radiation to midline of epigastric region  She did go to the ER and was treated for a UTI  Her labs were stable and her CT was negative for kidney stones  She does have bloating and nausea  She does have a hx of a fatty liver in the past  She denies any back pain, diarrhea  She has no gallbladder    The following portions of the patient's history were reviewed and updated as appropriate: allergies, current medications, past family history, past medical history, past social history, past surgical history and problem list      Review of Systems:     Review of Systems   Constitutional: Negative for appetite change, chills, diaphoresis, fatigue, fever and unexpected weight change  HENT: Negative for postnasal drip and sneezing  Eyes: Negative for visual disturbance  Respiratory: Negative for chest tightness and shortness of breath  Cardiovascular: Negative for chest pain, palpitations and leg swelling  Gastrointestinal: Positive for abdominal pain and nausea  Negative for blood in stool  Endocrine: Negative for cold intolerance, heat intolerance, polydipsia, polyphagia and polyuria  Genitourinary: Negative for difficulty urinating, dysuria, frequency and urgency  Musculoskeletal: Negative for arthralgias and myalgias  Skin: Negative for rash and wound  Neurological: Negative for dizziness, weakness, light-headedness and headaches  Hematological: Negative for adenopathy  Psychiatric/Behavioral: Negative for confusion, dysphoric mood and sleep disturbance  The patient is not nervous/anxious  Past Medical History:     Past Medical History:   Diagnosis Date   • Benign positional vertigo, right    • Cervicalgia    • DVT (deep venous thrombosis) (HCC)    • Graves disease    • Headache    • Hypertension    • Thyroid disease         Current Medications:     Current Outpatient Medications:   •  cephalexin (KEFLEX) 250 mg capsule, Take 2 capsules (500 mg total) by mouth 4 (four) times a day for 7 days, Disp: 56 capsule, Rfl: 0  •  dexlansoprazole (DEXILANT) 60 MG capsule, Take 1 capsule (60 mg total) by mouth daily Must be sent to [facility], Disp: 30 capsule, Rfl: 0  •  EPINEPHrine (EPIPEN) 0 3 mg/0 3 mL SOAJ, Inject 0 3 mL (0 3 mg total) into a muscle if needed for anaphylaxis As needed, Disp: 1 each, Rfl: 2  •  rizatriptan (MAXALT) 10 mg tablet, One p o  At headache onset, may repeat 1 after 2 hours p r n  Maximum 2/24 hours, Disp: 9 tablet, Rfl: 5     Allergies:      Allergies   Allergen Reactions   • Bee Venom      Bee sting   • Contrast [Iodinated Contrast Media] Anaphylaxis     Pt states her throat closes- kk rn    • Bee Pollen Dizziness     Specifically bee sting which evokes reaction of swelling of throat, headache and vomitting   • Pollen Extract Other (See Comments)     Throat clearing, difficulty swallowing, ears itchy   • Prochlorperazine Other (See Comments)      Aka (compazine)  Delirious • Besifloxacin Hcl Palpitations     Other reaction(s): Unknown        Physical Exam:     /82 (BP Location: Left arm, Patient Position: Sitting, Cuff Size: Standard)   Pulse 74   Temp 98 °F (36 7 °C) (Tympanic)   Resp 16   Ht 5' 7" (1 702 m)   Wt 60 7 kg (133 lb 12 8 oz)   SpO2 99%   BMI 20 96 kg/m²     Physical Exam  Constitutional:       Appearance: She is well-developed  HENT:      Head: Normocephalic and atraumatic  Eyes:      Pupils: Pupils are equal, round, and reactive to light  Neck:      Thyroid: No thyromegaly  Cardiovascular:      Rate and Rhythm: Normal rate and regular rhythm  Heart sounds: No murmur heard  Pulmonary:      Effort: Pulmonary effort is normal       Breath sounds: Normal breath sounds  Abdominal:      General: Bowel sounds are normal       Palpations: Abdomen is soft  Tenderness: There is abdominal tenderness in the right upper quadrant and epigastric area  There is guarding  Hernia: No hernia is present  Musculoskeletal:         General: Normal range of motion  Cervical back: Normal range of motion and neck supple  Lymphadenopathy:      Cervical: No cervical adenopathy  Skin:     General: Skin is warm and dry  Neurological:      Mental Status: She is alert and oriented to person, place, and time             Data:     Laboratory Results: I have personally reviewed the pertinent laboratory results/reports   Radiology/Other Diagnostic Testing Results: I have personally reviewed diagnostic test results    Brenda Napier, [address] [place] [address]

## 2023-03-24 ENCOUNTER — TELEPHONE (OUTPATIENT)
Dept: INTERNAL MEDICINE CLINIC | Facility: CLINIC | Age: 58
End: 2023-03-24

## 2023-03-24 NOTE — TELEPHONE ENCOUNTER
----- Message from [id number] sent at 3/24/2023 12:45 PM EDT -----  Your labs are stable , I am still waiting for your US to be resulted

## 2023-03-28 ENCOUNTER — APPOINTMENT (OUTPATIENT)
Dept: ULTRASOUND IMAGING | Facility: CLINIC | Age: 58
End: 2023-03-28

## 2023-03-30 ENCOUNTER — TELEPHONE (OUTPATIENT)
Dept: INTERNAL MEDICINE CLINIC | Facility: CLINIC | Age: 58
End: 2023-03-30

## 2023-03-30 NOTE — TELEPHONE ENCOUNTER
----- Message from [id number] sent at 3/30/2023  4:32 PM EDT -----  US was normal, if symptoms continue we can consult GI

## 2023-04-05 ENCOUNTER — ANNUAL EXAM (OUTPATIENT)
Dept: OBGYN CLINIC | Facility: CLINIC | Age: 58
End: 2023-04-05

## 2023-04-05 VITALS
SYSTOLIC BLOOD PRESSURE: 116 MMHG | BODY MASS INDEX: 20.88 KG/M2 | DIASTOLIC BLOOD PRESSURE: 78 MMHG | WEIGHT: 133 LBS | HEIGHT: 67 IN

## 2023-04-05 DIAGNOSIS — Z78.0 ASYMPTOMATIC POSTMENOPAUSAL STATUS: ICD-10-CM

## 2023-04-05 DIAGNOSIS — N64.4 BREAST PAIN: ICD-10-CM

## 2023-04-05 DIAGNOSIS — Z11.3 SCREEN FOR STD (SEXUALLY TRANSMITTED DISEASE): ICD-10-CM

## 2023-04-05 DIAGNOSIS — R31.9 HEMATURIA, UNSPECIFIED TYPE: ICD-10-CM

## 2023-04-05 DIAGNOSIS — Z01.411 ENCOUNTER FOR GYNECOLOGICAL EXAMINATION WITH ABNORMAL FINDING: Primary | ICD-10-CM

## 2023-04-05 DIAGNOSIS — R10.2 PELVIC PAIN: ICD-10-CM

## 2023-04-05 PROBLEM — R79.89 ELEVATED LFTS: Status: RESOLVED | Noted: 2021-08-01 | Resolved: 2023-04-05

## 2023-04-05 PROBLEM — N60.09 BREAST CYST: Status: RESOLVED | Noted: 2017-03-17 | Resolved: 2023-04-05

## 2023-04-05 PROBLEM — H69.90 EUSTACHIAN TUBE DISORDER: Status: ACTIVE | Noted: 2017-02-10

## 2023-04-05 PROBLEM — M85.00: Status: ACTIVE | Noted: 2017-08-31

## 2023-04-05 PROBLEM — S46.009A ROTATOR CUFF INJURY: Status: ACTIVE | Noted: 2017-06-15

## 2023-04-05 PROBLEM — R73.03 PREDIABETES: Status: RESOLVED | Noted: 2022-06-29 | Resolved: 2023-04-05

## 2023-04-05 PROBLEM — R10.11 RIGHT UPPER QUADRANT PAIN: Status: RESOLVED | Noted: 2021-08-01 | Resolved: 2023-04-05

## 2023-04-05 PROBLEM — M54.42 ACUTE LEFT-SIDED LOW BACK PAIN WITH LEFT-SIDED SCIATICA: Status: RESOLVED | Noted: 2021-04-07 | Resolved: 2023-04-05

## 2023-04-05 PROBLEM — Z01.419 ENCOUNTER FOR ANNUAL ROUTINE GYNECOLOGICAL EXAMINATION: Status: RESOLVED | Noted: 2018-04-27 | Resolved: 2023-04-05

## 2023-04-05 PROBLEM — N95.2 POST-MENOPAUSAL ATROPHIC VAGINITIS: Status: RESOLVED | Noted: 2020-07-22 | Resolved: 2023-04-05

## 2023-04-05 NOTE — PROGRESS NOTES
"Assessment/Plan:    No problem-specific Assessment & Plan notes found for this encounter  Diagnoses and all orders for this visit:    Encounter for gynecological examination with abnormal finding    Asymptomatic postmenopausal status  -     Liquid-based pap, screening    Hematuria, unspecified type  -     Ambulatory referral to Urology; Future    Breast pain  -     US breast left limited (diagnostic); Future  -     Mammo diagnostic bilateral w 3d & cad; Future    Pelvic pain  -     US pelvis complete w transvaginal; Future    Screen for STD (sexually transmitted disease)  -     Chlamydia/GC amplified DNA by PCR        Call as needed, encouraged calcium/vit D in her diet, all questions answered  Advised to monitor her diet for foods that could cause mastalgia  Consider Breast Specialist Referral if symptoms continue  Subjective:      Patient ID: Deja Tucker is a 61 y o  female  Pleasant 61 y o  postmenopausal female here for annual exam  She denies postmenopausal bleeding but passed a \"mucous plug\" recently and was seen in the emergency room where she was diagnosed with a possible kidney stone  Patient reported associated right upper quadrant pain and was advised to see GI for her history of fatty liver  She did have occult hematuria at that visit and would like to see Urology for this  She denies a history of abnormal pap smears, last Pap was negative/negative in 2018  A Pap with cotest was done today  Denies vaginal issues  She also reports mid pelvic pressure/pain  She is sexually active with 1 partner but it has been an off-and-on relationship for the past 10 years  She agrees to STD testing today  She denies any other postmenopausal issues  Mammogram 06/23/2022 normal  Colonoscopy 08/17/2019, due in 10  Gynecologic Exam  Associated symptoms include abdominal pain and hematuria  Pertinent negatives include no chills, constipation, diarrhea, dysuria or fever         The following " portions of the patient's history were reviewed and updated as appropriate:   She  has a past medical history of Benign positional vertigo, right, Cervicalgia, DVT (deep venous thrombosis) ([facility] [address] ), Graves disease, Headache, Hypertension, and Thyroid disease  She   Patient Active Problem List    Diagnosis Date Noted   • Bee sting allergy 06/29/2022   • Migraine without aura and without status migrainosus, not intractable 06/29/2022   • Mitral regurgitation 08/01/2021   • [facility]) [facility] 58/14/8333   • Biliary colic 71/31/2331   • Left ventricular hypertrophy 01/24/2019   • Secondary hypertension 01/24/2019   • Sclerosing bone dysplasia 08/31/2017   • Rotator cuff injury 06/15/2017   • Eustachian tube disorder 02/10/2017   • Neck pain 07/07/2016   • Positional vertigo, right 07/07/2016   • Hiatal hernia 01/20/2016     She  has a past surgical history that includes Appendectomy; NEUROMA EXCISION; Lipoma resection; Steroid injection hip (Left, 05/2018); FL injection left hip (non arthrogram) (1/31/2019); CHOLECYSTECTOMY LAPAROSCOPIC (N/A, 8/1/2021); Cardiac catheterization (Left, 7/29/2022); and Cardiac catheterization (N/A, 7/29/2022)  Her family history includes Asthma in her mother; Cancer (age of onset: 21) in her sister; Diabetes in her mother; Hyperlipidemia in her mother; Hypertension in her mother; Migraines in her brother; No Known Problems in her daughter, maternal aunt, maternal aunt, maternal aunt, maternal grandmother, paternal aunt, paternal aunt, paternal aunt, and paternal grandmother; Seizures in her brother and father  She  reports that she has been smoking cigarettes  She has a 0 02 pack-year smoking history  She has never used smokeless tobacco  She reports that she does not currently use alcohol  She reports that she does not use drugs    Current Outpatient Medications   Medication Sig Dispense Refill   • dexlansoprazole (DEXILANT) 60 MG capsule Take 1 capsule (60 mg total) by "mouth daily Must be sent to [facility] 30 capsule 0   • EPINEPHrine (EPIPEN) 0 3 mg/0 3 mL SOAJ Inject 0 3 mL (0 3 mg total) into a muscle if needed for anaphylaxis As needed 1 each 2   • rizatriptan (MAXALT) 10 mg tablet One p o  At headache onset, may repeat 1 after 2 hours p r n  Maximum 2/24 hours 9 tablet 5     No current facility-administered medications for this visit  She is allergic to bee venom, contrast [iodinated contrast media], bee pollen, pollen extract, prochlorperazine, and besifloxacin hcl  OB History        1    Para   1    Term   1            AB        Living   1       SAB        IAB        Ectopic        Multiple        Live Births   2               Has a 27 yo daughter, 1 grandson due any day now  Pt works for [employer] now, Dr Kartik Whiteside    Review of Systems   Constitutional: Negative for chills, fatigue, fever and unexpected weight change  Respiratory: Negative for shortness of breath  Gastrointestinal: Positive for abdominal pain  Negative for anal bleeding, blood in stool, constipation and diarrhea  Genitourinary: Positive for hematuria  Negative for difficulty urinating and dysuria  OCCULT         Objective:      /78 (BP Location: Left arm, Patient Position: Sitting, Cuff Size: Standard)   Ht 5' 7\" (1 702 m)   Wt 60 3 kg (133 lb)   BMI 20 83 kg/m²          Physical Exam  Constitutional:       General: She is not in acute distress  Appearance: She is well-developed  HENT:      Head: Normocephalic  Pulmonary:      Effort: Pulmonary effort is normal    Chest:   Breasts:     Breasts are symmetrical       Right: No inverted nipple, mass, nipple discharge, skin change or tenderness  Left: Tenderness present  No inverted nipple, mass, nipple discharge or skin change  Comments: + tenderness to delineated areas  Abdominal:      Palpations: Abdomen is soft  Genitourinary:     Exam position: Supine        Labia:         Right: No rash, " tenderness, lesion or injury  Left: No rash, tenderness, lesion or injury  Vagina: No signs of injury and foreign body  No vaginal discharge, erythema or tenderness  Cervix: No cervical motion tenderness, discharge or friability  Uterus: Not deviated, not enlarged, not fixed and not tender  Adnexa:         Right: No mass, tenderness or fullness  Left: No mass, tenderness or fullness  Comments: STENOTIC OS  Musculoskeletal:      Cervical back: Normal range of motion and neck supple  Lymphadenopathy:      Lower Body: No right inguinal adenopathy  No left inguinal adenopathy

## 2023-04-05 NOTE — PATIENT INSTRUCTIONS
Pelvic Pain in Women   AMBULATORY CARE:   Pelvic pain  may happen on one or both sides of your pelvis  Pelvic pain may occur with certain body positions or activities, such as when you have sex or a bowel movement  It may worsen during your monthly period or after you sit or stand for a long time  Chronic pelvic pain is pain that continues for longer than 6 months  Call 911 for any of the following: You have severe chest pain and sudden trouble breathing  Seek care immediately if:   You have heavy or unusual vaginal bleeding, and you feel lightheaded or faint  Contact your healthcare provider if:   You have pelvic pain that does not go away after you take pain medicine  You develop new symptoms or your symptoms are worse than before  You have questions or concerns about your condition or care  Medicines: You may need any of the following:  Pain medicine  may be given in pills or creams to relieve your pain  Hormones  may be given if your pain gets worse with your menstrual cycle  Antibiotics  may be given if your pain is caused by infection  Take your medicine as directed  Contact your healthcare provider if you think your medicine is not helping or if you have side effects  Tell your provider if you are allergic to any medicine  Keep a list of the medicines, vitamins, and herbs you take  Include the amounts, and when and why you take them  Bring the list or the pill bottles to follow-up visits  Carry your medicine list with you in case of an emergency  Self-care:   Keep a pain diary  Write down when your pain happens, how severe it is, and any other symptoms you have with your pain  A diary will help you keep track of pain cycles  It may also help your healthcare provider find out what is causing your pain  Learn ways to relax  Deep breathing, meditation, and relaxation techniques can help decrease your pain  When you are tense, your pain may increase      Change the foods you eat if you have irritable bowel syndrome  Ask your healthcare provider about the best foods for you  Follow up with your doctor as directed:  Write down your questions so you remember to ask them during your visits  © Copyright [employer] 2022 Information is for End User's use only and may not be sold, redistributed or otherwise used for commercial purposes  The above information is an  only  It is not intended as medical advice for individual conditions or treatments  Talk to your doctor, nurse or pharmacist before following any medical regimen to see if it is safe and effective for you  Hematuria   AMBULATORY CARE:   Hematuria  is blood in your urine  Your urine may be bright red to dark brown  Signs and symptoms:   Fever    Nausea and vomiting    Pain or bruising on your lower back or sides    Pain or burning when you urinate    More urination than usual, or the need to urinate right away    Blood clots in the toilet after you urinate    Seek care immediately if:   You have blood in your urine after a new injury, such as a fall  You have severe back or side pain that does not go away with treatment  Call your doctor if:   You are urinating very small amounts or not at all  You feel like you cannot empty your bladder  You have a fever that gets worse or does not go away with treatment  You cannot keep liquids or medicines down  Your urine gets darker, even after you drink extra liquids  You have questions or concerns about your condition, treatment, or care  Treatment for hematuria  depends on the cause of your hematuria  Treatment may not be needed  You may need medicines to treat an infection  Ask your healthcare provider for more information about the treatment you may need  Drink liquids as directed: You may need to drink extra liquids to help flush the blood from your body through your urine  Water is the best liquid to drink   Ask how much liquid to drink each day and which liquids are best for you  Follow up with your doctor as directed:  Write down your questions so you remember to ask them during your visits  © Copyright Sanjiv Casarez 2022 Information is for End User's use only and may not be sold, redistributed or otherwise used for commercial purposes  The above information is an  only  It is not intended as medical advice for individual conditions or treatments  Talk to your doctor, nurse or pharmacist before following any medical regimen to see if it is safe and effective for you

## 2023-05-02 ENCOUNTER — APPOINTMENT (OUTPATIENT)
Dept: LAB | Facility: CLINIC | Age: 58
End: 2023-05-02

## 2023-05-02 DIAGNOSIS — Z00.8 HEALTH EXAMINATION IN POPULATION SURVEY: ICD-10-CM

## 2023-05-03 LAB
CHOLEST SERPL-MCNC: 199 MG/DL
HDLC SERPL-MCNC: 77 MG/DL
LDLC SERPL CALC-MCNC: 101 MG/DL (ref 0–100)
NONHDLC SERPL-MCNC: 122 MG/DL
TRIGL SERPL-MCNC: 104 MG/DL

## 2023-05-05 LAB
EST. AVERAGE GLUCOSE BLD GHB EST-MCNC: 123 MG/DL
HBA1C MFR BLD: 5.9 %

## 2023-06-28 DIAGNOSIS — K92.0 HEMATEMESIS WITH NAUSEA: ICD-10-CM

## 2023-06-28 DIAGNOSIS — R11.14 BILIOUS VOMITING WITH NAUSEA: Primary | ICD-10-CM

## 2023-06-28 RX ORDER — DICYCLOMINE HCL 20 MG
20 TABLET ORAL EVERY 6 HOURS PRN
Qty: 60 TABLET | Refills: 3 | Status: SHIPPED | OUTPATIENT
Start: 2023-06-28 | End: 2023-07-05

## 2023-06-29 ENCOUNTER — APPOINTMENT (OUTPATIENT)
Dept: LAB | Facility: CLINIC | Age: 58
End: 2023-06-29
Payer: COMMERCIAL

## 2023-06-29 DIAGNOSIS — K92.0 HEMATEMESIS WITH NAUSEA: ICD-10-CM

## 2023-06-29 DIAGNOSIS — R11.14 BILIOUS VOMITING WITH NAUSEA: ICD-10-CM

## 2023-06-29 LAB
ALBUMIN SERPL BCP-MCNC: 3.9 G/DL (ref 3.5–5)
ALP SERPL-CCNC: 147 U/L (ref 46–116)
ALT SERPL W P-5'-P-CCNC: 20 U/L (ref 12–78)
AST SERPL W P-5'-P-CCNC: 22 U/L (ref 5–45)
BACTERIA UR QL AUTO: ABNORMAL /HPF
BASOPHILS # BLD AUTO: 0.03 THOUSANDS/ÂΜL (ref 0–0.1)
BASOPHILS NFR BLD AUTO: 1 % (ref 0–1)
BILIRUB DIRECT SERPL-MCNC: 0.2 MG/DL (ref 0–0.2)
BILIRUB SERPL-MCNC: 0.69 MG/DL (ref 0.2–1)
BILIRUB UR QL STRIP: NEGATIVE
CLARITY UR: CLEAR
COLOR UR: ABNORMAL
EOSINOPHIL # BLD AUTO: 0.1 THOUSAND/ÂΜL (ref 0–0.61)
EOSINOPHIL NFR BLD AUTO: 2 % (ref 0–6)
ERYTHROCYTE [DISTWIDTH] IN BLOOD BY AUTOMATED COUNT: 13.2 % (ref 11.6–15.1)
GLUCOSE UR STRIP-MCNC: NEGATIVE MG/DL
HCT VFR BLD AUTO: 44.6 % (ref 34.8–46.1)
HGB BLD-MCNC: 13.9 G/DL (ref 11.5–15.4)
HGB UR QL STRIP.AUTO: ABNORMAL
IMM GRANULOCYTES # BLD AUTO: 0.02 THOUSAND/UL (ref 0–0.2)
IMM GRANULOCYTES NFR BLD AUTO: 0 % (ref 0–2)
KETONES UR STRIP-MCNC: NEGATIVE MG/DL
LEUKOCYTE ESTERASE UR QL STRIP: ABNORMAL
LYMPHOCYTES # BLD AUTO: 1.94 THOUSANDS/ÂΜL (ref 0.6–4.47)
LYMPHOCYTES NFR BLD AUTO: 36 % (ref 14–44)
MCH RBC QN AUTO: 30.3 PG (ref 26.8–34.3)
MCHC RBC AUTO-ENTMCNC: 31.2 G/DL (ref 31.4–37.4)
MCV RBC AUTO: 97 FL (ref 82–98)
MONOCYTES # BLD AUTO: 0.58 THOUSAND/ÂΜL (ref 0.17–1.22)
MONOCYTES NFR BLD AUTO: 11 % (ref 4–12)
MUCOUS THREADS UR QL AUTO: ABNORMAL
NEUTROPHILS # BLD AUTO: 2.77 THOUSANDS/ÂΜL (ref 1.85–7.62)
NEUTS SEG NFR BLD AUTO: 50 % (ref 43–75)
NITRITE UR QL STRIP: NEGATIVE
NON-SQ EPI CELLS URNS QL MICRO: ABNORMAL /HPF
NRBC BLD AUTO-RTO: 0 /100 WBCS
PH UR STRIP.AUTO: 6.5 [PH]
PLATELET # BLD AUTO: 368 THOUSANDS/UL (ref 149–390)
PMV BLD AUTO: 10.6 FL (ref 8.9–12.7)
PROT SERPL-MCNC: 7.6 G/DL (ref 6.4–8.4)
PROT UR STRIP-MCNC: NEGATIVE MG/DL
RBC # BLD AUTO: 4.58 MILLION/UL (ref 3.81–5.12)
RBC #/AREA URNS AUTO: ABNORMAL /HPF
SP GR UR STRIP.AUTO: 1.01 (ref 1–1.03)
UROBILINOGEN UR STRIP-ACNC: <2 MG/DL
WBC # BLD AUTO: 5.44 THOUSAND/UL (ref 4.31–10.16)
WBC #/AREA URNS AUTO: ABNORMAL /HPF

## 2023-06-29 PROCEDURE — 80076 HEPATIC FUNCTION PANEL: CPT

## 2023-06-29 PROCEDURE — 36415 COLL VENOUS BLD VENIPUNCTURE: CPT

## 2023-06-29 PROCEDURE — 81001 URINALYSIS AUTO W/SCOPE: CPT | Performed by: PHYSICIAN ASSISTANT

## 2023-06-29 PROCEDURE — 85025 COMPLETE CBC W/AUTO DIFF WBC: CPT

## 2023-06-30 ENCOUNTER — HOSPITAL ENCOUNTER (EMERGENCY)
Facility: HOSPITAL | Age: 58
Discharge: HOME | End: 2023-06-30
Attending: EMERGENCY MEDICINE
Payer: COMMERCIAL

## 2023-06-30 ENCOUNTER — APPOINTMENT (EMERGENCY)
Dept: RADIOLOGY | Facility: HOSPITAL | Age: 58
End: 2023-06-30
Payer: COMMERCIAL

## 2023-06-30 ENCOUNTER — TELEPHONE (OUTPATIENT)
Dept: UROLOGY | Facility: CLINIC | Age: 58
End: 2023-06-30

## 2023-06-30 ENCOUNTER — APPOINTMENT (EMERGENCY)
Dept: CT IMAGING | Facility: HOSPITAL | Age: 58
End: 2023-06-30
Payer: COMMERCIAL

## 2023-06-30 VITALS
HEIGHT: 67 IN | RESPIRATION RATE: 15 BRPM | BODY MASS INDEX: 21.03 KG/M2 | DIASTOLIC BLOOD PRESSURE: 86 MMHG | SYSTOLIC BLOOD PRESSURE: 150 MMHG | TEMPERATURE: 98 F | WEIGHT: 134 LBS | HEART RATE: 62 BPM | OXYGEN SATURATION: 100 %

## 2023-06-30 DIAGNOSIS — R31.29 MICROSCOPIC HEMATURIA: ICD-10-CM

## 2023-06-30 DIAGNOSIS — R10.11 RIGHT UPPER QUADRANT ABDOMINAL PAIN: Primary | ICD-10-CM

## 2023-06-30 LAB
ALBUMIN SERPL BCP-MCNC: 4.7 G/DL (ref 3.5–5)
ALP SERPL-CCNC: 125 U/L (ref 34–104)
ALT SERPL W P-5'-P-CCNC: 12 U/L (ref 7–52)
ANION GAP SERPL CALCULATED.3IONS-SCNC: 6 MMOL/L
AST SERPL W P-5'-P-CCNC: 19 U/L (ref 13–39)
BACTERIA UR QL AUTO: ABNORMAL /HPF
BASOPHILS # BLD AUTO: 0.02 THOUSANDS/ÂΜL (ref 0–0.1)
BASOPHILS NFR BLD AUTO: 0 % (ref 0–1)
BILIRUB SERPL-MCNC: 0.76 MG/DL (ref 0.2–1)
BILIRUB UR QL STRIP: NEGATIVE
BUN SERPL-MCNC: 12 MG/DL (ref 5–25)
CALCIUM SERPL-MCNC: 10.1 MG/DL (ref 8.4–10.2)
CHLORIDE SERPL-SCNC: 105 MMOL/L (ref 96–108)
CLARITY UR: CLEAR
CO2 SERPL-SCNC: 29 MMOL/L (ref 21–32)
COLOR UR: COLORLESS
CREAT SERPL-MCNC: 0.73 MG/DL (ref 0.6–1.3)
EOSINOPHIL # BLD AUTO: 0.1 THOUSAND/ÂΜL (ref 0–0.61)
EOSINOPHIL NFR BLD AUTO: 2 % (ref 0–6)
ERYTHROCYTE [DISTWIDTH] IN BLOOD BY AUTOMATED COUNT: 13 % (ref 11.6–15.1)
GFR SERPL CREATININE-BSD FRML MDRD: 91 ML/MIN/1.73SQ M
GLUCOSE SERPL-MCNC: 98 MG/DL (ref 65–140)
GLUCOSE UR STRIP-MCNC: NEGATIVE MG/DL
HCT VFR BLD AUTO: 42.9 % (ref 34.8–46.1)
HGB BLD-MCNC: 13.8 G/DL (ref 11.5–15.4)
HGB UR QL STRIP.AUTO: ABNORMAL
IMM GRANULOCYTES # BLD AUTO: 0.01 THOUSAND/UL (ref 0–0.2)
IMM GRANULOCYTES NFR BLD AUTO: 0 % (ref 0–2)
KETONES UR STRIP-MCNC: NEGATIVE MG/DL
LEUKOCYTE ESTERASE UR QL STRIP: NEGATIVE
LIPASE SERPL-CCNC: 19 U/L (ref 11–82)
LYMPHOCYTES # BLD AUTO: 2.52 THOUSANDS/ÂΜL (ref 0.6–4.47)
LYMPHOCYTES NFR BLD AUTO: 40 % (ref 14–44)
MCH RBC QN AUTO: 30.2 PG (ref 26.8–34.3)
MCHC RBC AUTO-ENTMCNC: 32.2 G/DL (ref 31.4–37.4)
MCV RBC AUTO: 94 FL (ref 82–98)
MONOCYTES # BLD AUTO: 0.54 THOUSAND/ÂΜL (ref 0.17–1.22)
MONOCYTES NFR BLD AUTO: 9 % (ref 4–12)
NEUTROPHILS # BLD AUTO: 3.19 THOUSANDS/ÂΜL (ref 1.85–7.62)
NEUTS SEG NFR BLD AUTO: 49 % (ref 43–75)
NITRITE UR QL STRIP: NEGATIVE
NON-SQ EPI CELLS URNS QL MICRO: ABNORMAL /HPF
NRBC BLD AUTO-RTO: 0 /100 WBCS
PH UR STRIP.AUTO: 7 [PH]
PLATELET # BLD AUTO: 260 THOUSANDS/UL (ref 149–390)
PMV BLD AUTO: 9.6 FL (ref 8.9–12.7)
POTASSIUM SERPL-SCNC: 3.9 MMOL/L (ref 3.5–5.3)
PROT SERPL-MCNC: 7.8 G/DL (ref 6.4–8.4)
PROT UR STRIP-MCNC: NEGATIVE MG/DL
RBC # BLD AUTO: 4.57 MILLION/UL (ref 3.81–5.12)
RBC #/AREA URNS AUTO: ABNORMAL /HPF
SODIUM SERPL-SCNC: 140 MMOL/L (ref 135–147)
SP GR UR STRIP.AUTO: 1.01 (ref 1–1.03)
UROBILINOGEN UR STRIP-ACNC: <2 MG/DL
WBC # BLD AUTO: 6.38 THOUSAND/UL (ref 4.31–10.16)
WBC #/AREA URNS AUTO: ABNORMAL /HPF

## 2023-06-30 PROCEDURE — G1004 CDSM NDSC: HCPCS

## 2023-06-30 PROCEDURE — 74176 CT ABD & PELVIS W/O CONTRAST: CPT

## 2023-06-30 PROCEDURE — 99285 EMERGENCY DEPT VISIT HI MDM: CPT

## 2023-06-30 PROCEDURE — 96375 TX/PRO/DX INJ NEW DRUG ADDON: CPT

## 2023-06-30 PROCEDURE — 96361 HYDRATE IV INFUSION ADD-ON: CPT

## 2023-06-30 PROCEDURE — 80053 COMPREHEN METABOLIC PANEL: CPT | Performed by: PHYSICIAN ASSISTANT

## 2023-06-30 PROCEDURE — 83690 ASSAY OF LIPASE: CPT | Performed by: PHYSICIAN ASSISTANT

## 2023-06-30 PROCEDURE — C9113 INJ PANTOPRAZOLE SODIUM, VIA: HCPCS | Performed by: PHYSICIAN ASSISTANT

## 2023-06-30 PROCEDURE — 36415 COLL VENOUS BLD VENIPUNCTURE: CPT | Performed by: PHYSICIAN ASSISTANT

## 2023-06-30 PROCEDURE — 96374 THER/PROPH/DIAG INJ IV PUSH: CPT

## 2023-06-30 PROCEDURE — 85025 COMPLETE CBC W/AUTO DIFF WBC: CPT | Performed by: PHYSICIAN ASSISTANT

## 2023-06-30 PROCEDURE — 71046 X-RAY EXAM CHEST 2 VIEWS: CPT

## 2023-06-30 PROCEDURE — 81001 URINALYSIS AUTO W/SCOPE: CPT | Performed by: PHYSICIAN ASSISTANT

## 2023-06-30 PROCEDURE — 99284 EMERGENCY DEPT VISIT MOD MDM: CPT | Performed by: PHYSICIAN ASSISTANT

## 2023-06-30 RX ORDER — PANTOPRAZOLE SODIUM 40 MG/10ML
40 INJECTION, POWDER, LYOPHILIZED, FOR SOLUTION INTRAVENOUS ONCE
Status: COMPLETED | OUTPATIENT
Start: 2023-06-30 | End: 2023-06-30

## 2023-06-30 RX ORDER — SUCRALFATE 1 G/1
1 TABLET ORAL 4 TIMES DAILY
Qty: 40 TABLET | Refills: 0 | Status: SHIPPED | OUTPATIENT
Start: 2023-06-30

## 2023-06-30 RX ORDER — KETOROLAC TROMETHAMINE 30 MG/ML
15 INJECTION, SOLUTION INTRAMUSCULAR; INTRAVENOUS ONCE
Status: COMPLETED | OUTPATIENT
Start: 2023-06-30 | End: 2023-06-30

## 2023-06-30 RX ORDER — MAGNESIUM HYDROXIDE/ALUMINUM HYDROXICE/SIMETHICONE 120; 1200; 1200 MG/30ML; MG/30ML; MG/30ML
15 SUSPENSION ORAL ONCE
Status: COMPLETED | OUTPATIENT
Start: 2023-06-30 | End: 2023-06-30

## 2023-06-30 RX ORDER — PANTOPRAZOLE SODIUM 40 MG/1
40 TABLET, DELAYED RELEASE ORAL DAILY
Qty: 30 TABLET | Refills: 0 | Status: SHIPPED | OUTPATIENT
Start: 2023-06-30

## 2023-06-30 RX ORDER — FAMOTIDINE 10 MG/ML
20 INJECTION, SOLUTION INTRAVENOUS ONCE
Status: COMPLETED | OUTPATIENT
Start: 2023-06-30 | End: 2023-06-30

## 2023-06-30 RX ORDER — SUCRALFATE 1 G/1
1 TABLET ORAL ONCE
Status: COMPLETED | OUTPATIENT
Start: 2023-06-30 | End: 2023-06-30

## 2023-06-30 RX ADMIN — FAMOTIDINE 20 MG: 10 INJECTION, SOLUTION INTRAVENOUS at 10:52

## 2023-06-30 RX ADMIN — PANTOPRAZOLE SODIUM 40 MG: 40 INJECTION, POWDER, FOR SOLUTION INTRAVENOUS at 10:52

## 2023-06-30 RX ADMIN — ALUMINUM HYDROXIDE, MAGNESIUM HYDROXIDE, AND DIMETHICONE 15 ML: 200; 20; 200 SUSPENSION ORAL at 10:52

## 2023-06-30 RX ADMIN — SODIUM CHLORIDE 1000 ML: 0.9 INJECTION, SOLUTION INTRAVENOUS at 08:52

## 2023-06-30 RX ADMIN — SUCRALFATE 1 G: 1 TABLET ORAL at 10:52

## 2023-06-30 RX ADMIN — KETOROLAC TROMETHAMINE 15 MG: 30 INJECTION, SOLUTION INTRAMUSCULAR at 08:54

## 2023-06-30 NOTE — DISCHARGE INSTRUCTIONS
Take pantoprazole and Carafate as prescribed. Please follow-up with gastroenterology and urology. Return to the ER with any new or worsening symptoms.

## 2023-06-30 NOTE — TELEPHONE ENCOUNTER
Patient called to set up a NP appt with Dr Amelia Nickerson at the [facility]  Pt is currently admitted for microscopic hematuria and right upper quadrant abdominal pain  Patient can be reached at [phone]

## 2023-06-30 NOTE — ED PROVIDER NOTES
History  Chief Complaint   Patient presents with   • Abdominal Pain     C/o RUQ abd pain and nausea. 60yo female with a history of a previous cholecystectomy presenting for evaluation of abdominal pain. She has been having right upper quadrant intermittently for the past few days. Pain acutely worsened this morning. Pain is stabbing and severe. She is also having vomiting and diarrhea after eating. She empirically started herself on Keflex for a presumed UTI and has taken 4 doses thus far. She denies any fevers, chest pain, shortness of breath. She went for outpatient testing yesterday including a urinalysis which showed 1-2 WBC and no bacteria. She has an appointment scheduled next week with GI. She was seen in March 2023 in the ED and by her PCP for similar symptoms and no clear cause was identified. History provided by:  Medical records and patient   used: No        Prior to Admission Medications   Prescriptions Last Dose Informant Patient Reported? Taking? EPINEPHrine (EPIPEN) 0.3 mg/0.3 mL SOAJ   No No   Sig: Inject 0.3 mL (0.3 mg total) into a muscle if needed for anaphylaxis As needed   dexlansoprazole (DEXILANT) 60 MG capsule   No No   Sig: Take 1 capsule (60 mg total) by mouth daily Must be [facility] to [facility]   dicyclomine (BENTYL) 20 mg tablet   No No   Sig: Take 1 tablet (20 mg total) by mouth every 6 (six) hours as needed (abdominal pain)   rizatriptan (MAXALT) 10 mg tablet   No No   Sig: One p.o. At headache onset, may repeat 1 after 2 hours p.r.n.  Maximum 2/24 hours      Facility-Administered Medications: None       Past Medical History:   Diagnosis Date   • Benign positional vertigo, right    • Cervicalgia    • DVT (deep venous thrombosis) (HCC)    • Graves disease    • Headache    • Hypertension    • Thyroid disease        Past Surgical History:   Procedure Laterality Date   • APPENDECTOMY     • CARDIAC CATHETERIZATION Left 7/29/2022    Procedure: Cardiac Left Heart Cath;  Surgeon: Payton Gonzalez MD;  Location: [address] C[address] LAB; Service: Cardiology   • CARDIAC CATHETERIZATION N/A 7/29/2022    Procedure: Cardiac Coronary Angiogram;  Surgeon: Payton Gonzalez MD;  Location: [address] CATH LAB; Service: Cardiology   • CHOLECYSTECTOMY LAPAROSCOPIC N/A 8/1/2021    Procedure: CHOLECYSTECTOMY LAPAROSCOPIC W/ INTRAOP CHOLANGIOGRAM;  Surgeon: Jana Diaz MD;  Location: [facility];  Service: General   • FL INJECTION LEFT HIP (NON ARTHROGRAM)  1/31/2019   • LIPOMA RESECTION     • NEUROMA EXCISION     • STEROID INJECTION HIP Left 05/2018       Family History   Problem Relation Age of Onset   • Hypertension Mother    • Asthma Mother    • Diabetes Mother    • Hyperlipidemia Mother    • Seizures Father    • Migraines Brother    • Seizures Brother    • Cancer Sister 21        brain   • No Known Problems Daughter    • No Known Problems Maternal Grandmother    • No Known Problems Paternal Grandmother    • No Known Problems Maternal Aunt    • No Known Problems Maternal Aunt    • No Known Problems Maternal Aunt    • No Known Problems Paternal Aunt    • No Known Problems Paternal Aunt    • No Known Problems Paternal Aunt    • Breast cancer Neg Hx    • Colon cancer Neg Hx    • Ovarian cancer Neg Hx    • Uterine cancer Neg Hx    • Cervical cancer Neg Hx      I have reviewed and agree with the history as documented.     E-Cigarette/Vaping   • E-Cigarette Use Never User      E-Cigarette/Vaping Substances   • Nicotine No    • THC No    • CBD No    • Flavoring No    • Other No    • Unknown No      Social History     Tobacco Use   • Smoking status: Light Smoker     Packs/day: 0.01     Years: 2.00     Total pack years: 0.02     Types: Cigarettes   • Smokeless tobacco: Never   • Tobacco comments:     last smoked 8/16/19   Vaping Use   • Vaping Use: Never used   Substance Use Topics   • Alcohol use: Not Currently   • Drug use: No       Review of Systems   Constitutional: Negative for chills and fever.   HENT: Negative for drooling and voice change. Eyes: Negative for discharge and redness. Respiratory: Negative for shortness of breath and stridor. Cardiovascular: Negative for chest pain and leg swelling. Gastrointestinal: Positive for abdominal pain, diarrhea, nausea and vomiting. Genitourinary: Positive for frequency. Negative for dysuria. Musculoskeletal: Negative for neck pain and neck stiffness. Skin: Negative for color change and rash. Neurological: Negative for seizures and syncope. Psychiatric/Behavioral: Negative for confusion. The patient is not nervous/anxious. All other systems reviewed and are negative. Physical Exam  Physical Exam  Vitals and nursing note reviewed. Constitutional:       General: She is not in acute distress. Appearance: She is well-developed. She is not diaphoretic. HENT:      Head: Normocephalic and atraumatic. Right Ear: External ear normal.      Left Ear: External ear normal.      Nose: Nose normal.   Eyes:      General: No scleral icterus. Right eye: No discharge. Left eye: No discharge. Conjunctiva/sclera: Conjunctivae normal.   Cardiovascular:      Rate and Rhythm: Normal rate and regular rhythm. Heart sounds: Normal heart sounds. No murmur heard. Pulmonary:      Effort: Pulmonary effort is normal. No respiratory distress. Breath sounds: Normal breath sounds. No stridor. No wheezing or rales. Abdominal:      General: Bowel sounds are normal. There is no distension. Palpations: Abdomen is soft. Tenderness: There is abdominal tenderness in the right upper quadrant. There is no right CVA tenderness, left CVA tenderness or guarding. Musculoskeletal:         General: No deformity. Normal range of motion. Cervical back: Normal range of motion and neck supple. Lymphadenopathy:      Cervical: No cervical adenopathy. Skin:     General: Skin is warm and dry.    Neurological:      Mental Status: She is alert. She is not disoriented. GCS: GCS eye subscore is 4. GCS verbal subscore is 5. GCS motor subscore is 6.    Psychiatric:         Behavior: Behavior normal.         Vital Signs  ED Triage Vitals [06/30/23 0842]   Temperature Pulse Respirations Blood Pressure SpO2   98 °F (36.7 °C) 77 18 (!) 191/93 100 %      Temp Source Heart Rate Source Patient Position - Orthostatic VS BP Location FiO2 (%)   Temporal Monitor Standing Right arm --      Pain Score       9           Vitals:    06/30/23 0842 06/30/23 0945   BP: (!) 191/93 150/86   Pulse: 77 62   Patient Position - Orthostatic VS: Standing          Visual Acuity      ED Medications  Medications   sodium chloride 0.9 % bolus 1,000 mL (0 mL Intravenous Stopped 6/30/23 1032)   ketorolac (TORADOL) injection 15 mg (15 mg Intravenous Given 6/30/23 0854)   pantoprazole (PROTONIX) injection 40 mg (40 mg Intravenous Given 6/30/23 1052)   sucralfate (CARAFATE) tablet 1 g (1 g Oral Given 6/30/23 1052)   Famotidine (PF) (PEPCID) injection 20 mg (20 mg Intravenous Given 6/30/23 1052)   aluminum-magnesium hydroxide-simethicone (MYLANTA) oral suspension 15 mL (15 mL Oral Given 6/30/23 1052)       Diagnostic Studies  Results Reviewed     Procedure Component Value Units Date/Time    Urine culture [[id number]]     Lab Status: No result Specimen: Urine     Urine Microscopic [[id number]]  (Abnormal) Collected: 06/30/23 0855    Lab Status: Final result Specimen: Urine, Clean Catch Updated: 06/30/23 0920     RBC, UA 10-20 /hpf      WBC, UA None Seen /hpf      Epithelial Cells Occasional /hpf      Bacteria, UA Occasional /hpf     Comprehensive metabolic panel [[id number]]  (Abnormal) Collected: 06/30/23 0852    Lab Status: Final result Specimen: Blood from Arm, Right Updated: 06/30/23 0918     Sodium 140 mmol/L      Potassium 3.9 mmol/L      Chloride 105 mmol/L      CO2 29 mmol/L      ANION GAP 6 mmol/L      BUN 12 mg/dL      Creatinine 0.73 mg/dL      Glucose 98 mg/dL Calcium 10.1 mg/dL      AST 19 U/L      ALT 12 U/L      Alkaline Phosphatase 125 U/L      Total Protein 7.8 g/dL      Albumin 4.7 g/dL      Total Bilirubin 0.76 mg/dL      eGFR 91 ml/min/1.73sq m     Narrative:      [facility] guidelines for Chronic Kidney Disease (CKD):   •  Stage 1 with normal or high GFR (GFR > 90 mL/min/1.73 square meters)  •  Stage 2 Mild CKD (GFR = 60-89 mL/min/1.73 square meters)  •  Stage 3A Moderate CKD (GFR = 45-59 mL/min/1.73 square meters)  •  Stage 3B Moderate CKD (GFR = 30-44 mL/min/1.73 square meters)  •  Stage 4 Severe CKD (GFR = 15-29 mL/min/1.73 square meters)  •  Stage 5 End Stage CKD (GFR <15 mL/min/1.73 square meters)  Note: GFR calculation is accurate only with a steady state creatinine    Lipase [[id number]]  (Normal) Collected: 06/30/23 0852    Lab Status: Final result Specimen: Blood from Arm, Right Updated: 06/30/23 0918     Lipase 19 u/L     UA w Reflex to Microscopic w Reflex to Culture [[id number]]  (Abnormal) Collected: 06/30/23 0855    Lab Status: Final result Specimen: Urine, Clean Catch Updated: 06/30/23 0913     Color, UA Colorless     Clarity, UA Clear     Specific Gravity, UA 1.007     pH, UA 7.0     Leukocytes, UA Negative     Nitrite, UA Negative     Protein, UA Negative mg/dl      Glucose, UA Negative mg/dl      Ketones, UA Negative mg/dl      Urobilinogen, UA <2.0 mg/dl      Bilirubin, UA Negative     Occult Blood, UA Moderate    CBC and differential [[id number]] Collected: 06/30/23 0852    Lab Status: Final result Specimen: Blood from Arm, Right Updated: 06/30/23 0901     WBC 6.38 Thousand/uL      RBC 4.57 Million/uL      Hemoglobin 13.8 g/dL      Hematocrit 42.9 %      MCV 94 fL      MCH 30.2 pg      MCHC 32.2 g/dL      RDW 13.0 %      MPV 9.6 fL      Platelets 485 Thousands/uL      nRBC 0 /100 WBCs      Neutrophils Relative 49 %      Immat GRANS % 0 %      Lymphocytes Relative 40 %      Monocytes Relative 9 %      Eosinophils Relative 2 %      Basophils Relative 0 %      Neutrophils Absolute 3.19 Thousands/µL      Immature Grans Absolute 0.01 Thousand/uL      Lymphocytes Absolute 2.52 Thousands/µL      Monocytes Absolute 0.54 Thousand/µL      Eosinophils Absolute 0.10 Thousand/µL      Basophils Absolute 0.02 Thousands/µL                  XR chest 2 views   ED Interpretation by Wendie Celeste PA-C (06/30 0251)   No acute abnormality      Final Result by David Polanco MD (06/30 1027)      No acute consolidation or congestion                  Workstation performed: [id number]         CT abdomen pelvis wo contrast   Final Result by Janey Browning MD (06/30 7870)      No significant interval change. Status post cholecystectomy without evidence for biliary duct dilatation. No evidence for obstructive uropathy. Stable cortical renal calcifications and scarring in the left kidney. Workstation performed: [id number]                    Procedures  Procedures         ED Course                   SBIRT 22yo+    Flowsheet Row Most Recent Value   Initial Alcohol Screen: US AUDIT-C     1. How often do you have a drink containing alcohol? 0 Filed at: 06/30/2023 0849   2. How many drinks containing alcohol do you have on a typical day you are drinking? 0 Filed at: 06/30/2023 0849   3a. Male UNDER 65: How often do you have five or more drinks on one occasion? 0 Filed at: 06/30/2023 0849   3b. FEMALE Any Age, or MALE 65+: How often do you have 4 or more drinks on one occassion? 0 Filed at: 06/30/2023 0849   Audit-C Score 0 Filed at: 06/30/2023 9346   HUMZA: How many times in the past year have you. .. Used an illegal drug or used a prescription medication for non-medical reasons? Never Filed at: 06/30/2023 8052                    Medical Decision Making  55yoF presenting for RUQ pain x several days that is worsening today. Associated with nausea, diarrhea, and urinary frequency. She is afebrile and hemodynamically stable.  She appears uncomfortable but is non-toxic. Differential diagnosis includes but is not limited to: musculoskeletal, kidney stone, UTI, pyelonephritis, choledocholithiasis, gastritis, PUD, nonspecific abdominal pain    Initial ED plan: Check abdominal labs, UA, and CT abdomen. IV Toradol and fluid bolus for symptoms. Final assessment: Labs unremarkable including normal white count, lipase, renal function. UA with microscopic hematuria without signs of infection. CT abdomen is negative for acute findings. Unclear etiology of pain. Patient is questioning whether she has a stomach ulcer. Will trial pantoprazole and Carafate until she can f/u with GI next week. Advised f/u with urology for microscopic hematuria. ED return precautions discussed. Patient expressed understanding and is agreeable to plan. Patient discharged in stable condition. Microscopic hematuria: acute illness or injury  Right upper quadrant abdominal pain: acute illness or injury  Amount and/or Complexity of Data Reviewed  Labs: ordered. Radiology: ordered and independent interpretation performed. Risk  OTC drugs. Prescription drug management. Disposition  Final diagnoses:   Right upper quadrant abdominal pain   Microscopic hematuria     Time reflects when diagnosis was documented in both MDM as applicable and the Disposition within this note     Time User Action Codes Description Comment    6/30/2023  9:50 AM Flaquita Dubon Add [R10.11] Right upper quadrant abdominal pain     6/30/2023  9:52 AM Kim [address]on [address] [R31.29] Microscopic hematuria       ED Disposition     ED Disposition   Discharge    Condition   Stable    Date/Time   Fri Jun 30, 2023  9:50 AM    Comment   Farzad Rivas discharge to home/self care.                Follow-up Information     Follow up With Specialties Details Why Contact Info Additional [address] [facility] Schedule an appointment as soon as possible for a visit   [address] [facility] [address], [address] [facility] [address], [place], [address] [facility], [place], [address] [facility] Urology Schedule an appointment as soon as possible for a visit   [address] [facility]  [address] [place] [address] [address] [place] [phone] [address] [facility], [address] [facility], [facility], [facility], [place], [facility] Emergency Department Emergency Medicine  If symptoms worsen [address] [facility] Emergency Department, [place], [place], [address]          Discharge Medication List as of 6/30/2023 10:27 AM      START taking these medications    Details   pantoprazole (PROTONIX) 40 mg tablet Take 1 tablet (40 mg total) by mouth daily, Starting Fri 6/30/2023, Normal      sucralfate (CARAFATE) 1 g tablet Take 1 tablet (1 g total) by mouth 4 (four) times a day, Starting Fri 6/30/2023, Normal         CONTINUE these medications which have NOT CHANGED    Details   dexlansoprazole (DEXILANT) 60 MG capsule Take 1 capsule (60 mg total) by mouth daily Must be sent to [facility], Starting Tue 3/7/2023, Normal      dicyclomine (BENTYL) 20 mg tablet Take 1 tablet (20 mg total) by mouth every 6 (six) hours as needed (abdominal pain), Starting Wed 6/28/2023, Normal      EPINEPHrine (EPIPEN) 0.3 mg/0.3 mL SOAJ Inject 0.3 mL (0.3 mg total) into a muscle if needed for anaphylaxis As needed, Starting Wed 3/22/2023, Normal      rizatriptan (MAXALT) 10 mg tablet One p.o. At headache onset, may repeat 1 after 2 hours p.r.n. Maximum 2/24 hours, Normal             No discharge procedures on file.     PDMP Review       Value Time User    PDMP Reviewed  Yes 8/3/2021  1:14 PM Lola Willoughby PA-C          ED Provider  Electronically Signed by           Jimi Manriquez PA-C  06/30/23 7703

## 2023-07-07 ENCOUNTER — OFFICE VISIT (OUTPATIENT)
Dept: GASTROENTEROLOGY | Facility: CLINIC | Age: 58
End: 2023-07-07
Payer: COMMERCIAL

## 2023-07-07 VITALS
BODY MASS INDEX: 21.09 KG/M2 | SYSTOLIC BLOOD PRESSURE: 120 MMHG | DIASTOLIC BLOOD PRESSURE: 80 MMHG | HEIGHT: 67 IN | HEART RATE: 86 BPM | WEIGHT: 134.4 LBS | OXYGEN SATURATION: 99 %

## 2023-07-07 DIAGNOSIS — R11.0 NAUSEA: ICD-10-CM

## 2023-07-07 DIAGNOSIS — R10.13 EPIGASTRIC PAIN: Primary | ICD-10-CM

## 2023-07-07 PROCEDURE — 99213 OFFICE O/P EST LOW 20 MIN: CPT | Performed by: PHYSICIAN ASSISTANT

## 2023-07-07 NOTE — H&P (VIEW-ONLY)
[facility] - Outpatient Follow-up Note  Tomy Brunson 61 y.o. female MRN: [id number]  Encounter: [id number]          ASSESSMENT AND PLAN:      1. Epigastric pain  2. Nausea  She developed pain, nausea and vomiting with hematemesis 3 weeks ago  She went to the ER on 6/30   - labs were normal   - UA with microscopic blood which has been the case for several years   - CT AP normal  She is taking Pantoprazole 40mg daily, Dicyclomine PRN, Carafate 3 times per day and this has been helping  She is scheduled for an EGD next Friday with Dr. Karie Ojeda  She is seeing urology in August due to chronic microscopic hematuria    She admits to some increase life stress due to work  She had been taking a lot of Excedrin due to headaches    ______________________________________________________________________    SUBJECTIVE: 48-year-old female with a history of GERD and irritable bowel syndrome as well as anxiety who presents for evaluation of abdominal pain, nausea, vomiting and hematemesis. Symptoms started approximately 2-1/2 to 3 weeks ago. When she called the office describing the symptoms she was advised to continue Dexilant, use dicyclomine as needed for pain and get labs and urine testing. The labs were essentially unremarkable. The UA showed trace leukocyte. Because of this she went home and took an antibiotic. She ended up going to the emergency room the next day with ongoing symptoms. Labs and UA were repeated. This time the UA did not show any leukocytes but was again positive for trace hematuria. Again labs were unremarkable and CT scan was normal.  She was given a cocktail of pantoprazole, Maalox and dicyclomine which did help her symptoms in the emergency room. She has been continuing this type of regimen at home except she is using Carafate instead of the Maalox. It is helping. She reports that she is about 60% better but still symptomatic.   She has not had any hematemesis since that time. She has been taking a significant amount of Excedrin leading up to this episode due to headaches. She also has been under a great deal of stress due to work issues. She is scheduled for an upper endoscopy with Dr. Nafisa Connell next Friday, July 14. REVIEW OF SYSTEMS IS OTHERWISE NEGATIVE. Historical Information   Past Medical History:   Diagnosis Date   • Benign positional vertigo, right    • Cervicalgia    • DVT (deep venous thrombosis) (HCC)    • Graves disease    • Headache    • Hypertension    • Thyroid disease      Past Surgical History:   Procedure Laterality Date   • APPENDECTOMY     • CARDIAC CATHETERIZATION Left 7/29/2022    Procedure: Cardiac Left Heart Cath;  Surgeon: Simone Andrews MD;  [facility]; Service: Cardiology   • CARDIAC CATHETERIZATION N/A 7/29/2022    Procedure: Cardiac Coronary Angiogram;  Surgeon: Simone Andrews MD;  Location: [address] [place] [address] CATH LAB;   Service: Cardiology   • CHOLECYSTECTOMY LAPAROSCOPIC N/A 8/1/2021    Procedure: CHOLECYSTECTOMY LAPAROSCOPIC W/ INTRAOP CHOLANGIOGRAM;  Surgeon: Azul Avila MD;  Location: [facility];  Service: General   • FL INJECTION LEFT HIP (NON ARTHROGRAM)  1/31/2019   • LIPOMA RESECTION     • NEUROMA EXCISION     • STEROID INJECTION HIP Left 05/2018     Social History   Social History     Substance and Sexual Activity   Alcohol Use Not Currently     Social History     Substance and Sexual Activity   Drug Use No     Social History     Tobacco Use   Smoking Status Light Smoker   • Packs/day: 0.01   • Years: 2.00   • Total pack years: 0.02   • Types: Cigarettes   Smokeless Tobacco Never   Tobacco Comments    last smoked 8/16/19     Family History   Problem Relation Age of Onset   • Hypertension Mother    • Asthma Mother    • Diabetes Mother    • Hyperlipidemia Mother    • Seizures Father    • Migraines Brother    • Seizures Brother    • Cancer Sister         brain   • No Known Problems Daughter • No Known Problems Maternal Grandmother    • No Known Problems Paternal Grandmother    • No Known Problems Maternal Aunt    • No Known Problems Maternal Aunt    • No Known Problems Maternal Aunt    • No Known Problems Paternal Aunt    • No Known Problems Paternal Aunt    • No Known Problems Paternal Aunt    • Breast cancer Neg Hx    • Colon cancer Neg Hx    • Ovarian cancer Neg Hx    • Uterine cancer Neg Hx    • Cervical cancer Neg Hx        Meds/Allergies       Current Outpatient Medications:   •  dexlansoprazole (DEXILANT) 60 MG capsule  •  dicyclomine (BENTYL) 20 mg tablet  •  EPINEPHrine (EPIPEN) 0.3 mg/0.3 mL SOAJ  •  pantoprazole (PROTONIX) 40 mg tablet  •  rizatriptan (MAXALT) 10 mg tablet  •  sucralfate (CARAFATE) 1 g tablet    Allergies   Allergen Reactions   • Bee Venom      Bee sting   • Contrast [Iodinated Contrast Media] Anaphylaxis     Pt states her throat closes- kk rn    • Bee Pollen Dizziness     Specifically bee sting which evokes reaction of swelling of throat, headache and vomitting   • Pollen Extract Other (See Comments)     Throat clearing, difficulty swallowing, ears itchy   • Prochlorperazine Other (See Comments)      Aka (compazine)  Delirious    • Besifloxacin Hcl Palpitations     Other reaction(s): Unknown           Objective     Blood pressure 120/80, pulse 86, height 5' 7" (1.702 m), weight 61 kg (134 lb 6.4 oz), SpO2 99 %, not currently breastfeeding. Body mass index is 21.05 kg/m². PHYSICAL EXAM:      General Appearance:   Alert, cooperative, no distress   HEENT:   Normocephalic, atraumatic, anicteric.     Neck:  Supple, symmetrical, trachea midline   Lungs:   Clear to auscultation bilaterally; no rales, rhonchi or wheezing; respirations unlabored    Heart[de-identified]   Regular rate and rhythm; no murmur, rub, or gallop.    Abdomen:   Soft, non-tender, non-distended; normal bowel sounds; no masses, no organomegaly    Genitalia:   Deferred    Rectal:   Deferred    Extremities:  No cyanosis, clubbing or edema    Pulses:  2+ and symmetric    Skin:  No jaundice, rashes, or lesions    Lymph nodes:  No palpable cervical lymphadenopathy        Lab Results:   No visits with results within 1 Day(s) from this visit.    Latest known visit with results is:   Admission on 06/30/2023, Discharged on 06/30/2023   Component Date Value   • WBC 06/30/2023 6.38    • RBC 06/30/2023 4.57    • Hemoglobin 06/30/2023 13.8    • Hematocrit 06/30/2023 42.9    • MCV 06/30/2023 94    • MCH 06/30/2023 30.2    • MCHC 06/30/2023 32.2    • RDW 06/30/2023 13.0    • MPV 06/30/2023 9.6    • Platelets 40/18/1159 260    • nRBC 06/30/2023 0    • Neutrophils Relative 06/30/2023 49    • Immat GRANS % 06/30/2023 0    • Lymphocytes Relative 06/30/2023 40    • Monocytes Relative 06/30/2023 9    • Eosinophils Relative 06/30/2023 2    • Basophils Relative 06/30/2023 0    • Neutrophils Absolute 06/30/2023 3.19    • Immature Grans Absolute 06/30/2023 0.01    • Lymphocytes Absolute 06/30/2023 2.52    • Monocytes Absolute 06/30/2023 0.54    • Eosinophils Absolute 06/30/2023 0.10    • Basophils Absolute 06/30/2023 0.02    • Sodium 06/30/2023 140    • Potassium 06/30/2023 3.9    • Chloride 06/30/2023 105    • CO2 06/30/2023 29    • ANION GAP 06/30/2023 6    • BUN 06/30/2023 12    • Creatinine 06/30/2023 0.73    • Glucose 06/30/2023 98    • Calcium 06/30/2023 10.1    • AST 06/30/2023 19    • ALT 06/30/2023 12    • Alkaline Phosphatase 06/30/2023 125 (H)    • Total Protein 06/30/2023 7.8    • Albumin 06/30/2023 4.7    • Total Bilirubin 06/30/2023 0.76    • eGFR 06/30/2023 91    • Lipase 06/30/2023 19    • Color, UA 06/30/2023 Colorless    • Clarity, UA 06/30/2023 Clear    • Specific Gravity, UA 06/30/2023 1.007    • pH, UA 06/30/2023 7.0    • Leukocytes, UA 06/30/2023 Negative    • Nitrite, UA 06/30/2023 Negative    • Protein, UA 06/30/2023 Negative    • Glucose, UA 06/30/2023 Negative    • Ketones, UA 06/30/2023 Negative    • Urobilinogen, UA 06/30/2023 <2.0    • Bilirubin, UA 06/30/2023 Negative    • Occult Blood, UA 06/30/2023 Moderate (A)    • RBC, UA 06/30/2023 10-20 (A)    • WBC, UA 06/30/2023 None Seen    • Epithelial Cells 06/30/2023 Occasional    • Bacteria, UA 06/30/2023 Occasional          Radiology Results:   XR chest 2 views    Result Date: 6/30/2023  Narrative: CHEST INDICATION:   hemoptysis. COMPARISON: January 17, 2019 EXAM PERFORMED/VIEWS:  XR CHEST PA & LATERAL The frontal view was performed utilizing dual energy radiographic technique. Images: 5 FINDINGS: Cardiomediastinal silhouette appears unremarkable. Mild right apical scarring, unchanged from the previous study of 2019. No pneumothorax or pleural effusion. Osseous structures appear within normal limits for patient age. Impression: No acute consolidation or congestion Workstation performed: [id number]     CT abdomen pelvis wo contrast    Result Date: 6/30/2023  Narrative: CT ABDOMEN AND PELVIS WITHOUT IV CONTRAST INDICATION:   Epigastric pain RUQ pain. COMPARISON: 3/19/2023 TECHNIQUE:  CT examination of the abdomen and pelvis was performed without intravenous contrast. Multiplanar 2D reformatted images were created from the source data. This examination, like all CT scans performed in the [facility], was performed utilizing techniques to minimize radiation dose exposure, including the use of iterative reconstruction and automated exposure control. Radiation dose length product (DLP) for this visit:  594 mGy-cm Enteric contrast was administered. FINDINGS: ABDOMEN LOWER CHEST:  No clinically significant abnormality identified in the visualized lower chest. LIVER/BILIARY TREE:  Unremarkable. GALLBLADDER:  Gallbladder is surgically absent. SPLEEN:  Unremarkable. PANCREAS:  Unremarkable. ADRENAL GLANDS:  Unremarkable. KIDNEYS/URETERS: Left-sided renal scarring. Cortical calcifications along the left renal midpole.  STOMACH AND BOWEL: There is colonic diverticulosis without evidence of acute diverticulitis. APPENDIX:  No findings to suggest appendicitis. ABDOMINOPELVIC CAVITY:  No ascites. No pneumoperitoneum. No lymphadenopathy. VESSELS:  Unremarkable for patient's age. PELVIS REPRODUCTIVE ORGANS:  Unremarkable for patient's age. URINARY BLADDER:  Unremarkable. ABDOMINAL WALL/INGUINAL REGIONS: Surgical clips in the right lower quadrant. OSSEOUS STRUCTURES: Degenerative changes of the hips bilaterally. Impression: No significant interval change. Status post cholecystectomy without evidence for biliary duct dilatation. No evidence for obstructive uropathy. Stable cortical renal calcifications and scarring in the left kidney.  Workstation performed: [id number]

## 2023-07-07 NOTE — PROGRESS NOTES
[facility] - Outpatient Follow-up Note  Ifra Clyda Bamberger 61 y.o. female MRN: [id number]  Encounter: [id number]          ASSESSMENT AND PLAN:      1. Epigastric pain  2. Nausea  She developed pain, nausea and vomiting with hematemesis 3 weeks ago  She went to the ER on 6/30   - labs were normal   - UA with microscopic blood which has been the case for several years   - CT AP normal  She is taking Pantoprazole 40mg daily, Dicyclomine PRN, Carafate 3 times per day and this has been helping  She is scheduled for an EGD next Friday with Dr. Kimi Dumont  She is seeing urology in August due to chronic microscopic hematuria    She admits to some increase life stress due to work  She had been taking a lot of Excedrin due to headaches    ______________________________________________________________________    SUBJECTIVE: 39-year-old female with a history of GERD and irritable bowel syndrome as well as anxiety who presents for evaluation of abdominal pain, nausea, vomiting and hematemesis. Symptoms started approximately 2-1/2 to 3 weeks ago. When she called the office describing the symptoms she was advised to continue Dexilant, use dicyclomine as needed for pain and get labs and urine testing. The labs were essentially unremarkable. The UA showed trace leukocyte. Because of this she went home and took an antibiotic. She ended up going to the emergency room the next day with ongoing symptoms. Labs and UA were repeated. This time the UA did not show any leukocytes but was again positive for trace hematuria. Again labs were unremarkable and CT scan was normal.  She was given a cocktail of pantoprazole, Maalox and dicyclomine which did help her symptoms in the emergency room. She has been continuing this type of regimen at home except she is using Carafate instead of the Maalox. It is helping. She reports that she is about 60% better but still symptomatic.   She has not had any hematemesis since that time. She has been taking a significant amount of Excedrin leading up to this episode due to headaches. She also has been under a great deal of stress due to work issues. She is scheduled for an upper endoscopy with Dr. Stephon Byrne next Friday, July 14. REVIEW OF SYSTEMS IS OTHERWISE NEGATIVE. Historical Information   Past Medical History:   Diagnosis Date   • Benign positional vertigo, right    • Cervicalgia    • DVT (deep venous thrombosis) (HCC)    • Graves disease    • Headache    • Hypertension    • Thyroid disease      Past Surgical History:   Procedure Laterality Date   • APPENDECTOMY     • CARDIAC CATHETERIZATION Left 7/29/2022    Procedure: Cardiac Left Heart Cath;  Surgeon: Africa Balderrama MD;  Location: [facility]; Service: Cardiology   • CARDIAC CATHETERIZATION N/A 7/29/2022    Procedure: Cardiac Coronary Angiogram;  Surgeon: Africa Balderrama MD;  Location: [address] [place] [address] CATH LAB;   Service: Cardiology   • CHOLECYSTECTOMY LAPAROSCOPIC N/A 8/1/2021    Procedure: CHOLECYSTECTOMY LAPAROSCOPIC W/ INTRAOP CHOLANGIOGRAM;  Surgeon: Regi Egan MD;  Location: [facility];  Service: General   • FL INJECTION LEFT HIP (NON ARTHROGRAM)  1/31/2019   • LIPOMA RESECTION     • NEUROMA EXCISION     • STEROID INJECTION HIP Left 05/2018     Social History   Social History     Substance and Sexual Activity   Alcohol Use Not Currently     Social History     Substance and Sexual Activity   Drug Use No     Social History     Tobacco Use   Smoking Status Light Smoker   • Packs/day: 0.01   • Years: 2.00   • Total pack years: 0.02   • Types: Cigarettes   Smokeless Tobacco Never   Tobacco Comments    last smoked 8/16/19     Family History   Problem Relation Age of Onset   • Hypertension Mother    • Asthma Mother    • Diabetes Mother    • Hyperlipidemia Mother    • Seizures Father    • Migraines Brother    • Seizures Brother    • Cancer Sister         brain   • No Known Problems Daughter • No Known Problems Maternal Grandmother    • No Known Problems Paternal Grandmother    • No Known Problems Maternal Aunt    • No Known Problems Maternal Aunt    • No Known Problems Maternal Aunt    • No Known Problems Paternal Aunt    • No Known Problems Paternal Aunt    • No Known Problems Paternal Aunt    • Breast cancer Neg Hx    • Colon cancer Neg Hx    • Ovarian cancer Neg Hx    • Uterine cancer Neg Hx    • Cervical cancer Neg Hx        Meds/Allergies       Current Outpatient Medications:   •  dexlansoprazole (DEXILANT) 60 MG capsule  •  dicyclomine (BENTYL) 20 mg tablet  •  EPINEPHrine (EPIPEN) 0.3 mg/0.3 mL SOAJ  •  pantoprazole (PROTONIX) 40 mg tablet  •  rizatriptan (MAXALT) 10 mg tablet  •  sucralfate (CARAFATE) 1 g tablet    Allergies   Allergen Reactions   • Bee Venom      Bee sting   • Contrast [Iodinated Contrast Media] Anaphylaxis     Pt states her throat closes- kk rn    • Bee Pollen Dizziness     Specifically bee sting which evokes reaction of swelling of throat, headache and vomitting   • Pollen Extract Other (See Comments)     Throat clearing, difficulty swallowing, ears itchy   • Prochlorperazine Other (See Comments)      Aka (compazine)  Delirious    • Besifloxacin Hcl Palpitations     Other reaction(s): Unknown           Objective     Blood pressure 120/80, pulse 86, height 5' 7" (1.702 m), weight 61 kg (134 lb 6.4 oz), SpO2 99 %, not currently breastfeeding. Body mass index is 21.05 kg/m². PHYSICAL EXAM:      General Appearance:   Alert, cooperative, no distress   HEENT:   Normocephalic, atraumatic, anicteric.     Neck:  Supple, symmetrical, trachea midline   Lungs:   Clear to auscultation bilaterally; no rales, rhonchi or wheezing; respirations unlabored    Heart[de-identified]   Regular rate and rhythm; no murmur, rub, or gallop.    Abdomen:   Soft, non-tender, non-distended; normal bowel sounds; no masses, no organomegaly    Genitalia:   Deferred    Rectal:   Deferred    Extremities:  No cyanosis, clubbing or edema    Pulses:  2+ and symmetric    Skin:  No jaundice, rashes, or lesions    Lymph nodes:  No palpable cervical lymphadenopathy        Lab Results:   No visits with results within 1 Day(s) from this visit.    Latest known visit with results is:   Admission on 06/30/2023, Discharged on 06/30/2023   Component Date Value   • WBC 06/30/2023 6.38    • RBC 06/30/2023 4.57    • Hemoglobin 06/30/2023 13.8    • Hematocrit 06/30/2023 42.9    • MCV 06/30/2023 94    • MCH 06/30/2023 30.2    • MCHC 06/30/2023 32.2    • RDW 06/30/2023 13.0    • MPV 06/30/2023 9.6    • Platelets 65/90/9753 260    • nRBC 06/30/2023 0    • Neutrophils Relative 06/30/2023 49    • Immat GRANS % 06/30/2023 0    • Lymphocytes Relative 06/30/2023 40    • Monocytes Relative 06/30/2023 9    • Eosinophils Relative 06/30/2023 2    • Basophils Relative 06/30/2023 0    • Neutrophils Absolute 06/30/2023 3.19    • Immature Grans Absolute 06/30/2023 0.01    • Lymphocytes Absolute 06/30/2023 2.52    • Monocytes Absolute 06/30/2023 0.54    • Eosinophils Absolute 06/30/2023 0.10    • Basophils Absolute 06/30/2023 0.02    • Sodium 06/30/2023 140    • Potassium 06/30/2023 3.9    • Chloride 06/30/2023 105    • CO2 06/30/2023 29    • ANION GAP 06/30/2023 6    • BUN 06/30/2023 12    • Creatinine 06/30/2023 0.73    • Glucose 06/30/2023 98    • Calcium 06/30/2023 10.1    • AST 06/30/2023 19    • ALT 06/30/2023 12    • Alkaline Phosphatase 06/30/2023 125 (H)    • Total Protein 06/30/2023 7.8    • Albumin 06/30/2023 4.7    • Total Bilirubin 06/30/2023 0.76    • eGFR 06/30/2023 91    • Lipase 06/30/2023 19    • Color, UA 06/30/2023 Colorless    • Clarity, UA 06/30/2023 Clear    • Specific Gravity, UA 06/30/2023 1.007    • pH, UA 06/30/2023 7.0    • Leukocytes, UA 06/30/2023 Negative    • Nitrite, UA 06/30/2023 Negative    • Protein, UA 06/30/2023 Negative    • Glucose, UA 06/30/2023 Negative    • Ketones, UA 06/30/2023 Negative    • Urobilinogen, UA 06/30/2023 <2.0    • Bilirubin, UA 06/30/2023 Negative    • Occult Blood, UA 06/30/2023 Moderate (A)    • RBC, UA 06/30/2023 10-20 (A)    • WBC, UA 06/30/2023 None Seen    • Epithelial Cells 06/30/2023 Occasional    • Bacteria, UA 06/30/2023 Occasional          Radiology Results:   XR chest 2 views    Result Date: 6/30/2023  Narrative: CHEST INDICATION:   hemoptysis. COMPARISON: January 17, 2019 EXAM PERFORMED/VIEWS:  XR CHEST PA & LATERAL The frontal view was performed utilizing dual energy radiographic technique. Images: 5 FINDINGS: Cardiomediastinal silhouette appears unremarkable. Mild right apical scarring, unchanged from the previous study of 2019. No pneumothorax or pleural effusion. Osseous structures appear within normal limits for patient age. Impression: No acute consolidation or congestion Workstation performed: [id number]     CT abdomen pelvis wo contrast    Result Date: 6/30/2023  Narrative: CT ABDOMEN AND PELVIS WITHOUT IV CONTRAST INDICATION:   Epigastric pain RUQ pain. COMPARISON: 3/19/2023 TECHNIQUE:  CT examination of the abdomen and pelvis was performed without intravenous contrast. Multiplanar 2D reformatted images were created from the source data. This examination, like all CT scans performed in the [facility], was performed utilizing techniques to minimize radiation dose exposure, including the use of iterative reconstruction and automated exposure control. Radiation dose length product (DLP) for this visit:  594 mGy-cm Enteric contrast was administered. FINDINGS: ABDOMEN LOWER CHEST:  No clinically significant abnormality identified in the visualized lower chest. LIVER/BILIARY TREE:  Unremarkable. GALLBLADDER:  Gallbladder is surgically absent. SPLEEN:  Unremarkable. PANCREAS:  Unremarkable. ADRENAL GLANDS:  Unremarkable. KIDNEYS/URETERS: Left-sided renal scarring. Cortical calcifications along the left renal midpole.  STOMACH AND BOWEL: There is colonic diverticulosis without evidence of acute diverticulitis. APPENDIX:  No findings to suggest appendicitis. ABDOMINOPELVIC CAVITY:  No ascites. No pneumoperitoneum. No lymphadenopathy. VESSELS:  Unremarkable for patient's age. PELVIS REPRODUCTIVE ORGANS:  Unremarkable for patient's age. URINARY BLADDER:  Unremarkable. ABDOMINAL WALL/INGUINAL REGIONS: Surgical clips in the right lower quadrant. OSSEOUS STRUCTURES: Degenerative changes of the hips bilaterally. Impression: No significant interval change. Status post cholecystectomy without evidence for biliary duct dilatation. No evidence for obstructive uropathy. Stable cortical renal calcifications and scarring in the left kidney.  Workstation performed: [id number]

## 2023-07-10 ENCOUNTER — TELEPHONE (OUTPATIENT)
Dept: GASTROENTEROLOGY | Facility: CLINIC | Age: 58
End: 2023-07-10

## 2023-07-14 ENCOUNTER — HOSPITAL ENCOUNTER (OUTPATIENT)
Dept: GASTROENTEROLOGY | Facility: HOSPITAL | Age: 58
Setting detail: OUTPATIENT SURGERY
Discharge: HOME | End: 2023-07-14
Admitting: INTERNAL MEDICINE
Payer: COMMERCIAL

## 2023-07-14 ENCOUNTER — ANESTHESIA EVENT (OUTPATIENT)
Dept: GASTROENTEROLOGY | Facility: HOSPITAL | Age: 58
End: 2023-07-14

## 2023-07-14 ENCOUNTER — ANESTHESIA (OUTPATIENT)
Dept: GASTROENTEROLOGY | Facility: HOSPITAL | Age: 58
End: 2023-07-14

## 2023-07-14 VITALS
HEIGHT: 67 IN | WEIGHT: 132.06 LBS | SYSTOLIC BLOOD PRESSURE: 126 MMHG | OXYGEN SATURATION: 100 % | RESPIRATION RATE: 16 BRPM | TEMPERATURE: 97.5 F | DIASTOLIC BLOOD PRESSURE: 73 MMHG | BODY MASS INDEX: 20.73 KG/M2 | HEART RATE: 53 BPM

## 2023-07-14 DIAGNOSIS — K92.0 HEMATEMESIS WITH NAUSEA: ICD-10-CM

## 2023-07-14 DIAGNOSIS — R10.11 RIGHT UPPER QUADRANT PAIN: ICD-10-CM

## 2023-07-14 DIAGNOSIS — R11.14 BILIOUS VOMITING WITH NAUSEA: ICD-10-CM

## 2023-07-14 PROCEDURE — 43239 EGD BIOPSY SINGLE/MULTIPLE: CPT | Performed by: INTERNAL MEDICINE

## 2023-07-14 PROCEDURE — 88305 TISSUE EXAM BY PATHOLOGIST: CPT | Performed by: PATHOLOGY

## 2023-07-14 RX ORDER — LIDOCAINE HYDROCHLORIDE 20 MG/ML
INJECTION, SOLUTION EPIDURAL; INFILTRATION; INTRACAUDAL; PERINEURAL AS NEEDED
Status: DISCONTINUED | OUTPATIENT
Start: 2023-07-14 | End: 2023-07-14

## 2023-07-14 RX ORDER — SUCRALFATE 1 G/1
1 TABLET ORAL 4 TIMES DAILY
Qty: 120 TABLET | Refills: 1 | Status: SHIPPED | OUTPATIENT
Start: 2023-07-14

## 2023-07-14 RX ORDER — PROPOFOL 10 MG/ML
INJECTION, EMULSION INTRAVENOUS AS NEEDED
Status: DISCONTINUED | OUTPATIENT
Start: 2023-07-14 | End: 2023-07-14

## 2023-07-14 RX ORDER — SODIUM CHLORIDE, SODIUM LACTATE, POTASSIUM CHLORIDE, CALCIUM CHLORIDE 600; 310; 30; 20 MG/100ML; MG/100ML; MG/100ML; MG/100ML
INJECTION, SOLUTION INTRAVENOUS CONTINUOUS PRN
Status: DISCONTINUED | OUTPATIENT
Start: 2023-07-14 | End: 2023-07-14

## 2023-07-14 RX ADMIN — SODIUM CHLORIDE, SODIUM LACTATE, POTASSIUM CHLORIDE, AND CALCIUM CHLORIDE: .6; .31; .03; .02 INJECTION, SOLUTION INTRAVENOUS at 09:47

## 2023-07-14 RX ADMIN — LIDOCAINE HYDROCHLORIDE 5 ML: 20 INJECTION, SOLUTION EPIDURAL; INFILTRATION; INTRACAUDAL; PERINEURAL at 10:01

## 2023-07-14 RX ADMIN — PROPOFOL 20 MG: 10 INJECTION, EMULSION INTRAVENOUS at 10:02

## 2023-07-14 RX ADMIN — PROPOFOL 200 MG: 10 INJECTION, EMULSION INTRAVENOUS at 10:01

## 2023-07-14 RX ADMIN — SODIUM CHLORIDE, SODIUM LACTATE, POTASSIUM CHLORIDE, AND CALCIUM CHLORIDE: .6; .31; .03; .02 INJECTION, SOLUTION INTRAVENOUS at 09:41

## 2023-07-14 NOTE — INTERVAL H&P NOTE
H&P reviewed. After examining the patient I find no changes in the patients condition since the H&P had been written.     Vitals:    07/14/23 0920   Temp: (!) 97.1 °F (36.2 °C)

## 2023-07-14 NOTE — ANESTHESIA POSTPROCEDURE EVALUATION
Post-Op Assessment Note    CV Status:  Stable    Pain management: adequate     Mental Status:  Sleepy   Hydration Status:  Euvolemic   PONV Controlled:  Controlled   Airway Patency:  Patent      Post Op Vitals Reviewed: Yes      Staff: CRNA         No notable events documented.     /62 (07/14/23 1009)    Temp 97.5 °F (36.4 °C) (07/14/23 1009)    Pulse 59 (07/14/23 1009)   Resp 16 (07/14/23 1009)    SpO2 98 % (07/14/23 1009)

## 2023-07-14 NOTE — INTERVAL H&P NOTE
H&P reviewed. After examining the patient I find no changes in the patients condition since the H&P had been written.     Vitals:    07/14/23 0920   BP: 150/89   Pulse: 61   Resp: 19   Temp: (!) 97.1 °F (36.2 °C)   SpO2: 100%

## 2023-07-14 NOTE — ANESTHESIA PREPROCEDURE EVALUATION
Procedure:  EGD    Relevant Problems   CARDIO   (+) DVT (deep venous thrombosis) (HCC)   (+) Migraine without aura and without status migrainosus, not intractable   (+) Mitral regurgitation   (+) Secondary hypertension      GI/HEPATIC   (+) Hiatal hernia      MUSCULOSKELETAL   (+) Hiatal hernia      NEURO/PSYCH   (+) Migraine without aura and without status migrainosus, not intractable        Physical Exam    Airway    Mallampati score: I  TM Distance: >3 FB  Neck ROM: full     Dental       Cardiovascular  Cardiovascular exam normal    Pulmonary  Pulmonary exam normal     Other Findings        Anesthesia Plan  ASA Score- 2     Anesthesia Type- IV sedation with anesthesia with ASA Monitors. Additional Monitors:   Airway Plan:           Plan Factors-Exercise tolerance (METS): >4 METS. Chart reviewed. EKG reviewed. Imaging results reviewed. Existing labs reviewed. Patient summary reviewed. Induction- intravenous. Postoperative Plan- Plan for postoperative opioid use. Planned trial extubation    Informed Consent- Anesthetic plan and risks discussed with patient. I personally reviewed this patient with the CRNA. Discussed and agreed on the Anesthesia Plan with the CRNA. Hannah Tamez

## 2023-07-18 PROCEDURE — 88305 TISSUE EXAM BY PATHOLOGIST: CPT | Performed by: PATHOLOGY

## 2023-07-25 ENCOUNTER — HOSPITAL ENCOUNTER (OUTPATIENT)
Dept: NUCLEAR MEDICINE | Facility: HOSPITAL | Age: 58
Discharge: HOME | End: 2023-07-25
Payer: COMMERCIAL

## 2023-07-25 DIAGNOSIS — R11.14 BILIOUS VOMITING WITH NAUSEA: ICD-10-CM

## 2023-07-25 DIAGNOSIS — K92.0 HEMATEMESIS WITH NAUSEA: ICD-10-CM

## 2023-07-25 PROCEDURE — G1004 CDSM NDSC: HCPCS

## 2023-07-25 PROCEDURE — A9541 TC99M SULFUR COLLOID: HCPCS

## 2023-07-25 PROCEDURE — 78264 GASTRIC EMPTYING IMG STUDY: CPT

## 2023-08-08 DIAGNOSIS — K92.0 HEMATEMESIS WITH NAUSEA: ICD-10-CM

## 2023-08-08 RX ORDER — SUCRALFATE 1 G/1
1 TABLET ORAL 4 TIMES DAILY
Qty: 360 TABLET | Refills: 1 | Status: SHIPPED | OUTPATIENT
Start: 2023-08-08

## 2023-09-19 ENCOUNTER — TELEPHONE (OUTPATIENT)
Dept: NEUROLOGY | Facility: CLINIC | Age: 58
End: 2023-09-19

## 2023-09-19 NOTE — TELEPHONE ENCOUNTER
Attempted to contact patient and remind her of upcomign appt with Dr. Keyona Crenshaw for 10/4 @ 320pm. No answer and vociemail was full therefore unable to leave a message. Patient was informed on her last visit to obtain MRI imaging results from outside facility for [employer] to review.

## 2023-09-26 NOTE — TELEPHONE ENCOUNTER
Called and LVM for patient regarding confirmation for upcoming appt w/ Dr. Ester Bates. Provided patient w/ appt time, date, and location.

## 2023-11-15 ENCOUNTER — TELEPHONE (OUTPATIENT)
Dept: OBGYN CLINIC | Facility: CLINIC | Age: 58
End: 2023-11-15

## 2023-12-28 DIAGNOSIS — R39.9 UTI SYMPTOMS: Primary | ICD-10-CM

## 2023-12-28 RX ORDER — SULFAMETHOXAZOLE AND TRIMETHOPRIM 800; 160 MG/1; MG/1
1 TABLET ORAL EVERY 12 HOURS SCHEDULED
Qty: 6 TABLET | Refills: 0 | Status: SHIPPED | OUTPATIENT
Start: 2023-12-28 | End: 2023-12-31

## 2024-01-26 ENCOUNTER — TELEPHONE (OUTPATIENT)
Age: 59
End: 2024-01-26

## 2024-01-26 ENCOUNTER — HOSPITAL ENCOUNTER (OUTPATIENT)
Age: 59
Discharge: HOME | End: 2024-01-26
Payer: COMMERCIAL

## 2024-01-26 VITALS — BODY MASS INDEX: 20.72 KG/M2 | HEIGHT: 67 IN | WEIGHT: 132 LBS

## 2024-01-26 DIAGNOSIS — Z12.31 ENCOUNTER FOR SCREENING MAMMOGRAM FOR MALIGNANT NEOPLASM OF BREAST: ICD-10-CM

## 2024-01-26 PROCEDURE — 77067 SCR MAMMO BI INCL CAD: CPT

## 2024-01-26 PROCEDURE — 77063 BREAST TOMOSYNTHESIS BI: CPT

## 2024-01-26 NOTE — TELEPHONE ENCOUNTER
----- Message from MARIANN Bryant sent at 1/26/2024  4:24 PM EST -----  Normal mammogram.  Repeat in 1 year

## 2024-02-12 DIAGNOSIS — Z00.6 ENCOUNTER FOR EXAMINATION FOR NORMAL COMPARISON OR CONTROL IN CLINICAL RESEARCH PROGRAM: ICD-10-CM

## 2024-02-29 DIAGNOSIS — G89.29 CHRONIC LEFT HIP PAIN: Primary | ICD-10-CM

## 2024-02-29 DIAGNOSIS — M25.552 CHRONIC LEFT HIP PAIN: Primary | ICD-10-CM

## 2024-03-06 ENCOUNTER — TELEPHONE (OUTPATIENT)
Age: 59
End: 2024-03-06

## 2024-03-06 ENCOUNTER — OFFICE VISIT (OUTPATIENT)
Age: 59
End: 2024-03-06
Payer: COMMERCIAL

## 2024-03-06 VITALS
HEIGHT: 67 IN | HEART RATE: 64 BPM | DIASTOLIC BLOOD PRESSURE: 82 MMHG | RESPIRATION RATE: 16 BRPM | WEIGHT: 131 LBS | SYSTOLIC BLOOD PRESSURE: 118 MMHG | BODY MASS INDEX: 20.56 KG/M2

## 2024-03-06 DIAGNOSIS — D49.2 SKIN NEOPLASM: ICD-10-CM

## 2024-03-06 DIAGNOSIS — Z00.00 WELL ADULT EXAM: Primary | ICD-10-CM

## 2024-03-06 DIAGNOSIS — R73.03 PREDIABETES: ICD-10-CM

## 2024-03-06 DIAGNOSIS — M16.10 HIP ARTHRITIS: ICD-10-CM

## 2024-03-06 DIAGNOSIS — E55.9 VITAMIN D DEFICIENCY: ICD-10-CM

## 2024-03-06 DIAGNOSIS — Z72.0 TOBACCO ABUSE: ICD-10-CM

## 2024-03-06 PROCEDURE — 99396 PREV VISIT EST AGE 40-64: CPT | Performed by: INTERNAL MEDICINE

## 2024-03-06 NOTE — PROGRESS NOTES
Assessment/Plan:    Diagnoses and all orders for this visit:    Well adult exam  -     XR hip/pelv 2-3 vws left if performed; Future  -     Ambulatory Referral to Orthopedic Surgery; Future  -     CBC and differential; Future  -     Comprehensive metabolic panel; Future  -     Lipid panel; Future  -     Hemoglobin A1C; Future  -     TSH, 3rd generation with Free T4 reflex; Future    Prediabetes  -     Hemoglobin A1C; Future    Hip arthritis  -     XR hip/pelv 2-3 vws left if performed; Future  -     Ambulatory Referral to Orthopedic Surgery; Future    Vitamin D deficiency  -     Vitamin D 25 hydroxy; Future    Skin neoplasm  -     Ambulatory Referral to Dermatology; Future    Tobacco abuse  -     nicotine (NICODERM CQ) 7 mg/24hr TD 24 hr patch; Place 1 patch on the skin over 24 hours every 24 hours  -     Ambulatory referral to Psych Services; Future              Patient Instructions   Check labs, will follow accordingly    Severe degenerative arthritis left hip-check x-ray and refer to orthopedics    IBS symptoms stable with as needed Bentyl and Carafate    Tobacco abuse-cessation efforts discussed will start with low-dose patch.  Can consider therapy.    Subjective:      Patient ID: Tomy Stevenson is a 58 y.o. 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  Treadmill limited by hip pain.   Notes worsening L hip pain, knows she needs ROSIBEL at some pt.  Very reluctant to proceed.  Good relief w/ CSI in the past.    IBS w/ abd pain and diarrhea r/t diet and stress.  Taking carafate and bentyl prn.   MHA-none recently  Notes smoking occasionally, wants to quit        Current Outpatient Medications:   •  dicyclomine (BENTYL) 20 mg tablet, Take 1 tablet (20 mg total) by mouth every 6 (six) hours, Disp: 360 tablet, Rfl: 0  •  nicotine (NICODERM CQ) 7 mg/24hr TD 24 hr patch, Place 1 patch on the skin over 24  hours every 24 hours, Disp: 28 patch, Rfl: 0  •  sucralfate (CARAFATE) 1 g tablet, TAKE 1 TABLET BY MOUTH FOUR TIMES A DAY (Patient taking differently: Take 1 g by mouth if needed), Disp: 360 tablet, Rfl: 1  •  EPINEPHrine (EPIPEN) 0.3 mg/0.3 mL SOAJ, Inject 0.3 mL (0.3 mg total) into a muscle if needed for anaphylaxis As needed (Patient not taking: Reported on 3/6/2024), Disp: 1 each, Rfl: 2  •  pantoprazole (PROTONIX) 40 mg tablet, Take 1 tablet (40 mg total) by mouth daily (Patient not taking: Reported on 3/6/2024), Disp: 30 tablet, Rfl: 0  •  rizatriptan (MAXALT) 10 mg tablet, One p.o. At headache onset, may repeat 1 after 2 hours p.r.n. Maximum 2/24 hours (Patient not taking: Reported on 3/6/2024), Disp: 9 tablet, Rfl: 5    No results found for this or any previous visit (from the past 1008 hour(s)).    The following portions of the patient's history were reviewed and updated as appropriate: allergies, current medications, past family history, past medical history, past social history, past surgical history and problem list.     Review of Systems   Constitutional:  Negative for appetite change, chills, diaphoresis, fatigue, fever and unexpected weight change.   HENT:  Negative for congestion, hearing loss and rhinorrhea.    Eyes:  Negative for visual disturbance.   Respiratory:  Negative for cough, chest tightness, shortness of breath and wheezing.    Cardiovascular:  Negative for chest pain, palpitations and leg swelling.   Gastrointestinal:  Negative for abdominal pain and blood in stool.   Endocrine: Negative for cold intolerance, heat intolerance, polydipsia and polyuria.   Genitourinary:  Negative for difficulty urinating, dysuria, frequency and urgency.   Musculoskeletal:  Positive for arthralgias. Negative for myalgias.   Skin:  Negative for rash.   Neurological:  Negative for dizziness, weakness, light-headedness and headaches.   Hematological:  Does not bruise/bleed easily.   Psychiatric/Behavioral:   Negative for dysphoric mood and sleep disturbance.          Objective:      Vitals:    03/06/24 1346   BP: 118/82   Pulse: 64   Resp: 16          Physical Exam  Constitutional:       Appearance: She is well-developed.   HENT:      Head: Normocephalic and atraumatic.      Nose: Nose normal.   Eyes:      General: No scleral icterus.     Conjunctiva/sclera: Conjunctivae normal.      Pupils: Pupils are equal, round, and reactive to light.   Neck:      Thyroid: No thyromegaly.      Vascular: No JVD.      Trachea: No tracheal deviation.   Cardiovascular:      Rate and Rhythm: Normal rate and regular rhythm.      Heart sounds: No murmur heard.     No friction rub. No gallop.   Pulmonary:      Effort: Pulmonary effort is normal. No respiratory distress.      Breath sounds: Normal breath sounds. No wheezing or rales.   Musculoskeletal:         General: No deformity.      Cervical back: Normal range of motion and neck supple.   Lymphadenopathy:      Cervical: No cervical adenopathy.   Skin:     General: Skin is warm and dry.      Coloration: Skin is not pale.      Findings: No erythema or rash.   Neurological:      Mental Status: She is alert and oriented to person, place, and time.      Cranial Nerves: No cranial nerve deficit.   Psychiatric:         Behavior: Behavior normal.         Thought Content: Thought content normal.         Judgment: Judgment normal.

## 2024-03-06 NOTE — PATIENT INSTRUCTIONS
Check labs, will follow accordingly    Severe degenerative arthritis left hip-check x-ray and refer to orthopedics    IBS symptoms stable with as needed Bentyl and Carafate    Tobacco abuse-cessation efforts discussed will start with low-dose patch.  Can consider therapy.

## 2024-03-09 ENCOUNTER — HOSPITAL ENCOUNTER (OUTPATIENT)
Dept: RADIOLOGY | Facility: HOSPITAL | Age: 59
Discharge: HOME | End: 2024-03-09
Payer: COMMERCIAL

## 2024-03-09 ENCOUNTER — APPOINTMENT (OUTPATIENT)
Dept: LAB | Facility: HOSPITAL | Age: 59
End: 2024-03-09
Payer: COMMERCIAL

## 2024-03-09 DIAGNOSIS — Z00.00 WELL ADULT EXAM: ICD-10-CM

## 2024-03-09 DIAGNOSIS — R73.03 PREDIABETES: ICD-10-CM

## 2024-03-09 DIAGNOSIS — E55.9 VITAMIN D DEFICIENCY: ICD-10-CM

## 2024-03-09 DIAGNOSIS — Z00.6 ENCOUNTER FOR EXAMINATION FOR NORMAL COMPARISON OR CONTROL IN CLINICAL RESEARCH PROGRAM: ICD-10-CM

## 2024-03-09 DIAGNOSIS — M16.10 HIP ARTHRITIS: ICD-10-CM

## 2024-03-09 LAB
ALBUMIN SERPL BCP-MCNC: 4.9 G/DL (ref 3.5–5)
ALP SERPL-CCNC: 159 U/L (ref 34–104)
ALT SERPL W P-5'-P-CCNC: 15 U/L (ref 7–52)
ANION GAP SERPL CALCULATED.3IONS-SCNC: 8 MMOL/L
AST SERPL W P-5'-P-CCNC: 23 U/L (ref 13–39)
BASOPHILS # BLD AUTO: 0.02 THOUSANDS/ÂΜL (ref 0–0.1)
BASOPHILS NFR BLD AUTO: 0 % (ref 0–1)
BILIRUB SERPL-MCNC: 0.64 MG/DL (ref 0.2–1)
BUN SERPL-MCNC: 14 MG/DL (ref 5–25)
CALCIUM SERPL-MCNC: 9.8 MG/DL (ref 8.4–10.2)
CHLORIDE SERPL-SCNC: 105 MMOL/L (ref 96–108)
CHOLEST SERPL-MCNC: 228 MG/DL
CO2 SERPL-SCNC: 28 MMOL/L (ref 21–32)
CREAT SERPL-MCNC: 0.72 MG/DL (ref 0.6–1.3)
EOSINOPHIL # BLD AUTO: 0.11 THOUSAND/ÂΜL (ref 0–0.61)
EOSINOPHIL NFR BLD AUTO: 2 % (ref 0–6)
ERYTHROCYTE [DISTWIDTH] IN BLOOD BY AUTOMATED COUNT: 13 % (ref 11.6–15.1)
EST. AVERAGE GLUCOSE BLD GHB EST-MCNC: 131 MG/DL
GFR SERPL CREATININE-BSD FRML MDRD: 91 ML/MIN/1.73SQ M
GLUCOSE P FAST SERPL-MCNC: 96 MG/DL (ref 65–99)
HBA1C MFR BLD: 6.2 %
HCT VFR BLD AUTO: 46.2 % (ref 34.8–46.1)
HDLC SERPL-MCNC: 84 MG/DL
HGB BLD-MCNC: 15 G/DL (ref 11.5–15.4)
IMM GRANULOCYTES # BLD AUTO: 0.02 THOUSAND/UL (ref 0–0.2)
IMM GRANULOCYTES NFR BLD AUTO: 0 % (ref 0–2)
LDLC SERPL CALC-MCNC: 128 MG/DL (ref 0–100)
LYMPHOCYTES # BLD AUTO: 2.47 THOUSANDS/ÂΜL (ref 0.6–4.47)
LYMPHOCYTES NFR BLD AUTO: 37 % (ref 14–44)
MCH RBC QN AUTO: 30.1 PG (ref 26.8–34.3)
MCHC RBC AUTO-ENTMCNC: 32.5 G/DL (ref 31.4–37.4)
MCV RBC AUTO: 93 FL (ref 82–98)
MONOCYTES # BLD AUTO: 0.54 THOUSAND/ÂΜL (ref 0.17–1.22)
MONOCYTES NFR BLD AUTO: 8 % (ref 4–12)
NEUTROPHILS # BLD AUTO: 3.51 THOUSANDS/ÂΜL (ref 1.85–7.62)
NEUTS SEG NFR BLD AUTO: 53 % (ref 43–75)
NONHDLC SERPL-MCNC: 144 MG/DL
NRBC BLD AUTO-RTO: 0 /100 WBCS
PLATELET # BLD AUTO: 288 THOUSANDS/UL (ref 149–390)
PMV BLD AUTO: 9.8 FL (ref 8.9–12.7)
POTASSIUM SERPL-SCNC: 4.2 MMOL/L (ref 3.5–5.3)
PROT SERPL-MCNC: 7.9 G/DL (ref 6.4–8.4)
RBC # BLD AUTO: 4.98 MILLION/UL (ref 3.81–5.12)
SODIUM SERPL-SCNC: 141 MMOL/L (ref 135–147)
TRIGL SERPL-MCNC: 79 MG/DL
TSH SERPL DL<=0.05 MIU/L-ACNC: 3.4 UIU/ML (ref 0.45–4.5)
WBC # BLD AUTO: 6.67 THOUSAND/UL (ref 4.31–10.16)

## 2024-03-09 PROCEDURE — 84443 ASSAY THYROID STIM HORMONE: CPT

## 2024-03-09 PROCEDURE — 36415 COLL VENOUS BLD VENIPUNCTURE: CPT

## 2024-03-09 PROCEDURE — 80061 LIPID PANEL: CPT

## 2024-03-09 PROCEDURE — 82306 VITAMIN D 25 HYDROXY: CPT

## 2024-03-09 PROCEDURE — 83036 HEMOGLOBIN GLYCOSYLATED A1C: CPT

## 2024-03-09 PROCEDURE — 73502 X-RAY EXAM HIP UNI 2-3 VIEWS: CPT

## 2024-03-09 PROCEDURE — 85025 COMPLETE CBC W/AUTO DIFF WBC: CPT

## 2024-03-09 PROCEDURE — 80053 COMPREHEN METABOLIC PANEL: CPT

## 2024-03-10 LAB — 25(OH)D3 SERPL-MCNC: 26.9 NG/ML (ref 30–100)

## 2024-03-11 DIAGNOSIS — R73.03 PREDIABETES: Primary | ICD-10-CM

## 2024-03-12 ENCOUNTER — HOSPITAL ENCOUNTER (OUTPATIENT)
Dept: ULTRASOUND IMAGING | Facility: HOSPITAL | Age: 59
Discharge: HOME | End: 2024-03-12
Attending: INTERNAL MEDICINE
Payer: COMMERCIAL

## 2024-03-12 ENCOUNTER — APPOINTMENT (OUTPATIENT)
Dept: LAB | Facility: HOSPITAL | Age: 59
End: 2024-03-12
Payer: COMMERCIAL

## 2024-03-12 ENCOUNTER — TELEPHONE (OUTPATIENT)
Age: 59
End: 2024-03-12

## 2024-03-12 DIAGNOSIS — R10.9 FLANK PAIN: ICD-10-CM

## 2024-03-12 DIAGNOSIS — N20.0 NEPHROLITHIASIS: ICD-10-CM

## 2024-03-12 DIAGNOSIS — T63.444D BEE STING-INDUCED ANAPHYLAXIS, UNDETERMINED INTENT, SUBSEQUENT ENCOUNTER: ICD-10-CM

## 2024-03-12 DIAGNOSIS — N13.30 HYDRONEPHROSIS, UNSPECIFIED HYDRONEPHROSIS TYPE: ICD-10-CM

## 2024-03-12 DIAGNOSIS — R10.9 FLANK PAIN: Primary | ICD-10-CM

## 2024-03-12 LAB
BACTERIA UR QL AUTO: NORMAL /HPF
BILIRUB UR QL STRIP: NEGATIVE
CLARITY UR: CLEAR
COLOR UR: ABNORMAL
GLUCOSE UR STRIP-MCNC: NEGATIVE MG/DL
HGB UR QL STRIP.AUTO: ABNORMAL
KETONES UR STRIP-MCNC: NEGATIVE MG/DL
LEUKOCYTE ESTERASE UR QL STRIP: NEGATIVE
NITRITE UR QL STRIP: NEGATIVE
NON-SQ EPI CELLS URNS QL MICRO: NORMAL /HPF
PH UR STRIP.AUTO: 7 [PH]
PROT UR STRIP-MCNC: NEGATIVE MG/DL
RBC #/AREA URNS AUTO: NORMAL /HPF
SP GR UR STRIP.AUTO: 1 (ref 1–1.03)
UROBILINOGEN UR STRIP-ACNC: <2 MG/DL
WBC #/AREA URNS AUTO: NORMAL /HPF

## 2024-03-12 PROCEDURE — 76775 US EXAM ABDO BACK WALL LIM: CPT

## 2024-03-12 PROCEDURE — 81001 URINALYSIS AUTO W/SCOPE: CPT

## 2024-03-12 NOTE — TELEPHONE ENCOUNTER
Vannessa from the reading called and stated that there were immediate findings of the US of Kidney and Bladder. Vannessa stated the full results can be found in Epic

## 2024-03-12 NOTE — TELEPHONE ENCOUNTER
----- Message from Ministerio Deshpande MD sent at 3/12/2024  3:29 PM EDT -----  Stat CT urogram ordered

## 2024-03-13 ENCOUNTER — ANNUAL EXAM (OUTPATIENT)
Dept: OBGYN CLINIC | Facility: CLINIC | Age: 59
End: 2024-03-13
Payer: COMMERCIAL

## 2024-03-13 ENCOUNTER — OFFICE VISIT (OUTPATIENT)
Age: 59
End: 2024-03-13
Payer: COMMERCIAL

## 2024-03-13 ENCOUNTER — TELEPHONE (OUTPATIENT)
Age: 59
End: 2024-03-13

## 2024-03-13 ENCOUNTER — HOSPITAL ENCOUNTER (OUTPATIENT)
Dept: CT IMAGING | Facility: HOSPITAL | Age: 59
Discharge: HOME | End: 2024-03-13
Attending: INTERNAL MEDICINE
Payer: COMMERCIAL

## 2024-03-13 VITALS
HEIGHT: 67 IN | BODY MASS INDEX: 20.72 KG/M2 | SYSTOLIC BLOOD PRESSURE: 130 MMHG | WEIGHT: 132 LBS | DIASTOLIC BLOOD PRESSURE: 80 MMHG

## 2024-03-13 VITALS — BODY MASS INDEX: 20.67 KG/M2 | HEART RATE: 73 BPM | OXYGEN SATURATION: 100 % | HEIGHT: 67 IN

## 2024-03-13 DIAGNOSIS — N20.0 NEPHROLITHIASIS: ICD-10-CM

## 2024-03-13 DIAGNOSIS — R10.9 FLANK PAIN: ICD-10-CM

## 2024-03-13 DIAGNOSIS — Z12.31 SCREENING MAMMOGRAM FOR BREAST CANCER: ICD-10-CM

## 2024-03-13 DIAGNOSIS — N64.4 MASTALGIA: ICD-10-CM

## 2024-03-13 DIAGNOSIS — Z01.411 ENCOUNTER FOR GYNECOLOGICAL EXAMINATION WITH ABNORMAL FINDING: Primary | ICD-10-CM

## 2024-03-13 DIAGNOSIS — N13.30 HYDRONEPHROSIS, UNSPECIFIED HYDRONEPHROSIS TYPE: Primary | ICD-10-CM

## 2024-03-13 DIAGNOSIS — N13.30 HYDRONEPHROSIS, UNSPECIFIED HYDRONEPHROSIS TYPE: ICD-10-CM

## 2024-03-13 DIAGNOSIS — Z78.0 ASYMPTOMATIC POSTMENOPAUSAL STATUS: ICD-10-CM

## 2024-03-13 PROBLEM — H69.90 EUSTACHIAN TUBE DISORDER: Status: RESOLVED | Noted: 2017-02-10 | Resolved: 2024-03-13

## 2024-03-13 PROBLEM — K58.9 IRRITABLE BOWEL SYNDROME: Status: ACTIVE | Noted: 2023-04-15

## 2024-03-13 PROBLEM — S46.009A ROTATOR CUFF INJURY: Status: RESOLVED | Noted: 2017-06-15 | Resolved: 2024-03-13

## 2024-03-13 PROCEDURE — S0612 ANNUAL GYNECOLOGICAL EXAMINA: HCPCS | Performed by: NURSE PRACTITIONER

## 2024-03-13 PROCEDURE — G0145 SCR C/V CYTO,THINLAYER,RESCR: HCPCS | Performed by: NURSE PRACTITIONER

## 2024-03-13 PROCEDURE — 99213 OFFICE O/P EST LOW 20 MIN: CPT | Performed by: INTERNAL MEDICINE

## 2024-03-13 PROCEDURE — 74176 CT ABD & PELVIS W/O CONTRAST: CPT

## 2024-03-13 RX ORDER — EPINEPHRINE 0.3 MG/.3ML
0.3 INJECTION SUBCUTANEOUS AS NEEDED
Qty: 1 EACH | Refills: 0 | Status: SHIPPED | OUTPATIENT
Start: 2024-03-13

## 2024-03-13 NOTE — TELEPHONE ENCOUNTER
Patient called in and stated that she was left a message to call Dr. Deshpande about her Cat scan with contrast, she cannot have the Cat scan with the contrast because she is allergic. Dr. Deshpande asking patient to come into the office for further evaluation

## 2024-03-13 NOTE — PROGRESS NOTES
Assessment/Plan:    No problem-specific Assessment & Plan notes found for this encounter.       Diagnoses and all orders for this visit:    Diagnoses and all orders for this visit:    Encounter for gynecological examination with abnormal finding  -     Liquid-based pap, screening    Asymptomatic postmenopausal status    Mastalgia  -     Ambulatory Referral to Surgical Oncology; Future    Screening mammogram for breast cancer  -     Mammo screening bilateral w 3d & cad; Future    Call as needed, encouraged calcium/vit D in her diet, all questions answered.  Breast Specialist Referral ordered.        Subjective:      Patient ID: Tomy Stevenson is a 58 y.o. female.    Pleasant 58 y.o. postmenopausal female here for annual exam. She denies postmenopausal bleeding. Hx of kidney stone and occult hematuria and sees Urology for this. She denies a history of abnormal pap smears, last Pap was negative/negative in 2018 and 2023.  A Pap was done today per patient request. Denies vaginal issues. She is sexually active with 1 partner but it has been an off-and-on relationship for over 10 years. She does have discomfort at times but declines vaginal estrogen for now. Her STD testing was negative in 2023. She denies any other postmenopausal issues. Mammogram 01/26/2024 normal but she continues with left breast pain so she agrees to see Dr Martinez. Colonoscopy 08/17/2019, due in 10.          Gynecologic Exam  Associated symptoms include hematuria. Pertinent negatives include no abdominal pain, chills, constipation, diarrhea, dysuria or fever.       The following portions of the patient's history were reviewed and updated as appropriate:   She  has a past medical history of Benign positional vertigo, right, Cervicalgia, Disease of thyroid gland, DVT (deep venous thrombosis) (HCC), Graves disease, Headache, Hypertension, Migraine, and Thyroid disease.  She   Patient Active Problem List    Diagnosis Date Noted    Irritable bowel  syndrome 04/15/2023    Bee sting allergy 06/29/2022    Migraine without aura and without status migrainosus, not intractable 06/29/2022    Mitral regurgitation 08/01/2021    DVT (deep venous thrombosis) (HCC) 08/01/2021    Biliary colic 08/01/2021    Left ventricular hypertrophy 01/24/2019    Secondary hypertension 01/24/2019    Sclerosing bone dysplasia 08/31/2017    Positional vertigo, right 07/07/2016    Hiatal hernia 01/20/2016     She  has a past surgical history that includes Appendectomy; NEUROMA EXCISION; Lipoma resection; Steroid injection hip (Left, 05/2018); FL injection left hip (non arthrogram) (01/31/2019); CHOLECYSTECTOMY LAPAROSCOPIC (N/A, 08/01/2021); Cardiac catheterization (Left, 07/29/2022); Cardiac catheterization (N/A, 07/29/2022); and Cholecystectomy (3/22).  Her family history includes Asthma in her mother; Cancer (age of onset: 20) in her sister; Diabetes in her mother; Hyperlipidemia in her mother; Hypertension in her mother; Migraines in her brother; No Known Problems in her daughter, maternal aunt, maternal aunt, maternal aunt, maternal grandmother, paternal aunt, paternal aunt, paternal aunt, and paternal grandmother; Seizures in her brother and father.  She  reports that she has quit smoking. Her smoking use included cigarettes. She has never used smokeless tobacco. She reports that she does not currently use alcohol. She reports that she does not use drugs.  Current Outpatient Medications   Medication Sig Dispense Refill    dicyclomine (BENTYL) 20 mg tablet Take 1 tablet (20 mg total) by mouth every 6 (six) hours 360 tablet 0    EPINEPHrine (EPIPEN) 0.3 mg/0.3 mL SOAJ Inject 0.3 mL (0.3 mg total) into a muscle if needed for anaphylaxis As needed 1 each 2    pantoprazole (PROTONIX) 40 mg tablet Take 1 tablet (40 mg total) by mouth daily 30 tablet 0    rizatriptan (MAXALT) 10 mg tablet One p.o. At headache onset, may repeat 1 after 2 hours p.r.n. Maximum 2/24 hours 9 tablet 5     "sucralfate (CARAFATE) 1 g tablet TAKE 1 TABLET BY MOUTH FOUR TIMES A DAY (Patient taking differently: Take 1 g by mouth if needed) 360 tablet 1    nicotine (NICODERM CQ) 7 mg/24hr TD 24 hr patch Place 1 patch on the skin over 24 hours every 24 hours (Patient not taking: Reported on 3/13/2024) 28 patch 0     No current facility-administered medications for this visit.     She is allergic to bee venom, contrast [iodinated contrast media], bee pollen, pollen extract, prochlorperazine, and besifloxacin hcl.  OB History          2    Para   1    Term   1            AB        Living   1         SAB        IAB        Ectopic        Multiple        Live Births   2               Has a 34 yo daughter, 1 grandson .  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    Review of Systems   Constitutional:  Positive for fatigue. Negative for chills, fever and unexpected weight change.   Respiratory:  Negative for shortness of breath.    Gastrointestinal:  Negative for abdominal pain, anal bleeding, blood in stool, constipation and diarrhea.   Genitourinary:  Positive for hematuria. Negative for difficulty urinating and dysuria.        OCCULT           /80 (BP Location: Left arm, Patient Position: Sitting, Cuff Size: Standard)   Ht 5' 7\" (1.702 m)   Wt 59.9 kg (132 lb)   BMI 20.67 kg/m²      Physical Exam  Constitutional:       General: She is not in acute distress.     Appearance: She is well-developed.   HENT:      Head: Normocephalic.   Pulmonary:      Effort: Pulmonary effort is normal.   Chest:   Breasts:     Breasts are symmetrical.      Right: No inverted nipple, mass, nipple discharge, skin change or tenderness.      Left: Tenderness present. No inverted nipple, mass, nipple discharge or skin change.          Comments: + tenderness to delineated areas  Abdominal:      Palpations: Abdomen is soft.   Genitourinary:     Exam position: Supine.      Labia:         Right: No rash, tenderness, " lesion or injury.         Left: No rash, tenderness, lesion or injury.       Vagina: No signs of injury and foreign body. No vaginal discharge, erythema or tenderness.      Cervix: No cervical motion tenderness, discharge or friability.      Uterus: Not deviated, not enlarged, not fixed and not tender.       Adnexa:         Right: No mass, tenderness or fullness.          Left: No mass, tenderness or fullness.        Comments: STENOTIC OS  Musculoskeletal:      Cervical back: Normal range of motion and neck supple.   Lymphadenopathy:      Lower Body: No right inguinal adenopathy. No left inguinal adenopathy.

## 2024-03-13 NOTE — PROGRESS NOTES
Assessment/Plan:    There are no diagnoses linked to this encounter.          There are no Patient Instructions on file for this visit.    Subjective:      Patient ID: Tomy Stevenson is a 58 y.o. female    Follow-up hydronephrosis    Patient contacted me yesterday with flank pain and positive urine dipstick.  We did stat ultrasound and UA.  UA was unremarkable however ultrasound revealed right hydronephrosis without evidence of obstructing stone.  She does have history of stones in the past.  I ordered stat CT urogram however patient has remote history of allergic reaction to contrast dye.  She says approximately 9 years ago she was given a small amount of IV contrast and immediately had a choking sensation where her throat was closing and she had read rash across her chest.  She received Benadryl and was observed for 40 minutes and then released.    She has persistent right flank pain.  She notes a pulling sensation that radiates from the suprapubic area to right flank when she urinates.  She has nausea.  She notes feeling bloated in her belly    Flank Pain          Current Outpatient Medications:     dicyclomine (BENTYL) 20 mg tablet, Take 1 tablet (20 mg total) by mouth every 6 (six) hours, Disp: 360 tablet, Rfl: 0    EPINEPHrine (EPIPEN) 0.3 mg/0.3 mL SOAJ, Inject 0.3 mL (0.3 mg total) into a muscle if needed for anaphylaxis As needed, Disp: 1 each, Rfl: 0    nicotine (NICODERM CQ) 7 mg/24hr TD 24 hr patch, Place 1 patch on the skin over 24 hours every 24 hours, Disp: 28 patch, Rfl: 0    pantoprazole (PROTONIX) 40 mg tablet, Take 1 tablet (40 mg total) by mouth daily, Disp: 30 tablet, Rfl: 0    rizatriptan (MAXALT) 10 mg tablet, One p.o. At headache onset, may repeat 1 after 2 hours p.r.n. Maximum 2/24 hours, Disp: 9 tablet, Rfl: 5    sucralfate (CARAFATE) 1 g tablet, TAKE 1 TABLET BY MOUTH FOUR TIMES A DAY (Patient taking differently: Take 1 g by mouth if needed), Disp: 360 tablet, Rfl: 1    Recent  Results (from the past 1008 hour(s))   CBC and differential    Collection Time: 03/09/24 11:23 AM   Result Value Ref Range    WBC 6.67 4.31 - 10.16 Thousand/uL    RBC 4.98 3.81 - 5.12 Million/uL    Hemoglobin 15.0 11.5 - 15.4 g/dL    Hematocrit 46.2 (H) 34.8 - 46.1 %    MCV 93 82 - 98 fL    MCH 30.1 26.8 - 34.3 pg    MCHC 32.5 31.4 - 37.4 g/dL    RDW 13.0 11.6 - 15.1 %    MPV 9.8 8.9 - 12.7 fL    Platelets 288 149 - 390 Thousands/uL    nRBC 0 /100 WBCs    Neutrophils Relative 53 43 - 75 %    Immature Grans % 0 0 - 2 %    Lymphocytes Relative 37 14 - 44 %    Monocytes Relative 8 4 - 12 %    Eosinophils Relative 2 0 - 6 %    Basophils Relative 0 0 - 1 %    Neutrophils Absolute 3.51 1.85 - 7.62 Thousands/µL    Absolute Immature Grans 0.02 0.00 - 0.20 Thousand/uL    Absolute Lymphocytes 2.47 0.60 - 4.47 Thousands/µL    Absolute Monocytes 0.54 0.17 - 1.22 Thousand/µL    Eosinophils Absolute 0.11 0.00 - 0.61 Thousand/µL    Basophils Absolute 0.02 0.00 - 0.10 Thousands/µL   Comprehensive metabolic panel    Collection Time: 03/09/24 11:23 AM   Result Value Ref Range    Sodium 141 135 - 147 mmol/L    Potassium 4.2 3.5 - 5.3 mmol/L    Chloride 105 96 - 108 mmol/L    CO2 28 21 - 32 mmol/L    ANION GAP 8 mmol/L    BUN 14 5 - 25 mg/dL    Creatinine 0.72 0.60 - 1.30 mg/dL    Glucose, Fasting 96 65 - 99 mg/dL    Calcium 9.8 8.4 - 10.2 mg/dL    AST 23 13 - 39 U/L    ALT 15 7 - 52 U/L    Alkaline Phosphatase 159 (H) 34 - 104 U/L    Total Protein 7.9 6.4 - 8.4 g/dL    Albumin 4.9 3.5 - 5.0 g/dL    Total Bilirubin 0.64 0.20 - 1.00 mg/dL    eGFR 91 ml/min/1.73sq m   Lipid panel    Collection Time: 03/09/24 11:23 AM   Result Value Ref Range    Cholesterol 228 (H) See Comment mg/dL    Triglycerides 79 See Comment mg/dL    HDL, Direct 84 >=50 mg/dL    LDL Calculated 128 (H) 0 - 100 mg/dL    Non-HDL-Chol (CHOL-HDL) 144 mg/dl   Hemoglobin A1C    Collection Time: 03/09/24 11:23 AM   Result Value Ref Range    Hemoglobin A1C 6.2 (H) Normal  4.0-5.6%; PreDiabetic 5.7-6.4%; Diabetic >=6.5%; Glycemic control for adults with diabetes <7.0% %     mg/dl   TSH, 3rd generation with Free T4 reflex    Collection Time: 03/09/24 11:23 AM   Result Value Ref Range    TSH 3RD GENERATON 3.395 0.450 - 4.500 uIU/mL   Vitamin D 25 hydroxy    Collection Time: 03/09/24 11:23 AM   Result Value Ref Range    Vit D, 25-Hydroxy 26.9 (L) 30.0 - 100.0 ng/mL   UA w Reflex to Microscopic w Reflex to Culture    Collection Time: 03/12/24  1:20 PM    Specimen: Urine, Clean Catch   Result Value Ref Range    Color, UA Light Yellow     Clarity, UA Clear     Specific Gravity, UA 1.002 (L) 1.003 - 1.030    pH, UA 7.0 4.5, 5.0, 5.5, 6.0, 6.5, 7.0, 7.5, 8.0    Leukocytes, UA Negative Negative    Nitrite, UA Negative Negative    Protein, UA Negative Negative mg/dl    Glucose, UA Negative Negative mg/dl    Ketones, UA Negative Negative mg/dl    Urobilinogen, UA <2.0 <2.0 mg/dl mg/dl    Bilirubin, UA Negative Negative    Occult Blood, UA Small (A) Negative   Urine Microscopic    Collection Time: 03/12/24  1:20 PM   Result Value Ref Range    RBC, UA 1-2 None Seen, 1-2 /hpf    WBC, UA 1-2 None Seen, 1-2 /hpf    Epithelial Cells Occasional None Seen, Occasional /hpf    Bacteria, UA Occasional None Seen, Occasional /hpf       The following portions of the patient's history were reviewed and updated as appropriate: allergies, current medications, past family history, past medical history, past social history, past surgical history and problem list.     Review of Systems   Genitourinary:  Positive for flank pain.         Objective:      Vitals:    03/13/24 1518   Pulse: 73   SpO2: 100%          Physical Exam  Constitutional:       Appearance: She is well-developed.   HENT:      Head: Normocephalic and atraumatic.      Nose: Nose normal.   Eyes:      General: No scleral icterus.     Conjunctiva/sclera: Conjunctivae normal.      Pupils: Pupils are equal, round, and reactive to light.   Neck:       Thyroid: No thyromegaly.      Vascular: No JVD.      Trachea: No tracheal deviation.   Cardiovascular:      Rate and Rhythm: Normal rate and regular rhythm.      Heart sounds: No murmur heard.     No friction rub. No gallop.   Pulmonary:      Effort: Pulmonary effort is normal. No respiratory distress.      Breath sounds: Normal breath sounds. No wheezing or rales.   Abdominal:      General: Bowel sounds are normal. There is no distension.      Palpations: Abdomen is soft. There is no mass.      Tenderness: There is abdominal tenderness. There is right CVA tenderness. There is no guarding or rebound.      Comments: Tender to palpate right mid abdomen and lower quadrant.  Exquisitely tender in the CVA area with light pressure in the paraspinal and flank region   Musculoskeletal:         General: No tenderness.      Cervical back: Normal range of motion and neck supple.   Lymphadenopathy:      Cervical: No cervical adenopathy.   Skin:     General: Skin is warm and dry.      Findings: No erythema or rash.   Neurological:      Mental Status: She is alert and oriented to person, place, and time.      Cranial Nerves: No cranial nerve deficit.   Psychiatric:         Behavior: Behavior normal.         Thought Content: Thought content normal.         Judgment: Judgment normal.

## 2024-03-14 ENCOUNTER — TELEPHONE (OUTPATIENT)
Dept: HEMATOLOGY ONCOLOGY | Facility: CLINIC | Age: 59
End: 2024-03-14

## 2024-03-14 DIAGNOSIS — N20.0 NEPHROLITHIASIS: Primary | ICD-10-CM

## 2024-03-14 RX ORDER — TAMSULOSIN HYDROCHLORIDE 0.4 MG/1
0.4 CAPSULE ORAL
Qty: 30 CAPSULE | Refills: 0 | Status: SHIPPED | OUTPATIENT
Start: 2024-03-14 | End: 2024-04-13

## 2024-03-14 NOTE — TELEPHONE ENCOUNTER
I called [facility] in response to a referral that was received for patient to establish care with Surgical Oncology.     Outreach was made to schedule a consultation.    I left a voicemail explaining the reason for my call and advised patient to call [facility] at [phone].  Another attempt will be made to contact patient.

## 2024-03-15 ENCOUNTER — TELEPHONE (OUTPATIENT)
Dept: HEMATOLOGY ONCOLOGY | Facility: CLINIC | Age: 59
End: 2024-03-15

## 2024-03-15 NOTE — TELEPHONE ENCOUNTER
I called [facility] in response to a referral that was received for patient to establish care with Surgical Oncology.     Outreach was made to schedule a consultation.    A consultation was scheduled for patient during this call. Patient is scheduled on 04/03/2024 at 9:30AM with Dr. Martinez at the [facility]

## 2024-03-20 ENCOUNTER — TELEPHONE (OUTPATIENT)
Age: 59
End: 2024-03-20

## 2024-03-20 NOTE — TELEPHONE ENCOUNTER
Call to this patient no answer left a message to call Dr Hays's office to be scheduled for possible hip injection.

## 2024-03-21 LAB
LAB AP GYN PRIMARY INTERPRETATION: NORMAL
Lab: NORMAL

## 2024-03-23 ENCOUNTER — OFFICE VISIT (OUTPATIENT)
Dept: OBGYN CLINIC | Facility: CLINIC | Age: 59
End: 2024-03-23
Payer: COMMERCIAL

## 2024-03-23 VITALS
BODY MASS INDEX: 20.72 KG/M2 | SYSTOLIC BLOOD PRESSURE: 129 MMHG | OXYGEN SATURATION: 99 % | DIASTOLIC BLOOD PRESSURE: 81 MMHG | HEART RATE: 65 BPM | HEIGHT: 67 IN | RESPIRATION RATE: 18 BRPM | WEIGHT: 132 LBS

## 2024-03-23 DIAGNOSIS — M16.12 PRIMARY OSTEOARTHRITIS OF LEFT HIP: Primary | ICD-10-CM

## 2024-03-23 DIAGNOSIS — Z00.00 WELL ADULT EXAM: ICD-10-CM

## 2024-03-23 DIAGNOSIS — M16.10 HIP ARTHRITIS: ICD-10-CM

## 2024-03-23 PROCEDURE — 20611 DRAIN/INJ JOINT/BURSA W/US: CPT | Performed by: FAMILY MEDICINE

## 2024-03-23 PROCEDURE — 99213 OFFICE O/P EST LOW 20 MIN: CPT | Performed by: FAMILY MEDICINE

## 2024-03-23 RX ORDER — BUPIVACAINE HYDROCHLORIDE 2.5 MG/ML
8 INJECTION, SOLUTION INFILTRATION; PERINEURAL
Status: COMPLETED | OUTPATIENT
Start: 2024-03-23 | End: 2024-03-23

## 2024-03-23 RX ORDER — TRIAMCINOLONE ACETONIDE 40 MG/ML
40 INJECTION, SUSPENSION INTRA-ARTICULAR; INTRAMUSCULAR
Status: COMPLETED | OUTPATIENT
Start: 2024-03-23 | End: 2024-03-23

## 2024-03-23 RX ADMIN — BUPIVACAINE HYDROCHLORIDE 8 ML: 2.5 INJECTION, SOLUTION INFILTRATION; PERINEURAL at 09:30

## 2024-03-23 RX ADMIN — TRIAMCINOLONE ACETONIDE 40 MG: 40 INJECTION, SUSPENSION INTRA-ARTICULAR; INTRAMUSCULAR at 09:30

## 2024-03-23 NOTE — PROGRESS NOTES
Chief Complaint   Patient presents with    Left Hip - Follow-up, Pain       Ifnargis Stevenson is a 58 y.o. female complains of left hip pain. Onset of the symptoms was  several years .  Mechanism of injury:  none . Aggravating factors: walking  and weight bearing. Treatment to date: corticosteroid injection which was somewhat effective. Symptoms have progressed to a point and plateaued. Was recommended for hip arthoplasty several years ago, has completed IA Csi under fluoroscopy which do provide temporary relief     The following portions of the patient's history were reviewed and updated as appropriate: allergies, current medications, past family history, past medical history, past social history, past surgical history and problem list.    Occupation:    Review of Systems   Constitutional: Negative for fever.   HENT: Negative for dental problem and headaches.    Eyes: Negative for vision loss.   Respiratory: Negative for cough and shortness of breath.    Cardiovascular: Negative for leg swelling and palpitations.   Gastrointestinal: Negative for constipation and diarrhea.   Genitourinary: Negative for bladder incontinence and difficulty urinating.   Musculoskeletal: Negative for back pain and difficulty walking.   Skin: Negative for rash and ulcer.   Neurological: Negative for dizziness and headaches.   Hem/Lymph/Immuno: Negative for blood clots. Does not bruise/bleed easily.   Psychiatric/Behavioral: Negative for confusion.         Objective:  There were no vitals taken for this visit.    Skin: no rashes, lesions, skin discolorations, lacerations  Neurologic: Neurologic exam is normal throughout lower extremities, Awake, alert, and oriented x3, no apparent distress.    Neuro: no weakness in the L4-S1 nerve distribution  Musculoskeletal:  Inspection: no observable abnormalities  Special tests: + FADIR and GALO test          Assessment/Plan:  1. Primary osteoarthritis of left hip  Large joint arthrocentesis:  L hip joint  Universal Protocol:  Consent: Verbal consent obtained.  Risks and benefits: risks, benefits and alternatives were discussed  Consent given by: patient  Supporting Documentation  Indications: pain   Procedure Details  Location: hip - L hip joint  Preparation: Patient was prepped and draped in the usual sterile fashion  Needle size: 22 G  Ultrasound guidance: yes  Approach: anterolateral  Medications administered: 8 mL bupivacaine 0.25 %; 40 mg triamcinolone acetonide 40 mg/mL    Patient tolerance: patient tolerated the procedure well with no immediate complications    Risks and benefits of CSI were discussed with patient extensively. Risks were highlighted which included but were not limited to infection, pain, local site swelling, and chance that injection may not be effective. Patient was also counseled regarding glucose elevation days after receiving CSI and to be mindful of diet and check sugars daily. Patient agreeable to proceed with CSI after counseling.     US images and video clip saved to the [employer]

## 2024-03-27 LAB
APOB+LDLR+PCSK9 GENE MUT ANL BLD/T: NOT DETECTED
BRCA1+BRCA2 DEL+DUP + FULL MUT ANL BLD/T: NOT DETECTED
MLH1+MSH2+MSH6+PMS2 GN DEL+DUP+FUL M: NOT DETECTED

## 2024-04-03 ENCOUNTER — CONSULT (OUTPATIENT)
Dept: SURGICAL ONCOLOGY | Facility: CLINIC | Age: 59
End: 2024-04-03
Payer: COMMERCIAL

## 2024-04-03 VITALS
TEMPERATURE: 98 F | WEIGHT: 132 LBS | RESPIRATION RATE: 16 BRPM | HEIGHT: 67 IN | DIASTOLIC BLOOD PRESSURE: 72 MMHG | BODY MASS INDEX: 20.72 KG/M2 | OXYGEN SATURATION: 97 % | HEART RATE: 66 BPM | SYSTOLIC BLOOD PRESSURE: 120 MMHG

## 2024-04-03 DIAGNOSIS — N64.4 MASTALGIA: ICD-10-CM

## 2024-04-03 DIAGNOSIS — N60.12 FIBROCYSTIC BREAST CHANGES OF BOTH BREASTS: ICD-10-CM

## 2024-04-03 DIAGNOSIS — N64.4 BREAST PAIN, LEFT: Primary | ICD-10-CM

## 2024-04-03 DIAGNOSIS — N60.11 FIBROCYSTIC BREAST CHANGES OF BOTH BREASTS: ICD-10-CM

## 2024-04-03 PROCEDURE — 99245 OFF/OP CONSLTJ NEW/EST HI 55: CPT | Performed by: SURGERY

## 2024-04-03 NOTE — PROGRESS NOTES
Surgical Oncology Consult Note       [facility]  [facility] [place]  [address]] [id number]    Tomy Stevenson  1965  [id number]      No chief complaint on file.       Assessment/Plan    1. Breast pain, left    2. Mastalgia  -     Ambulatory Referral to Surgical Oncology         Oncology History    No history exists.        58-year-old very pleasant female comes to me with left breast axillary tail discomfort for 3 years.  She has no family history of breast cancer.  She is a vegetarian.  She smokes approximately 3 to 4 cigarettes a day socially.  She denies of any nipple discharge nipple retraction or skin changes.  She has been feeling lump in the upper outer quadrant/axillary tail for last 3 years.  She had a mammogram recently which was read as BI-RADS 1.  She is here to discuss further workup and management.          Review of Systems   Constitutional:  Negative for chills and fever.   HENT:  Negative for ear pain and sore throat.    Eyes:  Negative for pain and visual disturbance.   Respiratory:  Negative for cough and shortness of breath.    Cardiovascular:  Negative for chest pain and palpitations.   Gastrointestinal:  Negative for abdominal pain and vomiting.   Genitourinary:  Negative for dysuria and hematuria.   Musculoskeletal:  Negative for arthralgias and back pain.   Skin:  Negative for color change and rash.   Neurological:  Negative for seizures and syncope.   All other systems reviewed and are negative.       Past Medical History:      Patient Active Problem List   Diagnosis    Left ventricular hypertrophy    Secondary hypertension    Breast pain, left    Mitral regurgitation    DVT (deep venous thrombosis) (HCC)    Biliary colic    Bee sting allergy    Migraine without aura and without status migrainosus, not intractable    Hiatal hernia    Sclerosing bone dysplasia    Positional vertigo, right    Irritable  bowel syndrome        Past Medical History:   Diagnosis Date    Benign positional vertigo, right     Cervicalgia     Disease of thyroid gland     DVT (deep venous thrombosis) (HCC)     Graves disease     Headache     Hypertension     Migraine     Chronic for years    Thyroid disease         Past Surgical History:   Procedure Laterality Date    APPENDECTOMY      CARDIAC CATHETERIZATION Left 07/29/2022    Procedure: Cardiac Left Heart Cath;  Surgeon: Marcos Holden MD;  [facility];  Service: Cardiology    CARDIAC CATHETERIZATION N/A 07/29/2022    Procedure: Cardiac Coronary Angiogram;  Surgeon: Marcos Holden MD;  [facility];  Service: Cardiology    CHOLECYSTECTOMY  3/22    CHOLECYSTECTOMY LAPAROSCOPIC N/A 08/01/2021    Procedure: CHOLECYSTECTOMY LAPAROSCOPIC W/ INTRAOP CHOLANGIOGRAM;  Surgeon: Imelda Patel MD;  Location: [facility];  Service: General    FL INJECTION LEFT HIP (NON ARTHROGRAM)  01/31/2019    LIPOMA RESECTION      NEUROMA EXCISION      STEROID INJECTION HIP Left 05/2018        Family History   Problem Relation Age of Onset    Hypertension Mother     Asthma Mother     Diabetes Mother     Hyperlipidemia Mother     Seizures Father     Cancer Sister 20        brain    No Known Problems Daughter     No Known Problems Maternal Grandmother     No Known Problems Paternal Grandmother     Migraines Brother     Seizures Brother     No Known Problems Maternal Aunt     No Known Problems Maternal Aunt     No Known Problems Maternal Aunt     No Known Problems Paternal Aunt     No Known Problems Paternal Aunt     No Known Problems Paternal Aunt     Breast cancer Neg Hx     Colon cancer Neg Hx     Ovarian cancer Neg Hx     Uterine cancer Neg Hx     Cervical cancer Neg Hx         Social History     Socioeconomic History    Marital status: Single     Spouse name: Not on file    Number of children: Not on file    Years of education: Not on file    Highest education level: Not  on file   Occupational History    Occupation:    Tobacco Use    Smoking status: Former     Current packs/day: 0.01     Types: Cigarettes    Smokeless tobacco: Never    Tobacco comments:     last smoked 8/16/19   Vaping Use    Vaping status: Never Used   Substance and Sexual Activity    Alcohol use: Not Currently    Drug use: No    Sexual activity: Not Currently     Partners: Male     Birth control/protection: Post-menopausal   Other Topics Concern    Not on file   Social History Narrative    Not on file     Social Determinants of Health     Financial Resource Strain: Not on file   Food Insecurity: Not on file   Transportation Needs: Not on file   Physical Activity: Sufficiently Active (12/15/2020)    Exercise Vital Sign     Days of Exercise per Week: 3 days     Minutes of Exercise per Session: 90 min   Stress: No Stress Concern Present (12/15/2020)    [employer] [place] [employer] - Occupational Stress Questionnaire     Feeling of Stress : Not at all   Social Connections: Not on file   Intimate Partner Violence: Not on file   Housing Stability: Not on file        Current Outpatient Medications:     dicyclomine (BENTYL) 20 mg tablet, Take 1 tablet (20 mg total) by mouth every 6 (six) hours (Patient taking differently: Take 20 mg by mouth every 6 (six) hours as needed), Disp: 360 tablet, Rfl: 0    EPINEPHrine (EPIPEN) 0.3 mg/0.3 mL SOAJ, Inject 0.3 mL (0.3 mg total) into a muscle if needed for anaphylaxis As needed, Disp: 1 each, Rfl: 0    pantoprazole (PROTONIX) 40 mg tablet, Take 1 tablet (40 mg total) by mouth daily, Disp: 30 tablet, Rfl: 0    rizatriptan (MAXALT) 10 mg tablet, One p.o. At headache onset, may repeat 1 after 2 hours p.r.n. Maximum 2/24 hours, Disp: 9 tablet, Rfl: 5    sucralfate (CARAFATE) 1 g tablet, TAKE 1 TABLET BY MOUTH FOUR TIMES A DAY (Patient taking differently: Take 1 g by mouth if needed), Disp: 360 tablet, Rfl: 1    nicotine (NICODERM CQ) 7 mg/24hr TD 24 hr  patch, Place 1 patch on the skin over 24 hours every 24 hours, Disp: 28 patch, Rfl: 0    tamsulosin (FLOMAX) 0.4 mg, Take 1 capsule (0.4 mg total) by mouth daily with dinner (Patient not taking: Reported on 3/23/2024), Disp: 30 capsule, Rfl: 0     Allergies   Allergen Reactions    Bee Venom      Bee sting    Contrast [Iodinated Contrast Media] Anaphylaxis     Pt states her throat closes- kk rn     Bee Pollen Dizziness     Specifically bee sting which evokes reaction of swelling of throat, headache and vomitting    Pollen Extract Other (See Comments)     Throat clearing, difficulty swallowing, ears itchy    Prochlorperazine Other (See Comments)      Aka (compazine)  Delirious     Besifloxacin Hcl Palpitations     Other reaction(s): Unknown       Physical Exam:     Vitals:    04/03/24 0905   BP: 120/72   Pulse: 66   Resp: 16   Temp: 98 °F (36.7 °C)   SpO2: 97%     Physical Exam  Constitutional:       Appearance: Normal appearance.   HENT:      Head: Normocephalic and atraumatic.      Nose: Nose normal.      Mouth/Throat:      Mouth: Mucous membranes are moist.   Eyes:      Pupils: Pupils are equal, round, and reactive to light.   Cardiovascular:      Rate and Rhythm: Normal rate.      Pulses: Normal pulses.      Heart sounds: Normal heart sounds.   Pulmonary:      Effort: Pulmonary effort is normal.      Breath sounds: Normal breath sounds.   Chest:          Comments: Right breast no palpable mass masses nipple discharge nipple retraction or skin changes right axilla and supraclavicular examination no palpable adenopathy  Left breast no palpable mass masses nipple discharge nipple retraction or skin changes.  Left axillary along the fifth rib tenderness to palpate.  Left axillary and supraclavicular examination no palpable adenopathy.  Dense fibrocystic  Abdominal:      General: Bowel sounds are normal.      Palpations: Abdomen is soft.   Musculoskeletal:         General: Normal range of motion.      Cervical back:  Normal range of motion and neck supple.   Skin:     General: Skin is warm.   Neurological:      General: No focal deficit present.      Mental Status: She is alert and oriented to person, place, and time.   Psychiatric:         Mood and Affect: Mood normal.         Behavior: Behavior normal.         Thought Content: Thought content normal.         Judgment: Judgment normal.         Results:     Narrative & Impression   DIAGNOSIS: Encounter for screening mammogram for malignant neoplasm of breast      TECHNIQUE:  Digital screening mammography was performed. Computer Aided Detection (CAD) analyzed all applicable images.   COMPARISONS: Multiple prior exams dated between 8/17/2009 and 6/23/2022.     RELEVANT HISTORY:   Family Breast Cancer History: History of breast cancer in Neg Hx.  Family Medical History: No known relevant family medical history.   Personal History: Hormone history includes birth control. No known relevant surgical history. No known relevant medical history.     The patient is scheduled in a reminder system for screening mammography.     8-10% of cancers will be missed on mammography. Management of a palpable abnormality must be based on clinical grounds.  Patients will be notified of their results via letter from our facility. Accredited by American College of Rad[employer] and FDA.     RISK ASSESSMENT:   5 Year Tyrer-Cuzick: 0.56%  10 Year Tyrer-Cuzick: 1.22%  Lifetime Tyrer-Cuzick: 3.25%     TISSUE DENSITY:   There are scattered areas of fibroglandular density.      INDICATION: Tomy Stevenson is a 58 y.o. female presenting for screening mammography.     FINDINGS:   Bilateral  There are no suspicious masses, grouped microcalcifications or areas of unexplained architectural distortion. The skin and nipple areolar complex are unremarkable.  Benign-appearing calcifications are noted in each breast.        IMPRESSION:  No mammographic evidence of malignancy.           ASSESSMENT/BI-RADS  CATEGORY:  Left: 2 - Benign  Right: 2 - Benign  Overall: 2 - Benign     RECOMMENDATION:       - Routine screening mammogram in 1 year for both breasts.          Discussion/Summary:   This is a 58-year-old female with fibrocystic breast and mastalgia.  Further evaluating her pain she says it is around 5-6 at the scale of 10.  Physical examination it is tender along the fifth rib rather than the breast tissue.  I did not clinically appreciate any significant mass or masses.  We did discuss the reasons for mastalgia and recommended her to reduce smoking, cut down her dark chocolate intake.  We will also give her breast pain management leaflet.  Given her extremely dense breast tissue we will obtain ABUS of the breast to rule out occult malignancy.  All patient's questions answered to her satisfaction.    Advance Care Planning/Advance Directives:  I Dolly Martinez MD discussed the disease status, and treatment plans with Tomy Stevenson today 04/03/24 and will follow-up with the patient.

## 2024-04-05 ENCOUNTER — HOSPITAL ENCOUNTER (OUTPATIENT)
Dept: ULTRASOUND IMAGING | Facility: CLINIC | Age: 59
End: 2024-04-05
Payer: COMMERCIAL

## 2024-04-05 DIAGNOSIS — N64.4 MASTALGIA: ICD-10-CM

## 2024-04-05 DIAGNOSIS — N60.12 FIBROCYSTIC BREAST CHANGES OF BOTH BREASTS: ICD-10-CM

## 2024-04-05 DIAGNOSIS — N60.11 FIBROCYSTIC BREAST CHANGES OF BOTH BREASTS: ICD-10-CM

## 2024-04-05 DIAGNOSIS — N64.4 BREAST PAIN, LEFT: ICD-10-CM

## 2024-04-05 PROCEDURE — 76642 ULTRASOUND BREAST LIMITED: CPT

## 2024-04-09 DIAGNOSIS — N20.0 NEPHROLITHIASIS: ICD-10-CM

## 2024-04-09 RX ORDER — TAMSULOSIN HYDROCHLORIDE 0.4 MG/1
0.4 CAPSULE ORAL
Qty: 90 CAPSULE | Refills: 1 | OUTPATIENT
Start: 2024-04-09
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
## 2024-04-30 RX ORDER — DOXYCYCLINE HYCLATE 100 MG
100 TABLET ORAL 2 TIMES DAILY
Qty: 14 TABLET | Refills: 0 | Status: SHIPPED | OUTPATIENT
Start: 2024-04-30 | End: 2024-05-07

## 2024-05-01 DIAGNOSIS — K21.9 GASTROESOPHAGEAL REFLUX DISEASE, UNSPECIFIED WHETHER ESOPHAGITIS PRESENT: Primary | ICD-10-CM

## 2024-05-01 RX ORDER — DEXLANSOPRAZOLE 60 MG/1
60 CAPSULE, DELAYED RELEASE ORAL DAILY
Qty: 30 CAPSULE | Refills: 3 | Status: SHIPPED | OUTPATIENT
Start: 2024-05-01 | End: 2024-05-03 | Stop reason: SDUPTHER

## 2024-05-02 ENCOUNTER — TELEPHONE (OUTPATIENT)
Dept: GASTROENTEROLOGY | Facility: CLINIC | Age: 59
End: 2024-05-02

## 2024-05-02 DIAGNOSIS — K21.9 GASTROESOPHAGEAL REFLUX DISEASE, UNSPECIFIED WHETHER ESOPHAGITIS PRESENT: ICD-10-CM

## 2024-05-02 NOTE — TELEPHONE ENCOUNTER
Iqra pt  Spoke to patient, schedule appointment for 6/11  She would like Dexilant filled at [facility], and she would also like her pharmacy changed to [facility]

## 2024-05-03 ENCOUNTER — TELEPHONE (OUTPATIENT)
Age: 59
End: 2024-05-03

## 2024-05-03 RX ORDER — DEXLANSOPRAZOLE 60 MG/1
60 CAPSULE, DELAYED RELEASE ORAL DAILY
Qty: 30 CAPSULE | Refills: 3 | Status: SHIPPED | OUTPATIENT
Start: 2024-05-03

## 2024-05-03 NOTE — TELEPHONE ENCOUNTER
Forwarding to PA team for review.   Patient needs prior authorization for Dexilant 60 mg capsule  Iqra Uriarte PA-C/Duncan Lowman

## 2024-05-03 NOTE — TELEPHONE ENCOUNTER
PA for Dexilant     Submitted via    [x]CMM-KEY [id number]   []Surescripts-Case ID #   []Faxed to plan   []Other website   []Phone call Case ID #     Office notes sent, clinical questions answered. Awaiting determination    Turnaround time for your insurance to make a decision on your Prior Authorization can take 7-21 business days.

## 2024-05-05 NOTE — TELEPHONE ENCOUNTER
PA for dexlansoprazole Approved     Date(s) approved until 5/3/2025    Case #    Patient advised by          [x] [employer] Message  [] Phone call   []LMOM  []L/M to call office as no active Communication consent on file  []Unable to leave detailed message as VM not approved on Communication consent       Pharmacy advised by    [x]Fax  []Phone call    Approval letter scanned into Media Yes

## 2024-05-22 ENCOUNTER — OFFICE VISIT (OUTPATIENT)
Dept: GASTROENTEROLOGY | Facility: CLINIC | Age: 59
End: 2024-05-22
Payer: COMMERCIAL

## 2024-05-22 VITALS
WEIGHT: 130 LBS | BODY MASS INDEX: 20.4 KG/M2 | HEART RATE: 59 BPM | OXYGEN SATURATION: 100 % | DIASTOLIC BLOOD PRESSURE: 74 MMHG | SYSTOLIC BLOOD PRESSURE: 132 MMHG | TEMPERATURE: 98 F | HEIGHT: 67 IN

## 2024-05-22 DIAGNOSIS — K21.9 GASTROESOPHAGEAL REFLUX DISEASE, UNSPECIFIED WHETHER ESOPHAGITIS PRESENT: ICD-10-CM

## 2024-05-22 DIAGNOSIS — K58.2 IRRITABLE BOWEL SYNDROME WITH BOTH CONSTIPATION AND DIARRHEA: Primary | ICD-10-CM

## 2024-05-22 PROCEDURE — 99213 OFFICE O/P EST LOW 20 MIN: CPT | Performed by: PHYSICIAN ASSISTANT

## 2024-05-22 NOTE — PROGRESS NOTES
[facility] - Outpatient Follow-up Note  Tomy Stevenson 58 y.o. female MRN: [id number]  Encounter: [id number]          ASSESSMENT AND PLAN:      1. Irritable bowel syndrome with both constipation and diarrhea  Symptoms became exacerbated earlier this year but seem to be back in control  Continue fiber and probiotic  She is slightly constipated at present - Colace as needed    2. Gastroesophageal reflux disease, unspecified whether esophagitis present  Symptoms were severe a few weeks ago  On Dexilant 60mg daily and Carafate TID with good improvement  EGD last July showed gastritis  Continue present management with plans to cut back on carafate next month  She wants to continue Dexilant long term but admits it is expensive    ______________________________________________________________________    SUBJECTIVE: 58-year-old female with a long history of irritable bowel syndrome and GERD who presents for routine follow-up.  At present she is doing well but admits that several months ago her symptoms were significantly exacerbated.  She was having severe diarrhea for a time period.  She was also having significant epigastric pain and nausea with inability to eat.  She is back on Dexilant 60 mg daily and Carafate 1 g 3 times daily.  Her symptoms have improved significantly.  She is actually slightly constipated at present.  She reports abdominal discomfort but no severe pain.  During her symptom exacerbation she did lose weight but admits that she is regaining.  She reports that recently she is in a better working environment which has minimized her stress.  She has a 1-year-old grandson which has improved her mood.  Her last EGD was July 2023 with noted gastritis  Her last colonoscopy was 2019 without any polyps.  She is due routinely for colonoscopy in 2029.      REVIEW OF SYSTEMS IS OTHERWISE NEGATIVE.      Historical Information   Past Medical History:   Diagnosis Date    Benign  positional vertigo, right     Cervicalgia     Disease of thyroid gland     DVT (deep venous thrombosis) (HCC)     Graves disease     Headache     Hypertension     Migraine     Chronic for years    Thyroid disease      Past Surgical History:   Procedure Laterality Date    APPENDECTOMY      CARDIAC CATHETERIZATION Left 07/29/2022    Procedure: Cardiac Left Heart Cath;  Surgeon: Marcos Holden MD;  [facility];  Service: Cardiology    CARDIAC CATHETERIZATION N/A 07/29/2022    Procedure: Cardiac Coronary Angiogram;  Surgeon: Marcos Holden MD;  [facility];  Service: Cardiology    CHOLECYSTECTOMY  3/22    CHOLECYSTECTOMY LAPAROSCOPIC N/A 08/01/2021    Procedure: CHOLECYSTECTOMY LAPAROSCOPIC W/ INTRAOP CHOLANGIOGRAM;  Surgeon: Imelda Patel MD;  [facility];  Service: General    FL INJECTION LEFT HIP (NON ARTHROGRAM)  01/31/2019    LIPOMA RESECTION      NEUROMA EXCISION      STEROID INJECTION HIP Left 05/2018     Social History   Social History     Substance and Sexual Activity   Alcohol Use Not Currently     Social History     Substance and Sexual Activity   Drug Use No     Social History     Tobacco Use   Smoking Status Former    Current packs/day: 0.01    Types: Cigarettes   Smokeless Tobacco Never   Tobacco Comments    last smoked 8/16/19     Family History   Problem Relation Age of Onset    Hypertension Mother     Asthma Mother     Diabetes Mother     Hyperlipidemia Mother     Seizures Father     Cancer Sister 20        brain    No Known Problems Daughter     No Known Problems Maternal Grandmother     No Known Problems Paternal Grandmother     Migraines Brother     Seizures Brother     No Known Problems Maternal Aunt     No Known Problems Maternal Aunt     No Known Problems Maternal Aunt     No Known Problems Paternal Aunt     No Known Problems Paternal Aunt     No Known Problems Paternal Aunt     Breast cancer Neg Hx     Colon cancer Neg Hx     Ovarian cancer Neg  "Hx     Uterine cancer Neg Hx     Cervical cancer Neg Hx        Meds/Allergies       Current Outpatient Medications:     dexlansoprazole (DEXILANT) 60 MG capsule    dicyclomine (BENTYL) 20 mg tablet    EPINEPHrine (EPIPEN) 0.3 mg/0.3 mL SOAJ    pantoprazole (PROTONIX) 40 mg tablet    rizatriptan (MAXALT) 10 mg tablet    sucralfate (CARAFATE) 1 g tablet    Allergies   Allergen Reactions    Bee Venom      Bee sting    Contrast [Iodinated Contrast Media] Anaphylaxis     Pt states her throat closes- kk rn     Bee Pollen Dizziness     Specifically bee sting which evokes reaction of swelling of throat, headache and vomitting    Pollen Extract Other (See Comments)     Throat clearing, difficulty swallowing, ears itchy    Prochlorperazine Other (See Comments)      Aka (compazine)  Delirious     Besifloxacin Hcl Palpitations     Other reaction(s): Unknown           Objective     Blood pressure 132/74, pulse 59, temperature 98 °F (36.7 °C), temperature source Temporal, height 5' 7\" (1.702 m), weight 59 kg (130 lb), SpO2 100%, not currently breastfeeding. Body mass index is 20.36 kg/m².      PHYSICAL EXAM:      General Appearance:   Alert, cooperative, no distress   HEENT:   Normocephalic, atraumatic, anicteric.     Neck:  Supple, symmetrical, trachea midline   Lungs:   Clear to auscultation bilaterally; no rales, rhonchi or wheezing; respirations unlabored    Heart::   Regular rate and rhythm; no murmur, rub, or gallop.   Abdomen:   Soft, non-tender, non-distended; normal bowel sounds; no masses, no organomegaly    Genitalia:   Deferred    Rectal:   Deferred    Extremities:  No cyanosis, clubbing or edema    Pulses:  2+ and symmetric    Skin:  No jaundice, rashes, or lesions    Lymph nodes:  No palpable cervical lymphadenopathy        Lab Results:   No visits with results within 1 Day(s) from this visit.   Latest known visit with results is:   Annual Exam on 03/13/2024   Component Date Value    Case Report 03/13/2024           "            Value:Gynecologic Cytology Report                       Case: [id number]                                  Authorizing Provider:  MARIANN Quijano       Collected:           03/13/2024 0915              Ordering Location:     [facility]     Received:            03/13/2024 0915                                     [facility]                                                                               [place]                                                                       First Screen:          Taniya Mendez, CT                                                    Specimen:    LIQUID-BASED PAP, SCREENING, Vaginal/Endocervical                                          Primary Interpretation 03/13/2024 Negative for intraepithelial lesion or malignancy     Specimen Adequacy 03/13/2024 Satisfactory for evaluation. (See note)     Note 03/13/2024                      Value:This result contains rich text formatting which cannot be displayed here.    Additional Information 03/13/2024                      Value:This result contains rich text formatting which cannot be displayed here.         Radiology Results:   No results found.  Answers submitted by the patient for this visit:  Abdominal Pain Questionnaire (Submitted on 5/21/2024)  Chief Complaint: Abdominal pain  Chronicity: chronic  Onset: more than 1 month ago  Onset quality: sudden  Frequency: constantly  Episode duration: 3 Weeks  Progression since onset: waxing and waning  Pain location: epigastric region  Pain - numeric: 8/10  Pain quality: cramping, sharp  Radiates to: epigastric region  anorexia: No  arthralgias: Yes  belching: No  constipation: Yes  diarrhea: Yes  dysuria: No  fever: No  flatus: Yes  frequency: Yes  headaches: Yes  hematochezia: No  hematuria: No  melena: No  myalgias: Yes  nausea: Yes  weight loss: Yes  vomiting: No  Aggravated by: certain positions, eating, movement  Relieved by: palpation,  recumbency, vomiting  Diagnostic workup: lower endoscopy

## 2024-07-11 ENCOUNTER — NURSE TRIAGE (OUTPATIENT)
Age: 59
End: 2024-07-11

## 2024-07-11 NOTE — TELEPHONE ENCOUNTER
Pep tried to transfer a call pt is currently have s/s.    Pt hung up prior to getting transferred.     Tried to call pt back and left a message to call office to discuss s/s.

## 2024-07-14 DIAGNOSIS — I15.9 SECONDARY HYPERTENSION: Primary | ICD-10-CM

## 2024-07-14 RX ORDER — CHLORTHALIDONE 25 MG/1
25 TABLET ORAL DAILY
Qty: 30 TABLET | Refills: 5 | Status: SHIPPED | OUTPATIENT
Start: 2024-07-14

## 2024-08-01 ENCOUNTER — APPOINTMENT (OUTPATIENT)
Dept: LAB | Facility: HOSPITAL | Age: 59
End: 2024-08-01
Payer: COMMERCIAL

## 2024-08-01 DIAGNOSIS — I15.9 SECONDARY HYPERTENSION: ICD-10-CM

## 2024-08-01 DIAGNOSIS — Z00.8 HEALTH EXAMINATION IN POPULATION SURVEY: ICD-10-CM

## 2024-08-01 LAB
ANION GAP SERPL CALCULATED.3IONS-SCNC: 6 MMOL/L (ref 4–13)
BUN SERPL-MCNC: 16 MG/DL (ref 5–25)
CALCIUM SERPL-MCNC: 9.3 MG/DL (ref 8.4–10.2)
CHLORIDE SERPL-SCNC: 104 MMOL/L (ref 96–108)
CHOLEST SERPL-MCNC: 204 MG/DL
CO2 SERPL-SCNC: 30 MMOL/L (ref 21–32)
CREAT SERPL-MCNC: 0.61 MG/DL (ref 0.6–1.3)
EST. AVERAGE GLUCOSE BLD GHB EST-MCNC: 126 MG/DL
GFR SERPL CREATININE-BSD FRML MDRD: 99 ML/MIN/1.73SQ M
GLUCOSE SERPL-MCNC: 94 MG/DL (ref 65–140)
HBA1C MFR BLD: 6 %
HDLC SERPL-MCNC: 78 MG/DL
LDLC SERPL CALC-MCNC: 98 MG/DL (ref 0–100)
NONHDLC SERPL-MCNC: 126 MG/DL
POTASSIUM SERPL-SCNC: 3.7 MMOL/L (ref 3.5–5.3)
SODIUM SERPL-SCNC: 140 MMOL/L (ref 135–147)
TRIGL SERPL-MCNC: 141 MG/DL

## 2024-08-01 PROCEDURE — 36415 COLL VENOUS BLD VENIPUNCTURE: CPT

## 2024-08-01 PROCEDURE — 83036 HEMOGLOBIN GLYCOSYLATED A1C: CPT

## 2024-08-01 PROCEDURE — 80061 LIPID PANEL: CPT

## 2024-08-01 PROCEDURE — 80048 BASIC METABOLIC PNL TOTAL CA: CPT

## 2024-08-05 DIAGNOSIS — I15.9 SECONDARY HYPERTENSION: ICD-10-CM

## 2024-08-06 RX ORDER — CHLORTHALIDONE 25 MG/1
25 TABLET ORAL DAILY
Qty: 90 TABLET | Refills: 2 | Status: SHIPPED | OUTPATIENT
Start: 2024-08-06 | End: 2024-08-12 | Stop reason: ALTCHOICE

## 2024-08-12 ENCOUNTER — OFFICE VISIT (OUTPATIENT)
Dept: CARDIOLOGY CLINIC | Facility: CLINIC | Age: 59
End: 2024-08-12
Payer: COMMERCIAL

## 2024-08-12 VITALS
HEART RATE: 66 BPM | HEIGHT: 67 IN | BODY MASS INDEX: 20.72 KG/M2 | OXYGEN SATURATION: 99 % | DIASTOLIC BLOOD PRESSURE: 84 MMHG | WEIGHT: 132 LBS | RESPIRATION RATE: 16 BRPM | SYSTOLIC BLOOD PRESSURE: 146 MMHG

## 2024-08-12 DIAGNOSIS — I10 HYPERTENSION, UNSPECIFIED TYPE: ICD-10-CM

## 2024-08-12 DIAGNOSIS — R07.9 CHEST PAIN, UNSPECIFIED TYPE: Primary | ICD-10-CM

## 2024-08-12 DIAGNOSIS — Z86.718 HISTORY OF DVT (DEEP VEIN THROMBOSIS): ICD-10-CM

## 2024-08-12 PROCEDURE — 99214 OFFICE O/P EST MOD 30 MIN: CPT

## 2024-08-12 RX ORDER — LOSARTAN POTASSIUM 25 MG/1
25 TABLET ORAL DAILY
Qty: 90 TABLET | Refills: 1 | Status: SHIPPED | OUTPATIENT
Start: 2024-08-12

## 2024-08-12 NOTE — PROGRESS NOTES
[facility] Cardiology   Office Visit    Tomy Stevenson 58 y.o. female MRN: [id number]    08/12/24        Assessment/Plan:  1.  Chest pain  Endorses left-sided chest pain with exercise.  EKG shows sinus rhythm with moderate voltage criteria for LVH.  Cardiac catheterization in 2022 revealed angiographically normal coronary arteries.  Plan for limited TTE to assess LVEF, wall motion, and evaluate for structural abnormalities.  Advised to notify our office for any new/worsening symptoms.    2.  Hypertension  BP has been running elevated.  Reportedly did not tolerate chlorthalidone due to adverse effects.  Transition to losartan 25 mg daily.  Encourage ambulatory monitoring and low-sodium diet.  Advised to notify our office for persistently abnormal BP readings.    3.  History of provoked DVT        Interval history: Tomy Stevenson is a pleasant 58 y.o. year old female with history of hypertension and provoked DVT who presents for office visit.  During previous visit with cardiology patient was ordered cardiac catheterization for further evaluation of chest pain in setting of abnormal stress test.  This was completed in July 2022 and negative for evidence of significant epicardial CAD.  Today.  Patient complains of persistently over the blood pressures.  She was recently initiated on chlorthalidone, however stopped taking due to adverse effects.  She is agreeable to trial initiation of an alternative class of antihypertensive.  Patient reports she is very active at baseline and exercises routinely with free weights and cardio.  She occasionally experiences left-sided chest discomfort while exercising.  This feels somewhat similar to symptoms around time of her cardiac catheterization in 2022.  Denies any additional complaints at this time.  Previously followed with Dr. Lonny Alvarado his primary cardiologist.      Review of Systems:  Review of Systems   Constitutional:  Negative for chills and fever.  "  HENT:  Negative for ear pain and sore throat.    Eyes:  Negative for pain and visual disturbance.   Respiratory:  Negative for cough and shortness of breath.    Cardiovascular:  Positive for chest pain. Negative for palpitations and leg swelling.   Gastrointestinal:  Negative for abdominal pain and vomiting.   Genitourinary:  Negative for dysuria and hematuria.   Musculoskeletal:  Negative for arthralgias and back pain.   Skin:  Negative for color change and rash.   Neurological:  Negative for seizures and syncope.   All other systems reviewed and are negative.      PHYSICAL EXAM:  Vitals:   Vitals:    08/12/24 1637   BP: 146/84   BP Location: Left arm   Patient Position: Sitting   Cuff Size: Standard   Pulse: 66   Resp: 16   SpO2: 99%   Weight: 59.9 kg (132 lb)   Height: 5' 7\" (1.702 m)        Physical Exam:  GEN: Alert and oriented x 3, in no acute distress.  Well appearing and well nourished.   HEENT: Sclera anicteric, conjunctivae pink, mucous membranes moist. Oropharynx clear.   NECK: Supple, no carotid bruits, no significant JVD. Trachea midline, no thyromegaly.   HEART: Regular rhythm, normal S1 and S2, no murmurs, clicks, gallops or rubs. PMI nondisplaced, no thrills.   LUNGS: Clear to auscultation bilaterally; no wheezes, rales, or rhonchi. No increased work of breathing or signs of respiratory distress.   ABDOMEN: Soft, nontender, nondistended, normoactive bowel sounds.   EXTREMITIES: Skin warm and well perfused, no clubbing, cyanosis, or edema.  NEURO: No focal findings. Normal speech. Mood and affect normal.   SKIN: Normal without suspicious lesions on exposed skin.    Follow up: 2 months or sooner as needed    Allergies   Allergen Reactions    Bee Venom      Bee sting    Contrast [Iodinated Contrast Media] Anaphylaxis     Pt states her throat closes- kk rn     Bee Pollen Dizziness     Specifically bee sting which evokes reaction of swelling of throat, headache and vomitting    Pollen Extract Other " (See Comments)     Throat clearing, difficulty swallowing, ears itchy    Prochlorperazine Other (See Comments)      Aka (compazine)  Delirious     Besifloxacin Hcl Palpitations     Other reaction(s): Unknown         Current Outpatient Medications:     dexlansoprazole (DEXILANT) 60 MG capsule, Take 1 capsule (60 mg total) by mouth daily, Disp: 30 capsule, Rfl: 3    dicyclomine (BENTYL) 20 mg tablet, Take 1 tablet (20 mg total) by mouth every 6 (six) hours (Patient taking differently: Take 20 mg by mouth every 6 (six) hours as needed), Disp: 360 tablet, Rfl: 0    EPINEPHrine (EPIPEN) 0.3 mg/0.3 mL SOAJ, Inject 0.3 mL (0.3 mg total) into a muscle if needed for anaphylaxis As needed, Disp: 1 each, Rfl: 0    losartan (COZAAR) 25 mg tablet, Take 1 tablet (25 mg total) by mouth daily, Disp: 90 tablet, Rfl: 1    pantoprazole (PROTONIX) 40 mg tablet, Take 1 tablet (40 mg total) by mouth daily, Disp: 30 tablet, Rfl: 0    rizatriptan (MAXALT) 10 mg tablet, One p.o. At headache onset, may repeat 1 after 2 hours p.r.n. Maximum 2/24 hours, Disp: 9 tablet, Rfl: 5    sucralfate (CARAFATE) 1 g tablet, Take 1 tablet (1 g total) by mouth 4 (four) times a day, Disp: 360 tablet, Rfl: 0    Past Medical History:   Diagnosis Date    Benign positional vertigo, right     Cervicalgia     Disease of thyroid gland     DVT (deep venous thrombosis) (HCC)     Graves disease     Headache     Hypertension     Migraine     Chronic for years    Thyroid disease        Family History   Problem Relation Age of Onset    Hypertension Mother     Asthma Mother     Diabetes Mother     Hyperlipidemia Mother     Seizures Father     Cancer Sister 20        brain    No Known Problems Daughter     No Known Problems Maternal Grandmother     No Known Problems Paternal Grandmother     Migraines Brother     Seizures Brother     No Known Problems Maternal Aunt     No Known Problems Maternal Aunt     No Known Problems Maternal Aunt     No Known Problems Paternal Aunt      No Known Problems Paternal Aunt     No Known Problems Paternal Aunt     Breast cancer Neg Hx     Colon cancer Neg Hx     Ovarian cancer Neg Hx     Uterine cancer Neg Hx     Cervical cancer Neg Hx        Past Medical History:   Diagnosis Date    Benign positional vertigo, right     Cervicalgia     Disease of thyroid gland     DVT (deep venous thrombosis) (HCC)     Graves disease     Headache     Hypertension     Migraine     Chronic for years    Thyroid disease        Past Surgical History:   Procedure Laterality Date    APPENDECTOMY      CARDIAC CATHETERIZATION Left 07/29/2022    Procedure: Cardiac Left Heart Cath;  Surgeon: Marcos Holden MD;  Location: [facility];  Service: Cardiology    CARDIAC CATHETERIZATION N/A 07/29/2022    Procedure: Cardiac Coronary Angiogram;  Surgeon: Marcos Holden MD;  Location: [facility];  Service: Cardiology    CHOLECYSTECTOMY  3/22    CHOLECYSTECTOMY LAPAROSCOPIC N/A 08/01/2021    Procedure: CHOLECYSTECTOMY LAPAROSCOPIC W/ INTRAOP CHOLANGIOGRAM;  Surgeon: Imelda Patel MD;  Location: [facility];  Service: General    FL INJECTION LEFT HIP (NON ARTHROGRAM)  01/31/2019    LIPOMA RESECTION      NEUROMA EXCISION      STEROID INJECTION HIP Left 05/2018       Social History     Socioeconomic History    Marital status: Single     Spouse name: Not on file    Number of children: Not on file    Years of education: Not on file    Highest education level: Not on file   Occupational History    Occupation:    Tobacco Use    Smoking status: Former     Current packs/day: 0.01     Types: Cigarettes    Smokeless tobacco: Never    Tobacco comments:     last smoked 8/16/19   Vaping Use    Vaping status: Never Used   Substance and Sexual Activity    Alcohol use: Not Currently    Drug use: No    Sexual activity: Not Currently     Partners: Male     Birth control/protection: Post-menopausal   Other Topics Concern    Not on file   Social History Narrative     "Not on file     Social Determinants of Health     Financial Resource Strain: Not on file   Food Insecurity: Not on file   Transportation Needs: Not on file   Physical Activity: Sufficiently Active (12/15/2020)    Exercise Vital Sign     Days of Exercise per Week: 3 days     Minutes of Exercise per Session: 90 min   Stress: No Stress Concern Present (12/15/2020)    [employer] - Occupational Stress Questionnaire     Feeling of Stress : Not at all   Social Connections: Not on file   Intimate Partner Violence: Not on file   Housing Stability: Not on file             LABORATORY RESULTS:    Lab Results   Component Value Date    WBC 6.67 03/09/2024    HGB 15.0 03/09/2024    HCT 46.2 (H) 03/09/2024    MCV 93 03/09/2024     03/09/2024     Lab Results   Component Value Date    CALCIUM 9.3 08/01/2024    K 3.7 08/01/2024    CO2 30 08/01/2024     08/01/2024    BUN 16 08/01/2024    CREATININE 0.61 08/01/2024     Lab Results   Component Value Date    HGBA1C 6.0 (H) 08/01/2024       Lipid Profile:   No results found for: \"CHOL\"  Lab Results   Component Value Date    HDL 78 08/01/2024    HDL 84 03/09/2024    HDL 77 05/02/2023     Lab Results   Component Value Date    LDLCALC 98 08/01/2024    LDLCALC 128 (H) 03/09/2024    LDLCALC 101 (H) 05/02/2023     Lab Results   Component Value Date    TRIG 141 08/01/2024    TRIG 79 03/09/2024    TRIG 104 05/02/2023       The 10-year ASCVD risk score (Odalys CASTRO, et al., 2019) is: 4.1%    Values used to calculate the score:      Age: 58 years      Sex: Female      Is Non- : No      Diabetic: No      Tobacco smoker: No      Systolic Blood Pressure: 146 mmHg      Is BP treated: Yes      HDL Cholesterol: 78 mg/dL      Total Cholesterol: 204 mg/dL    1. Chest pain, unspecified type  Echo complete w/ contrast if indicated    ECG 12 lead      2. Hypertension, unspecified type  Echo complete w/ contrast if indicated    losartan (COZAAR) " 25 mg tablet      3. History of DVT (deep vein thrombosis)            Imaging: I have personally reviewed pertinent reports.        EKG reviewed personally: Sinus bradycardia; moderate voltage criteria for LVH, may be normal variant    Recommend aggressive risk factor modification and therapeutic lifestyle changes.  Low-salt, low-calorie, low-fat, low-cholesterol diet with regular exercise and to optimize weight.    Discussed concepts of atherosclerosis, including signs and symptoms of cardiac disease.    Medications reviewed and possible side effects discussed.  Previous studies were reviewed.    Safety measures were reviewed.  All questions and concerns addressed.  Patient was advised to report any problems requiring medical attention.    Follow-up with PCP and appropriate specialist and lab work as discussed.    Return for follow up visit as scheduled or earlier, if needed.  Thank you for allowing me to participate in the care and evaluation of your patient.  Should you have any questions, please feel free to contact me.    Sedrick Sheikh PA-C  8/12/2024,5:33 PM

## 2024-08-13 ENCOUNTER — TELEPHONE (OUTPATIENT)
Dept: CARDIOLOGY CLINIC | Facility: CLINIC | Age: 59
End: 2024-08-13

## 2024-08-13 PROCEDURE — 93000 ELECTROCARDIOGRAM COMPLETE: CPT

## 2024-08-13 NOTE — TELEPHONE ENCOUNTER
Tomy Stevenson   to [facility] (supporting Sedrick Sheikh PA-C)         8/12/24  8:09 PM  Lashanda Dubose I just reviewed the order and it is indicated with contrast which i have an allergy to please resend without contrast.     Thank you

## 2024-08-13 NOTE — TELEPHONE ENCOUNTER
Called and left Cayetano's message on pt's vm and to call the office with additional questions / concerns.

## 2024-08-13 NOTE — TELEPHONE ENCOUNTER
Caller: Tomy Stevenson    Provider: Sedrick Sheikh    Call back #: [phone]    Reason for call: Patient called for an update on her message that she sent 8/12/24 at 8:09 pm regarding echo complete with contrast. Patient is allergic to contrast although Sedrick Sheikh advised that it is a different type of contrast that is safe for her. Patient called back and was getting transferred to the office to be advised by clinical team, but she left the call. Please advise patient. Thank you!

## 2024-08-14 DIAGNOSIS — K92.0 HEMATEMESIS WITH NAUSEA: ICD-10-CM

## 2024-08-14 DIAGNOSIS — K21.9 GASTROESOPHAGEAL REFLUX DISEASE, UNSPECIFIED WHETHER ESOPHAGITIS PRESENT: ICD-10-CM

## 2024-08-14 RX ORDER — DEXLANSOPRAZOLE 60 MG/1
60 CAPSULE, DELAYED RELEASE ORAL DAILY
Qty: 30 CAPSULE | Refills: 5 | Status: SHIPPED | OUTPATIENT
Start: 2024-08-14

## 2024-08-14 RX ORDER — SUCRALFATE 1 G/1
1 TABLET ORAL 4 TIMES DAILY
Qty: 360 TABLET | Refills: 1 | Status: SHIPPED | OUTPATIENT
Start: 2024-08-14

## 2024-09-03 ENCOUNTER — APPOINTMENT (OUTPATIENT)
Dept: RADIOLOGY | Facility: CLINIC | Age: 59
End: 2024-09-03
Payer: COMMERCIAL

## 2024-09-03 ENCOUNTER — OFFICE VISIT (OUTPATIENT)
Dept: OBGYN CLINIC | Facility: CLINIC | Age: 59
End: 2024-09-03

## 2024-09-03 VITALS
BODY MASS INDEX: 20.67 KG/M2 | DIASTOLIC BLOOD PRESSURE: 97 MMHG | SYSTOLIC BLOOD PRESSURE: 164 MMHG | HEART RATE: 63 BPM | HEIGHT: 67 IN

## 2024-09-03 DIAGNOSIS — M17.11 PRIMARY OSTEOARTHRITIS OF RIGHT KNEE: Primary | ICD-10-CM

## 2024-09-03 DIAGNOSIS — M25.561 RIGHT KNEE PAIN, UNSPECIFIED CHRONICITY: ICD-10-CM

## 2024-09-03 DIAGNOSIS — T30.0 BURN: Primary | ICD-10-CM

## 2024-09-03 PROCEDURE — 73562 X-RAY EXAM OF KNEE 3: CPT

## 2024-09-03 RX ORDER — SILVER SULFADIAZINE 10 MG/G
CREAM TOPICAL
Qty: 50 G | Refills: 0 | Status: SHIPPED | OUTPATIENT
Start: 2024-09-03

## 2024-09-03 RX ORDER — SILVER SULFADIAZINE 10 MG/G
CREAM TOPICAL
Qty: 50 G | Refills: 0 | Status: SHIPPED | OUTPATIENT
Start: 2024-09-03 | End: 2024-09-03

## 2024-09-03 NOTE — PROGRESS NOTES
Assessment/Plan:  Assessment & Plan   Diagnoses and all orders for this visit:    Primary osteoarthritis of right knee  -     XR knee 3 vw right non injury; Future  -     Large joint arthrocentesis: R knee  -     Medial  Knee Brace        58-year-old female with history of osteoarthritis of right knee with worsening right knee pain for 1 month.  Discussed with patient physical exam, radiographs, impression, and plan.  X-rays of the right knee noted for pronounced medial space narrowing with slight genu varum.  Imaging studies, clinical exam, and history suggest symptoms of aggravated arthritis.  I discussed treatment regimen of steroid injection and  bracing.  Surgery not warranted at this time.  She experienced significant relief from previous injection lasting more than 6 years so offered repeat steroid injection to which she agreed.  I administered mixture 3 cc 0.25% bupivacaine and 2 cc Kenalog to the right knee without complication.  I will refer her for medial  brace to help with joint stabilization and correction of genu varum. She may continue being active as tolerated. She will follow-up as needed.      Subjective:   Patient ID: Tomy Stevenson is a 58 y.o. female.  Chief Complaint   Patient presents with    Right Knee - Pain        58-year-old female with history of osteoarthritis of right knee presents for worsening right knee pain over the past 1 month.  She was last seen by Dr. Bonilla more than 6 years ago at which point she was given steroid injection.  She states she was doing well until the past few months.  She has been spend a lot more time being active on her feet for work.  She also reports recently on a trip to [facility] in which she did a lot of hiking.  She reports having pain described as generalized to the knee but worse at the medial aspect, sharp, nonradiating, worse with bearing weight and ambulate, worse with twisting, associated with swelling, and  "improved with resting.  She has been icing and wearing compression sleeve.  She does not usually take medication for symptoms.    Knee Pain  This is a recurrent problem. The current episode started more than 1 month ago. The problem occurs daily. The problem has been gradually worsening. Associated symptoms include arthralgias and joint swelling. Pertinent negatives include no numbness or weakness. The symptoms are aggravated by standing, walking and twisting. She has tried rest, position changes and ice for the symptoms. The treatment provided mild relief.         The following portions of the patient's history were reviewed and updated as appropriate: She  has a past medical history of Benign positional vertigo, right, Cervicalgia, Disease of thyroid gland, DVT (deep venous thrombosis) (HCC), Graves disease, Headache, Hypertension, Migraine, and Thyroid disease.  She is allergic to bee venom, contrast [iodinated contrast media], bee pollen, pollen extract, prochlorperazine, and besifloxacin hcl..    Review of Systems   Musculoskeletal:  Positive for arthralgias and joint swelling.   Neurological:  Negative for weakness and numbness.       Objective:  Vitals:    09/03/24 1716   BP: 164/97   Pulse: 63   Height: 5' 7\" (1.702 m)      Right Ankle Exam     Muscle Strength   Dorsiflexion:  5/5  Plantar flexion:  5/5       Right Knee Exam     Tenderness   The patient is experiencing tenderness in the medial joint line.    Range of Motion   Extension:  normal   Flexion:  120     Tests   Varus: negative Valgus: positive (laxity)    Other   Swelling: mild    Comments:  Mild genu varum      Right Hip Exam     Muscle Strength   Flexion: 4/5       Left Hip Exam     Muscle Strength   Flexion: 4/5           Strength/Myotome Testing     Right Ankle/Foot   Dorsiflexion: 5  Plantar flexion: 5      Physical Exam  Vitals and nursing note reviewed.   Constitutional:       Appearance: Normal appearance. She is well-developed. She is not " "ill-appearing or diaphoretic.   HENT:      Head: Normocephalic and atraumatic.      Right Ear: External ear normal.      Left Ear: External ear normal.   Eyes:      Conjunctiva/sclera: Conjunctivae normal.   Neck:      Trachea: No tracheal deviation.   Cardiovascular:      Rate and Rhythm: Normal rate.   Pulmonary:      Effort: Pulmonary effort is normal. No respiratory distress.   Abdominal:      General: There is no distension.   Musculoskeletal:         General: Swelling and tenderness present.   Skin:     General: Skin is warm and dry.      Coloration: Skin is not jaundiced or pale.   Neurological:      Mental Status: She is alert and oriented to person, place, and time.   Psychiatric:         Mood and Affect: Mood normal.         Behavior: Behavior normal.         Thought Content: Thought content normal.         Judgment: Judgment normal.         I have personally reviewed pertinent films in PACS and my interpretation is  .  X-rays of the right knee noted for pronounced medial space narrowing with slight genu varum.    Large joint arthrocentesis: R knee  [place] Protocol:  procedure performed by consultantConsent: Verbal consent obtained.  Risks and benefits: risks, benefits and alternatives were discussed  Consent given by: patient  Time out: Immediately prior to procedure a \"time out\" was called to verify the correct patient, procedure, equipment, support staff and site/side marked as required.  Patient understanding: patient states understanding of the procedure being performed  Patient consent: the patient's understanding of the procedure matches consent given  Procedure consent: procedure consent matches procedure scheduled  Relevant documents: relevant documents present and verified  Test results: test results available and properly labeled  Site marked: the operative site was marked  Radiology Images displayed and confirmed. If images not available, report reviewed: imaging studies available  Required " items: required blood products, implants, devices, and special equipment available  Patient identity confirmed: verbally with patient  Supporting Documentation  Indications: pain   Procedure Details  Location: knee - R knee  Preparation: Patient was prepped and draped in the usual sterile fashion  Needle size: 22 G  Ultrasound guidance: no  Approach: anterolateral          More than 31 minutes were spent with reviewing patient chart, reviewing and interpreting imaging studies, obtaining history from patient, examining patient, discussing and implementing treatment plan.

## 2024-09-04 ENCOUNTER — HOSPITAL ENCOUNTER (OUTPATIENT)
Dept: NON INVASIVE DIAGNOSTICS | Facility: CLINIC | Age: 59
Discharge: HOME | End: 2024-09-04
Payer: COMMERCIAL

## 2024-09-04 VITALS
DIASTOLIC BLOOD PRESSURE: 84 MMHG | BODY MASS INDEX: 20.72 KG/M2 | WEIGHT: 132 LBS | SYSTOLIC BLOOD PRESSURE: 146 MMHG | HEART RATE: 71 BPM | HEIGHT: 67 IN

## 2024-09-04 DIAGNOSIS — R07.9 CHEST PAIN, UNSPECIFIED TYPE: ICD-10-CM

## 2024-09-04 DIAGNOSIS — I10 HYPERTENSION, UNSPECIFIED TYPE: ICD-10-CM

## 2024-09-04 LAB
AORTIC ROOT: 2.5 CM
APICAL FOUR CHAMBER EJECTION FRACTION: 66 %
BSA FOR ECHO PROCEDURE: 1.69 M2
E WAVE DECELERATION TIME: 196 MS
E/A RATIO: 1.07
FRACTIONAL SHORTENING: 32 (ref 28–44)
INTERVENTRICULAR SEPTUM IN DIASTOLE (PARASTERNAL SHORT AXIS VIEW): 0.9 CM
INTERVENTRICULAR SEPTUM: 0.9 CM (ref 0.6–1.1)
LAAS-AP2: 10.7 CM2
LAAS-AP4: 11.6 CM2
LEFT ATRIUM AREA SYSTOLE SINGLE PLANE A4C: 10.7 CM2
LEFT ATRIUM SIZE: 2.7 CM
LEFT ATRIUM VOLUME (MOD BIPLANE): 25 ML
LEFT ATRIUM VOLUME INDEX (MOD BIPLANE): 14.8 ML/M2
LEFT INTERNAL DIMENSION IN SYSTOLE: 2.6 CM (ref 2.1–4)
LEFT VENTRICULAR INTERNAL DIMENSION IN DIASTOLE: 3.8 CM (ref 3.5–6)
LEFT VENTRICULAR POSTERIOR WALL IN END DIASTOLE: 0.9 CM
LEFT VENTRICULAR STROKE VOLUME: 37 ML
LVSV (TEICH): 37 ML
MITRAL REGURGITATION PEAK VELOCITY: 4.46 M/S
MITRAL VALVE MEAN INFLOW VELOCITY: 3.75 M/S
MITRAL VALVE REGURGITANT PEAK GRADIENT: 80 MMHG
MV E'TISSUE VEL-SEP: 5 CM/S
MV PEAK A VEL: 0.89 M/S
MV PEAK E VEL: 95 CM/S
MV STENOSIS PRESSURE HALF TIME: 57 MS
MV VALVE AREA P 1/2 METHOD: 3.86
RIGHT ATRIUM AREA SYSTOLE A4C: 8 CM2
RIGHT VENTRICLE ID DIMENSION: 2.9 CM
SL CV DOP CALC MV VTI RETROGRADE: 164 CM
SL CV LEFT ATRIUM LENGTH A2C: 4.5 CM
SL CV LV EF: 65
SL CV MV MEAN GRADIENT RETROGRADE: 60 MMHG
SL CV PED ECHO LEFT VENTRICLE DIASTOLIC VOLUME (MOD BIPLANE) 2D: 61 ML
SL CV PED ECHO LEFT VENTRICLE SYSTOLIC VOLUME (MOD BIPLANE) 2D: 24 ML
TR MAX PG: 23 MMHG
TR PEAK VELOCITY: 2.4 M/S
TRICUSPID ANNULAR PLANE SYSTOLIC EXCURSION: 1.9 CM
TRICUSPID VALVE PEAK REGURGITATION VELOCITY: 2.42 M/S

## 2024-09-04 PROCEDURE — 93306 TTE W/DOPPLER COMPLETE: CPT | Performed by: INTERNAL MEDICINE

## 2024-09-04 PROCEDURE — 93306 TTE W/DOPPLER COMPLETE: CPT

## 2024-09-06 ENCOUNTER — TELEPHONE (OUTPATIENT)
Dept: CARDIOLOGY CLINIC | Facility: CLINIC | Age: 59
End: 2024-09-06

## 2024-09-06 NOTE — TELEPHONE ENCOUNTER
Called pt and relayed Sedrick DOUGLASS message. Pt verbalized understanding.    ----- Message from Sedrick Sheikh PA-C sent at 9/5/2024  4:57 PM EDT -----  Please call patient to advise echocardiogram overall looks stable from previous echocardiogram in 2019.  Thank you.

## 2024-09-09 ENCOUNTER — TELEPHONE (OUTPATIENT)
Dept: OBGYN CLINIC | Facility: HOSPITAL | Age: 59
End: 2024-09-09

## 2024-09-09 DIAGNOSIS — M17.11 PRIMARY OSTEOARTHRITIS OF RIGHT KNEE: Primary | ICD-10-CM

## 2024-09-09 RX ORDER — MELOXICAM 15 MG/1
15 TABLET ORAL DAILY
Qty: 30 TABLET | Refills: 0 | Status: SHIPPED | OUTPATIENT
Start: 2024-09-09

## 2024-09-09 NOTE — TELEPHONE ENCOUNTER
Toby     Pt states that she is awaiting a response from Dr. Luis as she is in pain 9/10 and hasn't received a response to her [employer] message last week.     States she is still without a brace and in more pain than prior to the inj.        Please advise   Phone:   [phone]

## 2024-09-10 DIAGNOSIS — R29.898 WEAKNESS OF BOTH HIPS: ICD-10-CM

## 2024-09-10 DIAGNOSIS — M17.11 PRIMARY OSTEOARTHRITIS OF RIGHT KNEE: Primary | ICD-10-CM

## 2024-09-10 DIAGNOSIS — M22.2X1 PATELLOFEMORAL SYNDROME OF RIGHT KNEE: ICD-10-CM

## 2024-09-25 DIAGNOSIS — J40 BRONCHITIS: Primary | ICD-10-CM

## 2024-09-25 RX ORDER — AZITHROMYCIN 250 MG/1
TABLET, FILM COATED ORAL
Qty: 6 TABLET | Refills: 0 | Status: SHIPPED | OUTPATIENT
Start: 2024-09-25 | End: 2024-09-29

## 2024-10-11 DIAGNOSIS — K21.9 GASTROESOPHAGEAL REFLUX DISEASE, UNSPECIFIED WHETHER ESOPHAGITIS PRESENT: ICD-10-CM

## 2024-10-11 RX ORDER — DEXLANSOPRAZOLE 60 MG/1
60 CAPSULE, DELAYED RELEASE ORAL DAILY
Qty: 90 CAPSULE | Refills: 1 | Status: SHIPPED | OUTPATIENT
Start: 2024-10-11

## 2024-11-04 ENCOUNTER — NURSE TRIAGE (OUTPATIENT)
Age: 59
End: 2024-11-04

## 2024-11-04 NOTE — TELEPHONE ENCOUNTER
"call transfered from [facility] peg.  Pt previously saw dr sophie carlin. Peg scheduled pt to see dr buck first available which was April 2025.      Spoke to pt.  States that migraine started Saturday   Took Rizatripan which did not help and then she took excedrine and excedrine usually helps and she doesn't have to take rizatriptan. Excedrine did not help either    States that pain was Debilitating over the weekend, she was unable to leave her room.  It was 10/10-worst headache of her life on Saturday.  Today it is 7-8/10.  this migraine is totally different than her normal migraines.    Normally migraines are across her forehead coming from her neck, now pain starts behind her r ear then goes to her eye and whole r side of her face is painful  Migraine Started again yesterday afternoon     Advised that if this is the worst migraine of her life and it is completely different than her typical migraines that we would recommend that she be seen in the ER.    She states that she Does not want to go to ER.      She is not taking any preventative medications.   Has never tried decadron or olanzapine  Depakote does not agree with her-thinks she had nausea when she took this     States that she Was on topamax in the past and it was helpful.  She is asking for something to help this current migraine and also a preventative medication until she can be seen in April.     Please advise  [phone] to leave detailed message or ok to send mychart message    Reason for Disposition   SEVERE headache, states 'worst headache' of life    Answer Assessment - Initial Assessment Questions  1. LOCATION: \"Where does it hurt?\"       Right side of her head, behind r eye, and jaw  2. ONSET: \"When did the headache start?\" (e.g., minutes, hours, days)       saturday  3. PATTERN: \"Does the pain come and go, or has it been constant since it started?\"      Constant   4. SEVERITY: \"How bad is the pain?\" and \"What does it keep you from " "doing?\"  (e.g., Scale 1-10; mild, moderate, or severe)      7-8/10 currently but it was a 10/10 on saturday  5. RECURRENT SYMPTOM: \"Have you ever had headaches before?\" If Yes, ask: \"When was the last time?\" and \"What happened that time?\"       yes  6. CAUSE: \"What do you think is causing the headache?\"      Thought that it may have been from melted butter that she had on a bagel on Friday.  This is the only thing that she had that was different than what she normally eats  7. MIGRAINE: \"Have you been diagnosed with migraine headaches?\" If Yes, ask: \"Is this headache similar?\"       Yes,   but this is totally different than her normal migraines.    Normally migraines are across her forehead coming form her neck, now pain starts behind her r ear then goes to her eye and whole r side of her face is painful  8. HEAD INJURY: \"Has there been any recent injury to the head?\"       no  9. OTHER SYMPTOMS: \"Do you have any other symptoms?\" (e.g., fever, stiff neck, eye pain, sore throat, cold symptoms)      Pain behind r eye  10. PREGNANCY: \"Is there any chance you are pregnant?\" \"When was your last menstrual period?\"        no    Protocols used: Headache-Adult-[place]    "

## 2024-11-04 NOTE — TELEPHONE ENCOUNTER
Called pt and made her aware of below.  She is not sure if she will go to the ER.  If she goes, she probably will go tomorrow.  ER order entered in case she goes to the ER.

## 2024-11-06 ENCOUNTER — OFFICE VISIT (OUTPATIENT)
Dept: CARDIOLOGY CLINIC | Facility: CLINIC | Age: 59
End: 2024-11-06
Payer: COMMERCIAL

## 2024-11-06 VITALS
HEIGHT: 67 IN | SYSTOLIC BLOOD PRESSURE: 154 MMHG | BODY MASS INDEX: 21.03 KG/M2 | WEIGHT: 134 LBS | OXYGEN SATURATION: 98 % | DIASTOLIC BLOOD PRESSURE: 90 MMHG | RESPIRATION RATE: 16 BRPM | HEART RATE: 67 BPM

## 2024-11-06 DIAGNOSIS — R07.9 CHEST PAIN, UNSPECIFIED TYPE: Primary | ICD-10-CM

## 2024-11-06 DIAGNOSIS — I05.9 MITRAL VALVE DISORDER: ICD-10-CM

## 2024-11-06 DIAGNOSIS — I10 PRIMARY HYPERTENSION: ICD-10-CM

## 2024-11-06 PROCEDURE — 99214 OFFICE O/P EST MOD 30 MIN: CPT

## 2024-11-06 RX ORDER — AMLODIPINE BESYLATE 5 MG/1
5 TABLET ORAL DAILY
Qty: 30 TABLET | Refills: 3 | Status: SHIPPED | OUTPATIENT
Start: 2024-11-06 | End: 2024-11-14 | Stop reason: SINTOL

## 2024-11-06 NOTE — ASSESSMENT & PLAN NOTE
BP is elevated today.  Patient stopped losartan due to side effects.  History of intolerance to chlorthalidone.  Start amlodipine 5 mg daily.  Encourage ambulatory monitoring and low-sodium diet.  Advised to notify our office for abnormal BP readings.

## 2024-11-06 NOTE — PROGRESS NOTES
[facility] Cardiology   Office Visit    Tomy Stevenson 58 y.o. female MRN: [id number]    11/06/24      Assessment & Plan  Chest pain, unspecified type  TTE shows mild MR, otherwise unremarkable with EF 65% and no RWMA.  Given persistence of left armpit/chest pain, discussed consideration for further ischemic evaluation.  Patient wishes to proceed with exercise echo stress test.  Advised to notify our office for any new/worsening symptoms.  Primary hypertension  BP is elevated today.  Patient stopped losartan due to side effects.  History of intolerance to chlorthalidone.  Start amlodipine 5 mg daily.  Encourage ambulatory monitoring and low-sodium diet.  Advised to notify our office for abnormal BP readings.  Mitral valve disorder  Mild MR as per above.  Continue periodic surveillance.        Interval history: Tomy Stevenson is a 58 y.o. year old female with history of hypertension, mild MR, and history of provoked DVT who presents for office visit.  During previous visit with cardiology patient was transitioned to losartan due to elevated BPs and intolerance to chlorthalidone.  Patient also complained of chest pain during previous visit.  EKG at that time was without acute ischemic abnormalities.  Echocardiogram has since been completed with results as per above.  Cardiac catheterization in July 2022 was negative for evidence of significant epicardial CAD.  Unfortunately, patient continues to experience chest/left armpit pain exacerbated with exertion and stress.  She also stopped taking losartan due to adverse effects and has been experiencing persistently elevated BP readings.  Otherwise, endorses compliance to medications.  Patient maintains a low-sodium diet.  Reports increased levels of stress and intermittent headaches.  Denies any additional complaints at this time.  Patient tries to maintain an active lifestyle with exercise including free weights and cardio.  Previously followed with Dr. Mukherjee  "Christiano as primary cardiologist.      Review of Systems:  Review of Systems   Constitutional:  Negative for chills and fever.   HENT:  Negative for ear pain and sore throat.    Eyes:  Negative for pain and visual disturbance.   Respiratory:  Negative for cough and shortness of breath.    Cardiovascular:  Positive for chest pain. Negative for palpitations and leg swelling.   Gastrointestinal:  Negative for abdominal pain and vomiting.   Genitourinary:  Negative for dysuria and hematuria.   Musculoskeletal:  Negative for arthralgias and back pain.   Skin:  Negative for color change and rash.   Neurological:  Positive for headaches. Negative for seizures and syncope.   All other systems reviewed and are negative.      PHYSICAL EXAM:  Vitals:   Vitals:    11/06/24 1001   BP: 154/90   BP Location: Left arm   Patient Position: Sitting   Cuff Size: Standard   Pulse: 67   Resp: 16   SpO2: 98%   Weight: 60.8 kg (134 lb)   Height: 5' 7\" (1.702 m)        Physical Exam:  GEN: Alert and oriented x 3, in no acute distress.  Well appearing and well nourished.   HEENT: Sclera anicteric, conjunctivae pink, mucous membranes moist. Oropharynx clear.   NECK: Supple, no carotid bruits, no significant JVD. Trachea midline, no thyromegaly.   HEART: Regular rhythm, normal S1 and S2, no murmurs, clicks, gallops or rubs. PMI nondisplaced, no thrills.   LUNGS: Clear to auscultation bilaterally; no wheezes, rales, or rhonchi. No increased work of breathing or signs of respiratory distress.   ABDOMEN: Soft, nontender, nondistended, normoactive bowel sounds.   EXTREMITIES: Skin warm and well perfused, no clubbing or cyanosis, no edema.  NEURO: No focal findings. Normal speech. Mood and affect normal.   SKIN: Normal without suspicious lesions on exposed skin.    Follow up: 1 month or sooner as needed    Allergies   Allergen Reactions    Bee Venom      Bee sting    Contrast [Iodinated Contrast Media] Anaphylaxis     Pt states her throat " closes- kk rn     Bee Pollen Dizziness     Specifically bee sting which evokes reaction of swelling of throat, headache and vomitting    Pollen Extract Other (See Comments)     Throat clearing, difficulty swallowing, ears itchy    Prochlorperazine Other (See Comments)      Aka (compazine)  Delirious     Besifloxacin Hcl Palpitations     Other reaction(s): Unknown         Current Outpatient Medications:     amLODIPine (NORVASC) 5 mg tablet, Take 1 tablet (5 mg total) by mouth daily, Disp: 30 tablet, Rfl: 3    dexlansoprazole (DEXILANT) 60 MG capsule, Take 1 capsule (60 mg total) by mouth daily, Disp: 90 capsule, Rfl: 1    dicyclomine (BENTYL) 20 mg tablet, Take 1 tablet (20 mg total) by mouth every 6 (six) hours (Patient taking differently: Take 20 mg by mouth every 6 (six) hours as needed), Disp: 360 tablet, Rfl: 0    EPINEPHrine (EPIPEN) 0.3 mg/0.3 mL SOAJ, Inject 0.3 mL (0.3 mg total) into a muscle if needed for anaphylaxis As needed, Disp: 1 each, Rfl: 0    pantoprazole (PROTONIX) 40 mg tablet, Take 1 tablet (40 mg total) by mouth daily, Disp: 30 tablet, Rfl: 0    rizatriptan (MAXALT) 10 mg tablet, One p.o. At headache onset, may repeat 1 after 2 hours p.r.n. Maximum 2/24 hours, Disp: 9 tablet, Rfl: 5    sucralfate (CARAFATE) 1 g tablet, Take 1 tablet (1 g total) by mouth 4 (four) times a day, Disp: 360 tablet, Rfl: 1    Past Medical History:   Diagnosis Date    Benign positional vertigo, right     Cervicalgia     Disease of thyroid gland     DVT (deep venous thrombosis) (HCC)     Graves disease     Headache     Hypertension     Migraine     Chronic for years    Thyroid disease        Family History   Problem Relation Age of Onset    Hypertension Mother     Asthma Mother     Diabetes Mother     Hyperlipidemia Mother     Seizures Father     Cancer Sister 20        brain    No Known Problems Daughter     No Known Problems Maternal Grandmother     No Known Problems Paternal Grandmother     Migraines Brother      Seizures Brother     No Known Problems Maternal Aunt     No Known Problems Maternal Aunt     No Known Problems Maternal Aunt     No Known Problems Paternal Aunt     No Known Problems Paternal Aunt     No Known Problems Paternal Aunt     Breast cancer Neg Hx     Colon cancer Neg Hx     Ovarian cancer Neg Hx     Uterine cancer Neg Hx     Cervical cancer Neg Hx        Past Medical History:   Diagnosis Date    Benign positional vertigo, right     Cervicalgia     Disease of thyroid gland     DVT (deep venous thrombosis) (HCC)     Graves disease     Headache     Hypertension     Migraine     Chronic for years    Thyroid disease        Past Surgical History:   Procedure Laterality Date    APPENDECTOMY      CARDIAC CATHETERIZATION Left 07/29/2022    Procedure: Cardiac Left Heart Cath;  Surgeon: Marcos Holden MD;  Location: [facility];  Service: Cardiology    CARDIAC CATHETERIZATION N/A 07/29/2022    Procedure: Cardiac Coronary Angiogram;  Surgeon: Marcos Holden MD;  Location: [facility];  Service: Cardiology    CHOLECYSTECTOMY  3/22    CHOLECYSTECTOMY LAPAROSCOPIC N/A 08/01/2021    Procedure: CHOLECYSTECTOMY LAPAROSCOPIC W/ INTRAOP CHOLANGIOGRAM;  Surgeon: Imelda Patel MD;  Location: [facility];  Service: General    FL INJECTION LEFT HIP (NON ARTHROGRAM)  01/31/2019    LIPOMA RESECTION      NEUROMA EXCISION      STEROID INJECTION HIP Left 05/2018       Social History     Socioeconomic History    Marital status: Single     Spouse name: Not on file    Number of children: Not on file    Years of education: Not on file    Highest education level: Not on file   Occupational History    Occupation:    Tobacco Use    Smoking status: Former     Current packs/day: 0.01     Types: Cigarettes    Smokeless tobacco: Never    Tobacco comments:     last smoked 8/16/19   Vaping Use    Vaping status: Never Used   Substance and Sexual Activity    Alcohol use: Not Currently    Drug use: No     "Sexual activity: Not Currently     Partners: Male     Birth control/protection: Post-menopausal   Other Topics Concern    Not on file   Social History Narrative    Not on file     Social Determinants of Health     Financial Resource Strain: Not on file   Food Insecurity: Not on file   Transportation Needs: Not on file   Physical Activity: Sufficiently Active (12/15/2020)    Exercise Vital Sign     Days of Exercise per Week: 3 days     Minutes of Exercise per Session: 90 min   Stress: No Stress Concern Present (12/15/2020)    [employer] [place] [employer] - Occupational Stress Questionnaire     Feeling of Stress : Not at all   Social Connections: Not on file   Intimate Partner Violence: Not on file   Housing Stability: Not on file         LABORATORY RESULTS:    Lab Results   Component Value Date    WBC 6.67 03/09/2024    HGB 15.0 03/09/2024    HCT 46.2 (H) 03/09/2024    MCV 93 03/09/2024     03/09/2024     Lab Results   Component Value Date    CALCIUM 9.3 08/01/2024    K 3.7 08/01/2024    CO2 30 08/01/2024     08/01/2024    BUN 16 08/01/2024    CREATININE 0.61 08/01/2024     Lab Results   Component Value Date    HGBA1C 6.0 (H) 08/01/2024       Lipid Profile:   No results found for: \"CHOL\"  Lab Results   Component Value Date    HDL 78 08/01/2024    HDL 84 03/09/2024    HDL 77 05/02/2023     Lab Results   Component Value Date    LDLCALC 98 08/01/2024    LDLCALC 128 (H) 03/09/2024    LDLCALC 101 (H) 05/02/2023     Lab Results   Component Value Date    TRIG 141 08/01/2024    TRIG 79 03/09/2024    TRIG 104 05/02/2023       The 10-year ASCVD risk score (Odalys CASTRO, et al., 2019) is: 4.5%    Values used to calculate the score:      Age: 58 years      Sex: Female      Is Non- : No      Diabetic: No      Tobacco smoker: No      Systolic Blood Pressure: 154 mmHg      Is BP treated: Yes      HDL Cholesterol: 78 mg/dL      Total Cholesterol: 204 mg/dL    1. Chest pain, unspecified " type  Echo stress test, exercise      2. Primary hypertension  amLODIPine (NORVASC) 5 mg tablet      3. Mitral valve disorder            Imaging: I have reviewed all pertinent imaging studies.      Recommend aggressive risk factor modification and therapeutic lifestyle changes.  Low-salt, low-calorie, low-fat, low-cholesterol diet with regular exercise and to optimize weight.    Discussed concepts of atherosclerosis, including signs and symptoms of cardiac disease.    Medications reviewed and possible side effects discussed.  Previous studies were reviewed.    Safety measures were reviewed.  All questions and concerns addressed.  Patient was advised to report any problems requiring medical attention.    Follow-up with PCP and appropriate specialist and lab work as discussed.    Return for follow up visit as scheduled or earlier, if needed.  Thank you for allowing me to participate in the care and evaluation of your patient.  Should you have any questions, please feel free to contact me.    Sedrick Sheikh PA-C  11/6/2024,1:02 PM

## 2024-11-11 ENCOUNTER — PATIENT MESSAGE (OUTPATIENT)
Dept: CARDIOLOGY CLINIC | Facility: CLINIC | Age: 59
End: 2024-11-11

## 2024-11-11 NOTE — PATIENT COMMUNICATION
Pt called back.  She states Sedrick told her to call if she has any issues with Amlodipine and she would be switched to a different medication.  Pt is noticing within 30 minutes of taking Amlodipine she has palpitations.  BP has been 134/84 which is improvement of how BP was prior 160's/80's.  HR has been elevated in the 90's, typically is 68-72 bpm.    Please review and advise.

## 2024-11-14 ENCOUNTER — TELEPHONE (OUTPATIENT)
Dept: CARDIOLOGY CLINIC | Facility: CLINIC | Age: 59
End: 2024-11-14

## 2024-11-14 DIAGNOSIS — I10 PRIMARY HYPERTENSION: Primary | ICD-10-CM

## 2024-11-14 RX ORDER — CARVEDILOL 6.25 MG/1
6.25 TABLET ORAL 2 TIMES DAILY WITH MEALS
Qty: 60 TABLET | Refills: 3 | Status: SHIPPED | OUTPATIENT
Start: 2024-11-14

## 2024-11-14 NOTE — TELEPHONE ENCOUNTER
Tomy Stevenson to P Cardiology [facility] Clinical (supporting Sedrick Sheikh PA-C)         11/13/24  8:21 PM  I have already spoken with the nurse I was awaiting for a response it has now been 2 days can someone please get back to me as now it is 3 days I am without any hypertension medication or recommendation for anything new     Thank you  November 11, 2024     GP    11/11/24 12:04 PM  Melissa Winchester RN routed this conversation to EVONNE Correa MA to Tomy Stevenson   PM      11/11/24 11:56 AM  Lashanda Huitron,     If you call us at [phone] and choose the option to speak to a nurse/clinical staff, it should give you the option to add your number to our call back list. That way you don't have to wait on hold, and will receive a call back once it's your turn in the queue. Unfortunately there is no way for us to add you to that call list on our end, you will have to call to add yourself to the list.     Last read by Tomy Stevenson at  8:19 PM on 11/13/2024.  Tomy Stevenson to [facility] (supporting Sedrick Sheikh PA-C)         11/11/24 11:30 AM  I was on hold for a nurse but had to hang up please have someone call me back at [phone]  Thank you   Katie Herrera MA to Tomy Stevenson   PM      11/11/24 10:37 AM  Hello Ifra,     Please call our office at [phone] so a nurse can discuss your symptoms with you.    Last read by Tomy Stevenson at  8:19 PM on 11/13/2024.  Tomy Stevenson to [facility] (supporting Sedrick Sheikh PA-C)         11/11/24  8:37 AM  Good Morning Sedrick;  so I am now on Amlodipine since Wednesday evening and it is not agreeing with me , I am experiencing extreme fatigue and dizziness and my feet not my ankles have some swelling, I also am having palpitations within 1/2 hr after taking the medication.  Can we try something else?

## 2024-11-16 DIAGNOSIS — K21.9 GASTROESOPHAGEAL REFLUX DISEASE, UNSPECIFIED WHETHER ESOPHAGITIS PRESENT: ICD-10-CM

## 2024-11-18 RX ORDER — DEXLANSOPRAZOLE 60 MG/1
60 CAPSULE, DELAYED RELEASE ORAL DAILY
Qty: 90 CAPSULE | Refills: 1 | Status: SHIPPED | OUTPATIENT
Start: 2024-11-18 | End: 2024-11-20 | Stop reason: SDUPTHER

## 2024-11-20 ENCOUNTER — HOSPITAL ENCOUNTER (OUTPATIENT)
Dept: RADIOLOGY | Facility: HOSPITAL | Age: 59
Discharge: HOME | End: 2024-11-20
Payer: COMMERCIAL

## 2024-11-20 ENCOUNTER — OFFICE VISIT (OUTPATIENT)
Dept: GASTROENTEROLOGY | Facility: CLINIC | Age: 59
End: 2024-11-20
Payer: COMMERCIAL

## 2024-11-20 VITALS
OXYGEN SATURATION: 99 % | TEMPERATURE: 97.6 F | HEART RATE: 67 BPM | DIASTOLIC BLOOD PRESSURE: 87 MMHG | BODY MASS INDEX: 21.28 KG/M2 | HEIGHT: 67 IN | WEIGHT: 135.6 LBS | SYSTOLIC BLOOD PRESSURE: 137 MMHG

## 2024-11-20 DIAGNOSIS — K58.2 IRRITABLE BOWEL SYNDROME WITH BOTH CONSTIPATION AND DIARRHEA: ICD-10-CM

## 2024-11-20 DIAGNOSIS — R07.82 INTERCOSTAL PAIN: ICD-10-CM

## 2024-11-20 DIAGNOSIS — K21.9 GASTROESOPHAGEAL REFLUX DISEASE, UNSPECIFIED WHETHER ESOPHAGITIS PRESENT: Primary | ICD-10-CM

## 2024-11-20 PROCEDURE — 71046 X-RAY EXAM CHEST 2 VIEWS: CPT

## 2024-11-20 PROCEDURE — 99213 OFFICE O/P EST LOW 20 MIN: CPT | Performed by: PHYSICIAN ASSISTANT

## 2024-11-20 RX ORDER — DEXLANSOPRAZOLE 60 MG/1
60 CAPSULE, DELAYED RELEASE ORAL DAILY
Qty: 30 CAPSULE | Refills: 11 | Status: SHIPPED | OUTPATIENT
Start: 2024-11-20

## 2024-11-20 NOTE — PROGRESS NOTES
Name: Tomy Stevenson      : 1965      MRN: [id number]  Encounter Provider: Iqra Uriarte PA-C  Encounter Date: 2024   Encounter department: [facility] [place]  :  Assessment & Plan  Gastroesophageal reflux disease, unspecified whether esophagitis present  She continues to maintain on Dexilant 60mg daily  She admits it is expensive but works better than any other other PPIs she has tried   If she misses a day of Dexilant she has heartburn and takes Carafate which helps  EGD 2023 noted mild gastritis    Irritable bowel syndrome with both constipation and diarrhea  She continues to report bouts of diarrhea and constipation however overall her symptoms are much improved from previously  She reports about 75% improvement in her symptoms overall  Continue fiber and probiotic  Colonoscopy will be due routinely in            History of Present Illness     HPI  Tomy Stevenson is a 58 y.o. female with GERD and irritable bowel syndrome who presents for routine follow-up.  Overall the patient continues to do quite well.  She maintains on Dexilant 60 mg daily.  She continues to maintain that this works better than any of the other proton pump inhibitor she has tried.  She reports that if she misses even 1 day of this medication she will have immediate return in symptoms for which she uses Carafate.  The Carafate does tend to get the symptoms back under control.  She has no nausea, vomiting, dysphagia or odynophagia.  Her weight is stable.  Her bowel movements continue to be somewhat sporadic.  She reports that she will have days of normal stools followed by days of diarrhea.  This is still much improved from what it was previously.  She really offers no significant complaints regarding this.  There is no rectal bleeding.  Her last colonoscopy in 2019 was a normal exam.  History obtained from: patient    Review of Systems  Medical History Reviewed by  "provider this encounter:  Problems     .  Current Outpatient Medications on File Prior to Visit   Medication Sig Dispense Refill    carvedilol (COREG) 6.25 mg tablet Take 1 tablet (6.25 mg total) by mouth 2 (two) times a day with meals 60 tablet 3    dicyclomine (BENTYL) 20 mg tablet Take 1 tablet (20 mg total) by mouth every 6 (six) hours (Patient taking differently: Take 20 mg by mouth every 6 (six) hours as needed) 360 tablet 0    EPINEPHrine (EPIPEN) 0.3 mg/0.3 mL SOAJ Inject 0.3 mL (0.3 mg total) into a muscle if needed for anaphylaxis As needed 1 each 0    pantoprazole (PROTONIX) 40 mg tablet Take 1 tablet (40 mg total) by mouth daily 30 tablet 0    rizatriptan (MAXALT) 10 mg tablet One p.o. At headache onset, may repeat 1 after 2 hours p.r.n. Maximum 2/24 hours 9 tablet 5    sucralfate (CARAFATE) 1 g tablet Take 1 tablet (1 g total) by mouth 4 (four) times a day 360 tablet 1    [DISCONTINUED] dexlansoprazole (DEXILANT) 60 MG capsule Take 1 capsule (60 mg total) by mouth daily 90 capsule 1     No current facility-administered medications on file prior to visit.         Objective   /87 (BP Location: Left arm, Patient Position: Sitting, Cuff Size: Standard)   Pulse 67   Temp 97.6 °F (36.4 °C) (Temporal)   Ht 5' 7\" (1.702 m)   Wt 61.5 kg (135 lb 9.6 oz)   SpO2 99%   BMI 21.24 kg/m²      Physical Exam      "

## 2024-11-20 NOTE — ASSESSMENT & PLAN NOTE
She continues to report bouts of diarrhea and constipation however overall her symptoms are much improved from previously  She reports about 75% improvement in her symptoms overall  Continue fiber and probiotic  Colonoscopy will be due routinely in 2029

## 2024-12-12 DIAGNOSIS — I10 PRIMARY HYPERTENSION: ICD-10-CM

## 2024-12-12 RX ORDER — CARVEDILOL 6.25 MG/1
6.25 TABLET ORAL 2 TIMES DAILY WITH MEALS
Qty: 180 TABLET | Refills: 1 | Status: SHIPPED | OUTPATIENT
Start: 2024-12-12

## 2024-12-20 DIAGNOSIS — R21 RASH: Primary | ICD-10-CM

## 2024-12-20 RX ORDER — CLOBETASOL PROPIONATE 0.5 MG/G
OINTMENT TOPICAL 2 TIMES DAILY
Qty: 60 G | Refills: 3 | Status: SHIPPED | OUTPATIENT
Start: 2024-12-20

## 2025-01-08 ENCOUNTER — HOSPITAL ENCOUNTER (OUTPATIENT)
Dept: NON INVASIVE DIAGNOSTICS | Facility: HOSPITAL | Age: 60
Discharge: HOME | End: 2025-01-08

## 2025-01-13 DIAGNOSIS — L23.9 ALLERGIC DERMATITIS: Primary | ICD-10-CM

## 2025-01-13 RX ORDER — PREDNISONE 10 MG/1
TABLET ORAL
Qty: 30 TABLET | Refills: 0 | Status: SHIPPED | OUTPATIENT
Start: 2025-01-13 | End: 2025-01-23

## 2025-01-24 ENCOUNTER — OFFICE VISIT (OUTPATIENT)
Age: 60
End: 2025-01-24
Payer: COMMERCIAL

## 2025-01-24 VITALS
SYSTOLIC BLOOD PRESSURE: 122 MMHG | HEART RATE: 75 BPM | TEMPERATURE: 97.6 F | RESPIRATION RATE: 18 BRPM | BODY MASS INDEX: 21.22 KG/M2 | WEIGHT: 135.2 LBS | HEIGHT: 67 IN | OXYGEN SATURATION: 97 % | DIASTOLIC BLOOD PRESSURE: 80 MMHG

## 2025-01-24 DIAGNOSIS — A08.4 VIRAL DIARRHEA: ICD-10-CM

## 2025-01-24 DIAGNOSIS — R19.7 DIARRHEA OF PRESUMED INFECTIOUS ORIGIN: Primary | ICD-10-CM

## 2025-01-24 DIAGNOSIS — I10 PRIMARY HYPERTENSION: Primary | ICD-10-CM

## 2025-01-24 DIAGNOSIS — R73.03 PREDIABETES: ICD-10-CM

## 2025-01-24 DIAGNOSIS — E78.2 MIXED HYPERLIPIDEMIA: ICD-10-CM

## 2025-01-24 PROBLEM — N64.4 MASTALGIA: Status: RESOLVED | Noted: 2020-08-19 | Resolved: 2025-01-24

## 2025-01-24 PROBLEM — M85.00: Status: RESOLVED | Noted: 2017-08-31 | Resolved: 2025-01-24

## 2025-01-24 PROCEDURE — 99214 OFFICE O/P EST MOD 30 MIN: CPT | Performed by: INTERNAL MEDICINE

## 2025-01-24 RX ORDER — AMOXICILLIN 500 MG/1
CAPSULE ORAL
COMMUNITY
Start: 2024-12-04 | End: 2025-01-24

## 2025-01-24 NOTE — ASSESSMENT & PLAN NOTE
Blood pressure very well controlled in the office today. Continue current anti-HTN therapy. She is sensitive to the effects of medication.

## 2025-01-24 NOTE — PROGRESS NOTES
Name: Tomy Stevenson      : 1965      MRN: [id number]  Encounter Provider: Gonzalez Guevara DO  Encounter Date: 2025   Encounter department: [facility] [place]    Assessment & Plan  Primary hypertension    Blood pressure very well controlled in the office today. Continue current anti-HTN therapy. She is sensitive to the effects of medication.    Viral diarrhea    Discussed conservative management at this time. Stay hydrated. She may ultimately have norovirus which will get better with additional time.    Prediabetes    She had been drinking a lot of coca cola in the past. She has cut down on that. Will order updated A1c. Last A1c was 6.0%.    Orders:  •  Hemoglobin A1C; Future    Mixed hyperlipidemia    Heart healthy diet and regular exercise encouraged. Will monitor lipids. No history of ASCVD.    Orders:  •  Lipid Panel with Direct LDL reflex; Future  •  CBC; Future  •  Comprehensive metabolic panel; Future      Depression Screening and Follow-up Plan: Patient was screened for depression during today's encounter. They screened negative with a PHQ-2 score of 0.        History of Present Illness     History of Present Illness  The patient presents to establish care with myself. Former patient of Dr. Deshpande.    She has a history of HTN, HLD, pre-diabetes, IBS, migraines, osteoarthritis, and remote DVT (20+ years ago).    She has been experiencing diarrhea for the past 3 to 4 days, with a frequency of 4 to 6 times daily. She recently consulted with her gastroenterologist's physician assistant, who suggested the possibility of a norovirus infection. She reports severe dehydration and dryness in the posterior part of her throat. The persistent diarrhea has left her feeling exhausted and fatigued. Additionally, she describes discomfort in the right upper quadrant, specifically in the area where her gallbladder was previously located.    She had a blood clot 20 years ago, which was  "provoked by birth control pills. She had never taken them before and was on it for about a month.    She is scheduled for a mammogram. She is not depressed. She has had one shingles vaccine and is due for the second one. She does not need any refills on her medications.    IMMUNIZATIONS  She received the first shingles vaccine on October 7, 2019.     Review of Systems   Constitutional:  Positive for fatigue (due to diarrhea). Negative for diaphoresis and fever.   Respiratory: Negative.     Cardiovascular: Negative.    Gastrointestinal:  Positive for diarrhea. Negative for abdominal pain, anal bleeding, constipation and vomiting.   Neurological:  Positive for headaches.     Objective   /80 (BP Location: Left arm, Patient Position: Sitting, Cuff Size: Large)   Pulse 75   Temp 97.6 °F (36.4 °C) (Tympanic)   Resp 18   Ht 5' 7\" (1.702 m)   Wt 61.3 kg (135 lb 3.2 oz)   SpO2 97%   BMI 21.18 kg/m²     Physical Exam  Constitutional:       General: She is not in acute distress.     Appearance: She is not ill-appearing.   Cardiovascular:      Rate and Rhythm: Normal rate and regular rhythm.      Heart sounds: No murmur heard.  Pulmonary:      Effort: Pulmonary effort is normal. No respiratory distress.      Breath sounds: No wheezing.   Abdominal:      General: Bowel sounds are normal. There is no distension.      Tenderness: There is no abdominal tenderness.   Musculoskeletal:      Right lower leg: No edema.      Left lower leg: No edema.   Neurological:      Mental Status: She is alert.       Gonzalez Guevara, DO   "

## 2025-01-28 ENCOUNTER — TELEPHONE (OUTPATIENT)
Age: 60
End: 2025-01-28

## 2025-01-28 DIAGNOSIS — Z12.31 SCREENING MAMMOGRAM, ENCOUNTER FOR: Primary | ICD-10-CM

## 2025-01-28 DIAGNOSIS — B37.49 CANDIDA INFECTION OF GENITAL REGION: Primary | ICD-10-CM

## 2025-01-28 RX ORDER — FLUCONAZOLE 150 MG/1
150 TABLET ORAL ONCE
Qty: 2 TABLET | Refills: 0 | Status: SHIPPED | OUTPATIENT
Start: 2025-01-28 | End: 2025-01-28

## 2025-01-28 NOTE — TELEPHONE ENCOUNTER
Call made to patient and reviewed provider recommendations. Patient verbalized understanding and appreciative.

## 2025-01-28 NOTE — TELEPHONE ENCOUNTER
Patient complaining of itching and burning at clitoris with no discharge or odor  Requesting prescription   Order placed per protocol for screening mammogram as current order will  before her appointment   Yearly scheduled 3/26/25

## 2025-01-28 NOTE — TELEPHONE ENCOUNTER
"RN, please see the thread in patient MYC msg encounter to determine if script is appropriate for [place]    No discharge \"I am having discomfort in the area of my clitoris with itching and burning sensation \"    Last seen in office 3/13/2024        Thank you   "

## 2025-02-03 ENCOUNTER — APPOINTMENT (OUTPATIENT)
Dept: LAB | Facility: HOSPITAL | Age: 60
End: 2025-02-03
Attending: INTERNAL MEDICINE
Payer: COMMERCIAL

## 2025-02-03 DIAGNOSIS — R53.83 OTHER FATIGUE: Primary | ICD-10-CM

## 2025-02-03 DIAGNOSIS — E78.2 MIXED HYPERLIPIDEMIA: ICD-10-CM

## 2025-02-03 DIAGNOSIS — R73.03 PREDIABETES: ICD-10-CM

## 2025-02-03 LAB
ALBUMIN SERPL BCG-MCNC: 4.4 G/DL (ref 3.5–5)
ALP SERPL-CCNC: 163 U/L (ref 34–104)
ALT SERPL W P-5'-P-CCNC: 17 U/L (ref 7–52)
ANION GAP SERPL CALCULATED.3IONS-SCNC: 7 MMOL/L (ref 4–13)
AST SERPL W P-5'-P-CCNC: 28 U/L (ref 13–39)
BILIRUB SERPL-MCNC: 0.34 MG/DL (ref 0.2–1)
BUN SERPL-MCNC: 12 MG/DL (ref 5–25)
CALCIUM SERPL-MCNC: 9.5 MG/DL (ref 8.4–10.2)
CHLORIDE SERPL-SCNC: 105 MMOL/L (ref 96–108)
CHOLEST SERPL-MCNC: 226 MG/DL (ref ?–200)
CO2 SERPL-SCNC: 29 MMOL/L (ref 21–32)
CREAT SERPL-MCNC: 0.74 MG/DL (ref 0.6–1.3)
ERYTHROCYTE [DISTWIDTH] IN BLOOD BY AUTOMATED COUNT: 12.5 % (ref 11.6–15.1)
GFR SERPL CREATININE-BSD FRML MDRD: 88 ML/MIN/1.73SQ M
GLUCOSE P FAST SERPL-MCNC: 79 MG/DL (ref 65–99)
HCT VFR BLD AUTO: 42.6 % (ref 34.8–46.1)
HDLC SERPL-MCNC: 78 MG/DL
HGB BLD-MCNC: 13.2 G/DL (ref 11.5–15.4)
LDLC SERPL CALC-MCNC: 98 MG/DL (ref 0–100)
MCH RBC QN AUTO: 29.2 PG (ref 26.8–34.3)
MCHC RBC AUTO-ENTMCNC: 31 G/DL (ref 31.4–37.4)
MCV RBC AUTO: 94 FL (ref 82–98)
PLATELET # BLD AUTO: 293 THOUSANDS/UL (ref 149–390)
PMV BLD AUTO: 9.3 FL (ref 8.9–12.7)
POTASSIUM SERPL-SCNC: 3.9 MMOL/L (ref 3.5–5.3)
PROT SERPL-MCNC: 7.3 G/DL (ref 6.4–8.4)
RBC # BLD AUTO: 4.52 MILLION/UL (ref 3.81–5.12)
SODIUM SERPL-SCNC: 141 MMOL/L (ref 135–147)
TRIGL SERPL-MCNC: 251 MG/DL (ref ?–150)
WBC # BLD AUTO: 6.02 THOUSAND/UL (ref 4.31–10.16)

## 2025-02-03 PROCEDURE — 80061 LIPID PANEL: CPT

## 2025-02-03 PROCEDURE — 80053 COMPREHEN METABOLIC PANEL: CPT

## 2025-02-03 PROCEDURE — 36415 COLL VENOUS BLD VENIPUNCTURE: CPT

## 2025-02-03 PROCEDURE — 83036 HEMOGLOBIN GLYCOSYLATED A1C: CPT

## 2025-02-03 PROCEDURE — 85027 COMPLETE CBC AUTOMATED: CPT

## 2025-02-04 ENCOUNTER — APPOINTMENT (OUTPATIENT)
Dept: LAB | Facility: HOSPITAL | Age: 60
End: 2025-02-04
Payer: COMMERCIAL

## 2025-02-04 DIAGNOSIS — R19.7 DIARRHEA OF PRESUMED INFECTIOUS ORIGIN: ICD-10-CM

## 2025-02-04 LAB
EST. AVERAGE GLUCOSE BLD GHB EST-MCNC: 128 MG/DL
HBA1C MFR BLD: 6.1 %

## 2025-02-04 PROCEDURE — 87505 NFCT AGENT DETECTION GI: CPT

## 2025-02-05 ENCOUNTER — RESULTS FOLLOW-UP (OUTPATIENT)
Dept: OTHER | Facility: HOSPITAL | Age: 60
End: 2025-02-05

## 2025-02-05 LAB
C COLI+JEJUNI TUF STL QL NAA+PROBE: NEGATIVE
C DIFF TOX GENS STL QL NAA+PROBE: NEGATIVE
EC STX1+STX2 GENES STL QL NAA+PROBE: NEGATIVE
SALMONELLA SP SPAO STL QL NAA+PROBE: NEGATIVE
SHIGELLA SP+EIEC IPAH STL QL NAA+PROBE: NEGATIVE

## 2025-03-25 DIAGNOSIS — K21.9 GASTROESOPHAGEAL REFLUX DISEASE, UNSPECIFIED WHETHER ESOPHAGITIS PRESENT: ICD-10-CM

## 2025-03-25 RX ORDER — DEXLANSOPRAZOLE 60 MG/1
60 CAPSULE, DELAYED RELEASE ORAL DAILY
Qty: 90 CAPSULE | Refills: 1 | Status: SHIPPED | OUTPATIENT
Start: 2025-03-25

## 2025-03-31 ENCOUNTER — ANNUAL EXAM (OUTPATIENT)
Dept: OBGYN CLINIC | Facility: CLINIC | Age: 60
End: 2025-03-31
Payer: COMMERCIAL

## 2025-03-31 VITALS
HEIGHT: 67 IN | BODY MASS INDEX: 21.09 KG/M2 | DIASTOLIC BLOOD PRESSURE: 90 MMHG | WEIGHT: 134.4 LBS | SYSTOLIC BLOOD PRESSURE: 140 MMHG

## 2025-03-31 DIAGNOSIS — Z01.411 ENCOUNTER FOR GYNECOLOGICAL EXAMINATION WITH ABNORMAL FINDING: Primary | ICD-10-CM

## 2025-03-31 DIAGNOSIS — Z11.3 SCREEN FOR STD (SEXUALLY TRANSMITTED DISEASE): ICD-10-CM

## 2025-03-31 DIAGNOSIS — Z78.0 ASYMPTOMATIC POSTMENOPAUSAL STATUS: ICD-10-CM

## 2025-03-31 DIAGNOSIS — N64.4 MASTALGIA: ICD-10-CM

## 2025-03-31 DIAGNOSIS — K64.9 HEMORRHOIDS, UNSPECIFIED HEMORRHOID TYPE: ICD-10-CM

## 2025-03-31 PROCEDURE — 87491 CHLMYD TRACH DNA AMP PROBE: CPT | Performed by: NURSE PRACTITIONER

## 2025-03-31 PROCEDURE — 87591 N.GONORRHOEAE DNA AMP PROB: CPT | Performed by: NURSE PRACTITIONER

## 2025-03-31 PROCEDURE — S0612 ANNUAL GYNECOLOGICAL EXAMINA: HCPCS | Performed by: NURSE PRACTITIONER

## 2025-03-31 RX ORDER — HYDROCORTISONE ACETATE 25 MG/1
25 SUPPOSITORY RECTAL 2 TIMES DAILY PRN
Qty: 12 SUPPOSITORY | Refills: 0 | Status: SHIPPED | OUTPATIENT
Start: 2025-03-31

## 2025-03-31 NOTE — PROGRESS NOTES
Name: Tomy Stevenson      : 1965      MRN: [id number]  Encounter Provider: MARIANN Quijano  Encounter Date: 3/31/2025   Encounter department: [facility]  :  Assessment & Plan  Encounter for gynecological examination with abnormal finding  Exercise 150-300 minutes per week minimum including weight bearing exercises.   Pap with high risk HPV Q 5 years, if normal.    Call your insurance company to verify coverage prior to completing any ordered tests.   Annual mammogram ordered and monthly breast self exam recommended.    Kegels 20 times twice daily.   Vaginal moisturizers such as coconut oil as needed.   Return to office in one year or sooner, if needed.          Asymptomatic postmenopausal status  Calcium 1200-1500mg (in divided doses-max 600 mg at one time) + 600-1000 IU Vit D daily.        Mastalgia    Orders:    Mammo diagnostic bilateral w 3d and cad; Future    US breast left limited (diagnostic); Future    Hemorrhoids, unspecified hemorrhoid type    Orders:    hydrocortisone (ANUSOL-HC) 25 mg suppository; Insert 1 suppository (25 mg total) into the rectum 2 (two) times a day as needed for hemorrhoids    Screen for STD (sexually transmitted disease)    Orders:    RPR-Syphilis Screening (Total Syphilis IGG/IGM); Future    HIV 1/2 AG/AB w Reflex [facility] for 2 yr old and above; Future    Hepatitis B surface antigen; Future    Hepatitis C antibody; Future    Chlamydia/GC amplified DNA by PCR        History of Present Illness   Postmenopausal   Sexually active yes  History of abnormal pap: none  Last pap 2024 , Findings neg  , Next pap: 3 years  Self breast exam yes  40+ Mammogram 2024  45+ Colon Cancer screenin2019, Type colonoscopy  , Follow up 10 years    Any issues or concerns: has slight pain with intercourse     Hereditary Cancer Screening  FH Breast/Ovarian cancer: none  FH Uterine cancer: none  FH Colon cancer: none    Past  Medical History:  No date: Benign positional vertigo, right  No date: Cervicalgia  No date: Disease of thyroid gland  No date: DVT (deep venous thrombosis) (HCC)  No date: Graves disease  No date: Headache  No date: Hypertension  No date: Migraine      Comment:  Chronic for years  No date: Thyroid disease      Past Surgical History:  No date: APPENDECTOMY  07/29/2022: CARDIAC CATHETERIZATION; Left      Comment:  Procedure: Cardiac Left Heart Cath;  Surgeon: Marcos Holden MD;  Location: [facility];  Service:                Cardiology  07/29/2022: CARDIAC CATHETERIZATION; N/A      Comment:  Procedure: Cardiac Coronary Angiogram;  Surgeon:                Marcos Holden MD;  Location: [facility];                 Service: Cardiology  3/22: CHOLECYSTECTOMY  08/01/2021: CHOLECYSTECTOMY LAPAROSCOPIC; N/A      Comment:  Procedure: CHOLECYSTECTOMY LAPAROSCOPIC W/ INTRAOP                CHOLANGIOGRAM;  Surgeon: Imelda Patel MD;  Location:               [facility];  Service: General  01/31/2019: FL INJECTION LEFT HIP (NON ARTHROGRAM)  No date: LIPOMA RESECTION  No date: NEUROMA EXCISION  05/2018: STEROID INJECTION HIP; Left      Social History    Tobacco Use      Smoking status: Former        Packs/day: 0.01        Types: Cigarettes      Smokeless tobacco: Never      Tobacco comments: last smoked 8/16/19    Vaping Use      Vaping status: Never Used    Alcohol use: Not Currently    Drug use: No    OB History   Gravida2  Para1  Term1  Preterm0  AB0  Living1    SAB0  IAB0  Ectopic0  Multiple0  Live Births2       Comment: First period 10  First child 23  Birth control - about 2 weeks      Current Outpatient Medications:   ·  dexlansoprazole (DEXILANT) 60 MG capsule, Take 1 capsule (60 mg total) by mouth daily, Disp: 90 capsule, Rfl: 1  ·  dicyclomine (BENTYL) 20 mg tablet, Take 1 tablet (20 mg total) by mouth every 6 (six) hours, Disp: 360 tablet, Rfl: 0  ·  EPINEPHrine (EPIPEN) 0.3 mg/0.3  mL SOAJ, Inject 0.3 mL (0.3 mg total) into a muscle if needed for anaphylaxis As needed, Disp: 1 each, Rfl: 0  ·  hydrocortisone (ANUSOL-HC) 25 mg suppository, Insert 1 suppository (25 mg total) into the rectum 2 (two) times a day as needed for hemorrhoids, Disp: 12 suppository, Rfl: 0  ·  pantoprazole (PROTONIX) 40 mg tablet, Take 1 tablet (40 mg total) by mouth daily, Disp: 30 tablet, Rfl: 0  ·  rizatriptan (MAXALT) 10 mg tablet, One p.o. At headache onset, may repeat 1 after 2 hours p.r.n. Maximum 2/24 hours, Disp: 9 tablet, Rfl: 5  ·  sucralfate (CARAFATE) 1 g tablet, Take 1 tablet (1 g total) by mouth 4 (four) times a day, Disp: 360 tablet, Rfl: 1  ·  carvedilol (COREG) 6.25 mg tablet, TAKE 1 TABLET BY MOUTH TWICE A DAY WITH MEALS (Patient not taking: Reported on 3/31/2025), Disp: 180 tablet, Rfl: 1                 Tomy Stevenson is a 59 y.o. female who presents for annual exam. She denies postmenopausal bleeding. Hx of kidney stone and occult hematuria and sees Urology for this. She denies a history of abnormal pap smears, last Pap was negative/negative in 2018 and 2023. A Pap done 03/13/2024 was also negative. Denies vaginal issues. She is sexually active with 1 partner. She does have discomfort at times but declines vaginal estrogen for now. Her STD testing was negative in 2023, she would like to retest. She denies any other postmenopausal issues but  continues with left breast pain and itchy hemorrhoids.     Has a 35 yo daughter, 1 grandson     Review of Systems   Constitutional:  Negative for chills, fatigue, fever and unexpected weight change.   Respiratory:  Negative for shortness of breath.    Gastrointestinal:  Negative for anal bleeding, blood in stool, constipation and diarrhea.   Genitourinary:  Negative for difficulty urinating, dysuria and hematuria.   Neurological:  Negative for weakness.   Psychiatric/Behavioral:  Negative for agitation, behavioral problems and confusion.          "  Objective   /90 (BP Location: Left arm, Patient Position: Sitting, Cuff Size: Standard)   Ht 5' 7\" (1.702 m)   Wt 61 kg (134 lb 6.4 oz)   BMI 21.05 kg/m²      Physical Exam  Constitutional:       General: She is not in acute distress.     Appearance: She is well-developed.   HENT:      Head: Normocephalic.   Pulmonary:      Effort: Pulmonary effort is normal.   Chest:   Breasts:     Breasts are symmetrical.      Right: No inverted nipple, mass, nipple discharge, skin change or tenderness.      Left: No inverted nipple, mass, nipple discharge, skin change or tenderness.   Abdominal:      Palpations: Abdomen is soft.      Comments: Small Hemorrhoids noted.   Genitourinary:     Exam position: Supine.      Labia:         Right: No rash, tenderness, lesion or injury.         Left: No rash, tenderness, lesion or injury.       Vagina: No signs of injury and foreign body. No vaginal discharge, erythema or tenderness.      Cervix: No cervical motion tenderness, discharge or friability.      Uterus: Not deviated, not enlarged, not fixed and not tender.       Adnexa:         Right: No mass, tenderness or fullness.          Left: No mass, tenderness or fullness.     Musculoskeletal:      Cervical back: Normal range of motion and neck supple.           "

## 2025-03-31 NOTE — PATIENT INSTRUCTIONS
Patient Education     Lowering Your Risk of Breast Cancer   About this topic   Breast cancer is a serious illness. Breast cancer is when abnormal cells grow and divide more quickly in your breast. These cells form a growth or tumor. The abnormal cells may enter nearby tissue and spread to other parts of the body. It is the type of cancer most often seen in women. Men can have breast cancer, but it is a rare condition.  General   Some things in your life may increase your risk of breast cancer. You may not be able to change some of these. Others you can control.  You are more likely to get breast cancer if you:  Have a mother, sister, or daughter who has had breast cancer  Have used hormones for menopause for more than 5 years  Have had radiation therapy to the breast or chest in the past  Are overweight or do not exercise  Had your first menstrual period before you were 11 years old  Went through menopause after age 54  Have never been pregnant or had your first child after age 35  Have had breast cancer before  Drink alcohol in any form  Have dense breasts  Are older in age  There is no certain way to prevent breast cancer. There are things you can do to lower your chances of having breast cancer.  Keep a healthy weight. Lose weight if you are overweight. Being overweight raises your chances of having breast cancer.  Eat a healthy diet to maintain a healthy weight, such as more fruits, vegetables, and lean cuts of meat. Decrease the amount of saturated fat in your diet.  Exercise. Being active helps you keep a healthy weight.  Limit your alcohol intake or do not drink alcohol. The more alcohol you drink, the higher your risk.  Do not smoke cigarettes. Smoking can increase your risk of many types of cancer.  Breastfeed your baby. This may help protect you. The longer you breastfeed, the more protection you have.  Talk with your doctor about:  Limiting or stopping hormone therapy.  Taking certain drugs to prevent  breast cancer. For women at high risk of having breast cancer, there are a few drugs that may lower your risk.  Surgery to prevent you from having breast cancer if you are very high risk.  When do I need to call the doctor?   Changes in your breasts  A lump or area in your breast that feels different  Discharge from your nipple  Skin on your breast is dimpled or indented  You have questions or concerns about your breasts  Helpful tips   Talk to your doctor about the best kind of breast cancer screening for you.  If you want to do self breast exams, have your doctor show you the right way to do them.  Tell your doctor of any abnormal finding.  Last Reviewed Date   2021-10-04  Consumer Information Use and Disclaimer   This generalized information is a limited summary of diagnosis, treatment, and/or medication information. It is not meant to be comprehensive and should be used as a tool to help the user understand and/or assess potential diagnostic and treatment options. It does NOT include all information about conditions, treatments, medications, side effects, or risks that may apply to a specific patient. It is not intended to be medical advice or a substitute for the medical advice, diagnosis, or treatment of a health care provider based on the health care provider's examination and assessment of a patient’s specific and unique circumstances. Patients must speak with a health care provider for complete information about their health, medical questions, and treatment options, including any risks or benefits regarding use of medications. This information does not endorse any treatments or medications as safe, effective, or approved for treating a specific patient. Up[employer], [employer] and its affiliates disclaim any warranty or liability relating to this information or the use thereof. The use of this information is governed by the Terms of Use, available at  https://www.woltersMiaoyushanguwer.com/en/know/clinical-effectiveness-terms   Copyright   Copyright © 2024 [employer], Inc[employer] and its affiliates and/or licensors. All rights reserved.

## 2025-04-01 ENCOUNTER — RESULTS FOLLOW-UP (OUTPATIENT)
Age: 60
End: 2025-04-01

## 2025-04-01 LAB
C TRACH DNA SPEC QL NAA+PROBE: NEGATIVE
N GONORRHOEA DNA SPEC QL NAA+PROBE: NEGATIVE

## 2025-04-11 ENCOUNTER — OFFICE VISIT (OUTPATIENT)
Dept: OBGYN CLINIC | Facility: MEDICAL CENTER | Age: 60
End: 2025-04-11
Payer: COMMERCIAL

## 2025-04-11 VITALS — BODY MASS INDEX: 21.19 KG/M2 | HEIGHT: 67 IN | WEIGHT: 135 LBS

## 2025-04-11 DIAGNOSIS — M17.11 PRIMARY OSTEOARTHRITIS OF RIGHT KNEE: Primary | ICD-10-CM

## 2025-04-11 DIAGNOSIS — G89.29 CHRONIC PAIN OF RIGHT KNEE: ICD-10-CM

## 2025-04-11 DIAGNOSIS — M16.12 PRIMARY OSTEOARTHRITIS OF LEFT HIP: ICD-10-CM

## 2025-04-11 DIAGNOSIS — M25.561 CHRONIC PAIN OF RIGHT KNEE: ICD-10-CM

## 2025-04-11 PROCEDURE — 99214 OFFICE O/P EST MOD 30 MIN: CPT | Performed by: ORTHOPAEDIC SURGERY

## 2025-04-11 PROCEDURE — 20610 DRAIN/INJ JOINT/BURSA W/O US: CPT | Performed by: ORTHOPAEDIC SURGERY

## 2025-04-11 RX ORDER — LIDOCAINE HYDROCHLORIDE 10 MG/ML
2 INJECTION, SOLUTION INFILTRATION; PERINEURAL
Status: COMPLETED | OUTPATIENT
Start: 2025-04-11 | End: 2025-04-11

## 2025-04-11 RX ORDER — BUPIVACAINE HYDROCHLORIDE 2.5 MG/ML
2 INJECTION, SOLUTION INFILTRATION; PERINEURAL
Status: COMPLETED | OUTPATIENT
Start: 2025-04-11 | End: 2025-04-11

## 2025-04-11 RX ORDER — BETAMETHASONE SODIUM PHOSPHATE AND BETAMETHASONE ACETATE 3; 3 MG/ML; MG/ML
12 INJECTION, SUSPENSION INTRA-ARTICULAR; INTRALESIONAL; INTRAMUSCULAR; SOFT TISSUE
Status: COMPLETED | OUTPATIENT
Start: 2025-04-11 | End: 2025-04-11

## 2025-04-11 RX ADMIN — LIDOCAINE HYDROCHLORIDE 2 ML: 10 INJECTION, SOLUTION INFILTRATION; PERINEURAL at 14:15

## 2025-04-11 RX ADMIN — BUPIVACAINE HYDROCHLORIDE 2 ML: 2.5 INJECTION, SOLUTION INFILTRATION; PERINEURAL at 14:15

## 2025-04-11 RX ADMIN — BETAMETHASONE SODIUM PHOSPHATE AND BETAMETHASONE ACETATE 12 MG: 3; 3 INJECTION, SUSPENSION INTRA-ARTICULAR; INTRALESIONAL; INTRAMUSCULAR; SOFT TISSUE at 14:15

## 2025-04-11 NOTE — PROGRESS NOTES
Name: Tomy Stevenson      : 1965       MRN: [id number]   Encounter Provider: Leonard Mead MD   Encounter Date: 25  Encounter department: [facility] [place]     ASSESSMENT & PLAN:  Assessment & Plan  Primary osteoarthritis of right knee  Cortisone injection performed today, 2025  Orders:    Large joint arthrocentesis: R knee    Chronic pain of right knee  Cortisone injection performed today, 2025  Orders:    Large joint arthrocentesis: R knee    Primary osteoarthritis of left hip  Seeing Dr Bonilla at the end of this month         ___________________________________________________  Previously taken diagnostics reviewed and physical exam performed.  Diagnosis, treatment options and associated risks were discussed with the patient including no treatment, nonsurgical treatment and potential for surgical intervention.  The patient was given the opportunity to ask questions regarding each.  Quality of life decision pursue elective left total hip arthroplasty with staged right total knee arthroplasty.  She has an appointment the end of this month with Dr. Bonilla for left hip arthroplasty consultation  She was offered, accepted, performed injection of cortisone to her right knee today for symptomatic relief.  She tolerated the procedure well.  Ice postinjection protocol advised.  Weightbearing and activity as tolerated.  Discussed repeating the injection of cortisone every 3 months versus a round of viscosupplementation    To do next visit:  Return if symptoms worsen or fail to improve, for re-check on an as needed basis (PRN).  ____________________________________________________  CHIEF COMPLAINT:  Chief Complaint   Patient presents with    Right Knee - Pain         SUBJECTIVE:  Tomy Stevenson is a 59 y.o. female who presents today at request of Dr. Luis for evaluation treatment of her right knee due to pain.  She was diagnosed with  osteoarthritis and had short-term relief with an injection of cortisone administered September 2024.  She has increased medial knee pain with weightbearing activities.  She is stiffness at the level of her knee getting up after prolonged sedentary positions, deep knee flexion positions as well as ambulating stairs.  She presents wearing a neoprene knee sleeve.  She had been referred to physical therapy as well as prescribed Mobic.  Pain at times can be 9/10  She also has pain at her left hip and responded favorably to previous injections of cortisone performed by radiology her last being March 2024.  She is seeing Dr. Bonilla the end of this month for left hip arthroplasty consultation.          PAST MEDICAL HISTORY:  Past Medical History:   Diagnosis Date    Benign positional vertigo, right     Cervicalgia     Disease of thyroid gland     DVT (deep venous thrombosis) (HCC)     Graves disease     Headache     Hypertension     Migraine     Chronic for years    Thyroid disease        PAST SURGICAL HISTORY:  Past Surgical History:   Procedure Laterality Date    APPENDECTOMY      CARDIAC CATHETERIZATION Left 07/29/2022    Procedure: Cardiac Left Heart Cath;  Surgeon: Marcos Holden MD;  [facility]: [facility];  Service: Cardiology    CARDIAC CATHETERIZATION N/A 07/29/2022    Procedure: Cardiac Coronary Angiogram;  Surgeon: Marcos Holden MD;  [facility];  Service: Cardiology    CHOLECYSTECTOMY  3/22    CHOLECYSTECTOMY LAPAROSCOPIC N/A 08/01/2021    Procedure: CHOLECYSTECTOMY LAPAROSCOPIC W/ INTRAOP CHOLANGIOGRAM;  Surgeon: Imelda Patel MD;  [facility]: [facility];  Service: General    FL INJECTION LEFT HIP (NON ARTHROGRAM)  01/31/2019    LIPOMA RESECTION      NEUROMA EXCISION      STEROID INJECTION HIP Left 05/2018       FAMILY HISTORY:  Family History   Problem Relation Age of Onset    Hypertension Mother     Asthma Mother     Diabetes Mother     Hyperlipidemia Mother     Seizures  Father     Cancer Sister 20        brain    No Known Problems Daughter     No Known Problems Maternal Grandmother     No Known Problems Paternal Grandmother     Migraines Brother     Seizures Brother     No Known Problems Maternal Aunt     No Known Problems Maternal Aunt     No Known Problems Maternal Aunt     No Known Problems Paternal Aunt     No Known Problems Paternal Aunt     No Known Problems Paternal Aunt     Breast cancer Neg Hx     Colon cancer Neg Hx     Ovarian cancer Neg Hx     Uterine cancer Neg Hx     Cervical cancer Neg Hx        SOCIAL HISTORY:  Social History     Tobacco Use    Smoking status: Former     Current packs/day: 0.01     Types: Cigarettes    Smokeless tobacco: Never    Tobacco comments:     last smoked 8/16/19   Vaping Use    Vaping status: Never Used   Substance Use Topics    Alcohol use: Not Currently    Drug use: No       MEDICATIONS:    Current Outpatient Medications:     dexlansoprazole (DEXILANT) 60 MG capsule, Take 1 capsule (60 mg total) by mouth daily, Disp: 90 capsule, Rfl: 1    dicyclomine (BENTYL) 20 mg tablet, Take 1 tablet (20 mg total) by mouth every 6 (six) hours, Disp: 360 tablet, Rfl: 0    EPINEPHrine (EPIPEN) 0.3 mg/0.3 mL SOAJ, Inject 0.3 mL (0.3 mg total) into a muscle if needed for anaphylaxis As needed, Disp: 1 each, Rfl: 0    hydrocortisone (ANUSOL-HC) 25 mg suppository, Insert 1 suppository (25 mg total) into the rectum 2 (two) times a day as needed for hemorrhoids, Disp: 12 suppository, Rfl: 0    pantoprazole (PROTONIX) 40 mg tablet, Take 1 tablet (40 mg total) by mouth daily, Disp: 30 tablet, Rfl: 0    rizatriptan (MAXALT) 10 mg tablet, One p.o. At headache onset, may repeat 1 after 2 hours p.r.n. Maximum 2/24 hours, Disp: 9 tablet, Rfl: 5    sucralfate (CARAFATE) 1 g tablet, Take 1 tablet (1 g total) by mouth 4 (four) times a day, Disp: 360 tablet, Rfl: 1    carvedilol (COREG) 6.25 mg tablet, TAKE 1 TABLET BY MOUTH TWICE A DAY WITH MEALS (Patient not taking:  "Reported on 3/31/2025), Disp: 180 tablet, Rfl: 1    ALLERGIES:  Allergies   Allergen Reactions    Bee Venom      Bee sting    Contrast [Iodinated Contrast Media] Anaphylaxis     Pt states her throat closes- kk rn     Bee Pollen Dizziness     Specifically bee sting which evokes reaction of swelling of throat, headache and vomitting    Macadamia Nut Oil - Food Allergy Hives    Pollen Extract Other (See Comments)     Throat clearing, difficulty swallowing, ears itchy    Prochlorperazine Other (See Comments)      Aka (compazine)  Delirious     Besifloxacin Hcl Palpitations     Other reaction(s): Unknown    Nitroglycerin Rash       LABS:  HgA1c:   Lab Results   Component Value Date    HGBA1C 6.1 (H) 02/03/2025     BMP:   Lab Results   Component Value Date    CALCIUM 9.5 02/03/2025    K 3.9 02/03/2025    CO2 29 02/03/2025     02/03/2025    BUN 12 02/03/2025    CREATININE 0.74 02/03/2025     CBC: No components found for: \"CBC\"    _____________________________________________________  PHYSICAL EXAMINATION:  Vital signs: Ht 5' 7\" (1.702 m)   Wt 61.2 kg (135 lb)   BMI 21.14 kg/m²   General: No acute distress, awake and alert  Psychiatric: Mood and affect appear appropriate  HEENT: Trachea Midline, No torticollis, no apparent facial trauma  Cardiovascular: No audible murmurs; Extremities appear perfused  Pulmonary: No audible wheezing or stridor  Skin: Intact, no open lesions; see further details (if any) below    MUSCULOSKELETAL EXAMINATION:    Right Knee Exam     Muscle Strength   The patient has normal right knee strength.    Tenderness   The patient is experiencing tenderness in the medial joint line.    Range of Motion   Extension:  5   Flexion:  110 (with crepitation and stiffness in flexion)     Other   Erythema: absent  Sensation: normal  Swelling: mild  Effusion: effusion (trace) present    Comments:    Intact extensor mechanism  Varus alignment      Left Hip Exam     Range of Motion   Flexion:  90 (leg goes " "into ER)   External rotation:  20 (with pain and guarding)   Left hip internal rotation: restricted due to pain and guarding.     Muscle Strength   The patient has normal left hip strength (mild pain with resistance).               _____________________________________________________  STUDIES REVIEWED:    None performed but reviewed:    The attending physician has personally reviewed the pertinent films in PACS and their interpretation is as follows:    Right knee x-rays taken 9/3/24 were reviewed today and show: moderately-severe degenerative changes with near bone-on-bone opposition of her medial and patellofemoral compartments. Varus alignment. Subchondral sclerosis and osteophyte formation present    Left hip and pelvis x-rays taken 3/9/24 were reviewed today and show: advanced left hip degenerative changes with bone-on-bone opposition, deformity of her femoral head. Subchondral sclerosis and osteophyte formation present      PROCEDURES PERFORMED:    Large joint arthrocentesis: R knee  Universal Protocol:  Consent: Verbal consent obtained.  Risks and benefits: risks, benefits and alternatives were discussed  Consent given by: patient  Time out: Immediately prior to procedure a \"time out\" was called to verify the correct patient, procedure, equipment, support staff and site/side marked as required.  Timeout called at: 4/11/2025 2:26 PM.  Patient understanding: patient states understanding of the procedure being performed  Site marked: the operative site was marked  Patient identity confirmed: verbally with patient  Supporting Documentation  Indications: pain and diagnostic evaluation   Procedure Details  Location: knee - R knee  Preparation: Patient was prepped and draped in the usual sterile fashion  Needle size: 22 G  Ultrasound guidance: no  Approach: anterolateral  Medications administered: 2 mL lidocaine 1 %; 2 mL bupivacaine 0.25 %; 12 mg betamethasone acetate-betamethasone sodium phosphate 6 (3-3) " mg/mL    Patient tolerance: patient tolerated the procedure well with no immediate complications  Dressing:  Sterile dressing applied            Scribe Attestation      I,:  Luis Antonio Orellana am acting as a scribe while in the presence of the attending physician.:       I,:  Leonard Mead MD personally performed the services described in this documentation    as scribed in my presence.:

## 2025-04-29 ENCOUNTER — HOSPITAL ENCOUNTER (OUTPATIENT)
Dept: RADIOLOGY | Facility: HOSPITAL | Age: 60
Discharge: HOME | End: 2025-04-29
Attending: ORTHOPAEDIC SURGERY
Payer: COMMERCIAL

## 2025-04-29 ENCOUNTER — OFFICE VISIT (OUTPATIENT)
Dept: OBGYN CLINIC | Facility: CLINIC | Age: 60
End: 2025-04-29
Payer: COMMERCIAL

## 2025-04-29 VITALS — BODY MASS INDEX: 21.69 KG/M2 | WEIGHT: 135 LBS | HEIGHT: 66 IN

## 2025-04-29 DIAGNOSIS — M25.552 LEFT HIP PAIN: ICD-10-CM

## 2025-04-29 DIAGNOSIS — M16.12 PRIMARY OSTEOARTHRITIS OF LEFT HIP: Primary | ICD-10-CM

## 2025-04-29 DIAGNOSIS — I82.4Z2 DEEP VEIN THROMBOSIS (DVT) OF DISTAL VEIN OF LEFT LOWER EXTREMITY, UNSPECIFIED CHRONICITY (HCC): ICD-10-CM

## 2025-04-29 DIAGNOSIS — M16.12 PRIMARY OSTEOARTHRITIS OF LEFT HIP: ICD-10-CM

## 2025-04-29 DIAGNOSIS — Z01.818 PREOPERATIVE TESTING: ICD-10-CM

## 2025-04-29 PROCEDURE — 99215 OFFICE O/P EST HI 40 MIN: CPT | Performed by: ORTHOPAEDIC SURGERY

## 2025-04-29 PROCEDURE — 73502 X-RAY EXAM HIP UNI 2-3 VIEWS: CPT

## 2025-04-29 RX ORDER — SODIUM CHLORIDE, SODIUM LACTATE, POTASSIUM CHLORIDE, CALCIUM CHLORIDE 600; 310; 30; 20 MG/100ML; MG/100ML; MG/100ML; MG/100ML
20 INJECTION, SOLUTION INTRAVENOUS CONTINUOUS
OUTPATIENT
Start: 2025-04-29

## 2025-04-29 RX ORDER — ASCORBIC ACID 500 MG
500 TABLET ORAL 2 TIMES DAILY
Qty: 60 TABLET | Refills: 1 | Status: CANCELLED | OUTPATIENT
Start: 2025-04-29

## 2025-04-29 RX ORDER — SODIUM CHLORIDE, SODIUM LACTATE, POTASSIUM CHLORIDE, CALCIUM CHLORIDE 600; 310; 30; 20 MG/100ML; MG/100ML; MG/100ML; MG/100ML
125 INJECTION, SOLUTION INTRAVENOUS CONTINUOUS
Status: CANCELLED | OUTPATIENT
Start: 2025-04-29

## 2025-04-29 RX ORDER — CHLORHEXIDINE GLUCONATE 40 MG/ML
SOLUTION TOPICAL DAILY PRN
OUTPATIENT
Start: 2025-04-29

## 2025-04-29 RX ORDER — FERROUS SULFATE 324(65)MG
324 TABLET, DELAYED RELEASE (ENTERIC COATED) ORAL
Qty: 60 TABLET | Refills: 1 | Status: SHIPPED | OUTPATIENT
Start: 2025-04-29

## 2025-04-29 RX ORDER — CELECOXIB 100 MG/1
100 CAPSULE ORAL DAILY
Qty: 15 CAPSULE | Refills: 0 | Status: CANCELLED | OUTPATIENT
Start: 2025-04-29

## 2025-04-29 RX ORDER — CHLORHEXIDINE GLUCONATE ORAL RINSE 1.2 MG/ML
15 SOLUTION DENTAL ONCE
Status: CANCELLED | OUTPATIENT
Start: 2025-04-29 | End: 2025-04-29

## 2025-04-29 RX ORDER — FERROUS SULFATE 324(65)MG
324 TABLET, DELAYED RELEASE (ENTERIC COATED) ORAL
Qty: 60 TABLET | Refills: 1 | Status: CANCELLED | OUTPATIENT
Start: 2025-04-29

## 2025-04-29 RX ORDER — ACETAMINOPHEN 325 MG/1
975 TABLET ORAL ONCE
OUTPATIENT
Start: 2025-04-29 | End: 2025-04-29

## 2025-04-29 RX ORDER — FOLIC ACID 1 MG/1
1 TABLET ORAL DAILY
Qty: 30 TABLET | Refills: 1 | Status: SHIPPED | OUTPATIENT
Start: 2025-04-29

## 2025-04-29 RX ORDER — ACETAMINOPHEN 325 MG/1
975 TABLET ORAL ONCE
Status: CANCELLED | OUTPATIENT
Start: 2025-04-29 | End: 2025-04-29

## 2025-04-29 RX ORDER — CHLORHEXIDINE GLUCONATE 40 MG/ML
SOLUTION TOPICAL DAILY PRN
Status: CANCELLED | OUTPATIENT
Start: 2025-04-29

## 2025-04-29 RX ORDER — SODIUM CHLORIDE, SODIUM LACTATE, POTASSIUM CHLORIDE, CALCIUM CHLORIDE 600; 310; 30; 20 MG/100ML; MG/100ML; MG/100ML; MG/100ML
20 INJECTION, SOLUTION INTRAVENOUS CONTINUOUS
Status: CANCELLED | OUTPATIENT
Start: 2025-04-29

## 2025-04-29 RX ORDER — FOLIC ACID 1 MG/1
1 TABLET ORAL DAILY
Qty: 30 TABLET | Refills: 1 | Status: CANCELLED | OUTPATIENT
Start: 2025-04-29

## 2025-04-29 RX ORDER — SODIUM CHLORIDE, SODIUM LACTATE, POTASSIUM CHLORIDE, CALCIUM CHLORIDE 600; 310; 30; 20 MG/100ML; MG/100ML; MG/100ML; MG/100ML
125 INJECTION, SOLUTION INTRAVENOUS CONTINUOUS
OUTPATIENT
Start: 2025-04-29

## 2025-04-29 RX ORDER — ASCORBIC ACID 500 MG
500 TABLET ORAL 2 TIMES DAILY
Qty: 60 TABLET | Refills: 1 | Status: SHIPPED | OUTPATIENT
Start: 2025-04-29

## 2025-04-29 RX ORDER — CHLORHEXIDINE GLUCONATE ORAL RINSE 1.2 MG/ML
15 SOLUTION DENTAL ONCE
OUTPATIENT
Start: 2025-04-29 | End: 2025-04-29

## 2025-04-29 NOTE — PROGRESS NOTES
Name: Tomy Stevenson      : 1965      MRN: [id number]  Encounter Provider: Lory Bonilla MD  Encounter Date: 2025   Encounter department: [facility]  :  Assessment & Plan  Primary osteoarthritis of left hip    Orders:    XR hip/pelv 2-3 vws left if performed; Future    FL injection left hip (non arthrogram); Future    Case request operating room: ARTHROPLASTY HIP TOTAL ANTERIOR; Standing    ascorbic acid (VITAMIN C) 500 MG tablet; Take 1 tablet (500 mg total) by mouth 2 (two) times a day    ferrous sulfate 324 (65 Fe) mg; Take 1 tablet (324 mg total) by mouth daily before breakfast    folic acid (FOLVITE) 1 mg tablet; Take 1 tablet (1 mg total) by mouth daily    Comprehensive metabolic panel; Future    Hemoglobin A1C W/EAG Estimation; Future    CBC and differential; Future    MRSA culture; Future    Protime-INR; Future    APTT; Future    Type and screen; Future    Ambulatory Referral to [facility]; Future    Ambulatory referral to Physical Therapy; Future    Ambulatory referral to surgical optimization; Future    EKG 12 lead; Future    Ambulatory referral to Cardiology; Future  Patient has chronic left hip due to severe osteoarthritis   Weightbearing as tolerated   Order was placed for FL left IA hip steroid injection. She is aware she would need to wait 3 months after the injection to have surgery ( July is the last month to have the injection)   Prescribed patient Celebrex 100 mg for pain relief   Discussed with patient surgery for Left anterior total hip arthroplasty. Patient agrees and would like to proceed forward scheduling for surgery.   The benefits and purpose of the operation and or procedure have been explained to me in language I understand by Dr. Bonilla as well the risks, alternatives and complications pertaining to the above procedure/surgery which include but is not limited to : infections, deep vein  thrombosis, blood clots to the lungs, wound healing problems, complications, for extensive blood loss, including shock, possible death, leg length discrepancy, limp hip dislocations, fracture, loss of limb, persistent pain, failure of hardware/implant, damage of blood vessels and or nerves, heart attack, stroke and potential need for further surgery/revision surgery.    Patient has history of DVT left groin region /no history PE and will be on Eliquis 5 mg BID after surgery   Patient does live alone but will have people staying and helping her after surgery   Patient will be assessed by physical therapy prior to discharge and will continue outpatient physical therapy  Patient's extremity will be wrapped in an Ace wrap/sleeve from the toes up to the knee down with postoperative swelling.  Patient will then be assessed 2-3 weeks postoperatively with incision check, new x-rays by either Danilo Boudreaux PA-C or Dr. Bonilla.  At that time it is reasonable for the patient to be on an ambulatory device such as a walker or cane, but at the 3-month kimberly these ambulatory devices should be discontinued.   Left hip pain    Orders:    XR hip/pelv 2-3 vws left if performed; Future    FL injection left hip (non arthrogram); Future    Preoperative testing    Orders:    ascorbic acid (VITAMIN C) 500 MG tablet; Take 1 tablet (500 mg total) by mouth 2 (two) times a day    ferrous sulfate 324 (65 Fe) mg; Take 1 tablet (324 mg total) by mouth daily before breakfast    folic acid (FOLVITE) 1 mg tablet; Take 1 tablet (1 mg total) by mouth daily    Comprehensive metabolic panel; Future    Hemoglobin A1C W/EAG Estimation; Future    CBC and differential; Future    MRSA culture; Future    Protime-INR; Future    APTT; Future    Type and screen; Future    Ambulatory Referral to Center for [facility]operative [facility]; Future    Ambulatory referral to Physical Therapy; Future    Ambulatory referral to surgical optimization; Future    EKG 12 lead;  Future    Ambulatory referral to Cardiology; Future    Deep vein thrombosis (DVT) of distal vein of left lower extremity, unspecified chronicity (HCC)             Follow up visit :  Postop 3 weeks left anterior total hip arthroplasty        The above stated was discussed in layman's terms and the patient expressed understanding.  All questions were answered to the patient's satisfaction.       Subjective:   Tomy Stevenson is a 59 y.o. female who presents today for evaluation for her left hip pain.  Patient was treated in the past with  for greater trochanteric bursitis and received steroid injection on 4/25/2018.  She has been having left hip pain for quite some time and is progressively getting worse.  Patient was treated in the past with the left hip steroid injection x 3.  She states the last steroid injection was on 3/23/2024 and only gave her 2 to 3 months of relief.  Patient states initially the first steroid injection she had lasted her for 10 years.  She states she is having pain and swelling in the left groin region and rating pain into the buttock.  She states the pain comes and goes.  She feels her leg lengths are equal.  She notes her pain is worse with going up steps, prolonged standing, walking, and with increased activities.  Patient has been using ice and heat for pain relief.    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      Review of systems negative unless otherwise specified in HPI    Past Medical History:   Diagnosis Date    Benign positional vertigo, right     Cervicalgia     Disease of thyroid gland     DVT (deep venous thrombosis) (HCC)     Graves disease     Headache     Hypertension     Migraine     Chronic for years    Thyroid disease        Past Surgical History:   Procedure Laterality Date    APPENDECTOMY      CARDIAC CATHETERIZATION Left 07/29/2022    Procedure: Cardiac Left Heart Cath;  Surgeon: Marcos Holden MD;  Location: [facility];  Service: Cardiology    CARDIAC CATHETERIZATION N/A 07/29/2022    Procedure: Cardiac Coronary Angiogram;  Surgeon: Marcos Holden MD;  Location: [facility];  Service: Cardiology    CHOLECYSTECTOMY  3/22    CHOLECYSTECTOMY LAPAROSCOPIC N/A 08/01/2021    Procedure: CHOLECYSTECTOMY LAPAROSCOPIC W/ INTRAOP CHOLANGIOGRAM;  Surgeon: Imelda Patel MD;  Location: [facility];  Service: General    FL INJECTION LEFT HIP (NON ARTHROGRAM)  01/31/2019    LIPOMA RESECTION      NEUROMA EXCISION      STEROID INJECTION HIP Left 05/2018       Family History   Problem Relation Age of Onset    Hypertension Mother     Asthma Mother     Diabetes Mother     Hyperlipidemia Mother     Seizures Father     Cancer Sister 20        brain    No Known Problems Daughter     No Known Problems Maternal Grandmother     No Known Problems Paternal Grandmother     Migraines Brother     Seizures Brother     No Known Problems Maternal Aunt     No Known Problems Maternal Aunt     No Known Problems Maternal Aunt     No Known Problems Paternal Aunt     No Known Problems Paternal Aunt     No Known Problems Paternal Aunt     Breast cancer Neg Hx     Colon cancer Neg Hx     Ovarian cancer Neg Hx     Uterine cancer Neg Hx     Cervical cancer Neg Hx        Social History     Occupational History    Occupation:    Tobacco Use    Smoking status: Former     Current packs/day: 0.01     Types: Cigarettes    Smokeless tobacco: Never    Tobacco comments:     last smoked 8/16/19   Vaping Use    Vaping status: Never Used   Substance and Sexual Activity    Alcohol use: Not Currently    Drug use: No    Sexual activity: Yes     Partners: Male     Birth control/protection: Post-menopausal         Current Outpatient Medications:     carvedilol (COREG) 6.25 mg tablet, TAKE 1 TABLET BY MOUTH TWICE A DAY WITH MEALS (Patient not taking: Reported on 3/31/2025), Disp: 180 tablet, Rfl: 1    dexlansoprazole (DEXILANT) 60 MG capsule, Take 1  capsule (60 mg total) by mouth daily, Disp: 90 capsule, Rfl: 1    dicyclomine (BENTYL) 20 mg tablet, Take 1 tablet (20 mg total) by mouth every 6 (six) hours, Disp: 360 tablet, Rfl: 0    EPINEPHrine (EPIPEN) 0.3 mg/0.3 mL SOAJ, Inject 0.3 mL (0.3 mg total) into a muscle if needed for anaphylaxis As needed, Disp: 1 each, Rfl: 0    hydrocortisone (ANUSOL-HC) 25 mg suppository, Insert 1 suppository (25 mg total) into the rectum 2 (two) times a day as needed for hemorrhoids, Disp: 12 suppository, Rfl: 0    pantoprazole (PROTONIX) 40 mg tablet, Take 1 tablet (40 mg total) by mouth daily, Disp: 30 tablet, Rfl: 0    rizatriptan (MAXALT) 10 mg tablet, One p.o. At headache onset, may repeat 1 after 2 hours p.r.n. Maximum 2/24 hours, Disp: 9 tablet, Rfl: 5    sucralfate (CARAFATE) 1 g tablet, Take 1 tablet (1 g total) by mouth 4 (four) times a day, Disp: 360 tablet, Rfl: 1    Allergies   Allergen Reactions    Bee Venom      Bee sting    Contrast [Iodinated Contrast Media] Anaphylaxis     Pt states her throat closes- kk rn     Bee Pollen Dizziness     Specifically bee sting which evokes reaction of swelling of throat, headache and vomitting    Macadamia Nut Oil - Food Allergy Hives    Pollen Extract Other (See Comments)     Throat clearing, difficulty swallowing, ears itchy    Prochlorperazine Other (See Comments)      Aka (compazine)  Delirious     Besifloxacin Hcl Palpitations     Other reaction(s): Unknown    Nitroglycerin Rash          There were no vitals filed for this visit.  Body mass index is 21.63 kg/m².  Wt Readings from Last 3 Encounters:   04/29/25 61.2 kg (135 lb)   04/11/25 61.2 kg (135 lb)   03/31/25 61 kg (134 lb 6.4 oz)       Objective:            Physical Exam  Physical Exam:      General Appearance:    Alert, cooperative, no distress, appears stated age   Head:    Normocephalic, without obvious abnormality, atraumatic   Eyes:    conjunctiva/corneas clear, both eyes         Nose:   Nares normal, septum  "midline, no drainage    Throat:   Lips normal; teeth and gums normal   Neck:    symmetrical, trachea midline, ;     thyroid:  no enlargement/   Back:     Symmetric, no curvature, ROM normal   Lungs:   No audible wheezing or labored breathing   Chest Wall:    No tenderness or deformity    Heart:    Regular rate and rhythm                         Pulses:   2+ and symmetric all extremities   Skin:   Skin color, texture, turgor normal, no rashes or lesions   Neurologic:   normal strength, sensation and reflexes     throughout                       Ortho Exam  Left hip  Skin intact  No erythema or open wounds  + Stinchfield  +  GALO test  + FADDIR  Internal rotation 0 degrees  External rotation 0 degrees  No tenderness to palpation over the greater trochanteric region  Neurovascularly Intact Distally      Right hip  Skin intact  No erythema or open wounds  Negative Stinchfield  Negative GALO test  Negative FADDIR  No tenderness to palpation over the greater trochanteric region  Neurovascularly Intact Distally        Diagnostics, reviewed and taken today if performed as documented:    The attending physician has personally reviewed the pertinent films in PACS and interpretation is as follows:X-ray left hip demonstrates severe joint space narrowing in the inferior aspect of the hip with osteophyte formation and subchondral sclerosis       Procedures, if performed today:    Procedures    None performed      Scribe Attestation      I,:  Matt Valero MA am acting as a scribe while in the presence of the attending physician.:       I,:  Lory Bonilla MD personally performed the services described in this documentation    as scribed in my presence.:               Portions of the record may have been created with voice recognition software.  Occasional wrong word or \"sound a like\" substitutions may have occurred due to the inherent limitations of voice recognition software.  Read the chart carefully and " recognize, using context, where substitutions have occurred.

## 2025-04-29 NOTE — ASSESSMENT & PLAN NOTE
Orders:    XR hip/pelv 2-3 vws left if performed; Future    FL injection left hip (non arthrogram); Future    Case request operating room: ARTHROPLASTY HIP TOTAL ANTERIOR; Standing    ascorbic acid (VITAMIN C) 500 MG tablet; Take 1 tablet (500 mg total) by mouth 2 (two) times a day    ferrous sulfate 324 (65 Fe) mg; Take 1 tablet (324 mg total) by mouth daily before breakfast    folic acid (FOLVITE) 1 mg tablet; Take 1 tablet (1 mg total) by mouth daily    Comprehensive metabolic panel; Future    Hemoglobin A1C W/EAG Estimation; Future    CBC and differential; Future    MRSA culture; Future    Protime-INR; Future    APTT; Future    Type and screen; Future    Ambulatory Referral to [facility]; Future    Ambulatory referral to Physical Therapy; Future    Ambulatory referral to surgical optimization; Future    EKG 12 lead; Future    Ambulatory referral to Cardiology; Future  Patient has chronic left hip due to severe osteoarthritis   Weightbearing as tolerated   Order was placed for FL left IA hip steroid injection. She is aware she would need to wait 3 months after the injection to have surgery ( July is the last month to have the injection)   Prescribed patient Celebrex 100 mg for pain relief   Discussed with patient surgery for Left anterior total hip arthroplasty. Patient agrees and would like to proceed forward scheduling for surgery.   The benefits and purpose of the operation and or procedure have been explained to me in language I understand by Dr. Bonilla as well the risks, alternatives and complications pertaining to the above procedure/surgery which include but is not limited to : infections, deep vein thrombosis, blood clots to the lungs, wound healing problems, complications, for extensive blood loss, including shock, possible death, leg length discrepancy, limp hip dislocations, fracture, loss of limb, persistent pain, failure of hardware/implant, damage of blood vessels and or  nerves, heart attack, stroke and potential need for further surgery/revision surgery.    Patient has history of DVT left groin region /no history PE and will be on Eliquis 5 mg BID after surgery   Patient does live alone but will have people staying and helping her after surgery   Patient will be assessed by physical therapy prior to discharge and will continue outpatient physical therapy  Patient's extremity will be wrapped in an Ace wrap/sleeve from the toes up to the knee down with postoperative swelling.  Patient will then be assessed 2-3 weeks postoperatively with incision check, new x-rays by either Danilo Boudreaux PA-C or Dr. Bonilla.  At that time it is reasonable for the patient to be on an ambulatory device such as a walker or cane, but at the 3-month kimberly these ambulatory devices should be discontinued.

## 2025-05-02 DIAGNOSIS — M16.12 PRIMARY OSTEOARTHRITIS OF LEFT HIP: Primary | ICD-10-CM

## 2025-05-02 RX ORDER — MUPIROCIN 20 MG/G
OINTMENT TOPICAL
Qty: 22 G | Refills: 0 | Status: SHIPPED | OUTPATIENT
Start: 2025-05-02

## 2025-05-06 DIAGNOSIS — K64.9 HEMORRHOIDS, UNSPECIFIED HEMORRHOID TYPE: ICD-10-CM

## 2025-05-06 RX ORDER — HYDROCORTISONE ACETATE 25 MG/1
25 SUPPOSITORY RECTAL 2 TIMES DAILY PRN
Qty: 12 SUPPOSITORY | Refills: 1 | Status: SHIPPED | OUTPATIENT
Start: 2025-05-06

## 2025-05-13 ENCOUNTER — OFFICE VISIT (OUTPATIENT)
Age: 60
End: 2025-05-13
Payer: COMMERCIAL

## 2025-05-13 VITALS
OXYGEN SATURATION: 99 % | HEIGHT: 67 IN | SYSTOLIC BLOOD PRESSURE: 118 MMHG | DIASTOLIC BLOOD PRESSURE: 80 MMHG | TEMPERATURE: 98.1 F | WEIGHT: 137 LBS | HEART RATE: 69 BPM | BODY MASS INDEX: 21.5 KG/M2

## 2025-05-13 DIAGNOSIS — D48.9 NEOPLASM OF UNCERTAIN BEHAVIOR: Primary | ICD-10-CM

## 2025-05-13 PROCEDURE — 99204 OFFICE O/P NEW MOD 45 MIN: CPT | Performed by: STUDENT IN AN ORGANIZED HEALTH CARE EDUCATION/TRAINING PROGRAM

## 2025-05-13 PROCEDURE — 11104 PUNCH BX SKIN SINGLE LESION: CPT | Performed by: STUDENT IN AN ORGANIZED HEALTH CARE EDUCATION/TRAINING PROGRAM

## 2025-05-13 PROCEDURE — 88305 TISSUE EXAM BY PATHOLOGIST: CPT | Performed by: PATHOLOGY

## 2025-05-13 PROCEDURE — 11105 PUNCH BX SKIN EA SEP/ADDL: CPT | Performed by: STUDENT IN AN ORGANIZED HEALTH CARE EDUCATION/TRAINING PROGRAM

## 2025-05-13 NOTE — PATIENT INSTRUCTIONS
"INFORMED CONSENT DISCUSSION AND POST-OPERATIVE INSTRUCTIONS FOR PATIENT    I.  RATIONALE FOR PROCEDURE  I understand that a skin biopsy allows the Dermatologist to test a lesion or rash under the microscope to obtain a diagnosis.  It usually involves numbing the area with numbing medication and removing a small piece of skin; sometimes the area will be closed with sutures. In this specific procedure, sutures are not usually needed.  If any sutures are placed, then they are usually need to be removed in 2 weeks or less.    I understand that my Dermatologist recommends that a skin \"shave\" biopsy be performed today.  A local anesthetic, similar to the kind that a dentist uses when filling a cavity, will be injected with a very small needle into the skin area to be sampled.  The injected skin and tissue underneath \"will go to sleep” and become numb so no pain should be felt afterwards.  An instrument shaped like a tiny \"razor blade\" (shave biopsy instrument) will be used to cut a small piece of tissue and skin from the area so that a sample of tissue can be taken and examined more closely under the microscope.  A slight amount of bleeding will occur, but it will be stopped with direct pressure and a pressure bandage and any other appropriate methods.  I understands that a scar will form where the wound was created.  Surgical ointment will be applied to help protect the wound.  Sutures are not usually needed.    II.  RISKS AND POTENTIAL COMPLICATIONS   I understand the risks and potential complications of a skin biopsy include but are not limited to the following:  Bleeding  Infection  Pain  Scar/keloid  Skin discoloration  Incomplete Removal  Recurrence  Nerve Damage/Numbness/Loss of Function  Allergic Reaction to Anesthesia  Biopsies are diagnostic procedures and based on findings additional treatment or evaluation may be required  Loss or destruction of specimen resulting in no additional findings    My Dermatologist " "has explained to me the nature of the condition, the nature of the procedure, and the benefits to be reasonably expected compared with alternative approaches.  My Dermatologist has discussed the likelihood of major risks or complications of this procedure including the specific risks listed above, such as bleeding, infection, and scarring/keloid.  I understand that a scar is expected after this procedure.  I understand that my physician cannot predict if the scar will form a \"keloid,\" which extends beyond the borders of the wound that is created.  A keloid is a thick, painful, and bumpy scar.  A keloid can be difficult to treat, as it does not always respond well to therapy, which includes injecting cortisone directly into the keloid every few weeks.  While this usually reduces the pain and size of the scar, it does not eliminate it.      I understand that photographs may be taken before and after the procedure.  These will be maintained as part of the medical providers confidential records and may not be made available to me.  I further authorize the medical provider to use the photographs for teaching purposes or to illustrate scientific papers, books, or lectures if in his/her judgment, medical research, education, or science may benefit from its use.    I have had an opportunity to fully inquire about the risks and benefits of this procedure and its alternatives.   I have been given ample time and opportunity to ask questions and to seek a second opinion if I wished to do so.  I acknowledge that there have specifically been no guarantees as to the cosmetic results from the procedure.  I am aware that with any procedure there is always the possibility of an unexpected complication.    III. POST-PROCEDURAL CARE (WHAT YOU WILL NEED TO DO \"AFTER THE BIOPSY\" TO OPTIMIZE HEALING)    Keep the area clean and dry.  Try NOT to remove the bandage or get it wet for the first 24 hours.    Gently clean the area and apply " "surgical ointment (such as Vaseline petrolatum ointment, which is available \"over the counter\" and not a prescription) to the biopsy site for up to 2 weeks straight.  This acts to protect the wound from the outside world.      Sutures are not usually placed in this procedure.  If any sutures were placed, return for suture removal as instructed (generally 1 week for the face, 2 weeks for the body).      Take Acetaminophen (Tylenol) for discomfort, if no contraindications.  Ibuprofen or aspirin could make bleeding worse.    Call our office immediately for signs of infection: fever, chills, increased redness, warmth, tenderness, discomfort/pain, or pus or foul smell coming from the wound.    WHAT TO DO IF THERE IS ANY BLEEDING?  If a small amount of bleeding is noticed, place a clean cloth over the area and apply firm pressure for ten minutes.  Check the wound after 10 minutes of direct pressure.  If bleeding persists, try one more time for an additional 10 minutes of direct pressure on the area.  If the bleeding becomes heavier or does not stop after the second attempt, or if you have any other questions about this procedure, then please call your [facility]'s Dermatologist by calling [phone] (SKIN).     I hereby acknowledge that I have reviewed and verified the site with my Dermatologist and have requested and authorized my Dermatologist to proceed with the procedure.  "

## 2025-05-13 NOTE — PROGRESS NOTES
"[facility] Dermatology Clinic Note     Patient Name: Tomy Stevenson  Encounter Date: 05/13/25       Have you been cared for by a [facility] Dermatologist in the last 3 years and, if so, which description applies to you? NO. I am considered a \"new\" patient and must complete all patient intake questions. I am of child-bearing potential.     REVIEW OF SYSTEMS:  Have you recently had or currently have any of the following? Recent fever or chills? No  Any non-healing wound? No  Are you pregnant or planning to become pregnant? No  Are you currently or planning to be nursing or breast feeding? No   PAST MEDICAL HISTORY:  Have you personally ever had or currently have any of the following?  If \"YES,\" then please provide more detail. Skin cancer (such as Melanoma, Basal Cell Carcinoma, Squamous Cell Carcinoma?  No  Tuberculosis, HIV/AIDS, Hepatitis B or C: No  Radiation Treatment No   HISTORY OF IMMUNOSUPPRESSION:   Do you have a history of any of the following:  Systemic Immunosuppression such as Diabetes, Biologic or Immunotherapy, Chemotherapy, Organ Transplantation, Bone Marrow Transplantation or Prednsione?  No    Answering \"YES\" requires the addition of the dotphrase \"IMMUNOSUPPRESSED\" as the first diagnosis of the patient's visit.   FAMILY HISTORY:  Any \"first degree relatives\" (parent, brother, sister, or child) with the following?    Skin Cancer, Pancreatic or Other Cancer? YES, Sister - Brain Cancer   PATIENT EXPERIENCE:    Do you want the Dermatologist to perform a COMPLETE skin exam today including a clinical examination under the \"bra and underwear\" areas?  NO  If necessary, do we have your permission to call and leave a detailed message on your Preferred Phone number that includes your specific medical information?  Yes      Allergies   Allergen Reactions   • Bee Venom      Bee sting   • Contrast [Iodinated Contrast Media] Anaphylaxis     Pt states her throat closes- kk rn    • Bee Pollen Dizziness     " Specifically bee sting which evokes reaction of swelling of throat, headache and vomitting   • Macadamia Nut Oil - Food Allergy Hives   • Pollen Extract Other (See Comments)     Throat clearing, difficulty swallowing, ears itchy   • Prochlorperazine Other (See Comments)      Aka (compazine)  Delirious    • Besifloxacin Hcl Palpitations     Other reaction(s): Unknown   • Nitroglycerin Rash      Current Outpatient Medications:   •  ascorbic acid (VITAMIN C) 500 MG tablet, Take 1 tablet (500 mg total) by mouth 2 (two) times a day, Disp: 60 tablet, Rfl: 1  •  carvedilol (COREG) 6.25 mg tablet, TAKE 1 TABLET BY MOUTH TWICE A DAY WITH MEALS (Patient not taking: Reported on 3/31/2025), Disp: 180 tablet, Rfl: 1  •  dexlansoprazole (DEXILANT) 60 MG capsule, Take 1 capsule (60 mg total) by mouth daily, Disp: 90 capsule, Rfl: 1  •  dicyclomine (BENTYL) 20 mg tablet, Take 1 tablet (20 mg total) by mouth every 6 (six) hours, Disp: 360 tablet, Rfl: 0  •  EPINEPHrine (EPIPEN) 0.3 mg/0.3 mL SOAJ, Inject 0.3 mL (0.3 mg total) into a muscle if needed for anaphylaxis As needed, Disp: 1 each, Rfl: 0  •  ferrous sulfate 324 (65 Fe) mg, Take 1 tablet (324 mg total) by mouth daily before breakfast, Disp: 60 tablet, Rfl: 1  •  folic acid (FOLVITE) 1 mg tablet, Take 1 tablet (1 mg total) by mouth daily, Disp: 30 tablet, Rfl: 1  •  hydrocortisone (ANUSOL-HC) 25 mg suppository, Insert 1 suppository (25 mg total) into the rectum 2 (two) times a day as needed for hemorrhoids, Disp: 12 suppository, Rfl: 1  •  mupirocin (BACTROBAN) 2 % ointment, Apply to bilateral nares twice daily 5 days prior to total joint arthroplasty, Disp: 22 g, Rfl: 0  •  pantoprazole (PROTONIX) 40 mg tablet, Take 1 tablet (40 mg total) by mouth daily, Disp: 30 tablet, Rfl: 0  •  rizatriptan (MAXALT) 10 mg tablet, One p.o. At headache onset, may repeat 1 after 2 hours p.r.n. Maximum 2/24 hours, Disp: 9 tablet, Rfl: 5  •  sucralfate (CARAFATE) 1 g tablet, Take 1 tablet (1 g  "total) by mouth 4 (four) times a day, Disp: 360 tablet, Rfl: 1              Whom besides the patient is providing clinical information about today's encounter?   NO ADDITIONAL HISTORIAN (patient alone provided history)    Physical Exam and Assessment/Plan by Diagnosis:    NEOPLASM OF UNCERTAIN BEHAVIOR OF SKIN    Physical Exam:  (Anatomic Location); (Size and Morphological Description); (Differential Diagnosis):  Specimen A; Right Medical Thigh; 0.5 mm dome shaped papule; DDX.:  Dermatofibroma  Specimen B; Right Posterior Shoulder; 0.4mm dome shaped papule; DDX.: Dermatofibroma  Pertinent Positives:  Pertinent Negatives:    Additional History of Present Condition:  present for years and changing    Assessment and Plan:  I have discussed with the patient that a sample of skin via a \"skin biopsy” would be potentially helpful to further make a specific diagnosis under the microscope.  Based on a thorough discussion of this condition and the management approach to it (including a comprehensive discussion of the known risks, side effects and potential benefits of treatment), the patient (family) agrees to implement the following specific plan:  PROCEDURE NOTE:  PUNCH BIOPSY  Performing Physician:   Anatomic Location; Clinical Description with size (cm); Pre-Op Diagnosis:    Specimen A; Right Medical Thigh; 0.5mm dome shaped papule; DDX.:  Dermatofibroma  Specimen B; Right Posterior Shoulder; 0.4mm dome shaped papule; DDX.:  Dermatofibroma   Anesthesia: 1% xylocaine with epi     Topical anesthesia: None  Indications: To indicate diagnosis and management plan.  Procedure Details   Patient informed of the risks (including bleeding,scaring and infection) and benefits of the procedure explained. Verbal and written informed consent obtained. The area was prepped and draped in the usual fashion. Anesthesia was obtained with 1% lidocaine with epinephrine. The skin was then stretched perpendicular to the skin tension " lines and a punch biopsy to an appropriate sampling depth was obtained with a 6 mm punch with a forceps and iris scissors.     Hemostasis was obtained with 4-0 Ethilon x 3 sutures per site.  Complications:  None  Specimen has been sent for review by Dermatopathology.  Plan:  1. Instructed to keep the wound dry and covered for 24-48h and clean thereafter.  2. Warning signs of infection were reviewed.    3. Recommended that the patient use acetaminophen as needed for pain  4. Sutures if any should be removed in 14 days    Standard post-procedure care has been explained and has been included in written form within the patient's copy of Informed Consent.    Scribe Attestation    I,:  Brittany Kim MA am acting as a scribe while in the presence of the attending physician.:       I,:  Harvey Ramirez DO personally performed the services described in this documentation    as scribed in my presence.:

## 2025-05-14 ENCOUNTER — OFFICE VISIT (OUTPATIENT)
Age: 60
End: 2025-05-14

## 2025-05-14 DIAGNOSIS — Z51.89 VISIT FOR WOUND CHECK: Primary | ICD-10-CM

## 2025-05-14 PROCEDURE — RECHECK: Performed by: STUDENT IN AN ORGANIZED HEALTH CARE EDUCATION/TRAINING PROGRAM

## 2025-05-14 NOTE — PROGRESS NOTES
"[facility] Dermatology Clinic Note     Patient Name: Tomy Stevenson  Encounter Date: 05/14/25       Have you been cared for by a [facility] Dermatologist in the last 3 years and, if so, which description applies to you? Yes. I have been here within the last 3 years, and my medical history has NOT changed since that time. I am of child-bearing potential.     REVIEW OF SYSTEMS:  Have you recently had or currently have any of the following? No changes in my recent health.   PAST MEDICAL HISTORY:  Have you personally ever had or currently have any of the following?  If \"YES,\" then please provide more detail. No changes in my medical history.   HISTORY OF IMMUNOSUPPRESSION: Do you have a history of any of the following:  Systemic Immunosuppression such as Diabetes, Biologic or Immunotherapy, Chemotherapy, Organ Transplantation, Bone Marrow Transplantation or Prednisone?  No     Answering \"YES\" requires the addition of the dotphrase \"IMMUNOSUPPRESSED\" as the first diagnosis of the patient's visit.   FAMILY HISTORY:  Any \"first degree relatives\" (parent, brother, sister, or child) with the following?    No changes in my family's known health.   PATIENT EXPERIENCE:    Do you want the Dermatologist to perform a COMPLETE skin exam today including a clinical examination under the \"bra and underwear\" areas?  NO  If necessary, do we have your permission to call and leave a detailed message on your Preferred Phone number that includes your specific medical information?  Yes      Allergies[1] Current Medications[2]              Whom besides the patient is providing clinical information about today's encounter?   NO ADDITIONAL HISTORIAN (patient alone provided history)    Physical Exam and Assessment/Plan by Diagnosis:    WOUND CHECK    Physical Exam:  Anatomic Location Affected:  Right Medical Thigh  Description of wound: biopsy site is clean and intact, ring of purplish bruising noted around perimeter of biopsy site "   Closure Type: 3 sutures still intact    Additional History of Present Condition:  punch biopsy done yesterday, pt notes that when she got in the car this morning she felt pulling    Assessment and Plan:  Based on a thorough discussion of this condition and the management approach to it (including a comprehensive discussion of the known risks, side effects and potential benefits of treatment), the patient (family) agrees to implement the following specific plan:  Consider minor trauma to the area that explains the bruising, lower suspicion for skin necrosis from lido with epinephrine. Superior shoulder site without this type of reaction.   RTC in 5 days to recheck              [1]  Allergies  Allergen Reactions   • Bee Venom      Bee sting   • Contrast [Iodinated Contrast Media] Anaphylaxis     Pt states her throat closes- kk rn    • Bee Pollen Dizziness     Specifically bee sting which evokes reaction of swelling of throat, headache and vomitting   • Macadamia Nut Oil - Food Allergy Hives   • Pollen Extract Other (See Comments)     Throat clearing, difficulty swallowing, ears itchy   • Prochlorperazine Other (See Comments)      Aka (compazine)  Delirious    • Besifloxacin Hcl Palpitations     Other reaction(s): Unknown   • Nitroglycerin Rash   [2]    Current Outpatient Medications:   •  ascorbic acid (VITAMIN C) 500 MG tablet, Take 1 tablet (500 mg total) by mouth 2 (two) times a day, Disp: 60 tablet, Rfl: 1  •  carvedilol (COREG) 6.25 mg tablet, TAKE 1 TABLET BY MOUTH TWICE A DAY WITH MEALS (Patient not taking: Reported on 3/31/2025), Disp: 180 tablet, Rfl: 1  •  dexlansoprazole (DEXILANT) 60 MG capsule, Take 1 capsule (60 mg total) by mouth daily, Disp: 90 capsule, Rfl: 1  •  dicyclomine (BENTYL) 20 mg tablet, Take 1 tablet (20 mg total) by mouth every 6 (six) hours, Disp: 360 tablet, Rfl: 0  •  EPINEPHrine (EPIPEN) 0.3 mg/0.3 mL SOAJ, Inject 0.3 mL (0.3 mg total) into a muscle if needed for anaphylaxis As  needed, Disp: 1 each, Rfl: 0  •  ferrous sulfate 324 (65 Fe) mg, Take 1 tablet (324 mg total) by mouth daily before breakfast, Disp: 60 tablet, Rfl: 1  •  folic acid (FOLVITE) 1 mg tablet, Take 1 tablet (1 mg total) by mouth daily, Disp: 30 tablet, Rfl: 1  •  hydrocortisone (ANUSOL-HC) 25 mg suppository, Insert 1 suppository (25 mg total) into the rectum 2 (two) times a day as needed for hemorrhoids, Disp: 12 suppository, Rfl: 1  •  mupirocin (BACTROBAN) 2 % ointment, Apply to bilateral nares twice daily 5 days prior to total joint arthroplasty, Disp: 22 g, Rfl: 0  •  pantoprazole (PROTONIX) 40 mg tablet, Take 1 tablet (40 mg total) by mouth daily, Disp: 30 tablet, Rfl: 0  •  rizatriptan (MAXALT) 10 mg tablet, One p.o. At headache onset, may repeat 1 after 2 hours p.r.n. Maximum 2/24 hours, Disp: 9 tablet, Rfl: 5  •  sucralfate (CARAFATE) 1 g tablet, Take 1 tablet (1 g total) by mouth 4 (four) times a day, Disp: 360 tablet, Rfl: 1

## 2025-05-15 DIAGNOSIS — R21 RASH: ICD-10-CM

## 2025-05-15 RX ORDER — CLOBETASOL PROPIONATE 0.5 MG/G
OINTMENT TOPICAL 2 TIMES DAILY
Qty: 60 G | Refills: 3 | Status: SHIPPED | OUTPATIENT
Start: 2025-05-15

## 2025-05-16 ENCOUNTER — HOSPITAL ENCOUNTER (OUTPATIENT)
Dept: MAMMOGRAPHY | Facility: CLINIC | Age: 60
End: 2025-05-16
Payer: COMMERCIAL

## 2025-05-16 VITALS — WEIGHT: 137 LBS | BODY MASS INDEX: 21.5 KG/M2 | HEIGHT: 67 IN

## 2025-05-16 DIAGNOSIS — Z12.31 SCREENING MAMMOGRAM FOR BREAST CANCER: ICD-10-CM

## 2025-05-16 PROCEDURE — 88305 TISSUE EXAM BY PATHOLOGIST: CPT | Performed by: PATHOLOGY

## 2025-05-16 PROCEDURE — 77063 BREAST TOMOSYNTHESIS BI: CPT

## 2025-05-16 PROCEDURE — 77067 SCR MAMMO BI INCL CAD: CPT

## 2025-05-19 ENCOUNTER — RESULTS FOLLOW-UP (OUTPATIENT)
Age: 60
End: 2025-05-19

## 2025-05-28 ENCOUNTER — CLINICAL SUPPORT (OUTPATIENT)
Age: 60
End: 2025-05-28

## 2025-05-28 DIAGNOSIS — D48.9 NEOPLASM OF UNCERTAIN BEHAVIOR: Primary | ICD-10-CM

## 2025-05-28 PROCEDURE — RECHECK: Performed by: STUDENT IN AN ORGANIZED HEALTH CARE EDUCATION/TRAINING PROGRAM

## 2025-05-28 NOTE — PROGRESS NOTES
Suture removal    Date/Time: 5/28/2025 2:45 PM    Performed by: Ramya Armstrong  Authorized by: Harvey Ramirez DO    Universal Protocol:  Procedure performed by: (Dr. Ramirez)  Consent given by: patient  Timeout called at: 5/28/2025 1:00 PM.  Patient understanding: patient states understanding of the procedure being performed  Patient identity confirmed: verbally with patient      Patient location:  Clinic  Location:     Anesthesia laterality: right shoulder, right inner thigh.  Procedure details:     Tools used:  Suture removal kit    Wound appearance:  No sign(s) of infection, good wound healing, clean, nonpurulent, moist and tender    Number of sutures removed:  6  Post-procedure details:     Post-removal:  Dressing applied    Patient tolerance of procedure:  Tolerated well, no immediate complications

## 2025-06-11 DIAGNOSIS — M16.12 PRIMARY OSTEOARTHRITIS OF LEFT HIP: Primary | ICD-10-CM

## 2025-06-11 RX ORDER — CELECOXIB 100 MG/1
100 CAPSULE ORAL EVERY 12 HOURS PRN
Qty: 60 CAPSULE | Refills: 1 | Status: SHIPPED | OUTPATIENT
Start: 2025-06-11

## 2025-06-12 DIAGNOSIS — E66.3 OVERWEIGHT: Primary | ICD-10-CM

## 2025-06-12 RX ORDER — TIRZEPATIDE 2.5 MG/.5ML
2.5 INJECTION, SOLUTION SUBCUTANEOUS WEEKLY
Qty: 2 ML | Refills: 0 | Status: SHIPPED | OUTPATIENT
Start: 2025-06-12 | End: 2025-07-10

## 2025-06-24 DIAGNOSIS — F41.9 ANXIETY: Primary | ICD-10-CM

## 2025-06-24 RX ORDER — LORAZEPAM 1 MG/1
TABLET ORAL
Qty: 1 TABLET | Refills: 0 | Status: SHIPPED | OUTPATIENT
Start: 2025-06-24

## 2025-06-30 DIAGNOSIS — K21.9 GASTROESOPHAGEAL REFLUX DISEASE, UNSPECIFIED WHETHER ESOPHAGITIS PRESENT: ICD-10-CM

## 2025-06-30 RX ORDER — DEXLANSOPRAZOLE 60 MG/1
60 CAPSULE, DELAYED RELEASE ORAL DAILY
Qty: 90 CAPSULE | Refills: 1 | Status: SHIPPED | OUTPATIENT
Start: 2025-06-30

## 2025-07-08 ENCOUNTER — TELEPHONE (OUTPATIENT)
Age: 60
End: 2025-07-08

## 2025-07-08 NOTE — TELEPHONE ENCOUNTER
PA for dexlansorpazole 60 mg    SUBMITTED  [x][facility]-Case ID #   [id number]:[facility] RxPhone:[phone]       All office notes, labs and other pertaining documents and studies sent. Clinical questions answered. Awaiting determination from insurance company.     Turnaround time for your insurance to make a decision on your Prior Authorization can take 7-21 business days.

## 2025-07-09 NOTE — TELEPHONE ENCOUNTER
PA for dexlansorpazole APPROVED      PA Case: [id number], Authorized from July 8, 2025 to July 8, 2026  Patient advised by          []MyChart Message  []Phone call   [x]LMOM  []L/M to call office as no active Communication consent on file  []Unable to leave detailed message as VM not approved on Communication consent       Pharmacy advised by    [x]Fax  []Phone call  []Secure Chat

## 2025-07-14 ENCOUNTER — TELEPHONE (OUTPATIENT)
Dept: GASTROENTEROLOGY | Facility: CLINIC | Age: 60
End: 2025-07-14

## 2025-07-14 NOTE — TELEPHONE ENCOUNTER
LMOM informing patient that her appt 11/14 with Iqra has been canceled. Provider not in the office.  Patient will need to reschedule.

## 2025-07-21 DIAGNOSIS — Z78.0 ASYMPTOMATIC POSTMENOPAUSAL STATUS: Primary | ICD-10-CM

## 2025-07-25 ENCOUNTER — APPOINTMENT (OUTPATIENT)
Age: 60
End: 2025-07-25
Payer: COMMERCIAL

## 2025-07-25 ENCOUNTER — HOSPITAL ENCOUNTER (OUTPATIENT)
Age: 60
Discharge: HOME | End: 2025-07-25
Payer: COMMERCIAL

## 2025-07-25 VITALS — WEIGHT: 131 LBS | BODY MASS INDEX: 21.05 KG/M2 | HEIGHT: 66 IN

## 2025-07-25 DIAGNOSIS — R53.83 OTHER FATIGUE: ICD-10-CM

## 2025-07-25 DIAGNOSIS — Z11.3 SCREEN FOR STD (SEXUALLY TRANSMITTED DISEASE): ICD-10-CM

## 2025-07-25 DIAGNOSIS — Z78.0 ASYMPTOMATIC POSTMENOPAUSAL STATUS: ICD-10-CM

## 2025-07-25 LAB
B BURGDOR IGG+IGM SER QL IA: NEGATIVE
HCV AB SER QL: NORMAL
HIV 1+2 AB+HIV1 P24 AG SERPL QL IA: NORMAL
TREPONEMA PALLIDUM IGG+IGM AB [PRESENCE] IN SERUM OR PLASMA BY IMMUNOASSAY: NORMAL
TSH SERPL DL<=0.05 MIU/L-ACNC: 4.12 UIU/ML (ref 0.45–4.5)

## 2025-07-25 PROCEDURE — 36415 COLL VENOUS BLD VENIPUNCTURE: CPT

## 2025-07-25 PROCEDURE — 86780 TREPONEMA PALLIDUM: CPT

## 2025-07-25 PROCEDURE — 86803 HEPATITIS C AB TEST: CPT

## 2025-07-25 PROCEDURE — 84443 ASSAY THYROID STIM HORMONE: CPT

## 2025-07-25 PROCEDURE — 77080 DXA BONE DENSITY AXIAL: CPT

## 2025-07-25 PROCEDURE — 87389 HIV-1 AG W/HIV-1&-2 AB AG IA: CPT

## 2025-07-25 PROCEDURE — 86618 LYME DISEASE ANTIBODY: CPT

## 2025-08-08 ENCOUNTER — TELEPHONE (OUTPATIENT)
Age: 60
End: 2025-08-08

## 2025-08-08 ENCOUNTER — OFFICE VISIT (OUTPATIENT)
Age: 60
End: 2025-08-08
Payer: COMMERCIAL

## 2025-08-08 VITALS
WEIGHT: 132.4 LBS | HEART RATE: 63 BPM | DIASTOLIC BLOOD PRESSURE: 76 MMHG | RESPIRATION RATE: 18 BRPM | HEIGHT: 66 IN | TEMPERATURE: 97.3 F | SYSTOLIC BLOOD PRESSURE: 122 MMHG | OXYGEN SATURATION: 97 % | BODY MASS INDEX: 21.28 KG/M2

## 2025-08-08 DIAGNOSIS — M81.0 OSTEOPOROSIS OF LUMBAR SPINE: ICD-10-CM

## 2025-08-08 DIAGNOSIS — Z00.00 ADULT GENERAL MEDICAL EXAMINATION: Primary | ICD-10-CM

## 2025-08-08 PROBLEM — Z86.718 HISTORY OF DVT (DEEP VEIN THROMBOSIS): Chronic | Status: ACTIVE | Noted: 2021-08-01

## 2025-08-08 PROBLEM — K80.50 BILIARY COLIC: Status: RESOLVED | Noted: 2021-08-01 | Resolved: 2025-08-08

## 2025-08-08 PROCEDURE — 99396 PREV VISIT EST AGE 40-64: CPT | Performed by: INTERNAL MEDICINE

## 2025-08-08 RX ORDER — SODIUM CHLORIDE 9 MG/ML
20 INJECTION, SOLUTION INTRAVENOUS ONCE
OUTPATIENT
Start: 2025-08-18

## 2025-08-08 RX ORDER — ZOLEDRONIC ACID 0.05 MG/ML
5 INJECTION, SOLUTION INTRAVENOUS ONCE
OUTPATIENT
Start: 2025-08-18

## 2025-08-13 ENCOUNTER — APPOINTMENT (OUTPATIENT)
Age: 60
End: 2025-08-13
Payer: COMMERCIAL

## 2025-08-13 ENCOUNTER — APPOINTMENT (OUTPATIENT)
Age: 60
End: 2025-08-13
Attending: PREVENTIVE MEDICINE
Payer: COMMERCIAL

## 2025-08-13 DIAGNOSIS — M81.0 OSTEOPOROSIS OF LUMBAR SPINE: ICD-10-CM

## 2025-08-13 DIAGNOSIS — Z00.8 HEALTH EXAMINATION IN POPULATION SURVEY: ICD-10-CM

## 2025-08-13 LAB
ALBUMIN SERPL BCG-MCNC: 4.5 G/DL (ref 3.5–5)
ALP SERPL-CCNC: 147 U/L (ref 34–104)
ALT SERPL W P-5'-P-CCNC: 10 U/L (ref 7–52)
ANION GAP SERPL CALCULATED.3IONS-SCNC: 10 MMOL/L (ref 4–13)
AST SERPL W P-5'-P-CCNC: 19 U/L (ref 13–39)
BILIRUB SERPL-MCNC: 0.48 MG/DL (ref 0.2–1)
BUN SERPL-MCNC: 14 MG/DL (ref 5–25)
CALCIUM SERPL-MCNC: 9.5 MG/DL (ref 8.4–10.2)
CHLORIDE SERPL-SCNC: 104 MMOL/L (ref 96–108)
CHOLEST SERPL-MCNC: 203 MG/DL (ref ?–200)
CO2 SERPL-SCNC: 30 MMOL/L (ref 21–32)
CREAT SERPL-MCNC: 0.81 MG/DL (ref 0.6–1.3)
EST. AVERAGE GLUCOSE BLD GHB EST-MCNC: 128 MG/DL
GFR SERPL CREATININE-BSD FRML MDRD: 79 ML/MIN/1.73SQ M
GLUCOSE P FAST SERPL-MCNC: 93 MG/DL (ref 65–99)
HBA1C MFR BLD: 6.1 %
HDLC SERPL-MCNC: 80 MG/DL
LDLC SERPL CALC-MCNC: 104 MG/DL (ref 0–100)
NONHDLC SERPL-MCNC: 123 MG/DL
POTASSIUM SERPL-SCNC: 3.8 MMOL/L (ref 3.5–5.3)
PROT SERPL-MCNC: 7.3 G/DL (ref 6.4–8.4)
SODIUM SERPL-SCNC: 144 MMOL/L (ref 135–147)
TRIGL SERPL-MCNC: 97 MG/DL (ref ?–150)

## 2025-08-13 PROCEDURE — 80053 COMPREHEN METABOLIC PANEL: CPT

## 2025-08-13 PROCEDURE — 80061 LIPID PANEL: CPT

## 2025-08-13 PROCEDURE — 36415 COLL VENOUS BLD VENIPUNCTURE: CPT

## 2025-08-13 PROCEDURE — 83036 HEMOGLOBIN GLYCOSYLATED A1C: CPT

## 2025-08-14 ENCOUNTER — TELEPHONE (OUTPATIENT)
Dept: INFUSION CENTER | Facility: CLINIC | Age: 60
End: 2025-08-14

## 2025-08-18 ENCOUNTER — HOSPITAL ENCOUNTER (OUTPATIENT)
Dept: INFUSION CENTER | Facility: CLINIC | Age: 60
Discharge: HOME | End: 2025-08-18
Attending: INTERNAL MEDICINE
Payer: COMMERCIAL

## 2025-08-18 VITALS
HEART RATE: 64 BPM | SYSTOLIC BLOOD PRESSURE: 147 MMHG | WEIGHT: 133.4 LBS | HEIGHT: 66 IN | DIASTOLIC BLOOD PRESSURE: 83 MMHG | RESPIRATION RATE: 18 BRPM | BODY MASS INDEX: 21.44 KG/M2 | TEMPERATURE: 97.2 F

## 2025-08-18 DIAGNOSIS — M81.0 OSTEOPOROSIS OF LUMBAR SPINE: Primary | ICD-10-CM

## 2025-08-18 PROCEDURE — 96374 THER/PROPH/DIAG INJ IV PUSH: CPT

## 2025-08-18 RX ORDER — SODIUM CHLORIDE 9 MG/ML
20 INJECTION, SOLUTION INTRAVENOUS ONCE
OUTPATIENT
Start: 2025-09-15

## 2025-08-18 RX ORDER — ZOLEDRONIC ACID 0.05 MG/ML
5 INJECTION, SOLUTION INTRAVENOUS ONCE
Status: COMPLETED | OUTPATIENT
Start: 2025-08-18 | End: 2025-08-18

## 2025-08-18 RX ORDER — SODIUM CHLORIDE 9 MG/ML
20 INJECTION, SOLUTION INTRAVENOUS ONCE
Status: COMPLETED | OUTPATIENT
Start: 2025-08-18 | End: 2025-08-18

## 2025-08-18 RX ORDER — ZOLEDRONIC ACID 0.05 MG/ML
5 INJECTION, SOLUTION INTRAVENOUS ONCE
OUTPATIENT
Start: 2025-09-15

## 2025-08-18 RX ADMIN — SODIUM CHLORIDE 20 ML/HR: 0.9 INJECTION, SOLUTION INTRAVENOUS at 12:20

## 2025-08-18 RX ADMIN — ZOLEDRONIC ACID 5 MG: 5 INJECTION INTRAVENOUS at 12:18

## 2025-08-19 ENCOUNTER — CLINICAL SUPPORT (OUTPATIENT)
Age: 60
End: 2025-08-19
Payer: COMMERCIAL

## 2025-08-19 ENCOUNTER — PATIENT MESSAGE (OUTPATIENT)
Dept: OBGYN CLINIC | Facility: CLINIC | Age: 60
End: 2025-08-19

## 2025-08-19 DIAGNOSIS — U07.1 COVID: Primary | ICD-10-CM

## 2025-08-19 LAB
SARS-COV-2 AG UPPER RESP QL IA: NEGATIVE
VALID CONTROL: NORMAL

## 2025-08-19 PROCEDURE — 87811 SARS-COV-2 COVID19 W/OPTIC: CPT

## (undated) DEVICE — TUBING SMOKE EVAC W/FILTRATION DEVICE PLUMEPORT ACTIV

## (undated) DEVICE — DRAPE EQUIPMENT RF WAND

## (undated) DEVICE — ENDOPATH 5MM CURVED SCISSORS WITH MONOPOLAR CAUTERY: Brand: ENDOPATH

## (undated) DEVICE — INTENDED FOR TISSUE SEPARATION, AND OTHER PROCEDURES THAT REQUIRE A SHARP SURGICAL BLADE TO PUNCTURE OR CUT.: Brand: BARD-PARKER SAFETY BLADES SIZE 11, STERILE

## (undated) DEVICE — TR BAND RADIAL ARTERY COMPRESSION DEVICE: Brand: TR BAND

## (undated) DEVICE — ELECTRODE LAP L WIRE E-Z CLEAN 33CM -0100

## (undated) DEVICE — DGW .035 FC J3MM 260CM TEF: Brand: EMERALD

## (undated) DEVICE — GLIDESHEATH SLENDER STAINLESS STEEL KIT: Brand: GLIDESHEATH SLENDER

## (undated) DEVICE — GUIDEWIRE WHOLEY HI TORQUE INTERM MOD J.035 260CM

## (undated) DEVICE — CHLORAPREP HI-LITE 26ML ORANGE

## (undated) DEVICE — TROCAR: Brand: KII FIOS FIRST ENTRY

## (undated) DEVICE — GLOVE SRG BIOGEL 7

## (undated) DEVICE — TROCAR: Brand: KII® SLEEVE

## (undated) DEVICE — PAD GROUNDING ADULT

## (undated) DEVICE — SCD SEQUENTIAL COMPRESSION COMFORT SLEEVE MEDIUM KNEE LENGTH: Brand: KENDALL SCD

## (undated) DEVICE — SUT MONOCRYL 4-0 PS-2 18 IN Y496G

## (undated) DEVICE — 5 MM CURVED DISSECTORS WITH MONOPOLAR CAUTERY: Brand: ENDOPATH

## (undated) DEVICE — ALLENTOWN LAP CHOLE APP PACK: Brand: CARDINAL HEALTH

## (undated) DEVICE — GAUZE SPONGES,8 PLY: Brand: CURITY

## (undated) DEVICE — LIGAMAX 5 MM ENDOSCOPIC MULTIPLE CLIP APPLIER: Brand: LIGAMAX

## (undated) DEVICE — 3M™ TEGADERM™ TRANSPARENT FILM DRESSING FRAME STYLE, 1624W, 2-3/8 IN X 2-3/4 IN (6 CM X 7 CM), 100/CT 4CT/CASE: Brand: 3M™ TEGADERM™

## (undated) DEVICE — LIGHT HANDLE COVER SLEEVE DISP BLUE STELLAR

## (undated) DEVICE — TROCARS: Brand: KII® BALLOON BLUNT TIP SYSTEM

## (undated) DEVICE — 3M™ STERI-STRIP™ REINFORCED ADHESIVE SKIN CLOSURES, R1546, 1/4 IN X 4 IN (6 MM X 100 MM), 10 STRIPS/ENVELOPE: Brand: 3M™ STERI-STRIP™

## (undated) DEVICE — RADIFOCUS OPTITORQUE ANGIOGRAPHIC CATHETER: Brand: OPTITORQUE

## (undated) DEVICE — PENCIL ELECTROSURG E-Z CLEAN -0035H

## (undated) DEVICE — SUT VICRYL 0 UR-6 27 IN J603H

## (undated) DEVICE — TISSUE RETRIEVAL SYSTEM: Brand: INZII RETRIEVAL SYSTEM

## (undated) DEVICE — [HIGH FLOW INSUFFLATOR,  DO NOT USE IF PACKAGE IS DAMAGED,  KEEP DRY,  KEEP AWAY FROM SUNLIGHT,  PROTECT FROM HEAT AND RADIOACTIVE SOURCES.]: Brand: PNEUMOSURE